# Patient Record
Sex: FEMALE | Race: BLACK OR AFRICAN AMERICAN | Employment: OTHER | URBAN - METROPOLITAN AREA
[De-identification: names, ages, dates, MRNs, and addresses within clinical notes are randomized per-mention and may not be internally consistent; named-entity substitution may affect disease eponyms.]

---

## 2017-01-31 ENCOUNTER — OFFICE VISIT (OUTPATIENT)
Dept: ORTHOPEDIC SURGERY | Age: 62
End: 2017-01-31

## 2017-01-31 ENCOUNTER — ANESTHESIA EVENT (OUTPATIENT)
Dept: ENDOSCOPY | Age: 62
End: 2017-01-31
Payer: COMMERCIAL

## 2017-01-31 VITALS
DIASTOLIC BLOOD PRESSURE: 60 MMHG | SYSTOLIC BLOOD PRESSURE: 143 MMHG | TEMPERATURE: 98 F | WEIGHT: 203 LBS | RESPIRATION RATE: 18 BRPM | OXYGEN SATURATION: 99 % | HEART RATE: 80 BPM | BODY MASS INDEX: 34.66 KG/M2 | HEIGHT: 64 IN

## 2017-01-31 DIAGNOSIS — M54.16 LUMBAR RADICULOPATHY: ICD-10-CM

## 2017-01-31 DIAGNOSIS — M79.18 MYOFASCIAL PAIN: ICD-10-CM

## 2017-01-31 DIAGNOSIS — M47.899 FACET SYNDROME: Primary | ICD-10-CM

## 2017-01-31 RX ORDER — IBUPROFEN 800 MG/1
TABLET ORAL
COMMUNITY
End: 2017-12-20

## 2017-01-31 NOTE — PROGRESS NOTES
[address]  [address], [address]  [place], [facility]  Phone: [phone]  Fax: [phone]        Mary Chavarria  : 1955  PCP: Henrique Da Silva MD  2017    PROGRESS NOTE      ASSESSMENT AND PLAN    Debo Davis comes in to the office today for a bilateral L4-5 and L5-S1 facet joint injection f/u. She has not had the facet injections completed. Her pain still appears to be due to a combination of facet syndrome, myofascial pain, and a right L5 radiculopathy. She will continue with the facet injections to see if it will provide relief. Pt was advised to continue her HEP. We discussed treating the other components of her pain if she finds relief for her facet syndrome with the injections. Pt will f/u in 3 weeks or prn. Nick Plascencia was seen today for back pain. Diagnoses and all orders for this visit:    Facet syndrome    Myofascial pain    Lumbar radiculopathy       Follow-up Disposition:  Return in about 3 weeks (around 2017), or if symptoms worsen or fail to improve. HISTORY OF PRESENT ILLNESS  Debo Davis is a 59 y.o. female. A&P / HPI from 2016:  Hector Puga was in the office today c/o lumbar pain. Her pain is likely due to myofascial pain syndrome. She has been in PT for about 4 weeks with mild relief. Pt will continue with PT at this time. She will continue taking Naproxen and Flexeril.      She was advised to increase the amount that she does her home exercises each day, stay active, and to use heat and massage as needed.        Pt will f/u in 6 weeks, or prn.       Updates from 16:  Pt presents for a lumbar PT f/u. She states that her pain has remained about the same, but she does mention that it has been a few weeks since she has gone and she does not do her exercises at home very often.  She has six PT visits left for the year.      Her pain today is predominantly in the right lumbar region.     Updates from 10/21/16:  Pt presents for a lumbar MRI f/u. The MRI shows some facet hypertrophy at L4-5 and L5-S1, a posterior L5-S1 disc bulge, and mild contact/compression of the bilateral L5 nerve roots.     She has pain with right back extension. She has a negative straight leg raise bilaterally. Pt mentions pain with standing for too long. She also has radiating right leg pain. Updates from 01/31/17:  Pt presents for a bilateral L4-5 and L5-S1 facet joint injection f/u. She has not had the facet injections completed. Her pain still appears to be due to facet syndrome. PAST MEDICAL HISTORY   Past Medical History   Diagnosis Date    Gastritis     GERD (gastroesophageal reflux disease)      erosive esophagitis 11/13 EGD    H/O colonoscopy 11/2013     normal    Hypertension     Left knee pain     Precordial pain     Prediabetes        Past Surgical History   Procedure Laterality Date    Hx gyn       vaginal delivery x 2    Hx orthopaedic       foot surgery    Hx cataract removal Right 10/04/2016    Hx colonoscopy  11/06/2016   . MEDICATIONS      Current Outpatient Prescriptions   Medication Sig Dispense Refill    naproxen (NAPROSYN) 500 mg tablet Take 500 mg by mouth two (2) times daily (with meals).  omeprazole (PRILOSEC) 20 mg capsule Take 20 mg by mouth daily.  cyclobenzaprine (FLEXERIL) 5 mg tablet Take 1 Tab by mouth nightly as needed for Muscle Spasm(s). 20 Tab 0    LORATADINE/PSEUDOEPHEDRINE (CLARITIN-D 24 HOUR PO) Take  by mouth.  fluticasone (FLONASE) 50 mcg/actuation nasal spray 2 sprays each nostril once daily (Patient taking differently: daily as needed. 2 sprays each nostril once daily) 1 Bottle 3    amLODIPine-valsartan (EXFORGE) 5-160 mg per tablet TAKE 1 TABLET DAILY 90 Tab 1    omega-3 fatty acids-vitamin e (FISH OIL) 1,000 mg cap Take 1 Cap by mouth.  multivitamin (ONE A DAY) tablet Take 1 Tab by mouth daily.       diclofenac EC (VOLTAREN) 75 mg EC tablet Take 1 Tab by mouth two (2) times daily as needed. 60 Tab 5    ibuprofen (MOTRIN) 800 mg tablet Take  by mouth. ALLERGIES    Allergies   Allergen Reactions    Pcn [Penicillins] Hives          SOCIAL HISTORY    Social History     Social History    Marital status:      Spouse name: N/A    Number of children: N/A    Years of education: N/A     Social History Main Topics    Smoking status: Never Smoker    Smokeless tobacco: Never Used    Alcohol use No    Drug use: No    Sexual activity: Not Asked     Other Topics Concern    None     Social History Narrative       FAMILY HISTORY    Family History   Problem Relation Age of Onset    Heart Failure Mother     Heart Disease Mother      mi 52's    Asthma Other          REVIEW OF SYSTEMS  Review of Systems   Constitutional: Negative for chills, diaphoresis, fever, malaise/fatigue and weight loss. Respiratory: Negative for shortness of breath. Cardiovascular: Negative for chest pain and leg swelling. Gastrointestinal: Negative for constipation, nausea and vomiting. Neurological: Negative for dizziness, tingling, seizures, loss of consciousness and headaches. Psychiatric/Behavioral: The patient does not have insomnia. PHYSICAL EXAMINATION  Visit Vitals    /60    Pulse 80    Temp 98 °F (36.7 °C) (Oral)    Resp 18    Ht 5' 3.5\" (1.613 m)    Wt 203 lb (92.1 kg)    SpO2 99%    BMI 35.4 kg/m2       Pain Assessment  10/21/2016   Location of Pain Back   Location Modifiers -   Severity of Pain 4   Quality of Pain Aching   Duration of Pain Persistent   Frequency of Pain Constant   Aggravating Factors -   Aggravating Factors Comment -   Limiting Behavior -   Relieving Factors -   Relieving Factors Comment -   Result of Injury No           Constitutional:  Well developed, well nourished, in no acute distress. Psychiatric: Affect and mood are appropriate. Integumentary: No rashes or abrasions noted on exposed areas. SPINE/MUSCULOSKELETAL EXAM    Cervical spine:  Neck is midline.    Normal muscle tone.    No focal atrophy is noted.    ROM pain free.    Shoulder ROM intact.    No tenderness to palpation. Negative Spurling's sign.    Negative Tinel's sign.    Negative Guevara's sign.       Sensation in the bilateral arms grossly intact to light touch.        Lumbar spine:  No rash, ecchymosis, or gross obliquity.    No fasciculations.    No focal atrophy is noted.    No pain with hip ROM.    Range of motion is normal.    Tenderness to palpation on right QL. No tenderness to palpation at the sciatic notch.    SI joints non-tender.    Trochanters non tender.      Sensation in the bilateral legs grossly intact to light touch. Updates from 01/31/17:  Pain with back extension, L=M=R. No pain with back flexion. MOTOR:      Biceps  Triceps Deltoids Wrist Ext Wrist Flex Hand Intrin   Right 5/5 5/5 5/5 5/5 5/5 5/5   Left 5/5 5/5 5/5 5/5 5/5 5/5             Hip Flex  Quads Hamstrings Ankle DF EHL Ankle PF   Right 5/5 5/5 5/5 5/5 5/5 5/5   Left 5/5 5/5 5/5 5/5 5/5 5/5     DTRs are 2+ biceps, triceps, brachioradialis, patella, and Achilles.      Negative Straight Leg raise.    Squat not tested.    No difficulty with tandem gait.    Normal heel walk.    Normal toe rise.       Ambulation without assistive device. FWB.       RADIOGRAPHS  Lumbar XR images from 7/11/2016 personally reviewed with patient:  1) Good overall alignment  2) No obvious fractures or instabilities    3) Degenerative changes     Lumbar MRI images taken on 10/18/2016 personally reviewed with patient:  Mild straightening of lumbar lordosis with no significant spondylolisthesis. No  compression fracture or pathologic marrow signal. Conus medullaris ends at L1-L2  with normal morphology and signal intensity.      High T2 signal lesions in the right kidney and in the left kidney, partially  imaged.  Likely cysts and can follow-up with CT or ultrasound.      L1-L2: Minimal posterior disc bulge with tiny right paracentral disc protrusion. No central stenosis or significant mass effect. No foraminal stenosis.      L2-L3, L3-L4: No disc herniation or central stenosis. Mild facet and ligamentous  hypertrophy. Patent neural foramina.      L4-L5: Minimal posterior disc bulge. Facet ligamentous hypertrophy with small  bilateral facet joint effusion. Mild left posterior lateral disc protrusion. Mild effacement of left lateral recess. Mild compression of the left descending  L5 nerve root possible. Patent neural foramina with no exiting L4 root  compression.      L5-S1: Posterior disc bulge. No significant central stenosis. Facet and  ligamentous hypertrophy with no significant foraminal stenosis. Disc material  slightly contacts the right exiting L5 nerve root within the right foramen.      IMPRESSION  IMPRESSION:  1. Mild disease at L4-L5 and L5-S1 as described. 2. Presumed cysts in the kidneys can be further followed up with CT or  ultrasound.     Daphney Luis   reviewed      Ms. Marya Shone has a reminder for a \"due or due soon\" health maintenance. I have asked that she contact her primary care provider for follow-up on this health maintenance.       This plan was reviewed with the patient and patient agrees. All questions were answered. More than half of this visit today was spent on counseling.     Written by Yana Rivero, as dictated by Dr. Horace Cedeno, Dr. Brien Mary, confirm that all documentation is accurate.

## 2017-01-31 NOTE — PATIENT INSTRUCTIONS
Riskalyze Activation    Thank you for requesting access to [employer]ze. Please follow the instructions below to securely access and download your online medical record. Riskalyze allows you to send messages to your doctor, view your test results, renew your prescriptions, schedule appointments, and more. How Do I Sign Up? 1. In your internet browser, go to [email or web address]  2. Click on the First Time User? Click Here link in the Sign In box. You will be redirect to the New Member Sign Up page. 3. Enter your Riskalyze Access Code exactly as it appears below. You will not need to use this code after youve completed the sign-up process. If you do not sign up before the expiration date, you must request a new code. Riskalyze Access Code: [email or web address]  Expires: 2017  9:36 AM (This is the date your Riskalyze access code will )    4. Enter the last four digits of your Social Security Number (xxxx) and Date of Birth (mm/dd/yyyy) as indicated and click Submit. You will be taken to the next sign-up page. 5. Create a Riskalyze ID. This will be your Riskalyze login ID and cannot be changed, so think of one that is secure and easy to remember. 6. Create a Riskalyze password. You can change your password at any time. 7. Enter your Password Reset Question and Answer. This can be used at a later time if you forget your password. 8. Enter your e-mail address. You will receive e-mail notification when new information is available in [address] 9. Click Sign Up. You can now view and download portions of your medical record. 10. Click the Download Summary menu link to download a portable copy of your medical information. Additional Information    If you have questions, please visit the Frequently Asked Questions section of the Riskalyze website at https://for; to (do) Centers. [employer]. [employer] Daily Caller/[employer] Home [employer]/. Remember, Riskalyze is NOT to be used for urgent needs. For medical emergencies, dial 911.          Low Back Pain: Exercises  Your Care Instructions  Here are some examples of typical rehabilitation exercises for your condition. Start each exercise slowly. Ease off the exercise if you start to have pain. Your doctor or physical therapist will tell you when you can start these exercises and which ones will work best for you. How to do the exercises  Press-up    1. Lie on your stomach, supporting your body with your forearms. 2. Press your elbows down into the floor to raise your upper back. As you do this, relax your stomach muscles and allow your back to arch without using your back muscles. As your press up, do not let your hips or pelvis come off the floor. 3. Hold for 15 to 30 seconds, then relax. 4. Repeat 2 to 4 times. Alternate arm and leg (bird dog) exercise    Note: Do this exercise slowly. Try to keep your body straight at all times, and do not let one hip drop lower than the other. 1. Start on the floor, on your hands and knees. 2. Tighten your belly muscles. 3. Raise one leg off the floor, and hold it straight out behind you. Be careful not to let your hip drop down, because that will twist your trunk. 4. Hold for about 6 seconds, then lower your leg and switch to the other leg. 5. Repeat 8 to 12 times on each leg. 6. Over time, work up to holding for 10 to 30 seconds each time. 7. If you feel stable and secure with your leg raised, try raising the opposite arm straight out in front of you at the same time. Knee-to-chest exercise    1. Lie on your back with your knees bent and your feet flat on the floor. 2. Bring one knee to your chest, keeping the other foot flat on the floor (or keeping the other leg straight, whichever feels better on your lower back). 3. Keep your lower back pressed to the floor. Hold for at least 15 to 30 seconds. 4. Relax, and lower the knee to the starting position. 5. Repeat with the other leg. Repeat 2 to 4 times with each leg.   6. To get more stretch, put your other leg flat on the floor while pulling your knee to your chest.  Curl-ups    1. Lie on the floor on your back with your knees bent at a 90-degree angle. Your feet should be flat on the floor, about 12 inches from your buttocks. 2. Cross your arms over your chest. If this bothers your neck, try putting your hands behind your neck (not your head), with your elbows spread apart. 3. Slowly tighten your belly muscles and raise your shoulder blades off the floor. 4. Keep your head in line with your body, and do not press your chin to your chest.  5. Hold this position for 1 or 2 seconds, then slowly lower yourself back down to the floor. 6. Repeat 8 to 12 times. Pelvic tilt exercise    1. Lie on your back with your knees bent. 2. \"Brace\" your stomach. This means to tighten your muscles by pulling in and imagining your belly button moving toward your spine. You should feel like your back is pressing to the floor and your hips and pelvis are rocking back. 3. Hold for about 6 seconds while you breathe smoothly. 4. Repeat 8 to 12 times. Heel dig bridging    1. Lie on your back with both knees bent and your ankles bent so that only your heels are digging into the floor. Your knees should be bent about 90 degrees. 2. Then push your heels into the floor, squeeze your buttocks, and lift your hips off the floor until your shoulders, hips, and knees are all in a straight line. 3. Hold for about 6 seconds as you continue to breathe normally, and then slowly lower your hips back down to the floor and rest for up to 10 seconds. 4. Do 8 to 12 repetitions. Hamstring stretch in doorway    1. Lie on your back in a doorway, with one leg through the open door. 2. Slide your leg up the wall to straighten your knee. You should feel a gentle stretch down the back of your leg. 3. Hold the stretch for at least 15 to 30 seconds. Do not arch your back, point your toes, or bend either knee.  Keep one heel touching the floor and the other heel touching the wall.  4. Repeat with your other leg. 5. Do 2 to 4 times for each leg. Hip flexor stretch    1. Kneel on the floor with one knee bent and one leg behind you. Place your forward knee over your foot. Keep your other knee touching the floor. 2. Slowly push your hips forward until you feel a stretch in the upper thigh of your rear leg. 3. Hold the stretch for at least 15 to 30 seconds. Repeat with your other leg. 4. Do 2 to 4 times on each side. Wall sit    1. Stand with your back 10 to 12 inches away from a wall. 2. Lean into the wall until your back is flat against it. 3. Slowly slide down until your knees are slightly bent, pressing your lower back into the wall. 4. Hold for about 6 seconds, then slide back up the wall. 5. Repeat 8 to 12 times. Follow-up care is a key part of your treatment and safety. Be sure to make and go to all appointments, and call your doctor if you are having problems. It's also a good idea to know your test results and keep a list of the medicines you take. Where can you learn more? Go to http://carlos eduardo-turner.info/. Enter R021 in the search box to learn more about \"Low Back Pain: Exercises. \"  Current as of: May 23, 2016  Content Version: 11.1  © 4176-9952 [employer]. Care instructions adapted under license by [employer] (which disclaims liability or warranty for this information). If you have questions about a medical condition or this instruction, always ask your healthcare professional. [place] [address] any warranty or liability for your use of this information.

## 2017-02-01 ENCOUNTER — ANESTHESIA (OUTPATIENT)
Dept: ENDOSCOPY | Age: 62
End: 2017-02-01
Payer: COMMERCIAL

## 2017-02-01 ENCOUNTER — SURGERY (OUTPATIENT)
Age: 62
End: 2017-02-01

## 2017-02-01 PROCEDURE — 74011000250 HC RX REV CODE- 250

## 2017-02-01 PROCEDURE — 74011250636 HC RX REV CODE- 250/636

## 2017-02-01 RX ORDER — LIDOCAINE HYDROCHLORIDE 20 MG/ML
INJECTION, SOLUTION EPIDURAL; INFILTRATION; INTRACAUDAL; PERINEURAL AS NEEDED
Status: DISCONTINUED | OUTPATIENT
Start: 2017-02-01 | End: 2017-02-01 | Stop reason: HOSPADM

## 2017-02-01 RX ORDER — PROPOFOL 10 MG/ML
INJECTION, EMULSION INTRAVENOUS AS NEEDED
Status: DISCONTINUED | OUTPATIENT
Start: 2017-02-01 | End: 2017-02-01 | Stop reason: HOSPADM

## 2017-02-01 RX ADMIN — LIDOCAINE HYDROCHLORIDE 40 MG: 20 INJECTION, SOLUTION EPIDURAL; INFILTRATION; INTRACAUDAL; PERINEURAL at 09:35

## 2017-02-01 RX ADMIN — PROPOFOL 50 MG: 10 INJECTION, EMULSION INTRAVENOUS at 09:35

## 2017-02-01 NOTE — ANESTHESIA PREPROCEDURE EVALUATION
Anesthetic History   No history of anesthetic complications            Review of Systems / Medical History  Patient summary reviewed and pertinent labs reviewed    Pulmonary  Within defined limits                 Neuro/Psych              Cardiovascular    Hypertension: well controlled              Exercise tolerance: >4 METS     GI/Hepatic/Renal     GERD           Endo/Other    Diabetes (prediabetic)    Morbid obesity     Other Findings   Comments: Current Smoker? NO       Elective Surgery? Yes       Abstained from smoking 24 hours prior to anesthesia? N/A    Risk Factors for Postoperative nausea/vomiting:       History of postoperative nausea/vomiting? NO       Female? YES       Motion sickness? NO       Intended opioid administration for postoperative analgesia?   NO           Physical Exam    Airway  Mallampati: II  TM Distance: 4 - 6 cm  Neck ROM: normal range of motion   Mouth opening: Normal     Cardiovascular  Regular rate and rhythm,  S1 and S2 normal,  no murmur, click, rub, or gallop             Dental  No notable dental hx       Pulmonary  Breath sounds clear to auscultation               Abdominal  GI exam deferred       Other Findings            Anesthetic Plan    ASA: 2  Anesthesia type: MAC          Induction: Intravenous  Anesthetic plan and risks discussed with: Patient

## 2017-02-01 NOTE — ANESTHESIA POSTPROCEDURE EVALUATION
Post-Anesthesia Evaluation and Assessment    Patient: Heidy Kaur MRN: [id number]  SSN: [id number]    YOB: 1955  Age: 59 y.o. Sex: female       Cardiovascular Function/Vital Signs  Visit Vitals    /78    Pulse (!) 50    Temp 36.4 °C (97.6 °F)    Resp 15    Ht 5' 3.5\" (1.613 m)    Wt 93 kg (205 lb)    SpO2 99%    Breastfeeding No    BMI 35.74 kg/m2       Patient is status post MAC anesthesia for Procedure(s):  ENDOSCOPY with biopsies. Nausea/Vomiting: None    Postoperative hydration reviewed and adequate. Pain:  Pain Scale 1: Numeric (0 - 10) (02/01/17 1018)  Pain Intensity 1: 0 (02/01/17 1018)   Managed    Neurological Status: At baseline    Mental Status and Level of Consciousness: Arousable    Pulmonary Status:   O2 Device: Room air (02/01/17 1018)   Adequate oxygenation and airway patent    Complications related to anesthesia: None    Post-anesthesia assessment completed.  No concerns      Signed By: Kourtney Kuo MD     February 1, 2017

## 2017-02-27 DIAGNOSIS — G89.29 CHRONIC RIGHT-SIDED LOW BACK PAIN, WITH SCIATICA PRESENCE UNSPECIFIED: ICD-10-CM

## 2017-02-27 DIAGNOSIS — M54.5 CHRONIC RIGHT-SIDED LOW BACK PAIN, WITH SCIATICA PRESENCE UNSPECIFIED: ICD-10-CM

## 2017-02-27 NOTE — TELEPHONE ENCOUNTER
This patient contacted office for the following prescriptions to be filled:    Medication requested :   Requested Prescriptions     Pending Prescriptions Disp Refills    cyclobenzaprine (FLEXERIL) 5 mg tablet 20 Tab 0     Sig: Take 1 Tab by mouth nightly as needed for Muscle Spasm(s).        PCP: [address] [place] [address] Street or Print: pharmacy  Mail order or Local pharmacy: [facility]     Scheduled appointment if not seen by current providers in office: last seen on 12/12/16 has fu on 4/24/17

## 2017-02-28 ENCOUNTER — TELEPHONE (OUTPATIENT)
Dept: ORTHOPEDIC SURGERY | Age: 62
End: 2017-02-28

## 2017-02-28 DIAGNOSIS — M54.5 CHRONIC RIGHT-SIDED LOW BACK PAIN, WITH SCIATICA PRESENCE UNSPECIFIED: ICD-10-CM

## 2017-02-28 DIAGNOSIS — G89.29 CHRONIC RIGHT-SIDED LOW BACK PAIN, WITH SCIATICA PRESENCE UNSPECIFIED: ICD-10-CM

## 2017-02-28 RX ORDER — CYCLOBENZAPRINE HCL 5 MG
5 TABLET ORAL
Qty: 20 TAB | Refills: 0 | OUTPATIENT
Start: 2017-02-28

## 2017-02-28 NOTE — TELEPHONE ENCOUNTER
PATIENT CALLED FOR . PATIENT SAID SHE ASKED HER PCP FOR A REFILL ON A MUSCLE SPASM MEDICATION; BUT HER PCP TOLD HER THAT SHE NEEDED TO CALL HER SPINE DOCTOR TO REQUEST THE MEDICATION. PATIENT IS ASKING FOR CYCLOBENZAPRINE MEDICATION FROM DR. ROMERO. WOULD LIKE CALLED IN  Juani Novak   TEL. [phone]. PATIENT TEL. [phone].

## 2017-03-01 RX ORDER — CYCLOBENZAPRINE HCL 5 MG
5 TABLET ORAL
Qty: 20 TAB | Refills: 0 | Status: SHIPPED | OUTPATIENT
Start: 2017-03-01 | End: 2017-07-20 | Stop reason: SDUPTHER

## 2017-03-01 NOTE — TELEPHONE ENCOUNTER
Per Dr. Radu Jorgensen, med refilled as before, sent to [address] on file. Attempted to call patient and number below to inform, phone rang numerous times, no answer, no voicemail or answering machine picked up. Will try again later.

## 2017-03-01 NOTE — TELEPHONE ENCOUNTER
Reached patient, informed of message below. Patient very grateful, verbalized agreement/understanding. No further action required at this time.

## 2017-04-10 ENCOUNTER — HOSPITAL ENCOUNTER (OUTPATIENT)
Dept: LAB | Age: 62
Discharge: HOME | End: 2017-04-10
Payer: COMMERCIAL

## 2017-04-10 LAB
ALBUMIN SERPL BCP-MCNC: 3.8 G/DL (ref 3.4–5)
ALBUMIN/GLOB SERPL: 1.4 {RATIO} (ref 0.8–1.7)
ALP SERPL-CCNC: 72 U/L (ref 45–117)
ALT SERPL-CCNC: 38 U/L (ref 13–56)
ANION GAP BLD CALC-SCNC: 10 MMOL/L (ref 3–18)
AST SERPL W P-5'-P-CCNC: 28 U/L (ref 15–37)
BILIRUB SERPL-MCNC: 0.6 MG/DL (ref 0.2–1)
BUN SERPL-MCNC: 17 MG/DL (ref 7–18)
BUN/CREAT SERPL: 24 (ref 12–20)
CALCIUM SERPL-MCNC: 8.7 MG/DL (ref 8.5–10.1)
CHLORIDE SERPL-SCNC: 108 MMOL/L (ref 100–108)
CO2 SERPL-SCNC: 26 MMOL/L (ref 21–32)
CREAT SERPL-MCNC: 0.71 MG/DL (ref 0.6–1.3)
GLOBULIN SER CALC-MCNC: 2.8 G/DL (ref 2–4)
GLUCOSE SERPL-MCNC: 115 MG/DL (ref 74–99)
HBA1C MFR BLD: 6.3 % (ref 4.2–5.6)
POTASSIUM SERPL-SCNC: 3.8 MMOL/L (ref 3.5–5.5)
PROT SERPL-MCNC: 6.6 G/DL (ref 6.4–8.2)
SODIUM SERPL-SCNC: 144 MMOL/L (ref 136–145)

## 2017-04-10 PROCEDURE — 83036 HEMOGLOBIN GLYCOSYLATED A1C: CPT | Performed by: FAMILY MEDICINE

## 2017-04-10 PROCEDURE — 80053 COMPREHEN METABOLIC PANEL: CPT | Performed by: FAMILY MEDICINE

## 2017-04-10 PROCEDURE — 36415 COLL VENOUS BLD VENIPUNCTURE: CPT | Performed by: FAMILY MEDICINE

## 2017-04-10 RX ORDER — AMLODIPINE AND VALSARTAN 5; 160 MG/1; MG/1
TABLET ORAL
Qty: 90 TAB | Refills: 0 | Status: SHIPPED | OUTPATIENT
Start: 2017-04-10 | End: 2017-07-09 | Stop reason: SDUPTHER

## 2017-04-11 ENCOUNTER — HOSPITAL ENCOUNTER (OUTPATIENT)
Dept: MAMMOGRAPHY | Age: 62
Discharge: HOME | End: 2017-04-11
Attending: FAMILY MEDICINE
Payer: COMMERCIAL

## 2017-04-11 DIAGNOSIS — Z12.31 VISIT FOR SCREENING MAMMOGRAM: ICD-10-CM

## 2017-04-11 DIAGNOSIS — R92.2 INCONCLUSIVE MAMMOGRAM: Primary | ICD-10-CM

## 2017-04-11 PROCEDURE — 77063 BREAST TOMOSYNTHESIS BI: CPT

## 2017-04-21 ENCOUNTER — HOSPITAL ENCOUNTER (OUTPATIENT)
Dept: ULTRASOUND IMAGING | Age: 62
Discharge: HOME | End: 2017-04-21
Attending: FAMILY MEDICINE
Payer: COMMERCIAL

## 2017-04-21 ENCOUNTER — HOSPITAL ENCOUNTER (OUTPATIENT)
Dept: MAMMOGRAPHY | Age: 62
Discharge: HOME | End: 2017-04-21
Attending: FAMILY MEDICINE
Payer: COMMERCIAL

## 2017-04-21 DIAGNOSIS — R92.2 INCONCLUSIVE MAMMOGRAM: ICD-10-CM

## 2017-04-21 PROCEDURE — 77065 DX MAMMO INCL CAD UNI: CPT

## 2017-04-24 ENCOUNTER — OFFICE VISIT (OUTPATIENT)
Dept: FAMILY MEDICINE CLINIC | Age: 62
End: 2017-04-24

## 2017-04-24 VITALS
TEMPERATURE: 97.9 F | SYSTOLIC BLOOD PRESSURE: 144 MMHG | RESPIRATION RATE: 16 BRPM | HEART RATE: 68 BPM | HEIGHT: 64 IN | DIASTOLIC BLOOD PRESSURE: 96 MMHG | BODY MASS INDEX: 34.83 KG/M2 | OXYGEN SATURATION: 96 % | WEIGHT: 204 LBS

## 2017-04-24 DIAGNOSIS — R73.03 PREDIABETES: ICD-10-CM

## 2017-04-24 DIAGNOSIS — K21.9 GASTROESOPHAGEAL REFLUX DISEASE, ESOPHAGITIS PRESENCE NOT SPECIFIED: ICD-10-CM

## 2017-04-24 DIAGNOSIS — I10 ESSENTIAL HYPERTENSION: Primary | ICD-10-CM

## 2017-04-24 NOTE — PROGRESS NOTES
Chief Complaint   Patient presents with    Hypertension    Other     Pre-DM    GERD    Results     1. Have you been to the ER, urgent care clinic since your last visit? Hospitalized since your last visit? No    2. Have you seen or consulted any other health care providers outside of the [facility] since your last visit? Include any pap smears or colon screening.  No

## 2017-04-24 NOTE — MR AVS SNAPSHOT
Visit Information Date & Time Provider Department Dept. Phone Encounter #  
 4/24/2017  8:00 AM Ophelia Skiff, [address] [id number] Follow-up Instructions Return in about 3 months (around 7/24/2017), or if symptoms worsen or fail to improve. Upcoming Health Maintenance Date Due  
 BREAST CANCER SCRN MAMMOGRAM 4/21/2019 PAP AKA CERVICAL CYTOLOGY 10/20/2019 DTaP/Tdap/Td series (2 - Td) 2/25/2023 COLONOSCOPY 12/24/2023 Allergies as of 4/24/2017  Review Complete On: 4/24/2017 By: Ophelia Skiff, MD  
  
 Severity Noted Reaction Type Reactions Pcn [Penicillins]  07/26/2010    Hives Current Immunizations  Reviewed on 10/20/2016 Name Date Influenza Vaccine 10/9/2014 Influenza Vaccine César Camps) 10/20/2016 Influenza Vaccine (Quad) PF 10/19/2015 Influenza Vaccine Split 9/21/2011 Tdap 2/25/2013 Not reviewed this visit You Were Diagnosed With   
  
 Codes Comments Essential hypertension    -  Primary ICD-10-CM: I10 
ICD-9-CM: 401.9 Prediabetes     ICD-10-CM: R73.03 
ICD-9-CM: 790.29 Gastroesophageal reflux disease, esophagitis presence not specified     ICD-10-CM: K21.9 ICD-9-CM: 530.81 Vitals BP Pulse Temp Resp Height(growth percentile) Weight(growth percentile) (!) 144/96 (BP 1 Location: Left arm, BP Patient Position: Sitting) 68 97.9 °F (36.6 °C) (Oral) 16 5' 3.5\" (1.613 m) 204 lb (92.5 kg) SpO2 BMI OB Status Smoking Status 96% 35.57 kg/m2 Postmenopausal Never Smoker BMI and BSA Data Body Mass Index Body Surface Area 35.57 kg/m 2 2.04 m 2 Preferred Pharmacy Pharmacy Name Phone [facility] [phone] Your Updated Medication List  
  
   
This list is accurate as of: 4/24/17  8:48 AM.  Always use your most recent med list. amLODIPine-valsartan 5-160 mg per tablet Commonly known as:  EXFORGE  
TAKE 1 TABLET DAILY CLARITIN-D 24 HOUR PO Take  by mouth. cyclobenzaprine 5 mg tablet Commonly known as:  FLEXERIL Take 1 Tab by mouth nightly as needed for Muscle Spasm(s). diclofenac EC 75 mg EC tablet Commonly known as:  VOLTAREN Take 1 Tab by mouth two (2) times daily as needed. FISH OIL 1,000 mg Cap Generic drug:  omega-3 fatty acids-vitamin e Take 1 Cap by mouth. fluticasone 50 mcg/actuation nasal spray Commonly known as:  FLONASE  
2 sprays each nostril once daily  
  
 ibuprofen 800 mg tablet Commonly known as:  MOTRIN Take  by mouth.  
  
 multivitamin tablet Commonly known as:  ONE A DAY Take 1 Tab by mouth daily. NAPROSYN 500 mg tablet Generic drug:  naproxen Take 500 mg by mouth two (2) times daily (with meals). omeprazole 20 mg capsule Commonly known as:  PRILOSEC Take 20 mg by mouth daily. Follow-up Instructions Return in about 3 months (around 7/24/2017), or if symptoms worsen or fail to improve. To-Do List   
 04/24/2017 Lab:  HEMOGLOBIN A1C W/O EAG   
  
 04/24/2017 Lab:  METABOLIC PANEL, BASIC Patient Instructions DASH Diet: Care Instructions Your Care Instructions The DASH diet is an eating plan that can help lower your blood pressure. DASH stands for Dietary Approaches to Stop Hypertension. Hypertension is high blood pressure. The DASH diet focuses on eating foods that are high in calcium, potassium, and magnesium. These nutrients can lower blood pressure. The foods that are highest in these nutrients are fruits, vegetables, low-fat dairy products, nuts, seeds, and legumes. But taking calcium, potassium, and magnesium supplements instead of eating foods that are high in those nutrients does not have the same effect. The DASH diet also includes whole grains, fish, and poultry.  
The DASH diet is one of several lifestyle changes your doctor may recommend to lower your high blood pressure. Your doctor may also want you to decrease the amount of sodium in your diet. Lowering sodium while following the DASH diet can lower blood pressure even further than just the DASH diet alone. Follow-up care is a key part of your treatment and safety. Be sure to make and go to all appointments, and call your doctor if you are having problems. It's also a good idea to know your test results and keep a list of the medicines you take. How can you care for yourself at home? Following the DASH diet · Eat 4 to 5 servings of fruit each day. A serving is 1 medium-sized piece of fruit, ½ cup chopped or canned fruit, 1/4 cup dried fruit, or 4 ounces (½ cup) of fruit juice. Choose fruit more often than fruit juice. · Eat 4 to 5 servings of vegetables each day. A serving is 1 cup of lettuce or raw leafy vegetables, ½ cup of chopped or cooked vegetables, or 4 ounces (½ cup) of vegetable juice. Choose vegetables more often than vegetable juice. · Get 2 to 3 servings of low-fat and fat-free dairy each day. A serving is 8 ounces of milk, 1 cup of yogurt, or 1 ½ ounces of cheese. · Eat 6 to 8 servings of grains each day. A serving is 1 slice of bread, 1 ounce of dry cereal, or ½ cup of cooked rice, pasta, or cooked cereal. Try to choose whole-grain products as much as possible. · Limit lean meat, poultry, and fish to 2 servings each day. A serving is 3 ounces, about the size of a deck of cards. · Eat 4 to 5 servings of nuts, seeds, and legumes (cooked dried beans, lentils, and split peas) each week. A serving is 1/3 cup of nuts, 2 tablespoons of seeds, or ½ cup of cooked beans or peas. · Limit fats and oils to 2 to 3 servings each day. A serving is 1 teaspoon of vegetable oil or 2 tablespoons of salad dressing. · Limit sweets and added sugars to 5 servings or less a week. A serving is 1 tablespoon jelly or jam, ½ cup sorbet, or 1 cup of lemonade. · Eat less than 2,300 milligrams (mg) of sodium a day. If you limit your sodium to 1,500 mg a day, you can lower your blood pressure even more. Tips for success · Start small. Do not try to make dramatic changes to your diet all at once. You might feel that you are missing out on your favorite foods and then be more likely to not follow the plan. Make small changes, and stick with them. Once those changes become habit, add a few more changes. · Try some of the following: ¨ Make it a goal to eat a fruit or vegetable at every meal and at snacks. This will make it easy to get the recommended amount of fruits and vegetables each day. ¨ Try yogurt topped with fruit and nuts for a snack or healthy dessert. ¨ Add lettuce, tomato, cucumber, and onion to sandwiches. ¨ Combine a ready-made pizza crust with low-fat mozzarella cheese and lots of vegetable toppings. Try using tomatoes, squash, spinach, broccoli, carrots, cauliflower, and onions. ¨ Have a variety of cut-up vegetables with a low-fat dip as an appetizer instead of chips and dip. ¨ Sprinkle sunflower seeds or chopped almonds over salads. Or try adding chopped walnuts or almonds to cooked vegetables. ¨ Try some vegetarian meals using beans and peas. Add garbanzo or kidney beans to salads. Make burritos and tacos with mashed valdez beans or black beans. Where can you learn more? Go to http://carlos eduardo-turner.info/. Enter W008 in the search box to learn more about \"DASH Diet: Care Instructions. \" Current as of: March 23, 2016 Content Version: 11.2 © 8409-4661 MedioTrabajo. Care instructions adapted under license by SeeSaw.[employer] (which disclaims liability or warranty for this information). If you have questions about a medical condition or this instruction, always ask your healthcare professional. [address] any warranty or liability for your use of this information. Introducing [facility]! Armando Warner introduces CaseStack patient portal. Now you can access parts of your medical record, email your doctor's office, and request medication refills online. 1. In your internet browser, go to [email or web address] 2. Click on the First Time User? Click Here link in the Sign In box. You will see the New Member Sign Up page. 3. Enter your CaseStack Access Code exactly as it appears below. You will not need to use this code after youve completed the sign-up process. If you do not sign up before the expiration date, you must request a new code. · CaseStack Access Code: [email or web address] Expires: 4/30/2017 10:36 AM 
 
4. Enter the last four digits of your Social Security Number (xxxx) and Date of Birth (mm/dd/yyyy) as indicated and click Submit. You will be taken to the next sign-up page. 5. Create a CaseStack ID. This will be your CaseStack login ID and cannot be changed, so think of one that is secure and easy to remember. 6. Create a CaseStack password. You can change your password at any time. 7. Enter your Password Reset Question and Answer. This can be used at a later time if you forget your password. 8. Enter your e-mail address. You will receive e-mail notification when new information is available in [address] 9. Click Sign Up. You can now view and download portions of your medical record. 10. Click the Download Summary menu link to download a portable copy of your medical information. If you have questions, please visit the Frequently Asked Questions section of the CaseStack website. Remember, CaseStack is NOT to be used for urgent needs. For medical emergencies, dial 911. Now available from your iPhone and Android! Please provide this summary of care documentation to your next provider. Your primary care clinician is listed as Marquis Sharp. If you have any questions after today's visit, please call [phone].

## 2017-04-24 NOTE — PROGRESS NOTES
Sary Friends, 59 y.o.,  female    SUBJECTIVE  Routine ff-up    HTN- taking meds later in the day, no symptoms. Did not take BP meds this am yet, and drank coffee    GERD- doing well on PPI, per pt recent EGD GLS normal, will request records    Chronic low back pain/ Myofascial pain- followed by dr. Angel Diallo, considering facet injections. Prediabetes- sedentary, diet is poor, soda regularly . ROS:  See HPI, all others negative        Patient Active Problem List   Diagnosis Code    HTN (hypertension) I10    Prediabetes R73.03    Left knee pain M25.562    Gastroesophageal reflux disease K21.9    Inconclusive mammogram R92.2       Current Outpatient Prescriptions   Medication Sig Dispense Refill    amLODIPine-valsartan (EXFORGE) 5-160 mg per tablet TAKE 1 TABLET DAILY 90 Tab 0    cyclobenzaprine (FLEXERIL) 5 mg tablet Take 1 Tab by mouth nightly as needed for Muscle Spasm(s). 20 Tab 0    ibuprofen (MOTRIN) 800 mg tablet Take  by mouth.  naproxen (NAPROSYN) 500 mg tablet Take 500 mg by mouth two (2) times daily (with meals).  omeprazole (PRILOSEC) 20 mg capsule Take 20 mg by mouth daily.  LORATADINE/PSEUDOEPHEDRINE (CLARITIN-D 24 HOUR PO) Take  by mouth.  fluticasone (FLONASE) 50 mcg/actuation nasal spray 2 sprays each nostril once daily (Patient taking differently: daily as needed. 2 sprays each nostril once daily) 1 Bottle 3    omega-3 fatty acids-vitamin e (FISH OIL) 1,000 mg cap Take 1 Cap by mouth.  multivitamin (ONE A DAY) tablet Take 1 Tab by mouth daily.  diclofenac EC (VOLTAREN) 75 mg EC tablet Take 1 Tab by mouth two (2) times daily as needed.  60 Tab 5       Allergies   Allergen Reactions    Pcn [Penicillins] Hives       Past Medical History:   Diagnosis Date    Gastritis     GERD (gastroesophageal reflux disease)     erosive esophagitis 11/13 EGD    H/O colonoscopy 11/2013    normal    Hypertension     Left knee pain     Morbid obesity ([address]     Precordial pain     Prediabetes        Social History     Social History    Marital status:      Spouse name: N/A    Number of children: N/A    Years of education: N/A     Occupational History    Not on file. Social History Main Topics    Smoking status: Never Smoker    Smokeless tobacco: Never Used    Alcohol use No    Drug use: No    Sexual activity: Not on file     Other Topics Concern    Not on file     Social History Narrative       Family History   Problem Relation Age of Onset    Heart Failure Mother     Heart Disease Mother      mi 52's    Asthma Other          OBJECTIVE    Physical Exam:     Visit Vitals    BP (!) 144/96 (BP 1 Location: Left arm, BP Patient Position: Sitting)  Comment: did not take meds this am and also had coffee    Pulse 68    Temp 97.9 °F (36.6 °C) (Oral)    Resp 16    Ht 5' 3.5\" (1.613 m)    Wt 204 lb (92.5 kg)    SpO2 96%    BMI 35.57 kg/m2       General: alert, well-appearing, in no apparent distress or pain  CVS: normal rate, regular rhythm, distinct S1 and S2  Lungs:clear to ausculation bilaterally, no crackles, wheezing or rhonchi noted  Abdomen: normoactive bowel sounds, soft, non-tender  Extremities: no edema, no cyanosis,  Skin: warm, no lesions, rashes noted  Psych:  mood and affect normal  Results for orders placed or performed during the hospital encounter of 63/45/61   METABOLIC PANEL, COMPREHENSIVE   Result Value Ref Range    Sodium 144 136 - 145 mmol/L    Potassium 3.8 3.5 - 5.5 mmol/L    Chloride 108 100 - 108 mmol/L    CO2 26 21 - 32 mmol/L    Anion gap 10 3.0 - 18 mmol/L    Glucose 115 (H) 74 - 99 mg/dL    BUN 17 7.0 - 18 MG/DL    Creatinine 0.71 0.6 - 1.3 MG/DL    BUN/Creatinine ratio 24 (H) 12 - 20      GFR est AA >60 >60 ml/min/1.73m2    GFR est non-AA >60 >60 ml/min/1.73m2    Calcium 8.7 8.5 - 10.1 MG/DL    Bilirubin, total 0.6 0.2 - 1.0 MG/DL    ALT (SGPT) 38 13 - 56 U/L    AST (SGOT) 28 15 - 37 U/L    Alk.  phosphatase 72 45 - 117 U/L    Protein, total 6.6 6.4 - 8.2 g/dL    Albumin 3.8 3.4 - 5.0 g/dL    Globulin 2.8 2.0 - 4.0 g/dL    A-G Ratio 1.4 0.8 - 1.7     HEMOGLOBIN A1C W/O EAG   Result Value Ref Range    Hemoglobin A1c 6.3 (H) 4.2 - 5.6 %           ASSESSMENT/PLAN  Tae Ramachandran was seen today for hypertension, other and gerd. Diagnoses and all orders for this visit:    Essential hypertension with goal blood pressure less than 140/90  Elevated today, Prev controlled, did not take am bp med yet  Cont current med exforge  Will monitor, DASH diet, meds prior to doctor visit    Prediabetes  Worsening, lengthy discussion on wt loss strategies, avoid soda, calorie counting, read labels    Gastroesophageal reflux disease, esophagitis presence not specified  Stable, cont PPI. Judicious use of PPI. Request EGD report GLS    Myofascial pain- improving, cont care with Dr. Bloom/PT/considering facet injection    Chronic low back pain without sciatica  Cont care with dr. Lee Just    Follow-up Disposition:  Return in about 3 months (around 7/24/2017), or if symptoms worsen or fail to improve. Patient understands plan of care. Patient has provided input and agrees with goals.

## 2017-04-24 NOTE — PATIENT INSTRUCTIONS

## 2017-07-17 ENCOUNTER — HOSPITAL ENCOUNTER (OUTPATIENT)
Dept: LAB | Age: 62
Discharge: HOME | End: 2017-07-17
Payer: COMMERCIAL

## 2017-07-17 LAB
ANION GAP BLD CALC-SCNC: 7 MMOL/L (ref 3–18)
BUN SERPL-MCNC: 19 MG/DL (ref 7–18)
BUN/CREAT SERPL: 21 (ref 12–20)
CALCIUM SERPL-MCNC: 9.2 MG/DL (ref 8.5–10.1)
CHLORIDE SERPL-SCNC: 106 MMOL/L (ref 100–108)
CO2 SERPL-SCNC: 28 MMOL/L (ref 21–32)
CREAT SERPL-MCNC: 0.92 MG/DL (ref 0.6–1.3)
GLUCOSE SERPL-MCNC: 109 MG/DL (ref 74–99)
HBA1C MFR BLD: 5.7 % (ref 4.2–5.6)
POTASSIUM SERPL-SCNC: 4.1 MMOL/L (ref 3.5–5.5)
SODIUM SERPL-SCNC: 141 MMOL/L (ref 136–145)

## 2017-07-17 PROCEDURE — 83036 HEMOGLOBIN GLYCOSYLATED A1C: CPT | Performed by: FAMILY MEDICINE

## 2017-07-17 PROCEDURE — 80048 BASIC METABOLIC PNL TOTAL CA: CPT | Performed by: FAMILY MEDICINE

## 2017-07-17 PROCEDURE — 36415 COLL VENOUS BLD VENIPUNCTURE: CPT | Performed by: FAMILY MEDICINE

## 2017-07-20 DIAGNOSIS — G89.29 CHRONIC RIGHT-SIDED LOW BACK PAIN, WITH SCIATICA PRESENCE UNSPECIFIED: ICD-10-CM

## 2017-07-20 DIAGNOSIS — M54.5 CHRONIC RIGHT-SIDED LOW BACK PAIN, WITH SCIATICA PRESENCE UNSPECIFIED: ICD-10-CM

## 2017-07-20 RX ORDER — CYCLOBENZAPRINE HCL 5 MG
TABLET ORAL
Qty: 20 TAB | Refills: 0 | Status: SHIPPED | OUTPATIENT
Start: 2017-07-20 | End: 2017-12-20 | Stop reason: SDUPTHER

## 2017-07-26 ENCOUNTER — HOSPITAL ENCOUNTER (OUTPATIENT)
Dept: LAB | Age: 62
Discharge: HOME | End: 2017-07-26
Payer: COMMERCIAL

## 2017-07-26 DIAGNOSIS — Z01.818 PREOP EXAMINATION: ICD-10-CM

## 2017-07-26 LAB
ALBUMIN SERPL BCP-MCNC: 3.9 G/DL (ref 3.4–5)
ALBUMIN/GLOB SERPL: 1.2 {RATIO} (ref 0.8–1.7)
ALP SERPL-CCNC: 78 U/L (ref 45–117)
ALT SERPL-CCNC: 29 U/L (ref 13–56)
ANION GAP BLD CALC-SCNC: 7 MMOL/L (ref 3–18)
AST SERPL W P-5'-P-CCNC: 17 U/L (ref 15–37)
ATRIAL RATE: 74 BPM
BILIRUB SERPL-MCNC: 0.6 MG/DL (ref 0.2–1)
BUN SERPL-MCNC: 15 MG/DL (ref 7–18)
BUN/CREAT SERPL: 19 (ref 12–20)
CALCIUM SERPL-MCNC: 9.3 MG/DL (ref 8.5–10.1)
CALCULATED P AXIS, ECG09: 46 DEGREES
CALCULATED R AXIS, ECG10: -4 DEGREES
CALCULATED T AXIS, ECG11: 22 DEGREES
CHLORIDE SERPL-SCNC: 106 MMOL/L (ref 100–108)
CO2 SERPL-SCNC: 27 MMOL/L (ref 21–32)
CREAT SERPL-MCNC: 0.79 MG/DL (ref 0.6–1.3)
DIAGNOSIS, 93000: NORMAL
ERYTHROCYTE [DISTWIDTH] IN BLOOD BY AUTOMATED COUNT: 12.9 % (ref 11.6–14.5)
GLOBULIN SER CALC-MCNC: 3.3 G/DL (ref 2–4)
GLUCOSE SERPL-MCNC: 137 MG/DL (ref 74–99)
HCT VFR BLD AUTO: 36 % (ref 35–45)
HGB BLD-MCNC: 11.7 G/DL (ref 12–16)
MCH RBC QN AUTO: 29.5 PG (ref 24–34)
MCHC RBC AUTO-ENTMCNC: 32.5 G/DL (ref 31–37)
MCV RBC AUTO: 90.9 FL (ref 74–97)
P-R INTERVAL, ECG05: 136 MS
PLATELET # BLD AUTO: 198 K/UL (ref 135–420)
PMV BLD AUTO: 11 FL (ref 9.2–11.8)
POTASSIUM SERPL-SCNC: 4 MMOL/L (ref 3.5–5.5)
PROT SERPL-MCNC: 7.2 G/DL (ref 6.4–8.2)
Q-T INTERVAL, ECG07: 404 MS
QRS DURATION, ECG06: 70 MS
QTC CALCULATION (BEZET), ECG08: 448 MS
RBC # BLD AUTO: 3.96 M/UL (ref 4.2–5.3)
SODIUM SERPL-SCNC: 140 MMOL/L (ref 136–145)
VENTRICULAR RATE, ECG03: 74 BPM
WBC # BLD AUTO: 3.6 K/UL (ref 4.6–13.2)

## 2017-07-26 PROCEDURE — 80053 COMPREHEN METABOLIC PANEL: CPT | Performed by: PODIATRIST

## 2017-07-26 PROCEDURE — 36415 COLL VENOUS BLD VENIPUNCTURE: CPT | Performed by: PODIATRIST

## 2017-07-26 PROCEDURE — 85027 COMPLETE CBC AUTOMATED: CPT | Performed by: PODIATRIST

## 2017-07-26 PROCEDURE — 93005 ELECTROCARDIOGRAM TRACING: CPT

## 2017-08-01 ENCOUNTER — OFFICE VISIT (OUTPATIENT)
Dept: FAMILY MEDICINE CLINIC | Age: 62
End: 2017-08-01

## 2017-08-01 VITALS
OXYGEN SATURATION: 97 % | HEIGHT: 64 IN | WEIGHT: 202 LBS | TEMPERATURE: 98.5 F | SYSTOLIC BLOOD PRESSURE: 130 MMHG | HEART RATE: 63 BPM | BODY MASS INDEX: 34.49 KG/M2 | DIASTOLIC BLOOD PRESSURE: 84 MMHG

## 2017-08-01 DIAGNOSIS — R73.03 PREDIABETES: ICD-10-CM

## 2017-08-01 DIAGNOSIS — I10 ESSENTIAL HYPERTENSION: Primary | ICD-10-CM

## 2017-08-01 DIAGNOSIS — K21.9 GASTROESOPHAGEAL REFLUX DISEASE, ESOPHAGITIS PRESENCE NOT SPECIFIED: ICD-10-CM

## 2017-08-01 DIAGNOSIS — Z01.818 PRE-OP EVALUATION: ICD-10-CM

## 2017-08-01 PROBLEM — R92.2 INCONCLUSIVE MAMMOGRAM: Status: RESOLVED | Noted: 2017-04-11 | Resolved: 2017-08-01

## 2017-08-01 NOTE — MR AVS SNAPSHOT
Visit Information Date & Time Provider Department Dept. Phone Encounter #  
 8/1/2017  [address] [place] [facility], [address] [id number] Follow-up Instructions Return in about 6 months (around 2/1/2018), or if symptoms worsen or fail to improve. Upcoming Health Maintenance Date Due INFLUENZA AGE 9 TO ADULT 8/1/2017 BREAST CANCER SCRN MAMMOGRAM 4/21/2019 PAP AKA CERVICAL CYTOLOGY 10/20/2019 DTaP/Tdap/Td series (2 - Td) 2/25/2023 COLONOSCOPY 12/24/2023 Allergies as of 8/1/2017  Review Complete On: 8/1/2017 By: Davin Castro MD  
  
 Severity Noted Reaction Type Reactions Pcn [Penicillins]  07/26/2010    Hives Current Immunizations  Reviewed on 10/20/2016 Name Date Influenza Vaccine 10/9/2014 Influenza Vaccine Joanette Uriel) 10/20/2016 Influenza Vaccine (Quad) PF 10/19/2015 Influenza Vaccine Split 9/21/2011 Tdap 2/25/2013 Not reviewed this visit You Were Diagnosed With   
  
 Codes Comments Essential hypertension    -  Primary ICD-10-CM: I10 
ICD-9-CM: 401.9 Gastroesophageal reflux disease, esophagitis presence not specified     ICD-10-CM: K21.9 ICD-9-CM: 530.81 Prediabetes     ICD-10-CM: R73.03 
ICD-9-CM: 790.29 Pre-op evaluation     ICD-10-CM: D13.320 ICD-9-CM: V72.84 Vitals BP Pulse Temp Height(growth percentile) Weight(growth percentile) SpO2  
 130/84 (BP 1 Location: Left arm, BP Patient Position: Sitting) 63 98.5 °F (36.9 °C) (Oral) 5' 3.5\" (1.613 m) 202 lb (91.6 kg) 97% BMI OB Status Smoking Status 35.22 kg/m2 Postmenopausal Never Smoker Vitals History BMI and BSA Data Body Mass Index Body Surface Area  
 35.22 kg/m 2 2.03 m 2 Preferred Pharmacy Pharmacy Name Phone [facility] [address] [place] [address] [phone] Your Updated Medication List  
  
   
 This list is accurate as of: 8/1/17  9:33 AM.  Always use your most recent med list. amLODIPine-valsartan 5-160 mg per tablet Commonly known as:  EXFORGE  
TAKE 1 TABLET DAILY CLARITIN-D 24 HOUR PO Take  by mouth. cyclobenzaprine 5 mg tablet Commonly known as:  FLEXERIL  
TAKE ONE TABLET BY MOUTH NIGHTLY AS NEEDED FOR MUSCLE SPASM(S)  
  
 diclofenac EC 75 mg EC tablet Commonly known as:  VOLTAREN Take 1 Tab by mouth two (2) times daily as needed. FISH OIL 1,000 mg Cap Generic drug:  omega-3 fatty acids-vitamin e Take 1 Cap by mouth. fluticasone 50 mcg/actuation nasal spray Commonly known as:  FLONASE  
2 sprays each nostril once daily  
  
 ibuprofen 800 mg tablet Commonly known as:  MOTRIN Take  by mouth.  
  
 multivitamin tablet Commonly known as:  ONE A DAY Take 1 Tab by mouth daily. NAPROSYN 500 mg tablet Generic drug:  naproxen Take 500 mg by mouth two (2) times daily (with meals). omeprazole 20 mg capsule Commonly known as:  PRILOSEC Take 20 mg by mouth daily. Follow-up Instructions Return in about 6 months (around 2/1/2018), or if symptoms worsen or fail to improve. Patient Instructions DASH Diet: Care Instructions Your Care Instructions The DASH diet is an eating plan that can help lower your blood pressure. DASH stands for Dietary Approaches to Stop Hypertension. Hypertension is high blood pressure. The DASH diet focuses on eating foods that are high in calcium, potassium, and magnesium. These nutrients can lower blood pressure. The foods that are highest in these nutrients are fruits, vegetables, low-fat dairy products, nuts, seeds, and legumes. But taking calcium, potassium, and magnesium supplements instead of eating foods that are high in those nutrients does not have the same effect. The DASH diet also includes whole grains, fish, and poultry. The DASH diet is one of several lifestyle changes your doctor may recommend to lower your high blood pressure. Your doctor may also want you to decrease the amount of sodium in your diet. Lowering sodium while following the DASH diet can lower blood pressure even further than just the DASH diet alone. Follow-up care is a key part of your treatment and safety. Be sure to make and go to all appointments, and call your doctor if you are having problems. It's also a good idea to know your test results and keep a list of the medicines you take. How can you care for yourself at home? Following the DASH diet · Eat 4 to 5 servings of fruit each day. A serving is 1 medium-sized piece of fruit, ½ cup chopped or canned fruit, 1/4 cup dried fruit, or 4 ounces (½ cup) of fruit juice. Choose fruit more often than fruit juice. · Eat 4 to 5 servings of vegetables each day. A serving is 1 cup of lettuce or raw leafy vegetables, ½ cup of chopped or cooked vegetables, or 4 ounces (½ cup) of vegetable juice. Choose vegetables more often than vegetable juice. · Get 2 to 3 servings of low-fat and fat-free dairy each day. A serving is 8 ounces of milk, 1 cup of yogurt, or 1 ½ ounces of cheese. · Eat 6 to 8 servings of grains each day. A serving is 1 slice of bread, 1 ounce of dry cereal, or ½ cup of cooked rice, pasta, or cooked cereal. Try to choose whole-grain products as much as possible. · Limit lean meat, poultry, and fish to 2 servings each day. A serving is 3 ounces, about the size of a deck of cards. · Eat 4 to 5 servings of nuts, seeds, and legumes (cooked dried beans, lentils, and split peas) each week. A serving is 1/3 cup of nuts, 2 tablespoons of seeds, or ½ cup of cooked beans or peas. · Limit fats and oils to 2 to 3 servings each day. A serving is 1 teaspoon of vegetable oil or 2 tablespoons of salad dressing. · Limit sweets and added sugars to 5 servings or less a week.  A serving is 1 tablespoon jelly or jam, ½ cup sorbet, or 1 cup of lemonade. · Eat less than 2,300 milligrams (mg) of sodium a day. If you limit your sodium to 1,500 mg a day, you can lower your blood pressure even more. Tips for success · Start small. Do not try to make dramatic changes to your diet all at once. You might feel that you are missing out on your favorite foods and then be more likely to not follow the plan. Make small changes, and stick with them. Once those changes become habit, add a few more changes. · Try some of the following: ¨ Make it a goal to eat a fruit or vegetable at every meal and at snacks. This will make it easy to get the recommended amount of fruits and vegetables each day. ¨ Try yogurt topped with fruit and nuts for a snack or healthy dessert. ¨ Add lettuce, tomato, cucumber, and onion to sandwiches. ¨ Combine a ready-made pizza crust with low-fat mozzarella cheese and lots of vegetable toppings. Try using tomatoes, squash, spinach, broccoli, carrots, cauliflower, and onions. ¨ Have a variety of cut-up vegetables with a low-fat dip as an appetizer instead of chips and dip. ¨ Sprinkle sunflower seeds or chopped almonds over salads. Or try adding chopped walnuts or almonds to cooked vegetables. ¨ Try some vegetarian meals using beans and peas. Add garbanzo or kidney beans to salads. Make burritos and tacos with mashed valdez beans or black beans. Where can you learn more? Go to http://carlos eduardo-turner.info/. Enter F581 in the search box to learn more about \"DASH Diet: Care Instructions. \" Current as of: April 3, 2017 Content Version: 11.3 © 9146-5360 Evotec. Care instructions adapted under license by [employer] (which disclaims liability or warranty for this information).  If you have questions about a medical condition or this instruction, always ask your healthcare professional. Betsy Schofield[employer] disclaims any warranty or liability for your use of this information. Introducing [facility]! Peg Slade introduces Bloomz patient portal. Now you can access parts of your medical record, email your doctor's office, and request medication refills online. 1. In your internet browser, go to [email or web address] 2. Click on the First Time User? Click Here link in the Sign In box. You will see the New Member Sign Up page. 3. Enter your Bloomz Access Code exactly as it appears below. You will not need to use this code after youve completed the sign-up process. If you do not sign up before the expiration date, you must request a new code. · Bloomz Access Code: [email or web address] Expires: 10/15/2017  4:27 PM 
 
4. Enter the last four digits of your Social Security Number (xxxx) and Date of Birth (mm/dd/yyyy) as indicated and click Submit. You will be taken to the next sign-up page. 5. Create a Bloomz ID. This will be your Bloomz login ID and cannot be changed, so think of one that is secure and easy to remember. 6. Create a Bloomz password. You can change your password at any time. 7. Enter your Password Reset Question and Answer. This can be used at a later time if you forget your password. 8. Enter your e-mail address. You will receive e-mail notification when new information is available in [address] 9. Click Sign Up. You can now view and download portions of your medical record. 10. Click the Download Summary menu link to download a portable copy of your medical information. If you have questions, please visit the Frequently Asked Questions section of the Bloomz website. Remember, Bloomz is NOT to be used for urgent needs. For medical emergencies, dial 911. Now available from your iPhone and Android! Please provide this summary of care documentation to your next provider. Your primary care clinician is listed as Davin Castro. If you have any questions after today's visit, please call [phone].

## 2017-08-01 NOTE — PROGRESS NOTES
Jenni Hsu, 57 y.o.,  female    SUBJECTIVE  Routine ff-up    HTN- taking exforge without problems. GERD- doing well on PPI    Chronic low back pain/ Myofascial pain- followed by dr. Gil Carias, considering facet injections. Prediabetes- sedentary    Requesting pre op form filled, to have L ankle and foot surgery. ROS:  See HPI, all others negative        Patient Active Problem List   Diagnosis Code    HTN (hypertension) I10    Prediabetes R73.03    Left knee pain M25.562    Gastroesophageal reflux disease K21.9    Pre-op evaluation Z01.818       Current Outpatient Prescriptions   Medication Sig Dispense Refill    cyclobenzaprine (FLEXERIL) 5 mg tablet TAKE ONE TABLET BY MOUTH NIGHTLY AS NEEDED FOR MUSCLE SPASM(S) 20 Tab 0    amLODIPine-valsartan (EXFORGE) 5-160 mg per tablet TAKE 1 TABLET DAILY 90 Tab 1    ibuprofen (MOTRIN) 800 mg tablet Take  by mouth.  naproxen (NAPROSYN) 500 mg tablet Take 500 mg by mouth two (2) times daily (with meals).  omeprazole (PRILOSEC) 20 mg capsule Take 20 mg by mouth daily.  LORATADINE/PSEUDOEPHEDRINE (CLARITIN-D 24 HOUR PO) Take  by mouth.  fluticasone (FLONASE) 50 mcg/actuation nasal spray 2 sprays each nostril once daily (Patient taking differently: daily as needed. 2 sprays each nostril once daily) 1 Bottle 3    omega-3 fatty acids-vitamin e (FISH OIL) 1,000 mg cap Take 1 Cap by mouth.  multivitamin (ONE A DAY) tablet Take 1 Tab by mouth daily.  diclofenac EC (VOLTAREN) 75 mg EC tablet Take 1 Tab by mouth two (2) times daily as needed.  61 Tab 5       Allergies   Allergen Reactions    Pcn [Penicillins] Hives       Past Medical History:   Diagnosis Date    Gastric ulcer 04/2017    dr Sherry Murphy, avoid nsaids    Gastritis     GERD (gastroesophageal reflux disease)     erosive esophagitis 11/13 EGD    H/O colonoscopy 11/2013    normal    Hypertension     Left knee pain     Morbid obesity (HCC)     Precordial pain     Prediabetes        Social History     Social History    Marital status:      Spouse name: N/A    Number of children: N/A    Years of education: N/A     Occupational History    Not on file.      Social History Main Topics    Smoking status: Never Smoker    Smokeless tobacco: Never Used    Alcohol use No    Drug use: No    Sexual activity: Not on file     Other Topics Concern    Not on file     Social History Narrative       Family History   Problem Relation Age of Onset    Heart Failure Mother     Heart Disease Mother      mi 52's    Asthma Other          OBJECTIVE    Physical Exam:     Visit Vitals    /84 (BP 1 Location: Left arm, BP Patient Position: Sitting)  Comment: took meds right before leaving for appt    Pulse 63    Temp 98.5 °F (36.9 °C) (Oral)    Ht 5' 3.5\" (1.613 m)    Wt 202 lb (91.6 kg)    SpO2 97%    BMI 35.22 kg/m2       General: alert, well-appearing, in no apparent distress or pain  CVS: normal rate, regular rhythm, distinct S1 and S2  Lungs:clear to ausculation bilaterally, no crackles, wheezing or rhonchi noted  Abdomen: normoactive bowel sounds, soft, non-tender  Extremities: no edema, no cyanosis,  Skin: warm, no lesions, rashes noted  Psych:  mood and affect normal  Results for orders placed or performed during the hospital encounter of 07/26/17   CBC W/O DIFF   Result Value Ref Range    WBC 3.6 (L) 4.6 - 13.2 K/uL    RBC 3.96 (L) 4.20 - 5.30 M/uL    HGB 11.7 (L) 12.0 - 16.0 g/dL    HCT 36.0 35.0 - 45.0 %    MCV 90.9 74.0 - 97.0 FL    MCH 29.5 24.0 - 34.0 PG    MCHC 32.5 31.0 - 37.0 g/dL    RDW 12.9 11.6 - 14.5 %    PLATELET 369 007 - 297 K/uL    MPV 11.0 9.2 - 82.6 FL   METABOLIC PANEL, COMPREHENSIVE   Result Value Ref Range    Sodium 140 136 - 145 mmol/L    Potassium 4.0 3.5 - 5.5 mmol/L    Chloride 106 100 - 108 mmol/L    CO2 27 21 - 32 mmol/L    Anion gap 7 3.0 - 18 mmol/L    Glucose 137 (H) 74 - 99 mg/dL    BUN 15 7.0 - 18 MG/DL    Creatinine 0.79 0.6 - 1.3 MG/DL    BUN/Creatinine ratio 19 12 - 20      GFR est AA >60 >60 ml/min/1.73m2    GFR est non-AA >60 >60 ml/min/1.73m2    Calcium 9.3 8.5 - 10.1 MG/DL    Bilirubin, total 0.6 0.2 - 1.0 MG/DL    ALT (SGPT) 29 13 - 56 U/L    AST (SGOT) 17 15 - 37 U/L    Alk. phosphatase 78 45 - 117 U/L    Protein, total 7.2 6.4 - 8.2 g/dL    Albumin 3.9 3.4 - 5.0 g/dL    Globulin 3.3 2.0 - 4.0 g/dL    A-G Ratio 1.2 0.8 - 1.7             ASSESSMENT/PLAN  Preeti Vasquez was seen today for hypertension, other and gerd. Diagnoses and all orders for this visit:    Essential hypertension with goal blood pressure less than 140/90  controlled  Cont current med exforge    Prediabetes  Worsening, lengthy discussion on wt loss strategies, avoid soda, calorie counting, read labels    Gastroesophageal reflux disease, esophagitis presence not specified  Stable, cont PPI. Judicious use of PPI. Myofascial pain- improving, cont care with Dr. Bloom/PT/considering facet injection    Chronic low back pain without sciatica  Cont care with dr. Fregoso Client    Pre-op clearance  Reviewed labs/EKG  Low risk for upcoming procedure  Cleared     Follow-up Disposition:  Return in about 6 months (around 2/1/2018), or if symptoms worsen or fail to improve. Patient understands plan of care. Patient has provided input and agrees with goals.

## 2017-08-01 NOTE — PROGRESS NOTES
Chief Complaint   Patient presents with    Hypertension    Other     Pre-DM    GERD    Results     1. Have you been to the ER, urgent care clinic since your last visit? Hospitalized since your last visit? No    2. Have you seen or consulted any other health care providers outside of the [address] [place] [address] since your last visit? Include any pap smears or colon screening.  No

## 2017-08-01 NOTE — PATIENT INSTRUCTIONS

## 2017-12-20 ENCOUNTER — OFFICE VISIT (OUTPATIENT)
Dept: ORTHOPEDIC SURGERY | Age: 62
End: 2017-12-20

## 2017-12-20 VITALS
BODY MASS INDEX: 35.03 KG/M2 | TEMPERATURE: 98.4 F | RESPIRATION RATE: 18 BRPM | WEIGHT: 205.2 LBS | HEIGHT: 64 IN | OXYGEN SATURATION: 97 % | DIASTOLIC BLOOD PRESSURE: 66 MMHG | SYSTOLIC BLOOD PRESSURE: 144 MMHG | HEART RATE: 85 BPM

## 2017-12-20 DIAGNOSIS — M79.18 MYOFASCIAL PAIN: ICD-10-CM

## 2017-12-20 DIAGNOSIS — G89.29 CHRONIC RIGHT-SIDED LOW BACK PAIN, WITH SCIATICA PRESENCE UNSPECIFIED: ICD-10-CM

## 2017-12-20 DIAGNOSIS — M54.5 CHRONIC RIGHT-SIDED LOW BACK PAIN, WITH SCIATICA PRESENCE UNSPECIFIED: ICD-10-CM

## 2017-12-20 DIAGNOSIS — M62.830 MUSCLE SPASM OF BACK: Primary | ICD-10-CM

## 2017-12-20 RX ORDER — DICLOFENAC SODIUM 75 MG/1
75 TABLET, DELAYED RELEASE ORAL
Qty: 60 TAB | Refills: 5 | Status: SHIPPED | OUTPATIENT
Start: 2017-12-20 | End: 2018-06-06 | Stop reason: SDUPTHER

## 2017-12-20 RX ORDER — CYCLOBENZAPRINE HCL 5 MG
TABLET ORAL
Qty: 30 TAB | Refills: 2 | Status: SHIPPED | OUTPATIENT
Start: 2017-12-20 | End: 2018-06-06 | Stop reason: SDUPTHER

## 2017-12-20 NOTE — PATIENT INSTRUCTIONS
Back Stretches: Exercises  Your Care Instructions  Here are some examples of exercises for stretching your back. Start each exercise slowly. Ease off the exercise if you start to have pain. Your doctor or physical therapist will tell you when you can start these exercises and which ones will work best for you. How to do the exercises  Overhead stretch    1. Stand comfortably with your feet shoulder-width apart. 2. Looking straight ahead, raise both arms over your head and reach toward the ceiling. Do not allow your head to tilt back. 3. Hold for 15 to 30 seconds, then lower your arms to your sides. 4. Repeat 2 to 4 times. Side stretch    1. Stand comfortably with your feet shoulder-width apart. 2. Raise one arm over your head, and then lean to the other side. 3. Slide your hand down your leg as you let the weight of your arm gently stretch your side muscles. Hold for 15 to 30 seconds. 4. Repeat 2 to 4 times on each side. Press-up    1. Lie on your stomach, supporting your body with your forearms. 2. Press your elbows down into the floor to raise your upper back. As you do this, relax your stomach muscles and allow your back to arch without using your back muscles. As your press up, do not let your hips or pelvis come off the floor. 3. Hold for 15 to 30 seconds, then relax. 4. Repeat 2 to 4 times. Relax and rest    1. Lie on your back with a rolled towel under your neck and a pillow under your knees. Extend your arms comfortably to your sides. 2. Relax and breathe normally. 3. Remain in this position for about 10 minutes. 4. If you can, do this 2 or 3 times each day. Follow-up care is a key part of your treatment and safety. Be sure to make and go to all appointments, and call your doctor if you are having problems. It's also a good idea to know your test results and keep a list of the medicines you take. Where can you learn more? Go to http://carlos eduardo-turner.info/.   Enter D520 in the search box to learn more about \"Back Stretches: Exercises. \"  Current as of: March 21, 2017  Content Version: 11.4  © 7342-4931 Healthwise, Incorporated. Care instructions adapted under license by [facility] (which disclaims liability or warranty for this information). If you have questions about a medical condition or this instruction, always ask your healthcare professional. [place] [address] any warranty or liability for your use of this information.

## 2017-12-20 NOTE — PROGRESS NOTES
[address]  [address], [address]  [place], [address] [place] [address] Street  Phone: [phone]  Fax: [phone]    Marlin Trujillo  : 1955  PCP: Shekhar Marquez MD    PROGRESS NOTE    HISTORY OF PRESENT ILLNESS:  Chief Complaint   Patient presents with    Back Pain     follow up     Meg Alejandra is a 57 y.o.  female with history of lumbar pain. She has myofascial pain syndrome. She has completed PT with minimal relief. She was scheduled for bilateral L4-5 L5-S1 facet joint injections but she never completed this. She was reading the information and was scared of this. She has been taking diclofenac and flexeril with good relief. She takes this very PRN. Today, she here for a med follow up. Her back is doing well. She is managing her symptoms with the two medications. Denies bladder/bowel dysfunction, saddle paresthesia, weakness, gait disturbance, or other neurological deficit. Pt at this time desires to continue with current care/proceed with medication evaluation. ASSESSMENT  57 y.o. female with lumbar pain2. Diagnoses and all orders for this visit:    1. Muscle spasm of back  -     cyclobenzaprine (FLEXERIL) 5 mg tablet; TAKE ONE TABLET BY MOUTH NIGHTLY AS NEEDED FOR MUSCLE SPASM(S)    2. Myofascial pain  -     diclofenac EC (VOLTAREN) 75 mg EC tablet; Take 1 Tab by mouth two (2) times daily as needed. 3. Chronic right-sided low back pain, with sciatica presence unspecified  -     diclofenac EC (VOLTAREN) 75 mg EC tablet; Take 1 Tab by mouth two (2) times daily as needed. IMPRESSION/PLAN    1) Pt was given information on lumbar exercises. 2) Continue Diclofenac and Flexeril PRN  3) increase frequency of HEP  4) Ms. Johann Wilhelm has a reminder for a \"due or due soon\" health maintenance. I have asked that she contact her primary care provider, Shekhar Marquez MD, for follow-up on this health maintenance.   5) We have informed patient to notify us for immediate appointment if he has any worsening neurogical symptoms or if an emergency situation presents, then call 911  6) Pt will follow-up in 6 months w/ NP for med fu. No pain behaviors. Denies thoughts of harming self or others. Pt has a good risk to benefit ratio which allows the pt to function in a home environment without side effects. PAST MEDICAL HISTORY  Past Medical History:   Diagnosis Date    Gastric ulcer 04/2017    dr Reagan William, avoid nsaids    Gastritis     GERD (gastroesophageal reflux disease)     erosive esophagitis 11/13 EGD    H/O colonoscopy 11/2013    normal    Hypertension     Left knee pain     Morbid obesity (HCC)     Precordial pain     Prediabetes         MEDICATIONS  Current Outpatient Prescriptions   Medication Sig Dispense Refill    diclofenac EC (VOLTAREN) 75 mg EC tablet Take 1 Tab by mouth two (2) times daily as needed. 60 Tab 5    cyclobenzaprine (FLEXERIL) 5 mg tablet TAKE ONE TABLET BY MOUTH NIGHTLY AS NEEDED FOR MUSCLE SPASM(S) 30 Tab 2    amLODIPine-valsartan (EXFORGE) 5-160 mg per tablet TAKE 1 TABLET DAILY 90 Tab 1    omeprazole (PRILOSEC) 20 mg capsule Take 20 mg by mouth daily.  LORATADINE/PSEUDOEPHEDRINE (CLARITIN-D 24 HOUR PO) Take  by mouth.  fluticasone (FLONASE) 50 mcg/actuation nasal spray 2 sprays each nostril once daily (Patient taking differently: daily as needed. 2 sprays each nostril once daily) 1 Bottle 3    omega-3 fatty acids-vitamin e (FISH OIL) 1,000 mg cap Take 1 Cap by mouth.  multivitamin (ONE A DAY) tablet Take 1 Tab by mouth daily. ALLERGIES  Allergies   Allergen Reactions    Pcn [Penicillins] Hives       SOCIAL HISTORY    Social History     Social History    Marital status:      Spouse name: N/A    Number of children: N/A    Years of education: N/A     Occupational History    Not on file.      Social History Main Topics    Smoking status: Never Smoker    Smokeless tobacco: Never Used    Alcohol use No  Drug use: No    Sexual activity: Not on file     Other Topics Concern    Not on file     Social History Narrative       SUBJECTIVE        Pain Scale: 5/10    Pain Assessment  12/20/2017   Location of Pain Back   Location Modifiers -   Severity of Pain 5   Quality of Pain Dull;Aching   Duration of Pain -   Frequency of Pain -   Aggravating Factors -   Aggravating Factors Comment -   Limiting Behavior -   Relieving Factors -   Relieving Factors Comment -   Result of Injury -       Accompanied by self. REVIEW OF SYSTEMS  ROS    Constitutional: Negative for fever, chills, or weight change. Respiratory: Negative for cough or shortness of breath. Cardiovascular: Negative for chest pain or palpitations. Gastrointestinal: Negative for acid reflux, change in bowel habits, or constipation. Genitourinary: Negative for incontinence, dysuria and flank pain. Musculoskeletal: Positive for low back pain. Skin: Negative for rash. Neurological: Negative for headaches, dizziness, or numbness. Endo/Heme/Allergies: Negative . Psychiatric/Behavioral: Negative. PHYSICAL EXAMINATION  Visit Vitals    /66    Pulse 85    Temp 98.4 °F (36.9 °C) (Oral)    Resp 18    Ht 5' 3.5\" (1.613 m)    Wt 205 lb 3.2 oz (93.1 kg)    SpO2 97%    BMI 35.78 kg/m2       Constitutional: Well developed,  well nourished,  awake, alert, and in no acute distress. Neurological:  Sensation to light touch is intact. Psychiatric: Affect and mood are appropriate. Integumentary: No rashes or abrasions noted on exposed areas,  warm, dry and intact. Cardiovascular/Peripheral Vascular:  No peripheral edema is noted. Lymphatic:  No evidence of lymphedema. No cervical lymphadenopathy. SPINE/MUSCULOSKELETAL EXAM    Lumbar spine:  No rash, ecchymosis, or gross obliquity. No fasciculations. No focal atrophy is noted. Range of motion is intact. No Tenderness to palpation. SI joints non-tender. Trochanters non tender. Musculoskeletal:  No pain with extension, axial loading, or forward flexion. No pain with internal or external rotation of her hips. MOTOR     Hip Flex  Quads Hamstrings Ankle DF EHL Ankle PF   Right +4/5 +4/5 +4/5 +4/5 +4/5 +4/5   Left +4/5 +4/5 +4/5 +4/5 +4/5 +4/5       Straight Leg raise negative bilaterally. normal gait and station    Ambulation without assistive device. full weight bearing, non-antalgic gait.     Rosales Younger NP

## 2017-12-20 NOTE — MR AVS SNAPSHOT
Visit Information Date & Time Provider Department Dept. Phone Encounter #  
 12/20/2017  9:00 AM Jamarcus Hopkins NP [facility] [phone] [id number] Follow-up Instructions Return in about 6 months (around 6/20/2018) for w/ NP for med fu. Follow-up and Disposition History Your Appointments 2/1/2018  9:30 AM  
ROUTINE CARE with Alok Mcclendon MD  
[address] [facility] ([address]) Appt Note: 6 month F/U  
 [address] [facility] [address]  
[address] [facility] [address] [facility] [address] Upcoming Health Maintenance Date Due Influenza Age 5 to Adult 8/1/2017 PAP AKA CERVICAL CYTOLOGY 10/20/2019 DTaP/Tdap/Td series (2 - Td) 2/25/2023 COLONOSCOPY 12/24/2023 Allergies as of 12/20/2017  Review Complete On: 12/20/2017 By: Nabila Dorsey LPN Severity Noted Reaction Type Reactions Pcn [Penicillins]  07/26/2010    Hives Current Immunizations  Reviewed on 10/20/2016 Name Date Influenza Vaccine 10/9/2014 Influenza Vaccine Roselynn Stevan) 10/20/2016 Influenza Vaccine (Quad) PF 10/19/2015 Influenza Vaccine Split 9/21/2011 Tdap 2/25/2013 Not reviewed this visit You Were Diagnosed With   
  
 Codes Comments Muscle spasm of back    -  Primary ICD-10-CM: W49.733 ICD-9-CM: 724.8 Myofascial pain     ICD-10-CM: M79.1 ICD-9-CM: 729.1 Chronic right-sided low back pain, with sciatica presence unspecified     ICD-10-CM: M54.5, G89.29 ICD-9-CM: 724.2, 338.29 Vitals BP Pulse Temp Resp Height(growth percentile) Weight(growth percentile) 144/66 85 98.4 °F (36.9 °C) (Oral) 18 5' 3.5\" (1.613 m) 205 lb 3.2 oz (93.1 kg) SpO2 BMI OB Status Smoking Status 97% 35.78 kg/m2 Postmenopausal Never Smoker BMI and BSA Data Body Mass Index Body Surface Area  35.78 kg/m 2 2.04 m 2  
  
  
 Preferred Pharmacy Pharmacy Name Phone [facility] [address], [address] [place] [address] [phone] Your Updated Medication List  
  
   
This list is accurate as of: 12/20/17  9:18 AM.  Always use your most recent med list. amLODIPine-valsartan 5-160 mg per tablet Commonly known as:  EXFORGE  
TAKE 1 TABLET DAILY CLARITIN-D 24 HOUR PO Take  by mouth. cyclobenzaprine 5 mg tablet Commonly known as:  FLEXERIL  
TAKE ONE TABLET BY MOUTH NIGHTLY AS NEEDED FOR MUSCLE SPASM(S)  
  
 diclofenac EC 75 mg EC tablet Commonly known as:  VOLTAREN Take 1 Tab by mouth two (2) times daily as needed. FISH OIL 1,000 mg Cap Generic drug:  omega-3 fatty acids-vitamin e Take 1 Cap by mouth. fluticasone 50 mcg/actuation nasal spray Commonly known as:  FLONASE  
2 sprays each nostril once daily  
  
 multivitamin tablet Commonly known as:  ONE A DAY Take 1 Tab by mouth daily. omeprazole 20 mg capsule Commonly known as:  PRILOSEC Take 20 mg by mouth daily. Prescriptions Sent to Pharmacy Refills  
 diclofenac EC (VOLTAREN) 75 mg EC tablet 5 Sig: Take 1 Tab by mouth two (2) times daily as needed. Class: Normal  
 Pharmacy: [id number] [address] Ph #: [phone] Route: Oral  
 cyclobenzaprine (FLEXERIL) 5 mg tablet 2 Sig: TAKE ONE TABLET BY MOUTH NIGHTLY AS NEEDED FOR MUSCLE SPASM(S) Class: Normal  
 Pharmacy: [id number] [address] Ph #: [phone] Follow-up Instructions Return in about 6 months (around 6/20/2018) for w/ NP for med fu. Patient Instructions Back Stretches: Exercises Your Care Instructions Here are some examples of exercises for stretching your back. Start each exercise slowly. Ease off the exercise if you start to have pain. Your doctor or physical therapist will tell you when you can start these exercises and which ones will work best for you. How to do the exercises Overhead stretch 1. Stand comfortably with your feet shoulder-width apart. 2. Looking straight ahead, raise both arms over your head and reach toward the ceiling. Do not allow your head to tilt back. 3. Hold for 15 to 30 seconds, then lower your arms to your sides. 4. Repeat 2 to 4 times. Side stretch 1. Stand comfortably with your feet shoulder-width apart. 2. Raise one arm over your head, and then lean to the other side. 3. Slide your hand down your leg as you let the weight of your arm gently stretch your side muscles. Hold for 15 to 30 seconds. 4. Repeat 2 to 4 times on each side. Press-up 1. Lie on your stomach, supporting your body with your forearms. 2. Press your elbows down into the floor to raise your upper back. As you do this, relax your stomach muscles and allow your back to arch without using your back muscles. As your press up, do not let your hips or pelvis come off the floor. 3. Hold for 15 to 30 seconds, then relax. 4. Repeat 2 to 4 times. Relax and rest 
 
1. Lie on your back with a rolled towel under your neck and a pillow under your knees. Extend your arms comfortably to your sides. 2. Relax and breathe normally. 3. Remain in this position for about 10 minutes. 4. If you can, do this 2 or 3 times each day. Follow-up care is a key part of your treatment and safety. Be sure to make and go to all appointments, and call your doctor if you are having problems. It's also a good idea to know your test results and keep a list of the medicines you take. Where can you learn more? Go to http://carlos eduardo-turner.info/. Enter Y128 in the search box to learn more about \"Back Stretches: Exercises. \" Current as of: March 21, 2017 Content Version: 11.4 © 7204-9695 Healthwise, Incorporated.  Care instructions adapted under license by [address] (which disclaims liability or warranty for this information). If you have questions about a medical condition or this instruction, always ask your healthcare professional. Hiren Robertson any warranty or liability for your use of this information. Introducing \[facility]\"" & HEALTH SERVICES! [address] [facility] introduces [employer] patient portal. Now you can access parts of your medical record, email your doctor's office, and request medication refills online. 1. In your internet browser, go to [email or web address] 2. Click on the First Time User? Click Here link in the Sign In box. You will see the New Member Sign Up page. 3. Enter your [employer] Access Code exactly as it appears below. You will not need to use this code after youve completed the sign-up process. If you do not sign up before the expiration date, you must request a new code. · [employer] Access Code: [email or web address] Expires: 3/20/2018  8:42 AM 
 
4. Enter the last four digits of your Social Security Number (xxxx) and Date of Birth (mm/dd/yyyy) as indicated and click Submit. You will be taken to the next sign-up page. 5. Create a [employer] ID. This will be your [employer] login ID and cannot be changed, so think of one that is secure and easy to remember. 6. Create a [employer] password. You can change your password at any time. 7. Enter your Password Reset Question and Answer. This can be used at a later time if you forget your password. 8. Enter your e-mail address. You will receive e-mail notification when new information is available in [address] 9. Click Sign Up. You can now view and download portions of your medical record. 10. Click the Download Summary menu link to download a portable copy of your medical information. If you have questions, please visit the Frequently Asked Questions section of the [employer] website.  Remember, [employer] is NOT to be used for urgent needs. For medical emergencies, dial 911. Now available from your iPhone and Android! Please provide this summary of care documentation to your next provider. Your primary care clinician is listed as Jole Ku. If you have any questions after today's visit, please call [phone].

## 2018-01-11 RX ORDER — AMLODIPINE AND VALSARTAN 5; 160 MG/1; MG/1
TABLET ORAL
Qty: 90 TAB | Refills: 1 | Status: SHIPPED | OUTPATIENT
Start: 2018-01-11 | End: 2018-07-05 | Stop reason: SDUPTHER

## 2018-01-24 ENCOUNTER — HOSPITAL ENCOUNTER (OUTPATIENT)
Dept: LAB | Age: 63
Discharge: HOME | End: 2018-01-24
Payer: COMMERCIAL

## 2018-01-24 DIAGNOSIS — R73.03 PREDIABETES: ICD-10-CM

## 2018-01-24 LAB
ANION GAP SERPL CALC-SCNC: 7 MMOL/L (ref 3–18)
BUN SERPL-MCNC: 14 MG/DL (ref 7–18)
BUN/CREAT SERPL: 18 (ref 12–20)
CALCIUM SERPL-MCNC: 9.2 MG/DL (ref 8.5–10.1)
CHLORIDE SERPL-SCNC: 106 MMOL/L (ref 100–108)
CO2 SERPL-SCNC: 29 MMOL/L (ref 21–32)
CREAT SERPL-MCNC: 0.8 MG/DL (ref 0.6–1.3)
GLUCOSE SERPL-MCNC: 115 MG/DL (ref 74–99)
HBA1C MFR BLD: 6.1 % (ref 4.2–5.6)
POTASSIUM SERPL-SCNC: 4.1 MMOL/L (ref 3.5–5.5)
SODIUM SERPL-SCNC: 142 MMOL/L (ref 136–145)

## 2018-01-24 PROCEDURE — 36415 COLL VENOUS BLD VENIPUNCTURE: CPT | Performed by: FAMILY MEDICINE

## 2018-01-24 PROCEDURE — 83036 HEMOGLOBIN GLYCOSYLATED A1C: CPT | Performed by: FAMILY MEDICINE

## 2018-01-24 PROCEDURE — 80048 BASIC METABOLIC PNL TOTAL CA: CPT | Performed by: FAMILY MEDICINE

## 2018-02-01 ENCOUNTER — OFFICE VISIT (OUTPATIENT)
Dept: FAMILY MEDICINE CLINIC | Age: 63
End: 2018-02-01

## 2018-02-01 VITALS
HEIGHT: 64 IN | TEMPERATURE: 97.8 F | SYSTOLIC BLOOD PRESSURE: 122 MMHG | WEIGHT: 201 LBS | RESPIRATION RATE: 16 BRPM | DIASTOLIC BLOOD PRESSURE: 76 MMHG | BODY MASS INDEX: 34.31 KG/M2 | OXYGEN SATURATION: 98 % | HEART RATE: 92 BPM

## 2018-02-01 DIAGNOSIS — K21.9 GASTROESOPHAGEAL REFLUX DISEASE, ESOPHAGITIS PRESENCE NOT SPECIFIED: ICD-10-CM

## 2018-02-01 DIAGNOSIS — I10 ESSENTIAL HYPERTENSION: Primary | ICD-10-CM

## 2018-02-01 DIAGNOSIS — L29.0 ANAL PRURITUS: ICD-10-CM

## 2018-02-01 DIAGNOSIS — R73.03 PREDIABETES: ICD-10-CM

## 2018-02-01 DIAGNOSIS — E66.01 SEVERE OBESITY (BMI 35.0-39.9): ICD-10-CM

## 2018-02-01 RX ORDER — TRIAMCINOLONE ACETONIDE 0.25 MG/G
OINTMENT TOPICAL 2 TIMES DAILY
Qty: 30 G | Refills: 0 | Status: SHIPPED | OUTPATIENT
Start: 2018-02-01 | End: 2018-02-01 | Stop reason: SDUPTHER

## 2018-02-01 RX ORDER — TRIAMCINOLONE ACETONIDE 0.25 MG/G
OINTMENT TOPICAL 2 TIMES DAILY
Qty: 30 G | Refills: 0 | Status: SHIPPED | OUTPATIENT
Start: 2018-02-01 | End: 2020-01-16 | Stop reason: SDUPTHER

## 2018-02-01 NOTE — PROGRESS NOTES
Loni Arevalo, 57 y.o.,  female    SUBJECTIVE  Routine ff-up    HTN- taking exforge without problems. Says improving diet. Reviewed labs    GERD- doing well on PPI    Chronic low back pain/ Myofascial pain- followed by dr. Khari Douglas    Prediabetes- sedentary    ROS:  See HPI, all others negative        Patient Active Problem List   Diagnosis Code    HTN (hypertension) I10    Prediabetes R73.03    Left knee pain M25.562    Gastroesophageal reflux disease K21.9    Pre-op evaluation Z01.818       Current Outpatient Prescriptions   Medication Sig Dispense Refill    triamcinolone acetonide (KENALOG) 0.025 % ointment Apply  to affected area two (2) times a day. use thin layer 30 g 0    amLODIPine-valsartan (EXFORGE) 5-160 mg per tablet TAKE 1 TABLET DAILY 90 Tab 1    diclofenac EC (VOLTAREN) 75 mg EC tablet Take 1 Tab by mouth two (2) times daily as needed. 60 Tab 5    cyclobenzaprine (FLEXERIL) 5 mg tablet TAKE ONE TABLET BY MOUTH NIGHTLY AS NEEDED FOR MUSCLE SPASM(S) 30 Tab 2    omeprazole (PRILOSEC) 20 mg capsule Take 20 mg by mouth daily.  fluticasone (FLONASE) 50 mcg/actuation nasal spray 2 sprays each nostril once daily (Patient taking differently: daily as needed. 2 sprays each nostril once daily) 1 Bottle 3    omega-3 fatty acids-vitamin e (FISH OIL) 1,000 mg cap Take 1 Cap by mouth.  multivitamin (ONE A DAY) tablet Take 1 Tab by mouth daily.  LORATADINE/PSEUDOEPHEDRINE (CLARITIN-D 24 HOUR PO) Take  by mouth.          Allergies   Allergen Reactions    Pcn [Penicillins] Hives       Past Medical History:   Diagnosis Date    Eosinophilic esophagitis     started on flvent, dr. Joel Patel Gastric ulcer 04/2017    dr Ruth Kee, avoid nsaids    Gastritis 12/13/2017    gastritis, cont PPI dr. Joel Patel GERD (gastroesophageal reflux disease)     erosive esophagitis 11/13 EGD    H/O colonoscopy 11/2013    normal    Hypertension     Left knee pain     Morbid obesity ([address]     Precordial pain     Prediabetes        Social History     Social History    Marital status:      Spouse name: N/A    Number of children: N/A    Years of education: N/A     Occupational History    Not on file. Social History Main Topics    Smoking status: Never Smoker    Smokeless tobacco: Never Used    Alcohol use No    Drug use: No    Sexual activity: Not on file     Other Topics Concern    Not on file     Social History Narrative       Family History   Problem Relation Age of Onset    Heart Failure Mother     Heart Disease Mother      mi 52's    Asthma Other          OBJECTIVE    Physical Exam:     Visit Vitals    /76 (BP 1 Location: Left arm, BP Patient Position: Sitting)    Pulse 92    Temp 97.8 °F (36.6 °C) (Oral)    Resp 16    Ht 5' 3.5\" (1.613 m)    Wt 201 lb (91.2 kg)    SpO2 98%    BMI 35.05 kg/m2       General: alert, well-appearing, in no apparent distress or pain  CVS: normal rate, regular rhythm, distinct S1 and S2  Lungs:clear to ausculation bilaterally, no crackles, wheezing or rhonchi noted  Abdomen: normoactive bowel sounds, soft, non-tender  Extremities: no edema, no cyanosis,  Skin: warm, no lesions, rashes noted  Psych:  mood and affect normal  Results for orders placed or performed during the hospital encounter of 01/24/18   HEMOGLOBIN A1C W/O EAG   Result Value Ref Range    Hemoglobin A1c 6.1 (H) 4.2 - 5.6 %   METABOLIC PANEL, BASIC   Result Value Ref Range    Sodium 142 136 - 145 mmol/L    Potassium 4.1 3.5 - 5.5 mmol/L    Chloride 106 100 - 108 mmol/L    CO2 29 21 - 32 mmol/L    Anion gap 7 3.0 - 18 mmol/L    Glucose 115 (H) 74 - 99 mg/dL    BUN 14 7.0 - 18 MG/DL    Creatinine 0.80 0.6 - 1.3 MG/DL    BUN/Creatinine ratio 18 12 - 20      GFR est AA >60 >60 ml/min/1.73m2    GFR est non-AA >60 >60 ml/min/1.73m2    Calcium 9.2 8.5 - 10.1 MG/DL     ASSESSMENT/PLAN  Emmy Dues was seen today for hypertension, other and gerd.     Diagnoses and all orders for this visit:    Essential hypertension with goal blood pressure less than 140/90  controlled  Cont current med exforge    Prediabetes  Persistent, commended on dietary changes  Referral to dietician    Gastroesophageal reflux disease, esophagitis presence not specified  Stable, cont PPI. Judicious use of PPI. BMI 35  Commended on healthier choices, will refer to dietician    Follow-up Disposition:  Return in about 3 months (around 5/1/2018), or if symptoms worsen or fail to improve, plan for CPE then. .    Patient understands plan of care. Patient has provided input and agrees with goals.

## 2018-02-01 NOTE — PROGRESS NOTES
Chief Complaint   Patient presents with    Hypertension    Other     Pre-DM    GERD    Results     1. Have you been to the ER, urgent care clinic since your last visit? Hospitalized since your last visit? No    2. Have you seen or consulted any other health care providers outside of the [address] [place] [address] since your last visit? Include any pap smears or colon screening.  Yes When: 1/12/18 Where: Dr. Annetta Shah Reason for visit: GERD

## 2018-02-01 NOTE — PATIENT INSTRUCTIONS

## 2018-02-01 NOTE — MR AVS SNAPSHOT
[address] [facility] [address] [phone] Patient: Tyrel Burton MRN: [id number] Visit Information Date & Time Provider Department Dept. Phone Encounter #  
 2/1/2018  9:30 AM Thong Liang, [address] [id number] Follow-up Instructions Return in about 3 months (around 5/1/2018), or if symptoms worsen or fail to improve, plan for CPE then. Madeleine Smith Your Appointments 6/6/2018  9:00 AM  
Follow Up with Clive Ribeiro NP  
[facility] ([address] [place] [address]) Appt Note: 6 MO MED F/U NP  
 [address] [facility] [address]  
[phone]  
  
   
 [address] [facility] [address] Upcoming Health Maintenance Date Due  
 BREAST CANCER SCRN MAMMOGRAM 4/21/2019 PAP AKA CERVICAL CYTOLOGY 10/20/2019 DTaP/Tdap/Td series (2 - Td) 2/25/2023 COLONOSCOPY 12/24/2023 Allergies as of 2/1/2018  Review Complete On: 2/1/2018 By: Thong Liang MD  
  
 Severity Noted Reaction Type Reactions Pcn [Penicillins]  07/26/2010    Hives Current Immunizations  Reviewed on 10/20/2016 Name Date Influenza Vaccine 10/1/2017, 10/9/2014 Influenza Vaccine Judene Bark) 10/20/2016 Influenza Vaccine (Quad) PF 10/19/2015 Influenza Vaccine Split 9/21/2011 Tdap 2/25/2013 Not reviewed this visit You Were Diagnosed With   
  
 Codes Comments Essential hypertension    -  Primary ICD-10-CM: I10 
ICD-9-CM: 401.9 Prediabetes     ICD-10-CM: R73.03 
ICD-9-CM: 790.29 Severe obesity (BMI 35.0-39.9) (HCC)     ICD-10-CM: E66.01 
ICD-9-CM: 278.01 Gastroesophageal reflux disease, esophagitis presence not specified     ICD-10-CM: K21.9 ICD-9-CM: 530.81 Anal pruritus     ICD-10-CM: L29.0 ICD-9-CM: 698.0 Vitals BP Pulse Temp Resp Height(growth percentile) Weight(growth percentile) 122/76 (BP 1 Location: Left arm, BP Patient Position: Sitting) 92 97.8 °F (36.6 °C) (Oral) 16 5' 3.5\" (1.613 m) 201 lb (91.2 kg) SpO2 BMI OB Status Smoking Status 98% 35.05 kg/m2 Postmenopausal Never Smoker BMI and BSA Data Body Mass Index Body Surface Area 35.05 kg/m 2 2.02 m 2 Preferred Pharmacy Pharmacy Name Phone [address] [facility] [phone] Your Updated Medication List  
  
   
This list is accurate as of: 2/1/18 10:12 AM.  Always use your most recent med list. amLODIPine-valsartan 5-160 mg per tablet Commonly known as:  EXFORGE  
TAKE 1 TABLET DAILY CLARITIN-D 24 HOUR PO Take  by mouth. cyclobenzaprine 5 mg tablet Commonly known as:  FLEXERIL  
TAKE ONE TABLET BY MOUTH NIGHTLY AS NEEDED FOR MUSCLE SPASM(S)  
  
 diclofenac EC 75 mg EC tablet Commonly known as:  VOLTAREN Take 1 Tab by mouth two (2) times daily as needed. FISH OIL 1,000 mg Cap Generic drug:  omega-3 fatty acids-vitamin e Take 1 Cap by mouth. fluticasone 50 mcg/actuation nasal spray Commonly known as:  FLONASE  
2 sprays each nostril once daily  
  
 multivitamin tablet Commonly known as:  ONE A DAY Take 1 Tab by mouth daily. omeprazole 20 mg capsule Commonly known as:  PRILOSEC Take 20 mg by mouth daily. triamcinolone acetonide 0.025 % ointment Commonly known as:  KENALOG Apply  to affected area two (2) times a day. use thin layer Prescriptions Sent to Pharmacy Refills  
 triamcinolone acetonide (KENALOG) 0.025 % ointment 0 Sig: Apply  to affected area two (2) times a day. use thin layer Class: Normal  
 Pharmacy: [address], [address] [place] [address] Ph #: [phone] Route: Topical  
  
We Performed the Following REFERRAL TO COLON AND RECTAL SURGERY [REF17 Custom] REFERRAL TO DIETITIAN [SQM51 Custom] Follow-up Instructions Return in about 3 months (around 5/1/2018), or if symptoms worsen or fail to improve, plan for CPE then. Peter Burt To-Do List   
 02/01/2018 Lab:  HEMOGLOBIN A1C W/O EAG   
  
 02/01/2018 Lab:  LIPID PANEL   
  
 02/01/2018 Lab:  METABOLIC PANEL, COMPREHENSIVE Referral Information Referral ID Referred By Referred To  
  
 [id number] Lynsey Trevino P Not Available Visits Status Start Date End Date 1 New Request 2/1/18 2/1/19 If your referral has a status of pending review or denied, additional information will be sent to support the outcome of this decision. Referral ID Referred By Referred To  
 [id number] Reina Hemphill MD  
   [address] [place] [address] Phone: [phone] Fax: [phone] Visits Status Start Date End Date 1 New Request 2/1/18 2/1/19 If your referral has a status of pending review or denied, additional information will be sent to support the outcome of this decision. Patient Instructions Starting a Weight Loss Plan: Care Instructions Your Care Instructions If you are thinking about losing weight, it can be hard to know where to start. Your doctor can help you set up a weight loss plan that best meets your needs. You may want to take a class on nutrition or exercise, or join a weight loss support group. If you have questions about how to make changes to your eating or exercise habits, ask your doctor about seeing a registered dietitian or an exercise specialist. 
It can be a big challenge to lose weight. But you do not have to make huge changes at once. Make small changes, and stick with them. When those changes become habit, add a few more changes.  
If you do not think you are ready to make changes right now, try to pick a date in the future. Make an appointment to see your doctor to discuss whether the time is right for you to start a plan. Follow-up care is a key part of your treatment and safety. Be sure to make and go to all appointments, and call your doctor if you are having problems. It's also a good idea to know your test results and keep a list of the medicines you take. How can you care for yourself at home? · Set realistic goals. Many people expect to lose much more weight than is likely. A weight loss of 5% to 10% of your body weight may be enough to improve your health. · Get family and friends involved to provide support. Talk to them about why you are trying to lose weight, and ask them to help. They can help by participating in exercise and having meals with you, even if they may be eating something different. · Find what works best for you. If you do not have time or do not like to cook, a program that offers meal replacement bars or shakes may be better for you. Or if you like to prepare meals, finding a plan that includes daily menus and recipes may be best. 
· Ask your doctor about other health professionals who can help you achieve your weight loss goals. ¨ A dietitian can help you make healthy changes in your diet. ¨ An exercise specialist or  can help you develop a safe and effective exercise program. 
¨ A counselor or psychiatrist can help you cope with issues such as depression, anxiety, or family problems that can make it hard to focus on weight loss. · Consider joining a support group for people who are trying to lose weight. Your doctor can suggest groups in your area. Where can you learn more? Go to http://carlos eduardo-turner.info/. Enter E174 in the search box to learn more about \"Starting a Weight Loss Plan: Care Instructions. \" Current as of: October 13, 2016 Content Version: 11.4 © 6444-3139 Healthwise, Incorporated.  Care instructions adapted under license by [address] (which disclaims liability or warranty for this information). If you have questions about a medical condition or this instruction, always ask your healthcare professional. [address] any warranty or liability for your use of this information. Introducing [facility]! Tee Goldsmith introduces [employer] patient portal. Now you can access parts of your medical record, email your doctor's office, and request medication refills online. 1. In your internet browser, go to [email or web address] 2. Click on the First Time User? Click Here link in the Sign In box. You will see the New Member Sign Up page. 3. Enter your [employer] Access Code exactly as it appears below. You will not need to use this code after youve completed the sign-up process. If you do not sign up before the expiration date, you must request a new code. · [employer] Access Code: [email or web address] Expires: 3/20/2018  8:42 AM 
 
4. Enter the last four digits of your Social Security Number (xxxx) and Date of Birth (mm/dd/yyyy) as indicated and click Submit. You will be taken to the next sign-up page. 5. Create a [employer] ID. This will be your [employer] login ID and cannot be changed, so think of one that is secure and easy to remember. 6. Create a [employer] password. You can change your password at any time. 7. Enter your Password Reset Question and Answer. This can be used at a later time if you forget your password. 8. Enter your e-mail address. You will receive e-mail notification when new information is available in [address] 9. Click Sign Up. You can now view and download portions of your medical record. 10. Click the Download Summary menu link to download a portable copy of your medical information. If you have questions, please visit the Frequently Asked Questions section of the [employer] website.  Remember, [employer] is NOT to be used for urgent needs. For medical emergencies, dial 911. Now available from your iPhone and Android! Please provide this summary of care documentation to your next provider. Your primary care clinician is listed as Callie Cables. If you have any questions after today's visit, please call [phone].

## 2018-02-07 ENCOUNTER — TELEPHONE (OUTPATIENT)
Dept: FAMILY MEDICINE CLINIC | Age: 63
End: 2018-02-07

## 2018-02-07 NOTE — TELEPHONE ENCOUNTER
Patient called this afternoon. She states she has been having trouble sleeping at night. She states she feels like she only sleeps for 2 hours at night. Patient requesting a medication to help her sleep as she states she tried over the counter meds but were not helpful.

## 2018-02-07 NOTE — TELEPHONE ENCOUNTER
Left detailed message for patient to schedule appointment with Dr. Lazarus Haynes to discuss sleep issues.

## 2018-02-08 NOTE — TELEPHONE ENCOUNTER
Patient identified with 2 identifiers (name and ). Patient returned my call and states that she had left this message on tuesday not wednesday. Patient is upset that she need to schedule appt to come in when she has a history of sleep issues. Tried to explain to patient that Dr. Paco Conway needs to discuss the sleeping issues with patient for best recommendation and best medicine practice. While trying to explain this to patient she apparently had hung up phone.

## 2018-02-19 ENCOUNTER — TELEPHONE (OUTPATIENT)
Dept: SURGERY | Age: 63
End: 2018-02-19

## 2018-02-19 NOTE — TELEPHONE ENCOUNTER
Attempted to call patient to move her appt to tomorrow at 10:30 am with an arrival time of 10 am to complete new patient paperwork.

## 2018-02-20 ENCOUNTER — OFFICE VISIT (OUTPATIENT)
Dept: SURGERY | Age: 63
End: 2018-02-20

## 2018-02-20 VITALS
WEIGHT: 201 LBS | SYSTOLIC BLOOD PRESSURE: 136 MMHG | HEART RATE: 80 BPM | BODY MASS INDEX: 34.31 KG/M2 | TEMPERATURE: 97.5 F | HEIGHT: 64 IN | DIASTOLIC BLOOD PRESSURE: 72 MMHG | RESPIRATION RATE: 18 BRPM

## 2018-02-20 DIAGNOSIS — L29.0 PRURITUS ANI: Primary | ICD-10-CM

## 2018-02-20 NOTE — PROGRESS NOTES
Patient presents as referred by PCP for anal pruritis. She reports 20 years of itching, which she states she often scratches sometimes opening the skin. She denies constipation or rectal bleeding. Per patient she had a colonoscopy approx 4 years ago.

## 2018-02-20 NOTE — MR AVS SNAPSHOT
[address], [facility] [address] [facility] [address] 
[phone] Patient: Jazmin Mcgowan MRN: [id number] Visit Information Date & Time Provider Department Dept. Phone Encounter #  
 2018  9:30 AM Sergey Montague MD [facility] [phone] [id number] Your Appointments 5/3/2018  8:15 AM  
COMPLETE PHYSICAL with Kiki Solis MD  
[facility] ([facility]) Appt Note: 3mth f/u  
 [address] [facility] [address]  
[address] [facility] [address] [address] [place] [address]  
  
    
 2018  9:00 AM  
Follow Up with Mary Anne Contreras NP  
[facility] ([facility]) Appt Note: 6 MO MED F/U NP  
 [address] [facility] [address]  
[phone]  
  
   
 [address] [facility] [address] [facility] [address] Upcoming Health Maintenance Date Due  
 BREAST CANCER SCRN MAMMOGRAM 2019 PAP AKA CERVICAL CYTOLOGY 10/20/2019 DTaP/Tdap/Td series (2 - Td) 2023 COLONOSCOPY 2023 Allergies as of 2018  Review Complete On: 2018 By: Maurine Apgar, LPN Severity Noted Reaction Type Reactions Pcn [Penicillins]  2010    Hives Current Immunizations  Reviewed on 10/20/2016 Name Date Influenza Vaccine 10/1/2017, 10/9/2014 Influenza Vaccine Kodyfranky Mehta) 10/20/2016 Influenza Vaccine (Quad) PF 10/19/2015 Influenza Vaccine Split 2011 Tdap 2013 Not reviewed this visit Vitals BP Pulse Temp Resp Height(growth percentile) Weight(growth percentile) 136/72 80 97.5 °F (36.4 °C) (Oral) 18 5' 3.5\" (1.613 m) 201 lb (91.2 kg) BMI OB Status Smoking Status 35.05 kg/m2 Postmenopausal Never Smoker BMI and BSA Data Body Mass Index Body Surface Area  35.05 kg/m 2 2.02 m 2  
  
 Preferred Pharmacy Pharmacy Name Phone [facility], [address] [place] [address] [phone] Your Updated Medication List  
  
   
This list is accurate as of: 2/20/18 11:15 AM.  Always use your most recent med list. amLODIPine-valsartan 5-160 mg per tablet Commonly known as:  EXFORGE  
TAKE 1 TABLET DAILY CLARITIN-D 24 HOUR PO Take  by mouth. cyclobenzaprine 5 mg tablet Commonly known as:  FLEXERIL  
TAKE ONE TABLET BY MOUTH NIGHTLY AS NEEDED FOR MUSCLE SPASM(S)  
  
 diclofenac EC 75 mg EC tablet Commonly known as:  VOLTAREN Take 1 Tab by mouth two (2) times daily as needed. FISH OIL 1,000 mg Cap Generic drug:  omega-3 fatty acids-vitamin e Take 1 Cap by mouth. fluticasone 50 mcg/actuation nasal spray Commonly known as:  FLONASE  
2 sprays each nostril once daily  
  
 multivitamin tablet Commonly known as:  ONE A DAY Take 1 Tab by mouth daily. omeprazole 20 mg capsule Commonly known as:  PRILOSEC Take 20 mg by mouth daily. triamcinolone acetonide 0.025 % ointment Commonly known as:  KENALOG Apply  to affected area two (2) times a day. use thin layer Patient Instructions If you have any questions or concerns about today's appointment, the verbal and/or written instructions you were given for follow up care, please call our office at [phone]. [facility], [place] [phone] office [phone] Introducing [facility]! Chano Jarrell introduces [employer] patient portal. Now you can access parts of your medical record, email your doctor's office, and request medication refills online. 1. In your internet browser, go to [email or web address] 2. Click on the First Time User? Click Here link in the Sign In box. You will see the New Member Sign Up page. 3. Enter your [employer] Access Code exactly as it appears below. You will not need to use this code after youve completed the sign-up process. If you do not sign up before the expiration date, you must request a new code. · [employer] Access Code: [email or web address] Expires: 3/20/2018  8:42 AM 
 
4. Enter the last four digits of your Social Security Number (xxxx) and Date of Birth (mm/dd/yyyy) as indicated and click Submit. You will be taken to the next sign-up page. 5. Create a [employer] ID. This will be your [employer] login ID and cannot be changed, so think of one that is secure and easy to remember. 6. Create a [employer] password. You can change your password at any time. 7. Enter your Password Reset Question and Answer. This can be used at a later time if you forget your password. 8. Enter your e-mail address. You will receive e-mail notification when new information is available in [address] 9. Click Sign Up. You can now view and download portions of your medical record. 10. Click the Download Summary menu link to download a portable copy of your medical information. If you have questions, please visit the Frequently Asked Questions section of the [employer] website. Remember, [employer] is NOT to be used for urgent needs. For medical emergencies, dial 911. Now available from your iPhone and Android! Please provide this summary of care documentation to your next provider. Your primary care clinician is listed as Ina Greenberg. If you have any questions after today's visit, please call [phone].

## 2018-02-20 NOTE — PATIENT INSTRUCTIONS
If you have any questions or concerns about today's appointment, the verbal and/or written instructions you were given for follow up care, please call our office at [phone].     [facility] - [address]    [phone] office  [phone]

## 2018-02-20 NOTE — PROGRESS NOTES
[facility]  Colon and Rectal Surgery  [address], [address], [facility]              Colon and Rectal Surgery Consult          Patient: Cathy Shahid  MRN: [id number]  Date: 2/20/2018     Age:  57 y.o.,      Sex: female    YOB: 1955      Subjective    Ms. Jesenia Orellana is an 57 y.o. female referred by Dr. Kyara Shepard.  Her current symptoms persistent perianal itching and burning sensation.  reports symptoms have presented for 20 years. She has not had previous rectal surgery. Previous treatments have included pretty much all sorts of prescribed and overt the counter topical interventions without success.   denies associated fever. A history of inflammatory bowel disease has not been reported. Of note, the patient states that the perianal area was biopsied about 6 years ago with negative findings as per the patient. The patient denies any rectal bleeding, change in bowel habits, weight changes, nor any abdominal pain. Patient denies constipation, vomiting, diarrhea, bloody stools, mucousy stools, reflux and nausea. Bowel habits are reported as normal without unsual diarrhea, constipation, blood or pain. The family history is negative for colon cancer/polyps, other GI malignancies, nor inflammatory bowel diseases. Colonoscopy was performed last about 4-5 years ago with unremarkable findings as per the patient.          Past Medical History:   Diagnosis Date    Eosinophilic esophagitis     started on flvent, dr. Ander Kebede Gastric ulcer 04/2017    dr Sunday Tracy, avoid nsaids    Gastritis 12/13/2017    gastritis, cont PPI dr. Ander Kebede GERD (gastroesophageal reflux disease)     erosive esophagitis 11/13 EGD    H/O colonoscopy 11/2013    normal    Hypertension     Left knee pain     Morbid obesity ([address]     Precordial pain     Prediabetes        Past Surgical History:   Procedure Laterality Date    HX CATARACT REMOVAL Right 10/04/2016  HX COLONOSCOPY  11/06/2016    HX GYN      vaginal delivery x 2    HX ORTHOPAEDIC      foot surgery       Allergies   Allergen Reactions    Pcn [Penicillins] Hives       Prior to Admission medications    Medication Sig Start Date End Date Taking? Authorizing Provider   triamcinolone acetonide (KENALOG) 0.025 % ointment Apply  to affected area two (2) times a day. use thin layer 2/1/18  Yes Kelsey García MD   amLODIPine-valsartan (EXFORGE) 5-160 mg per tablet TAKE 1 TABLET DAILY 1/11/18  Yes eKlsey García MD   diclofenac EC (VOLTAREN) 75 mg EC tablet Take 1 Tab by mouth two (2) times daily as needed. 12/20/17  Yes Stefany PETERSON [facility], NP   cyclobenzaprine (FLEXERIL) 5 mg tablet TAKE ONE TABLET BY MOUTH NIGHTLY AS NEEDED FOR MUSCLE SPASM(S) 12/20/17  Yes Norma Steinberg NP   omeprazole (PRILOSEC) 20 mg capsule Take 20 mg by mouth daily. Yes Historical Provider   LORATADINE/PSEUDOEPHEDRINE (CLARITIN-D 24 HOUR PO) Take  by mouth. Yes Historical Provider   fluticasone (FLONASE) 50 mcg/actuation nasal spray 2 sprays each nostril once daily  Patient taking differently: daily as needed. 2 sprays each nostril once daily 11/16/16  Yes Rober Simon MD   omega-3 fatty acids-vitamin e (FISH OIL) 1,000 mg cap Take 1 Cap by mouth. Yes Historical Provider   multivitamin (ONE A DAY) tablet Take 1 Tab by mouth daily. Yes Historical Provider       Current Outpatient Prescriptions   Medication Sig Dispense Refill    triamcinolone acetonide (KENALOG) 0.025 % ointment Apply  to affected area two (2) times a day. use thin layer 30 g 0    amLODIPine-valsartan (EXFORGE) 5-160 mg per tablet TAKE 1 TABLET DAILY 90 Tab 1    diclofenac EC (VOLTAREN) 75 mg EC tablet Take 1 Tab by mouth two (2) times daily as needed. 60 Tab 5    cyclobenzaprine (FLEXERIL) 5 mg tablet TAKE ONE TABLET BY MOUTH NIGHTLY AS NEEDED FOR MUSCLE SPASM(S) 30 Tab 2    omeprazole (PRILOSEC) 20 mg capsule Take 20 mg by mouth daily.       LORATADINE/PSEUDOEPHEDRINE (CLARITIN-D 24 HOUR PO) Take  by mouth.  fluticasone (FLONASE) 50 mcg/actuation nasal spray 2 sprays each nostril once daily (Patient taking differently: daily as needed. 2 sprays each nostril once daily) 1 Bottle 3    omega-3 fatty acids-vitamin e (FISH OIL) 1,000 mg cap Take 1 Cap by mouth.  multivitamin (ONE A DAY) tablet Take 1 Tab by mouth daily. Social History     Social History    Marital status:      Spouse name: N/A    Number of children: N/A    Years of education: N/A     Occupational History    Not on file. Social History Main Topics    Smoking status: Never Smoker    Smokeless tobacco: Never Used    Alcohol use No    Drug use: No    Sexual activity: Not on file     Other Topics Concern    Not on file     Social History Narrative       Family History   Problem Relation Age of Onset    Heart Failure Mother     Heart Disease Mother      mi 52's    Asthma Other            Review of Systems:    A comprehensive review of systems was negative except for: Musculoskeletal: positive for myalgias, arthralgias and stiff joints    Objective:        Visit Vitals    /72    Pulse 80    Temp 97.5 °F (36.4 °C) (Oral)    Resp 18    Ht 5' 3.5\" (1.613 m)    Wt 91.2 kg (201 lb)    BMI 35.05 kg/m2       Physical Exam:   GENERAL: alert, cooperative, no distress, appears stated age  LUNG: clear to auscultation bilaterally  HEART: regular rate and rhythm  EXTREMITIES:  extremities normal, atraumatic, no cyanosis or edema     Anorectal:  With the patient in the prone position the anus appeared abnormal with findings of mild to moderate circumferential perianal pruritus ani. No skin breakdown was present. Digital rectal examination revealed Normal sphincter tone and squeeze pressure. Palpation revealed No Masses. Anoscopy revealed normal findings. Assessment / Plan    Ms. Alanis Carlson is an 57 y.o. female with mild to moderate circumferential perianal pruritus ani.      I reassured the patient and recommended starting the management regimen consisting of:    1. Frequent sitz baths at home with Epsom salt  2. Avoidance of any soap to the anal area. 3. Application of A&D ointment as instructed. The patient will follow up in my clinic in about 2 months. Thank you for allowing me to participate in the patient's care.           Jason Rojas MD, FACS, FASCRS  Colon and Rectal Surgery  [facility]  Office [phone]  Fax     [phone]  2/20/2018  3:16 PM

## 2018-03-05 ENCOUNTER — HOSPITAL ENCOUNTER (OUTPATIENT)
Dept: NUTRITION | Age: 63
Discharge: HOME | End: 2018-03-05
Payer: COMMERCIAL

## 2018-03-05 PROCEDURE — 97802 MEDICAL NUTRITION INDIV IN: CPT

## 2018-03-05 NOTE — PROGRESS NOTES
[address] [place] [address], [address] [place] [address], [address] [facility] [address]  Phone: [phone]  Fax: [phone]   Nutrition Assessment  Medical Nutrition Therapy   Outpatient Initial Evaluation         Patient Name: Philly Briceño : 1955   Treatment Diagnosis: Prediabetes, Obesity    Referral Source: Vanesa Renee MD [facility]): 3/5/2018     Gender: female Age: 57 y.o. Ht:  in Wt:   lb  kg   BMI:  BMR   Male  BMR Female    Anthropometrics Assessment: Moderate abdominal adiposity is evident based on visual observation     Past Medical History includes: HTN     Pertinent Medications:        Biochemical Data:   Lab Results   Component Value Date/Time    Hemoglobin A1c 6.1 (H) 2018 09:25 AM     Lab Results   Component Value Date/Time    Cholesterol, total 175 10/10/2016 06:30 AM    HDL Cholesterol 90 (H) 10/10/2016 06:30 AM    LDL, calculated 72.8 10/10/2016 06:30 AM    VLDL, calculated 12.2 10/10/2016 06:30 AM    Triglyceride 61 10/10/2016 06:30 AM    CHOL/HDL Ratio 1.9 10/10/2016 06:30 AM     Lab Results   Component Value Date/Time    ALT (SGPT) 29 2017 10:50 AM    AST (SGOT) 17 2017 10:50 AM    Alk. phosphatase 78 2017 10:50 AM    Bilirubin, direct 0.23 2012 07:06 AM    Bilirubin, total 0.6 2017 10:50 AM     Lab Results   Component Value Date/Time    Creatinine 0.80 2018 09:25 AM     Lab Results   Component Value Date/Time    BUN 14 2018 09:25 AM     No results found for: MCACR, MCA1, MCA2, MCA3, MCAU, MCAU2, MCALPOCT     Subjective/Assessment:   Pt being seen for weight loss and BG management. Since , Pt has daily been writing down her calories, food/fluid intake, and weight. She uses [employer] and the booklet Carb Counting & Meal Planning as references. She is frustrated that she hasn't lost any weight except for 5 lbs since .  Pt walks 4.1 miles daily, either outside or at the gym for 1 hour. She sleeps poorly, having a restless mind and often waking up 1AM and not being able to get back to sleep. Pt is well-read on health/nutrition information but has a misunderstanding on principles for weight loss/management and only focuses on \"diet\" and calories. She was willing and open to discussing lifestyle habits she can incorporate to help aid weight loss and manage BG. Discussed that Pt is probably not consuming sufficient daily calories to meet her caloric needs and is therefore also slowing her metabolism. Pt verbalized understanding of information discussed, expect compliance, and will follow up in a few weeks to review progress. Current Eating Patterns: Pt eats three meals and two snacks daily, spacing them out appropriately. Breakfast is usually solely CHO and is sometimes lacking protein at other meals as well. Overall food choices are healthful. Pt has been limiting her daily calorie intake to 1200 Kcal or less. Beverages include green tea, water, and diet soda. Estimate Needs   Calories:  1600 Protein: 144 Carbs: 136 Fat: 53   Kcal/day  g/day  g/day  g/day        percent: 36  34  30               Education & Recommendations provided: Educated pt on principles of general healthy eating, including the importance of sufficient calories to meet nutrient needs. Discussed meal timing and appropriate serving sizes.    Handouts Provided: []  Carbohydrates  []  Protein  []  Fiber  []  Serving Sizes  [x]  Meal and Snack Ideas  []  Food Journals []  Diabetes  []  Cholesterol  []  Sodium  []  Gen Nutr Guidelines  []  SBGM Guidelines  [x]  Others: Meal Builder    Information Reviewed with: Pt   Readiness to Change Stage: []  Pre-contemplative    []  Contemplative  [x]  Preparation               [x]  Action                  []  Maintenance   Potential Barriers to Learning: []  Decline in memory    []  Language barrier   []  Other:  []  Emotional                  []  Limited mobility  []  Lack of motivation     [] Vision, hearing or cognitive impairment   Expected Compliance: Good      Nutritional Goal - To promote lifestyle changes to result in:    [x]  Weight loss  []  Improved diabetic control  []  Decreased cholesterol levels  []  Decreased blood pressure  []  Weight maintenance []  Preventing any interactions associated with food allergies  []  Adequate weight gain toward goal weight  [x]  Other: Improved BG control      Patient Goals:  SMART goals 1. Include protein at every meal, eating every 3-5 hours and 2 hours upon waking. 2. Use Meal Builder to practice portion control and provide structure/balance to meals.       Dietitian Signature: Ab Lomax RDN Date: 3/5/2018   Follow-up: March 26 at 0930 Time: 2:52 PM

## 2018-03-26 ENCOUNTER — HOSPITAL ENCOUNTER (OUTPATIENT)
Dept: NUTRITION | Age: 63
Discharge: HOME | End: 2018-03-26
Payer: COMMERCIAL

## 2018-03-26 ENCOUNTER — TELEPHONE (OUTPATIENT)
Dept: FAMILY MEDICINE CLINIC | Age: 63
End: 2018-03-26

## 2018-03-26 DIAGNOSIS — R63.5 WEIGHT GAIN: ICD-10-CM

## 2018-03-26 PROCEDURE — 97803 MED NUTRITION INDIV SUBSEQ: CPT

## 2018-03-26 NOTE — PROGRESS NOTES
NUTRITION  FOLLOW-UP TREATMENT NOTE  Patient Name: Ishaan Mccormick         Date: 3/26/2018  : 1955    YES Patient  Verified  Diagnosis: Prediabetes, Obesity   In time:   930             Out time:   1000   Total Treatment Time (min):   30     SUBJECTIVE/ASSESSMENT  Changes in medication or medical history? Any new allergies, surgeries or procedures? NO    If yes, update Summary List   Pt continues to be frustrated that she is not losing weight but fluctuating between +/-2 lb. She is eating 4-5 times/day, which includes three meals and 1-2 snacks. She has found Meal Builder helpful but doesn't understand why her weight remains unchanged despite her efforts to be active and eat less calories. Reviewed nutrition guidelines and addressed her specific questions/concerns. Explained that the number on the scale will take longer to \"catch up\" and show results but instead she should pay attention to how she is feeling/clothes fitting. Her food choices are healthful and she is staying active 3-4 days/week. Will continue to provide support. Pt has next doctor's visit in May, will plan to follow up after this appointment to review. Current Wt: 196 Previous Wt: 195 Wt Change: +1     Achievement of Goals: 1. Include protein at every meal, eating every 3-5 hours and 2 hours upon waking.=met, continue  2. Use Meal Builder to practice portion control and provide structure/balance to meals.=met, continue  New Patient Goals:  1. Weigh yourself only once per week, first thing in the morning without clothes. Patient Education:  [x]  Review current plan with patient   []  Other:    Handouts/  Information Provided: []  Carbohydrates  []  Protein  []  Fiber  []  Serving Sizes  []  Fluids  []  General guidelines []  Diabetes  []  Cholesterol  []  Sodium  []  SBGM  []  Food Journals  []  Others:      PLAN  []  Continue on current plan []  Follow-up PRN   []  Discharge due to :    [x]  Next appt:  May 28 at 0930 Dietitian: Felicia Wilde RDN    Date: 3/26/2018 Time: 1:32 PM

## 2018-03-26 NOTE — TELEPHONE ENCOUNTER
Pt states that the nutritionist thinks that her thyroid needs to be checked for her metabolism. Please advise.

## 2018-03-29 ENCOUNTER — HOSPITAL ENCOUNTER (OUTPATIENT)
Dept: LAB | Age: 63
Discharge: HOME | End: 2018-03-29
Payer: COMMERCIAL

## 2018-03-29 LAB — TSH SERPL DL<=0.05 MIU/L-ACNC: 1.06 UIU/ML (ref 0.36–3.74)

## 2018-03-29 PROCEDURE — 36415 COLL VENOUS BLD VENIPUNCTURE: CPT | Performed by: FAMILY MEDICINE

## 2018-03-29 PROCEDURE — 84443 ASSAY THYROID STIM HORMONE: CPT | Performed by: FAMILY MEDICINE

## 2018-04-30 ENCOUNTER — HOSPITAL ENCOUNTER (OUTPATIENT)
Dept: LAB | Age: 63
Discharge: HOME | End: 2018-04-30
Payer: COMMERCIAL

## 2018-04-30 DIAGNOSIS — R73.03 PREDIABETES: ICD-10-CM

## 2018-04-30 DIAGNOSIS — I10 ESSENTIAL HYPERTENSION: ICD-10-CM

## 2018-04-30 LAB
ALBUMIN SERPL-MCNC: 3.9 G/DL (ref 3.4–5)
ALBUMIN/GLOB SERPL: 1.3 {RATIO} (ref 0.8–1.7)
ALP SERPL-CCNC: 76 U/L (ref 45–117)
ALT SERPL-CCNC: 40 U/L (ref 13–56)
ANION GAP SERPL CALC-SCNC: 8 MMOL/L (ref 3–18)
AST SERPL-CCNC: 26 U/L (ref 15–37)
BILIRUB SERPL-MCNC: 0.5 MG/DL (ref 0.2–1)
BUN SERPL-MCNC: 22 MG/DL (ref 7–18)
BUN/CREAT SERPL: 28 (ref 12–20)
CALCIUM SERPL-MCNC: 8.6 MG/DL (ref 8.5–10.1)
CHLORIDE SERPL-SCNC: 109 MMOL/L (ref 100–108)
CHOLEST SERPL-MCNC: 187 MG/DL
CO2 SERPL-SCNC: 25 MMOL/L (ref 21–32)
CREAT SERPL-MCNC: 0.8 MG/DL (ref 0.6–1.3)
GLOBULIN SER CALC-MCNC: 3 G/DL (ref 2–4)
GLUCOSE SERPL-MCNC: 110 MG/DL (ref 74–99)
HBA1C MFR BLD: 6.6 % (ref 4.2–5.6)
HDLC SERPL-MCNC: 79 MG/DL (ref 40–60)
HDLC SERPL: 2.4 {RATIO} (ref 0–5)
LDLC SERPL CALC-MCNC: 92.2 MG/DL (ref 0–100)
LIPID PROFILE,FLP: ABNORMAL
POTASSIUM SERPL-SCNC: 4 MMOL/L (ref 3.5–5.5)
PROT SERPL-MCNC: 6.9 G/DL (ref 6.4–8.2)
SODIUM SERPL-SCNC: 142 MMOL/L (ref 136–145)
TRIGL SERPL-MCNC: 79 MG/DL (ref ?–150)
VLDLC SERPL CALC-MCNC: 15.8 MG/DL

## 2018-04-30 PROCEDURE — 36415 COLL VENOUS BLD VENIPUNCTURE: CPT | Performed by: FAMILY MEDICINE

## 2018-04-30 PROCEDURE — 80053 COMPREHEN METABOLIC PANEL: CPT | Performed by: FAMILY MEDICINE

## 2018-04-30 PROCEDURE — 80061 LIPID PANEL: CPT | Performed by: FAMILY MEDICINE

## 2018-04-30 PROCEDURE — 83036 HEMOGLOBIN GLYCOSYLATED A1C: CPT | Performed by: FAMILY MEDICINE

## 2018-05-01 ENCOUNTER — HOSPITAL ENCOUNTER (OUTPATIENT)
Dept: MAMMOGRAPHY | Age: 63
Discharge: HOME | End: 2018-05-01
Attending: FAMILY MEDICINE
Payer: COMMERCIAL

## 2018-05-01 DIAGNOSIS — Z12.31 VISIT FOR SCREENING MAMMOGRAM: ICD-10-CM

## 2018-05-01 PROCEDURE — 77063 BREAST TOMOSYNTHESIS BI: CPT

## 2018-05-03 ENCOUNTER — OFFICE VISIT (OUTPATIENT)
Dept: FAMILY MEDICINE CLINIC | Age: 63
End: 2018-05-03

## 2018-05-03 VITALS
HEIGHT: 63 IN | HEART RATE: 85 BPM | OXYGEN SATURATION: 97 % | RESPIRATION RATE: 16 BRPM | TEMPERATURE: 97.9 F | SYSTOLIC BLOOD PRESSURE: 130 MMHG | BODY MASS INDEX: 34.59 KG/M2 | DIASTOLIC BLOOD PRESSURE: 72 MMHG | WEIGHT: 195.2 LBS

## 2018-05-03 DIAGNOSIS — I10 ESSENTIAL HYPERTENSION: ICD-10-CM

## 2018-05-03 DIAGNOSIS — J30.1 SEASONAL ALLERGIC RHINITIS DUE TO POLLEN: ICD-10-CM

## 2018-05-03 DIAGNOSIS — Z12.11 COLON CANCER SCREENING: ICD-10-CM

## 2018-05-03 DIAGNOSIS — Z01.419 WELL WOMAN EXAM WITH ROUTINE GYNECOLOGICAL EXAM: Primary | ICD-10-CM

## 2018-05-03 DIAGNOSIS — R73.03 PREDIABETES: ICD-10-CM

## 2018-05-03 DIAGNOSIS — K21.9 GASTROESOPHAGEAL REFLUX DISEASE, ESOPHAGITIS PRESENCE NOT SPECIFIED: ICD-10-CM

## 2018-05-03 DIAGNOSIS — R09.81 SINUS CONGESTION: ICD-10-CM

## 2018-05-03 PROBLEM — Z01.818 PRE-OP EVALUATION: Status: RESOLVED | Noted: 2017-08-01 | Resolved: 2018-05-03

## 2018-05-03 RX ORDER — FLUTICASONE PROPIONATE 50 MCG
SPRAY, SUSPENSION (ML) NASAL
Qty: 1 BOTTLE | Refills: 3 | Status: SHIPPED | OUTPATIENT
Start: 2018-05-03 | End: 2020-02-12

## 2018-05-03 NOTE — PROGRESS NOTES
Chief Complaint   Patient presents with    Well Woman    Other     prediabetes    Hypertension    GERD       1. Have you been to the ER, urgent care clinic since your last visit? Hospitalized since your last visit? No    2. Have you seen or consulted any other health care providers outside of the [facility] since your last visit? Include any pap smears or colon screening.  No

## 2018-05-03 NOTE — PROGRESS NOTES
Subjective:   57 y.o. female for Well Woman Check. No LMP recorded. Patient is postmenopausal.    HTN- taking medication without problems  Prediabetes- working with nutritionist, lost 6 lobs since last visit. TSH normal    Allergies   Allergen Reactions    Pcn [Penicillins] Hives     Past Medical History:   Diagnosis Date    Eosinophilic esophagitis     started on flvent, dr. Ju Victor Gastric ulcer 04/2017    dr Alisha Mcdonald, avoid nsaids    Gastritis 12/13/2017    gastritis, cont PPI dr. Ju Victor GERD (gastroesophageal reflux disease)     erosive esophagitis 11/13 EGD    H/O colonoscopy 11/2013    normal    Hypertension     Left knee pain     Morbid obesity ([address]     Precordial pain     Prediabetes      Past Surgical History:   Procedure Laterality Date    HX CATARACT REMOVAL Right 10/04/2016    HX COLONOSCOPY  11/06/2016    HX GYN      vaginal delivery x 2    HX ORTHOPAEDIC      foot surgery     Family History   Problem Relation Age of Onset    Heart Failure Mother     Heart Disease Mother      mi 52's   Leanna Fare Asthma Other      Social History   Substance Use Topics    Smoking status: Never Smoker    Smokeless tobacco: Never Used    Alcohol use No        ROS:  Feeling well. No dyspnea or chest pain on exertion. No abdominal pain, change in bowel habits, black or bloody stools. No urinary tract symptoms. GYN ROS: no breast pain or new or enlarging lumps on self exam. No neurological complaints. Objective:     Visit Vitals    /72 (BP 1 Location: Left arm, BP Patient Position: Sitting)    Pulse 85    Temp 97.9 °F (36.6 °C) (Oral)    Resp 16    Ht 5' 3\" (1.6 m)    Wt 195 lb 3.2 oz (88.5 kg)    SpO2 97%    BMI 34.58 kg/m2     The patient appears well, alert, oriented x 3, in no distress. ENT normal.  Neck supple. No adenopathy or thyromegaly. EMMIE. Lungs are clear, good air entry, no wheezes, rhonchi or rales. S1 and S2 normal, no murmurs, regular rate and rhythm.  Abdomen soft without tenderness, guarding, mass or organomegaly. Extremities show no edema, normal peripheral pulses. Neurological is normal, no focal findings. BREAST EXAM: breasts appear normal, no suspicious masses, no skin or nipple changes or axillary nodes    PELVIC EXAM: examination not indicated    Assessment/Plan:   Diagnoses and all orders for this visit:    1. Well woman exam with routine gynecological exam  well woman  Mammogram- utd  pap smear- update 2019  return annually or prn  reviewed diet, exercise and weight control. tdap utd  shingrix rx provided  crcs update 2018, referral placed  Plan on dexa screen next year    2. Prediabetes  Persistent, commended on wt loss   Monitoring, cont with nutritionist  Recheck labs in 3 months  -     HEMOGLOBIN A1C W/O EAG; Future  -     METABOLIC PANEL, BASIC; Future    3. Essential hypertension  Controlled, cont exforge    4. Gastroesophageal reflux disease, esophagitis presence not specified  Stable, cont prilosec    5. Colon cancer screening  -     REFERRAL TO GASTROENTEROLOGY    6. Sinus congestion  -     fluticasone (FLONASE) 50 mcg/actuation nasal spray; 2 sprays each nostril once daily        - zyrtec  7. Seasonal allergic rhinitis due to pollen    Other orders  -     varicella-zoster recombinant, PF, (SHINGRIX) 50 mcg/0.5 mL susr injection; 0.5 mL by IntraMUSCular route once for 1 dose. Ff-up in 3 months or sooner prn    Patient/guardian understands plan of care. Patient has provided input and agrees with goals. Future labs to be discussed on next visit.

## 2018-05-03 NOTE — PATIENT INSTRUCTIONS
Prediabetes: Care Instructions  Your Care Instructions    Prediabetes is a warning sign that you are at risk for getting type 2 diabetes. It means that your blood sugar is higher than it should be. The food you eat turns into sugar, which your body uses for energy. Normally, an organ called the pancreas makes insulin, which allows the sugar in your blood to get into your body's cells. But when your body can't use insulin the right way, the sugar doesn't move into cells. It stays in your blood instead. This is called insulin resistance. The buildup of sugar in the blood causes prediabetes. The good news is that lifestyle changes may help you get your blood sugar back to normal and help you avoid or delay diabetes. Follow-up care is a key part of your treatment and safety. Be sure to make and go to all appointments, and call your doctor if you are having problems. It's also a good idea to know your test results and keep a list of the medicines you take. How can you care for yourself at home? · Watch your weight. A healthy weight helps your body use insulin properly. · Limit the amount of calories, sweets, and unhealthy fat you eat. Ask your doctor if you should see a dietitian. A registered dietitian can help you create meal plans that fit your lifestyle. · Get at least 30 minutes of exercise on most days of the week. Exercise helps control your blood sugar. It also helps you maintain a healthy weight. Walking is a good choice. You also may want to do other activities, such as running, swimming, cycling, or playing tennis or team sports. · Do not smoke. Smoking can make prediabetes worse. If you need help quitting, talk to your doctor about stop-smoking programs and medicines. These can increase your chances of quitting for good. · If your doctor prescribed medicines, take them exactly as prescribed. Call your doctor if you think you are having a problem with your medicine.  You will get more details on the specific medicines your doctor prescribes. When should you call for help? Watch closely for changes in your health, and be sure to contact your doctor if:  ? · You have any symptoms of diabetes. These may include:  ¨ Being thirsty more often. ¨ Urinating more. ¨ Being hungrier. ¨ Losing weight. ¨ Being very tired. ¨ Having blurry vision. ? · You have a wound that will not heal.   ? · You have an infection that will not go away. ? · You have problems with your blood pressure. ? · You want more information about diabetes and how you can keep from getting it. Where can you learn more? Go to http://carlos eduardo-turner.info/. Enter I222 in the search box to learn more about \"Prediabetes: Care Instructions. \"  Current as of: March 13, 2017  Content Version: 11.4  © 8576-1402 [employer]. Care instructions adapted under license by [employer] (which disclaims liability or warranty for this information). If you have questions about a medical condition or this instruction, always ask your healthcare professional. [address] any warranty or liability for your use of this information. DASH Diet: Care Instructions  Your Care Instructions    The DASH diet is an eating plan that can help lower your blood pressure. DASH stands for Dietary Approaches to Stop Hypertension. Hypertension is high blood pressure. The DASH diet focuses on eating foods that are high in calcium, potassium, and magnesium. These nutrients can lower blood pressure. The foods that are highest in these nutrients are fruits, vegetables, low-fat dairy products, nuts, seeds, and legumes. But taking calcium, potassium, and magnesium supplements instead of eating foods that are high in those nutrients does not have the same effect. The DASH diet also includes whole grains, fish, and poultry.   The DASH diet is one of several lifestyle changes your doctor may recommend to lower your high blood pressure. Your doctor may also want you to decrease the amount of sodium in your diet. Lowering sodium while following the DASH diet can lower blood pressure even further than just the DASH diet alone. Follow-up care is a key part of your treatment and safety. Be sure to make and go to all appointments, and call your doctor if you are having problems. It's also a good idea to know your test results and keep a list of the medicines you take. How can you care for yourself at home? Following the DASH diet  · Eat 4 to 5 servings of fruit each day. A serving is 1 medium-sized piece of fruit, ½ cup chopped or canned fruit, 1/4 cup dried fruit, or 4 ounces (½ cup) of fruit juice. Choose fruit more often than fruit juice. · Eat 4 to 5 servings of vegetables each day. A serving is 1 cup of lettuce or raw leafy vegetables, ½ cup of chopped or cooked vegetables, or 4 ounces (½ cup) of vegetable juice. Choose vegetables more often than vegetable juice. · Get 2 to 3 servings of low-fat and fat-free dairy each day. A serving is 8 ounces of milk, 1 cup of yogurt, or 1 ½ ounces of cheese. · Eat 6 to 8 servings of grains each day. A serving is 1 slice of bread, 1 ounce of dry cereal, or ½ cup of cooked rice, pasta, or cooked cereal. Try to choose whole-grain products as much as possible. · Limit lean meat, poultry, and fish to 2 servings each day. A serving is 3 ounces, about the size of a deck of cards. · Eat 4 to 5 servings of nuts, seeds, and legumes (cooked dried beans, lentils, and split peas) each week. A serving is 1/3 cup of nuts, 2 tablespoons of seeds, or ½ cup of cooked beans or peas. · Limit fats and oils to 2 to 3 servings each day. A serving is 1 teaspoon of vegetable oil or 2 tablespoons of salad dressing. · Limit sweets and added sugars to 5 servings or less a week. A serving is 1 tablespoon jelly or jam, ½ cup sorbet, or 1 cup of lemonade. · Eat less than 2,300 milligrams (mg) of sodium a day. If you limit your sodium to 1,500 mg a day, you can lower your blood pressure even more. Tips for success  · Start small. Do not try to make dramatic changes to your diet all at once. You might feel that you are missing out on your favorite foods and then be more likely to not follow the plan. Make small changes, and stick with them. Once those changes become habit, add a few more changes. · Try some of the following:  ¨ Make it a goal to eat a fruit or vegetable at every meal and at snacks. This will make it easy to get the recommended amount of fruits and vegetables each day. ¨ Try yogurt topped with fruit and nuts for a snack or healthy dessert. ¨ Add lettuce, tomato, cucumber, and onion to sandwiches. ¨ Combine a ready-made pizza crust with low-fat mozzarella cheese and lots of vegetable toppings. Try using tomatoes, squash, spinach, broccoli, carrots, cauliflower, and onions. ¨ Have a variety of cut-up vegetables with a low-fat dip as an appetizer instead of chips and dip. ¨ Sprinkle sunflower seeds or chopped almonds over salads. Or try adding chopped walnuts or almonds to cooked vegetables. ¨ Try some vegetarian meals using beans and peas. Add garbanzo or kidney beans to salads. Make burritos and tacos with mashed valdez beans or black beans. Where can you learn more? Go to http://carlso eduardo-turner.info/. Enter D303 in the search box to learn more about \"DASH Diet: Care Instructions. \"  Current as of: September 21, 2016  Content Version: 11.4  © 5140-7342 [employer]. Care instructions adapted under license by [employer] (which disclaims liability or warranty for this information). If you have questions about a medical condition or this instruction, always ask your healthcare professional. [address] any warranty or liability for your use of this information.

## 2018-05-03 NOTE — MR AVS SNAPSHOT
[address] [place] [address] [address] [facility] 
[phone] Patient: Eran Sheikh MRN: [id number] IEF:[birth year] Visit Information Date & Time Provider Department Dept. Phone Encounter #  
 5/3/2018  8:15 AM Scott Narayan, [address] [id number] Follow-up Instructions Return in about 3 months (around 8/3/2018), or if symptoms worsen or fail to improve. Your Appointments 6/5/2018  9:00 AM  
Follow Up with Praveen Hayes MD  
[facility] [address]) Appt Note: 2 month f/up 12[address] [facility] [place] [address] [place] [address], [place] [address] [phone]  
  
    
 6/6/2018  9:00 AM  
Follow Up with Cristiano Tena NP  
[facility] ([address] [place] [address]) Appt Note: 6 MO MED F/U NP  
 [address] [facility] [id number] [phone]  
  
   
 [address] [facility] [address] Upcoming Health Maintenance Date Due Bone Densitometry (Dexa) Screening 7/7/2018 Influenza Age 5 to Adult 8/1/2018 PAP AKA CERVICAL CYTOLOGY 10/20/2019 BREAST CANCER SCRN MAMMOGRAM 5/1/2020 DTaP/Tdap/Td series (2 - Td) 2/25/2023 COLONOSCOPY 12/24/2023 Allergies as of 5/3/2018  Review Complete On: 5/3/2018 By: Scott Narayan MD  
  
 Severity Noted Reaction Type Reactions Pcn [Penicillins]  07/26/2010    Hives Current Immunizations  Reviewed on 10/20/2016 Name Date Influenza Vaccine 10/1/2017, 10/9/2014 Influenza Vaccine Elenora Gemma) 10/20/2016 Influenza Vaccine (Quad) PF 10/19/2015 Influenza Vaccine Split 9/21/2011 Tdap 2/25/2013 Not reviewed this visit You Were Diagnosed With   
  
 Codes Comments Well woman exam with routine gynecological exam    -  Primary ICD-10-CM: I62.295 ICD-9-CM: V72.31   
 Prediabetes     ICD-10-CM: R73.03 
ICD-9-CM: 790.29 Essential hypertension     ICD-10-CM: I10 
ICD-9-CM: 401.9 Gastroesophageal reflux disease, esophagitis presence not specified     ICD-10-CM: K21.9 ICD-9-CM: 530.81 Vitals BP Pulse Temp Resp Height(growth percentile) Weight(growth percentile) 130/72 (BP 1 Location: Left arm, BP Patient Position: Sitting) 85 97.9 °F (36.6 °C) (Oral) 16 5' 3\" (1.6 m) 195 lb 3.2 oz (88.5 kg) SpO2 BMI OB Status Smoking Status 97% 34.58 kg/m2 Postmenopausal Never Smoker Vitals History BMI and BSA Data Body Mass Index Body Surface Area 34.58 kg/m 2 1.98 m 2 Preferred Pharmacy Pharmacy Name Phone [address] [phone] Your Updated Medication List  
  
   
This list is accurate as of 5/3/18  8:52 AM.  Always use your most recent med list. amLODIPine-valsartan 5-160 mg per tablet Commonly known as:  EXFORGE  
TAKE 1 TABLET DAILY CLARITIN-D 24 HOUR PO Take  by mouth. cyclobenzaprine 5 mg tablet Commonly known as:  FLEXERIL  
TAKE ONE TABLET BY MOUTH NIGHTLY AS NEEDED FOR MUSCLE SPASM(S)  
  
 diclofenac EC 75 mg EC tablet Commonly known as:  VOLTAREN Take 1 Tab by mouth two (2) times daily as needed. FISH OIL 1,000 mg Cap Generic drug:  omega-3 fatty acids-vitamin e Take 1 Cap by mouth. fluticasone 50 mcg/actuation nasal spray Commonly known as:  FLONASE  
2 sprays each nostril once daily  
  
 multivitamin tablet Commonly known as:  ONE A DAY Take 1 Tab by mouth daily. omeprazole 20 mg capsule Commonly known as:  PRILOSEC Take 20 mg by mouth daily. triamcinolone acetonide 0.025 % ointment Commonly known as:  KENALOG Apply  to affected area two (2) times a day. use thin layer  
  
 varicella-zoster recombinant (PF) 50 mcg/0.5 mL Susr injection Commonly known as:  [facility] 0.5 mL by IntraMUSCular route once for 1 dose. ZYRTEC PO Take  by mouth. Prescriptions Printed Refills  
 varicella-zoster recombinant, PF, (SHINGRIX) 50 mcg/0.5 mL susr injection 1 Si.5 mL by IntraMUSCular route once for 1 dose. Class: Print Route: IntraMUSCular Follow-up Instructions Return in about 3 months (around 8/3/2018), or if symptoms worsen or fail to improve. To-Do List   
 2018 Lab:  HEMOGLOBIN A1C W/O EAG   
  
 2018 Lab:  METABOLIC PANEL, BASIC   
  
 2018 9:30 AM  
  Appointment with Angella Espinoza at [address] [place] [address] Patient Instructions Prediabetes: Care Instructions Your Care Instructions Prediabetes is a warning sign that you are at risk for getting type 2 diabetes. It means that your blood sugar is higher than it should be. The food you eat turns into sugar, which your body uses for energy. Normally, an organ called the pancreas makes insulin, which allows the sugar in your blood to get into your body's cells. But when your body can't use insulin the right way, the sugar doesn't move into cells. It stays in your blood instead. This is called insulin resistance. The buildup of sugar in the blood causes prediabetes. The good news is that lifestyle changes may help you get your blood sugar back to normal and help you avoid or delay diabetes. Follow-up care is a key part of your treatment and safety. Be sure to make and go to all appointments, and call your doctor if you are having problems. It's also a good idea to know your test results and keep a list of the medicines you take. How can you care for yourself at home? · Watch your weight. A healthy weight helps your body use insulin properly. · Limit the amount of calories, sweets, and unhealthy fat you eat. Ask your doctor if you should see a dietitian. A registered dietitian can help you create meal plans that fit your lifestyle. · Get at least 30 minutes of exercise on most days of the week. Exercise helps control your blood sugar. It also helps you maintain a healthy weight. Walking is a good choice. You also may want to do other activities, such as running, swimming, cycling, or playing tennis or team sports. · Do not smoke. Smoking can make prediabetes worse. If you need help quitting, talk to your doctor about stop-smoking programs and medicines. These can increase your chances of quitting for good. · If your doctor prescribed medicines, take them exactly as prescribed. Call your doctor if you think you are having a problem with your medicine. You will get more details on the specific medicines your doctor prescribes. When should you call for help? Watch closely for changes in your health, and be sure to contact your doctor if: 
? · You have any symptoms of diabetes. These may include: ¨ Being thirsty more often. ¨ Urinating more. ¨ Being hungrier. ¨ Losing weight. ¨ Being very tired. ¨ Having blurry vision. ? · You have a wound that will not heal.  
? · You have an infection that will not go away. ? · You have problems with your blood pressure. ? · You want more information about diabetes and how you can keep from getting it. Where can you learn more? Go to http://carlos eduardo-turner.info/. Enter I222 in the search box to learn more about \"Prediabetes: Care Instructions. \" Current as of: March 13, 2017 Content Version: 11.4 © 2449-6456 ScanÃ¢â‚¬Â¢Jour. Care instructions adapted under license by [employer] (which disclaims liability or warranty for this information). If you have questions about a medical condition or this instruction, always ask your healthcare professional. [place] [address] any warranty or liability for your use of this information. DASH Diet: Care Instructions Your Care Instructions The DASH diet is an eating plan that can help lower your blood pressure. DASH stands for Dietary Approaches to Stop Hypertension. Hypertension is high blood pressure. The DASH diet focuses on eating foods that are high in calcium, potassium, and magnesium. These nutrients can lower blood pressure. The foods that are highest in these nutrients are fruits, vegetables, low-fat dairy products, nuts, seeds, and legumes. But taking calcium, potassium, and magnesium supplements instead of eating foods that are high in those nutrients does not have the same effect. The DASH diet also includes whole grains, fish, and poultry. The DASH diet is one of several lifestyle changes your doctor may recommend to lower your high blood pressure. Your doctor may also want you to decrease the amount of sodium in your diet. Lowering sodium while following the DASH diet can lower blood pressure even further than just the DASH diet alone. Follow-up care is a key part of your treatment and safety. Be sure to make and go to all appointments, and call your doctor if you are having problems. It's also a good idea to know your test results and keep a list of the medicines you take. How can you care for yourself at home? Following the DASH diet · Eat 4 to 5 servings of fruit each day. A serving is 1 medium-sized piece of fruit, ½ cup chopped or canned fruit, 1/4 cup dried fruit, or 4 ounces (½ cup) of fruit juice. Choose fruit more often than fruit juice. · Eat 4 to 5 servings of vegetables each day. A serving is 1 cup of lettuce or raw leafy vegetables, ½ cup of chopped or cooked vegetables, or 4 ounces (½ cup) of vegetable juice. Choose vegetables more often than vegetable juice. · Get 2 to 3 servings of low-fat and fat-free dairy each day. A serving is 8 ounces of milk, 1 cup of yogurt, or 1 ½ ounces of cheese. · Eat 6 to 8 servings of grains each day.  A serving is 1 slice of bread, 1 ounce of dry cereal, or ½ cup of cooked rice, pasta, or cooked cereal. Try to choose whole-grain products as much as possible. · Limit lean meat, poultry, and fish to 2 servings each day. A serving is 3 ounces, about the size of a deck of cards. · Eat 4 to 5 servings of nuts, seeds, and legumes (cooked dried beans, lentils, and split peas) each week. A serving is 1/3 cup of nuts, 2 tablespoons of seeds, or ½ cup of cooked beans or peas. · Limit fats and oils to 2 to 3 servings each day. A serving is 1 teaspoon of vegetable oil or 2 tablespoons of salad dressing. · Limit sweets and added sugars to 5 servings or less a week. A serving is 1 tablespoon jelly or jam, ½ cup sorbet, or 1 cup of lemonade. · Eat less than 2,300 milligrams (mg) of sodium a day. If you limit your sodium to 1,500 mg a day, you can lower your blood pressure even more. Tips for success · Start small. Do not try to make dramatic changes to your diet all at once. You might feel that you are missing out on your favorite foods and then be more likely to not follow the plan. Make small changes, and stick with them. Once those changes become habit, add a few more changes. · Try some of the following: ¨ Make it a goal to eat a fruit or vegetable at every meal and at snacks. This will make it easy to get the recommended amount of fruits and vegetables each day. ¨ Try yogurt topped with fruit and nuts for a snack or healthy dessert. ¨ Add lettuce, tomato, cucumber, and onion to sandwiches. ¨ Combine a ready-made pizza crust with low-fat mozzarella cheese and lots of vegetable toppings. Try using tomatoes, squash, spinach, broccoli, carrots, cauliflower, and onions. ¨ Have a variety of cut-up vegetables with a low-fat dip as an appetizer instead of chips and dip. ¨ Sprinkle sunflower seeds or chopped almonds over salads. Or try adding chopped walnuts or almonds to cooked vegetables. ¨ Try some vegetarian meals using beans and peas. Add garbanzo or kidney beans to salads. Make burritos and tacos with mashed valdez beans or black beans. Where can you learn more? Go to http://carlos eduardo-turner.info/. Enter C325 in the search box to learn more about \"DASH Diet: Care Instructions. \" Current as of: September 21, 2016 Content Version: 11.4 © 2262-7831 [employer]. Care instructions adapted under license by [employer] (which disclaims liability or warranty for this information). If you have questions about a medical condition or this instruction, always ask your healthcare professional. [place] [address] any warranty or liability for your use of this information. Introducing [address]! Celso Boone introduces [employer] patient portal. Now you can access parts of your medical record, email your doctor's office, and request medication refills online. 1. In your internet browser, go to https://Delta Data Software. [employer] 2. Click on the First Time User? Click Here link in the Sign In box. You will see the New Member Sign Up page. 3. Enter your T[employer] Access Code exactly as it appears below. You will not need to use this code after youve completed the sign-up process. If you do not sign up before the expiration date, you must request a new code. · [employer] Access Code: [email or web address] Expires: 6/20/2018  5:02 PM 
 
4. Enter the last four digits of your Social Security Number (xxxx) and Date of Birth (mm/dd/yyyy) as indicated and click Submit. You will be taken to the next sign-up page. 5. Create a T[employer] ID. This will be your [employer] login ID and cannot be changed, so think of one that is secure and easy to remember. 6. Create a [employer] password. You can change your password at any time. 7. Enter your Password Reset Question and Answer. This can be used at a later time if you forget your password. 8. Enter your e-mail address. You will receive e-mail notification when new information is available in [address] 9. Click Sign Up. You can now view and download portions of your medical record. 10. Click the Download Summary menu link to download a portable copy of your medical information. If you have questions, please visit the Frequently Asked Questions section of the [employer] website. Remember, [employer] is NOT to be used for urgent needs. For medical emergencies, dial 911. Now available from your iPhone and Android! Please provide this summary of care documentation to your next provider. Your primary care clinician is listed as Oneita Clas. If you have any questions after today's visit, please call [phone].

## 2018-05-29 ENCOUNTER — DOCUMENTATION ONLY (OUTPATIENT)
Dept: ORTHOPEDIC SURGERY | Age: 63
End: 2018-05-29

## 2018-06-06 ENCOUNTER — OFFICE VISIT (OUTPATIENT)
Dept: ORTHOPEDIC SURGERY | Age: 63
End: 2018-06-06

## 2018-06-06 VITALS
SYSTOLIC BLOOD PRESSURE: 131 MMHG | HEIGHT: 63 IN | RESPIRATION RATE: 15 BRPM | OXYGEN SATURATION: 98 % | DIASTOLIC BLOOD PRESSURE: 61 MMHG | HEART RATE: 99 BPM | WEIGHT: 197.2 LBS | TEMPERATURE: 98.6 F | BODY MASS INDEX: 34.94 KG/M2

## 2018-06-06 DIAGNOSIS — M54.5 CHRONIC RIGHT-SIDED LOW BACK PAIN, WITH SCIATICA PRESENCE UNSPECIFIED: ICD-10-CM

## 2018-06-06 DIAGNOSIS — G89.29 CHRONIC RIGHT-SIDED LOW BACK PAIN, WITH SCIATICA PRESENCE UNSPECIFIED: ICD-10-CM

## 2018-06-06 DIAGNOSIS — M62.830 MUSCLE SPASM OF BACK: ICD-10-CM

## 2018-06-06 DIAGNOSIS — M79.18 MYOFASCIAL PAIN: ICD-10-CM

## 2018-06-06 RX ORDER — CYCLOBENZAPRINE HCL 5 MG
TABLET ORAL
Qty: 30 TAB | Refills: 2 | Status: SHIPPED | OUTPATIENT
Start: 2018-06-06 | End: 2018-12-11 | Stop reason: SDUPTHER

## 2018-06-06 RX ORDER — DICLOFENAC SODIUM 75 MG/1
75 TABLET, DELAYED RELEASE ORAL
Qty: 60 TAB | Refills: 5 | Status: SHIPPED | OUTPATIENT
Start: 2018-06-06 | End: 2018-12-11 | Stop reason: SDUPTHER

## 2018-06-06 NOTE — PATIENT INSTRUCTIONS
Learning About How to Have a Healthy Back  What causes back pain? Back pain is often caused by overuse, strain, or injury. For example, people often hurt their backs playing sports or working in the yard, being jolted in a car accident, or lifting something too heavy. Aging plays a part too. Your bones and muscles tend to lose strength as you age, which makes injury more likely. The spongy discs between the bones of the spine (vertebrae) may suffer from wear and tear and no longer provide enough cushion between the bones. A disc that bulges or breaks open (herniated disc) can press on nerves, causing back pain. In some people, back pain is the result of arthritis, broken vertebrae caused by bone loss (osteoporosis), illness, or a spine problem. Although most people have back pain at one time or another, there are steps you can take to make it less likely. How can you have a healthy back? Reduce stress on your back through good posture  Slumping or slouching alone may not cause low back pain. But after the back has been strained or injured, bad posture can make pain worse. · Sleep in a position that maintains your back's normal curves and on a mattress that feels comfortable. Sleep on your side with a pillow between your knees, or sleep on your back with a pillow under your knees. These positions can reduce strain on your back. · Stand and sit up straight. \"Good posture\" generally means your ears, shoulders, and hips are in a straight line. · If you must stand for a long time, put one foot on a stool, ledge, or box. Switch feet every now and then. · Sit in a chair that is low enough to let you place both feet flat on the floor with both knees nearly level with your hips. If your chair or desk is too high, use a footrest to raise your knees. Place a small pillow, a rolled-up towel, or a lumbar roll in the curve of your back if you need extra support.   · Try a kneeling chair, which helps tilt your hips forward. This takes pressure off your lower back. · Try sitting on an exercise ball. It can rock from side to side, which helps keep your back loose. · When driving, keep your knees nearly level with your hips. Sit straight, and drive with both hands on the steering wheel. Your arms should be in a slightly bent position. Reduce stress on your back through careful lifting  · Squat down, bending at the hips and knees only. If you need to, put one knee to the floor and extend your other knee in front of you, bent at a right angle (half kneeling). · Press your chest straight forward. This helps keep your upper back straight while keeping a slight arch in your low back. · Hold the load as close to your body as possible, at the level of your belly button (navel). · Use your feet to change direction, taking small steps. · Lead with your hips as you change direction. Keep your shoulders in line with your hips as you move. · Set down your load carefully, squatting with your knees and hips only. Exercise and stretch your back  · Do some exercise on most days of the week, if your doctor says it is okay. You can walk, run, swim, or cycle. · Stretch your back muscles. Here are a few exercises to try:  Swati Sermons on your back, and gently pull one bent knee to your chest. Put that foot back on the floor, and then pull the other knee to your chest.  ¨ Do pelvic tilts. Lie on your back with your knees bent. Tighten your stomach muscles. Pull your belly button (navel) in and up toward your ribs. You should feel like your back is pressing to the floor and your hips and pelvis are slightly lifting off the floor. Hold for 6 seconds while breathing smoothly. ¨ Sit with your back flat against a wall. · Keep your core muscles strong. The muscles of your back, belly (abdomen), and buttocks support your spine. ¨ Pull in your belly and imagine pulling your navel toward your spine. Hold this for 6 seconds, then relax.  Remember to keep breathing normally as you tense your muscles. ¨ Do curl-ups. Always do them with your knees bent. Keep your low back on the floor, and curl your shoulders toward your knees using a smooth, slow motion. Keep your arms folded across your chest. If this bothers your neck, try putting your hands behind your neck (not your head), with your elbows spread apart. ¨ Lie on your back with your knees bent and your feet flat on the floor. Tighten your belly muscles, and then push with your feet and raise your buttocks up a few inches. Hold this position 6 seconds as you continue to breathe normally, then lower yourself slowly to the floor. Repeat 8 to 12 times. ¨ If you like group exercise, try Pilates or yoga. These classes have poses that strengthen the core muscles. Lead a healthy lifestyle  · Stay at a healthy weight to avoid strain on your back. · Do not smoke. Smoking increases the risk of osteoporosis, which weakens the spine. If you need help quitting, talk to your doctor about stop-smoking programs and medicines. These can increase your chances of quitting for good. Where can you learn more? Go to http://carlos eduardo-turner.info/. Enter L315 in the search box to learn more about \"Learning About How to Have a Healthy Back. \"  Current as of: March 21, 2017  Content Version: 11.4  © 0645-1649 Healthwise, Incorporated. Care instructions adapted under license by [employer] (which disclaims liability or warranty for this information). If you have questions about a medical condition or this instruction, always ask your healthcare professional. [place] [address] any warranty or liability for your use of this information.

## 2018-06-06 NOTE — PROGRESS NOTES
[address]  [address]  [place], [address] [place] [address] Street  Phone: [phone]  Fax: [phone]    Sarina [place]  : 1955  PCP: Armida Salazar MD    PROGRESS NOTE    HISTORY OF PRESENT ILLNESS:  Chief Complaint   Patient presents with    Back Pain     F/U     Luisito Trinidad is a 57 y.o.  female with history of lumbar pain. She has myofascial pain syndrome. She has completed PT with minimal relief. She was scheduled for bilateral L4-5 L5-S1 facet joint injections but she never completed this. She was reading the information and was scared of this. She has been taking diclofenac and flexeril with good relief. She takes this very PRN. She states she has good days and bad days. She has learned how to manage her symptoms. She continues to use Diclofenac and flexeril PRN. She states the muscle spasms cause the most discomfort. Denies bladder/bowel dysfunction, saddle paresthesia, weakness, gait disturbance, or other neurological deficit. Pt at this time desires to continue with current care/proceed with medication evaluation. ASSESSMENT  57 y.o. female with lumbar pain. Diagnoses and all orders for this visit:    1. Myofascial pain  -     diclofenac EC (VOLTAREN) 75 mg EC tablet; Take 1 Tab by mouth two (2) times daily as needed. -     cyclobenzaprine (FLEXERIL) 5 mg tablet; TAKE ONE TABLET BY MOUTH NIGHTLY AS NEEDED FOR MUSCLE SPASM(S)    2. Chronic right-sided low back pain, with sciatica presence unspecified  -     diclofenac EC (VOLTAREN) 75 mg EC tablet; Take 1 Tab by mouth two (2) times daily as needed. -     cyclobenzaprine (FLEXERIL) 5 mg tablet; TAKE ONE TABLET BY MOUTH NIGHTLY AS NEEDED FOR MUSCLE SPASM(S)    3. Muscle spasm of back  -     diclofenac EC (VOLTAREN) 75 mg EC tablet; Take 1 Tab by mouth two (2) times daily as needed.   -     cyclobenzaprine (FLEXERIL) 5 mg tablet; TAKE ONE TABLET BY MOUTH NIGHTLY AS NEEDED FOR MUSCLE SPASM(S) IMPRESSION/PLAN    1) Pt was given information on back care. 2) Continue Diclofenac and Flexeril PRN  3) Increase HEP to daily. 4) Ms. Sebastián Pandya has a reminder for a \"due or due soon\" health maintenance. I have asked that she contact her primary care provider, Mary Poon MD, for follow-up on this health maintenance. 5) We have informed patient to notify us for immediate appointment if he has any worsening neurogical symptoms or if an emergency situation presents, then call 911  6) Pt will follow-up in 6 months for med fu.  is appropriate. PAST MEDICAL HISTORY  Past Medical History:   Diagnosis Date    Eosinophilic esophagitis     started on flvent, dr. Cirilo Colmenares Gastric ulcer 04/2017    dr Matteo Ramon, avoid nsaids    Gastritis 12/13/2017    gastritis, cont PPI dr. Cirilo Colmenares GERD (gastroesophageal reflux disease)     erosive esophagitis 11/13 EGD    H/O colonoscopy 11/2013    normal    Hypertension     Left knee pain     Morbid obesity (HCC)     Precordial pain     Prediabetes         MEDICATIONS  Current Outpatient Prescriptions   Medication Sig Dispense Refill    diclofenac EC (VOLTAREN) 75 mg EC tablet Take 1 Tab by mouth two (2) times daily as needed. 60 Tab 5    cyclobenzaprine (FLEXERIL) 5 mg tablet TAKE ONE TABLET BY MOUTH NIGHTLY AS NEEDED FOR MUSCLE SPASM(S) 30 Tab 2    cetirizine HCl (ZYRTEC PO) Take  by mouth.  fluticasone (FLONASE) 50 mcg/actuation nasal spray 2 sprays each nostril once daily 1 Bottle 3    triamcinolone acetonide (KENALOG) 0.025 % ointment Apply  to affected area two (2) times a day. use thin layer 30 g 0    amLODIPine-valsartan (EXFORGE) 5-160 mg per tablet TAKE 1 TABLET DAILY 90 Tab 1    omeprazole (PRILOSEC) 20 mg capsule Take 20 mg by mouth daily.  LORATADINE/PSEUDOEPHEDRINE (CLARITIN-D 24 HOUR PO) Take  by mouth.  omega-3 fatty acids-vitamin e (FISH OIL) 1,000 mg cap Take 1 Cap by mouth.       multivitamin (ONE A DAY) tablet Take 1 Tab by mouth daily. ALLERGIES  Allergies   Allergen Reactions    Pcn [Penicillins] Hives       SOCIAL HISTORY    Social History     Social History    Marital status:      Spouse name: N/A    Number of children: N/A    Years of education: N/A     Occupational History    Not on file. Social History Main Topics    Smoking status: Never Smoker    Smokeless tobacco: Never Used    Alcohol use No    Drug use: No    Sexual activity: Not on file     Other Topics Concern    Not on file     Social History Narrative       SUBJECTIVE        Pain Scale: 5/10    Pain Assessment  6/6/2018   Location of Pain Back   Location Modifiers -   Severity of Pain 5   Quality of Pain Aching   Duration of Pain -   Frequency of Pain Constant   Aggravating Factors (No Data)   Aggravating Factors Comment mornings, sitting to long   Limiting Behavior -   Relieving Factors (No Data)   Relieving Factors Comment pain medication   Result of Injury -       Accompanied by self. REVIEW OF SYSTEMS  ROS    Constitutional: Negative for fever, chills, or weight change. Respiratory: Negative for cough or shortness of breath. Cardiovascular: Negative for chest pain or palpitations. Gastrointestinal: Negative for acid reflux, change in bowel habits, or constipation. Genitourinary: Negative for incontinence, dysuria and flank pain. Musculoskeletal: Positive for lumbar pain. Skin: Negative for rash. Neurological: Negative for headaches, dizziness, or numbness. Endo/Heme/Allergies: Negative . Psychiatric/Behavioral: Negative. PHYSICAL EXAMINATION  Visit Vitals    /61    Pulse 99    Temp 98.6 °F (37 °C)    Resp 15    Ht 5' 3\" (1.6 m)    Wt 197 lb 3.2 oz (89.4 kg)    SpO2 98%    BMI 34.93 kg/m2       Constitutional: Well developed,  well nourished,  awake, alert, and in no acute distress. Neurological:  Sensation to light touch is intact. Psychiatric: Affect and mood are appropriate. Integumentary: No rashes or abrasions noted on exposed areas,  warm, dry and intact. Cardiovascular/Peripheral Vascular:  No peripheral edema is noted. Lymphatic:  No evidence of lymphedema. No cervical lymphadenopathy. SPINE/MUSCULOSKELETAL EXAM    Lumbar spine:  No rash, ecchymosis, or gross obliquity. No fasciculations. No focal atrophy is noted. Range of motion is intacy. No Tenderness to palpation . SI joints non-tender. Trochanters non tender. Musculoskeletal:  No pain with extension, axial loading, or forward flexion. No pain with internal or external rotation of her hips. MOTOR     Hip Flex  Quads Hamstrings Ankle DF EHL Ankle PF   Right +4/5 +4/5 +4/5 +4/5 +4/5 +4/5   Left +4/5 +4/5 +4/5 +4/5 +4/5 +4/5     Straight Leg raise negative bilaterally. normal gait and station    Ambulation -non assistive devices. full weight bearing, non-antalgic gait.     Ethan Cedeño, NP

## 2018-06-06 NOTE — MR AVS SNAPSHOT
[address] [facility] [address] 
[phone] Patient: Willard Gibson MRN: [id number] MOX:[birth year] Visit Information Date & Time Provider Department Dept. Phone Encounter #  
 6/6/2018  9:00 AM Ese Mancini NP [facility] [phone] [id number] Follow-up Instructions Return in about 6 months (around 12/6/2018) for neno. Follow-up and Disposition History Your Appointments 8/10/2018  8:00 AM  
FOLLOW UP EXAM with Arabella Seay MD  
[facility] ([address]) Appt Note: routine f/u 3mo [address] [facility] [address]  
[address] [facility] [address] [facility] [address] Upcoming Health Maintenance Date Due Bone Densitometry (Dexa) Screening 7/7/2018 Influenza Age 5 to Adult 8/1/2018 PAP AKA CERVICAL CYTOLOGY 10/20/2019 BREAST CANCER SCRN MAMMOGRAM 5/1/2020 DTaP/Tdap/Td series (2 - Td) 2/25/2023 COLONOSCOPY 12/24/2023 Allergies as of 6/6/2018  Review Complete On: 6/6/2018 By: Mague Dennis LPN Severity Noted Reaction Type Reactions Pcn [Penicillins]  07/26/2010    Hives Current Immunizations  Reviewed on 10/20/2016 Name Date Influenza Vaccine 10/1/2017, 10/9/2014 Influenza Vaccine Deri Mulling) 10/20/2016 Influenza Vaccine (Quad) PF 10/19/2015 Influenza Vaccine Split 9/21/2011 Tdap 2/25/2013 Not reviewed this visit You Were Diagnosed With   
  
 Codes Comments Myofascial pain     ICD-10-CM: M79.1 ICD-9-CM: 729.1 Chronic right-sided low back pain, with sciatica presence unspecified     ICD-10-CM: M54.5, G89.29 ICD-9-CM: 724.2, 338.29 Muscle spasm of back     ICD-10-CM: M06.706 ICD-9-CM: 724.8 Vitals BP Pulse Temp Resp Height(growth percentile) Weight(growth percentile) 131/61 99 98.6 °F (37 °C) 15 5' 3\" (1.6 m) 197 lb 3.2 oz (89.4 kg) SpO2 BMI OB Status Smoking Status 98% 34.93 kg/m2 Postmenopausal Never Smoker BMI and BSA Data Body Mass Index Body Surface Area 34.93 kg/m 2 1.99 m 2 Preferred Pharmacy Pharmacy Name Phone [address] [phone] Your Updated Medication List  
  
   
This list is accurate as of 6/6/18  9:29 AM.  Always use your most recent med list. amLODIPine-valsartan 5-160 mg per tablet Commonly known as:  EXFORGE  
TAKE 1 TABLET DAILY CLARITIN-D 24 HOUR PO Take  by mouth. cyclobenzaprine 5 mg tablet Commonly known as:  FLEXERIL  
TAKE ONE TABLET BY MOUTH NIGHTLY AS NEEDED FOR MUSCLE SPASM(S)  
  
 diclofenac EC 75 mg EC tablet Commonly known as:  VOLTAREN Take 1 Tab by mouth two (2) times daily as needed. FISH OIL 1,000 mg Cap Generic drug:  omega-3 fatty acids-vitamin e Take 1 Cap by mouth. fluticasone 50 mcg/actuation nasal spray Commonly known as:  FLONASE  
2 sprays each nostril once daily  
  
 multivitamin tablet Commonly known as:  ONE A DAY Take 1 Tab by mouth daily. omeprazole 20 mg capsule Commonly known as:  PRILOSEC Take 20 mg by mouth daily. triamcinolone acetonide 0.025 % ointment Commonly known as:  KENALOG Apply  to affected area two (2) times a day. use thin layer ZYRTEC PO Take  by mouth. Prescriptions Sent to Pharmacy Refills  
 diclofenac EC (VOLTAREN) 75 mg EC tablet 5 Sig: Take 1 Tab by mouth two (2) times daily as needed. Class: Normal  
 Pharmacy: [facility] [address] YESSICA DORAN [address] [facility], [address] [place] [address] Ph #: [phone] Route: Oral  
 cyclobenzaprine (FLEXERIL) 5 mg tablet 2 Sig: TAKE ONE TABLET BY MOUTH NIGHTLY AS NEEDED FOR MUSCLE SPASM(S)  Class: Normal  
 Pharmacy: [facility] [address] YESSICA DORAN [address] [place], [address] [place] [address] #: [phone] Follow-up Instructions Return in about 6 months (around 12/6/2018) for neno. Patient Instructions Learning About How to Have a Healthy Back What causes back pain? Back pain is often caused by overuse, strain, or injury. For example, people often hurt their backs playing sports or working in the yard, being jolted in a car accident, or lifting something too heavy. Aging plays a part too. Your bones and muscles tend to lose strength as you age, which makes injury more likely. The spongy discs between the bones of the spine (vertebrae) may suffer from wear and tear and no longer provide enough cushion between the bones. A disc that bulges or breaks open (herniated disc) can press on nerves, causing back pain. In some people, back pain is the result of arthritis, broken vertebrae caused by bone loss (osteoporosis), illness, or a spine problem. Although most people have back pain at one time or another, there are steps you can take to make it less likely. How can you have a healthy back? Reduce stress on your back through good posture Slumping or slouching alone may not cause low back pain. But after the back has been strained or injured, bad posture can make pain worse. · Sleep in a position that maintains your back's normal curves and on a mattress that feels comfortable. Sleep on your side with a pillow between your knees, or sleep on your back with a pillow under your knees. These positions can reduce strain on your back. · Stand and sit up straight. \"Good posture\" generally means your ears, shoulders, and hips are in a straight line. · If you must stand for a long time, put one foot on a stool, ledge, or box. Switch feet every now and then. · Sit in a chair that is low enough to let you place both feet flat on the floor with both knees nearly level with your hips.  If your chair or desk is too high, use a footrest to raise your knees. Place a small pillow, a rolled-up towel, or a lumbar roll in the curve of your back if you need extra support. · Try a kneeling chair, which helps tilt your hips forward. This takes pressure off your lower back. · Try sitting on an exercise ball. It can rock from side to side, which helps keep your back loose. · When driving, keep your knees nearly level with your hips. Sit straight, and drive with both hands on the steering wheel. Your arms should be in a slightly bent position. Reduce stress on your back through careful lifting · Squat down, bending at the hips and knees only. If you need to, put one knee to the floor and extend your other knee in front of you, bent at a right angle (half kneeling). · Press your chest straight forward. This helps keep your upper back straight while keeping a slight arch in your low back. · Hold the load as close to your body as possible, at the level of your belly button (navel). · Use your feet to change direction, taking small steps. · Lead with your hips as you change direction. Keep your shoulders in line with your hips as you move. · Set down your load carefully, squatting with your knees and hips only. Exercise and stretch your back · Do some exercise on most days of the week, if your doctor says it is okay. You can walk, run, swim, or cycle. · Stretch your back muscles. Here are a few exercises to try: ¨ Lie on your back, and gently pull one bent knee to your chest. Put that foot back on the floor, and then pull the other knee to your chest. 
¨ Do pelvic tilts. Lie on your back with your knees bent. Tighten your stomach muscles. Pull your belly button (navel) in and up toward your ribs. You should feel like your back is pressing to the floor and your hips and pelvis are slightly lifting off the floor. Hold for 6 seconds while breathing smoothly. ¨ Sit with your back flat against a wall. · Keep your core muscles strong. The muscles of your back, belly (abdomen), and buttocks support your spine. ¨ Pull in your belly and imagine pulling your navel toward your spine. Hold this for 6 seconds, then relax. Remember to keep breathing normally as you tense your muscles. ¨ Do curl-ups. Always do them with your knees bent. Keep your low back on the floor, and curl your shoulders toward your knees using a smooth, slow motion. Keep your arms folded across your chest. If this bothers your neck, try putting your hands behind your neck (not your head), with your elbows spread apart. ¨ Lie on your back with your knees bent and your feet flat on the floor. Tighten your belly muscles, and then push with your feet and raise your buttocks up a few inches. Hold this position 6 seconds as you continue to breathe normally, then lower yourself slowly to the floor. Repeat 8 to 12 times. ¨ If you like group exercise, try Pilates or yoga. These classes have poses that strengthen the core muscles. Lead a healthy lifestyle · Stay at a healthy weight to avoid strain on your back. · Do not smoke. Smoking increases the risk of osteoporosis, which weakens the spine. If you need help quitting, talk to your doctor about stop-smoking programs and medicines. These can increase your chances of quitting for good. Where can you learn more? Go to http://carlos eduardo-turner.info/. Enter L315 in the search box to learn more about \"Learning About How to Have a Healthy Back. \" Current as of: March 21, 2017 Content Version: 11.4 © 0612-9933 Healthwise, Incorporated. Care instructions adapted under license by [employer] (which disclaims liability or warranty for this information). If you have questions about a medical condition or this instruction, always ask your healthcare professional. [address] any warranty or liability for your use of this information. Introducing [facility]! [facility] introduces Inter[employer] Me patient portal. Now you can access parts of your medical record, email your doctor's office, and request medication refills online. 1. In your internet browser, go to [email or web address] 2. Click on the First Time User? Click Here link in the Sign In box. You will see the New Member Sign Up page. 3. Enter your Interviu Me Access Code exactly as it appears below. You will not need to use this code after youve completed the sign-up process. If you do not sign up before the expiration date, you must request a new code. · Inter[employer] Me Access Code: [email or web address] Expires: 6/20/2018  5:02 PM 
 
4. Enter the last four digits of your Social Security Number (xxxx) and Date of Birth (mm/dd/yyyy) as indicated and click Submit. You will be taken to the next sign-up page. 5. Create a Interviu Me ID. This will be your Interviu Me login ID and cannot be changed, so think of one that is secure and easy to remember. 6. Create a Interviu Me password. You can change your password at any time. 7. Enter your Password Reset Question and Answer. This can be used at a later time if you forget your password. 8. Enter your e-mail address. You will receive e-mail notification when new information is available in [address] 9. Click Sign Up. You can now view and download portions of your medical record. 10. Click the Download Summary menu link to download a portable copy of your medical information. If you have questions, please visit the Frequently Asked Questions section of the Interviu Me website. Remember, Interviu Me is NOT to be used for urgent needs. For medical emergencies, dial 911. Now available from your iPhone and Android! Please provide this summary of care documentation to your next provider. Your primary care clinician is listed as Silvia Chavez. If you have any questions after today's visit, please call [phone].

## 2018-06-26 ENCOUNTER — HOSPITAL ENCOUNTER (OUTPATIENT)
Dept: LAB | Age: 63
Discharge: HOME | End: 2018-06-26
Payer: COMMERCIAL

## 2018-06-26 ENCOUNTER — OFFICE VISIT (OUTPATIENT)
Dept: FAMILY MEDICINE CLINIC | Age: 63
End: 2018-06-26

## 2018-06-26 ENCOUNTER — HOSPITAL ENCOUNTER (OUTPATIENT)
Dept: GENERAL RADIOLOGY | Age: 63
Discharge: HOME | End: 2018-06-26
Payer: COMMERCIAL

## 2018-06-26 VITALS
WEIGHT: 198.4 LBS | HEIGHT: 63 IN | HEART RATE: 58 BPM | DIASTOLIC BLOOD PRESSURE: 80 MMHG | TEMPERATURE: 97.7 F | BODY MASS INDEX: 35.15 KG/M2 | RESPIRATION RATE: 16 BRPM | SYSTOLIC BLOOD PRESSURE: 130 MMHG | OXYGEN SATURATION: 98 %

## 2018-06-26 DIAGNOSIS — G89.29 CHRONIC MIDLINE LOW BACK PAIN, WITH SCIATICA PRESENCE UNSPECIFIED: ICD-10-CM

## 2018-06-26 DIAGNOSIS — R19.5 LOOSE STOOLS: ICD-10-CM

## 2018-06-26 DIAGNOSIS — R10.9 SIDE PAIN: ICD-10-CM

## 2018-06-26 DIAGNOSIS — R10.9 SIDE PAIN: Primary | ICD-10-CM

## 2018-06-26 DIAGNOSIS — M54.5 CHRONIC MIDLINE LOW BACK PAIN, WITH SCIATICA PRESENCE UNSPECIFIED: ICD-10-CM

## 2018-06-26 PROBLEM — E66.01 SEVERE OBESITY (BMI 35.0-39.9): Status: ACTIVE | Noted: 2018-06-26

## 2018-06-26 LAB
ALBUMIN SERPL-MCNC: 4 G/DL (ref 3.4–5)
ALBUMIN/GLOB SERPL: 1.3 {RATIO} (ref 0.8–1.7)
ALP SERPL-CCNC: 72 U/L (ref 45–117)
ALT SERPL-CCNC: 29 U/L (ref 13–56)
ANION GAP SERPL CALC-SCNC: 8 MMOL/L (ref 3–18)
AST SERPL-CCNC: 21 U/L (ref 15–37)
BASOPHILS # BLD: 0 K/UL (ref 0–0.06)
BASOPHILS NFR BLD: 0 % (ref 0–2)
BILIRUB SERPL-MCNC: 0.7 MG/DL (ref 0.2–1)
BILIRUB UR QL STRIP: NEGATIVE
BUN SERPL-MCNC: 10 MG/DL (ref 7–18)
BUN/CREAT SERPL: 13 (ref 12–20)
CALCIUM SERPL-MCNC: 8.6 MG/DL (ref 8.5–10.1)
CHLORIDE SERPL-SCNC: 106 MMOL/L (ref 100–108)
CO2 SERPL-SCNC: 29 MMOL/L (ref 21–32)
CREAT SERPL-MCNC: 0.75 MG/DL (ref 0.6–1.3)
DIFFERENTIAL METHOD BLD: ABNORMAL
EOSINOPHIL # BLD: 0.1 K/UL (ref 0–0.4)
EOSINOPHIL NFR BLD: 3 % (ref 0–5)
ERYTHROCYTE [DISTWIDTH] IN BLOOD BY AUTOMATED COUNT: 12.8 % (ref 11.6–14.5)
GLOBULIN SER CALC-MCNC: 3.1 G/DL (ref 2–4)
GLUCOSE SERPL-MCNC: 93 MG/DL (ref 74–99)
GLUCOSE UR-MCNC: NEGATIVE MG/DL
HCT VFR BLD AUTO: 35.8 % (ref 35–45)
HGB BLD-MCNC: 11.8 G/DL (ref 12–16)
KETONES P FAST UR STRIP-MCNC: NEGATIVE MG/DL
LYMPHOCYTES # BLD: 2 K/UL (ref 0.9–3.6)
LYMPHOCYTES NFR BLD: 46 % (ref 21–52)
MCH RBC QN AUTO: 30.1 PG (ref 24–34)
MCHC RBC AUTO-ENTMCNC: 33 G/DL (ref 31–37)
MCV RBC AUTO: 91.3 FL (ref 74–97)
MONOCYTES # BLD: 0.3 K/UL (ref 0.05–1.2)
MONOCYTES NFR BLD: 7 % (ref 3–10)
NEUTS SEG # BLD: 1.9 K/UL (ref 1.8–8)
NEUTS SEG NFR BLD: 44 % (ref 40–73)
PH UR STRIP: 6.5 [PH] (ref 4.6–8)
PLATELET # BLD AUTO: 197 K/UL (ref 135–420)
PMV BLD AUTO: 10.8 FL (ref 9.2–11.8)
POTASSIUM SERPL-SCNC: 3.6 MMOL/L (ref 3.5–5.5)
PROT SERPL-MCNC: 7.1 G/DL (ref 6.4–8.2)
PROT UR QL STRIP: NEGATIVE
RBC # BLD AUTO: 3.92 M/UL (ref 4.2–5.3)
SODIUM SERPL-SCNC: 143 MMOL/L (ref 136–145)
SP GR UR STRIP: 1 (ref 1–1.03)
UA UROBILINOGEN AMB POC: NORMAL (ref 0.2–1)
URINALYSIS CLARITY POC: CLEAR
URINALYSIS COLOR POC: YELLOW
URINE BLOOD POC: NEGATIVE
URINE LEUKOCYTES POC: NORMAL
URINE NITRITES POC: NEGATIVE
WBC # BLD AUTO: 4.2 K/UL (ref 4.6–13.2)

## 2018-06-26 PROCEDURE — 87086 URINE CULTURE/COLONY COUNT: CPT | Performed by: FAMILY MEDICINE

## 2018-06-26 PROCEDURE — 85025 COMPLETE CBC W/AUTO DIFF WBC: CPT | Performed by: FAMILY MEDICINE

## 2018-06-26 PROCEDURE — 74018 RADEX ABDOMEN 1 VIEW: CPT

## 2018-06-26 PROCEDURE — 80053 COMPREHEN METABOLIC PANEL: CPT | Performed by: FAMILY MEDICINE

## 2018-06-26 PROCEDURE — 36415 COLL VENOUS BLD VENIPUNCTURE: CPT | Performed by: FAMILY MEDICINE

## 2018-06-26 RX ORDER — CIPROFLOXACIN 250 MG/1
250 TABLET, FILM COATED ORAL EVERY 12 HOURS
Qty: 6 TAB | Refills: 0 | Status: SHIPPED | OUTPATIENT
Start: 2018-06-26 | End: 2018-06-29

## 2018-06-26 NOTE — PROGRESS NOTES
1. Have you been to the ER, urgent care clinic since your last visit? Hospitalized since your last visit? No    2. Have you seen or consulted any other health care providers outside of the [address] [place] [address] since your last visit? Include any pap smears or colon screening.  No    Chief Complaint   Patient presents with    Other     LT side pain, times 2 days

## 2018-06-26 NOTE — MR AVS SNAPSHOT
[address] [facility] [address] [facility] 
[phone] Patient: Paris Giron MRN: [id number] KED:0/2/3344 Visit Information Date & Time Provider Department Dept. Phone Encounter #  
 6/26/2018  2:30 PM Tee Mccarthy, [address] [facility] [address] [id number] Follow-up Instructions Return in about 3 days (around 6/29/2018), or if symptoms worsen or fail to improve. Your Appointments 8/10/2018  8:00 AM  
FOLLOW UP EXAM with Tee Mccarthy MD  
[facility] ([address] [facility]) Appt Note: routine f/u 3mo [address] [facility] [address] [place] [address] [address] [place] [id number]  
  
    
 12/11/2018  8:30 AM  
Follow Up with Niharika Woodward NP  
[facility] ([address] [place] [address]) Appt Note: 6 mo fu back [address] [facility] [id number] [phone]  
  
   
 [address] [facility] [address] Upcoming Health Maintenance Date Due Bone Densitometry (Dexa) Screening 7/7/2018 Influenza Age 5 to Adult 8/1/2018 PAP AKA CERVICAL CYTOLOGY 10/20/2019 BREAST CANCER SCRN MAMMOGRAM 5/1/2020 DTaP/Tdap/Td series (2 - Td) 2/25/2023 COLONOSCOPY 12/24/2023 Allergies as of 6/26/2018  Review Complete On: 6/26/2018 By: Tee Mccarthy MD  
  
 Severity Noted Reaction Type Reactions Pcn [Penicillins]  07/26/2010    Hives Current Immunizations  Reviewed on 10/20/2016 Name Date Influenza Vaccine 10/1/2017, 10/9/2014 Influenza Vaccine Pawan Sharper) 10/20/2016 Influenza Vaccine (Quad) PF 10/19/2015 Influenza Vaccine Split 9/21/2011 Tdap 2/25/2013 Zoster Recombinant 5/30/2018 Not reviewed this visit You Were Diagnosed With   
  
 Codes Comments Side pain    -  Primary ICD-10-CM: R10.9 ICD-9-CM: 789.00 Chronic midline low back pain, with sciatica presence unspecified     ICD-10-CM: M54.5, G89.29 ICD-9-CM: 724.2, 338.29 Vitals BP Pulse Temp Resp Height(growth percentile) Weight(growth percentile) 130/80 (BP 1 Location: Left arm, BP Patient Position: Sitting) (!) 58 97.7 °F (36.5 °C) (Oral) 16 5' 3\" (1.6 m) 198 lb 6.4 oz (90 kg) SpO2 BMI OB Status Smoking Status 98% 35.14 kg/m2 Postmenopausal Never Smoker Vitals History BMI and BSA Data Body Mass Index Body Surface Area  
 35.14 kg/m 2 2 m 2 Preferred Pharmacy Pharmacy Name Phone [address] [phone] Your Updated Medication List  
  
   
This list is accurate as of 6/26/18  3:14 PM.  Always use your most recent med list. amLODIPine-valsartan 5-160 mg per tablet Commonly known as:  EXFORGE  
TAKE 1 TABLET DAILY  
  
 ciprofloxacin HCl 250 mg tablet Commonly known as:  CIPRO Take 1 Tab by mouth every twelve (12) hours for 3 days. CLARITIN-D 24 HOUR PO Take  by mouth. cyclobenzaprine 5 mg tablet Commonly known as:  FLEXERIL  
TAKE ONE TABLET BY MOUTH NIGHTLY AS NEEDED FOR MUSCLE SPASM(S)  
  
 diclofenac EC 75 mg EC tablet Commonly known as:  VOLTAREN Take 1 Tab by mouth two (2) times daily as needed. FISH OIL 1,000 mg Cap Generic drug:  omega-3 fatty acids-vitamin e Take 1 Cap by mouth. fluticasone 50 mcg/actuation nasal spray Commonly known as:  FLONASE  
2 sprays each nostril once daily  
  
 multivitamin tablet Commonly known as:  ONE A DAY Take 1 Tab by mouth daily. omeprazole 20 mg capsule Commonly known as:  PRILOSEC Take 20 mg by mouth daily. triamcinolone acetonide 0.025 % ointment Commonly known as:  KENALOG Apply  to affected area two (2) times a day. use thin layer ZYRTEC PO Take  by mouth. Prescriptions Sent to Pharmacy Refills  
 ciprofloxacin HCl (CIPRO) 250 mg tablet 0 Sig: Take 1 Tab by mouth every twelve (12) hours for 3 days. Class: Normal  
 Pharmacy: [facility] [address] YESSICA DORAN [address], [address] [place] [address] #: [phone] Route: Oral  
  
We Performed the Following AMB POC URINALYSIS DIP STICK AUTO W/O MICRO [72441 CPT(R)] Follow-up Instructions Return in about 3 days (around 6/29/2018), or if symptoms worsen or fail to improve. To-Do List   
 06/26/2018 Lab:  CBC WITH AUTOMATED DIFF   
  
 06/26/2018 Lab:  METABOLIC PANEL, COMPREHENSIVE   
  
 06/26/2018 Imaging:  XR ABD (KUB) Patient Instructions Take flexeril and ciprofloxacin Introducing [facility] & HEALTH SERVICES! [facility] introduces [employer] patient portal. Now you can access parts of your medical record, email your doctor's office, and request medication refills online. 1. In your internet browser, go to [email or web address] 2. Click on the First Time User? Click Here link in the Sign In box. You will see the New Member Sign Up page. 3. Enter your [employer] Access Code exactly as it appears below. You will not need to use this code after youve completed the sign-up process. If you do not sign up before the expiration date, you must request a new code. · [employer] Access Code: [email or web address] Expires: 9/24/2018  3:12 PM 
 
4. Enter the last four digits of your Social Security Number (xxxx) and Date of Birth (mm/dd/yyyy) as indicated and click Submit. You will be taken to the next sign-up page. 5. Create a [email or web address] ID. This will be your [employer] login ID and cannot be changed, so think of one that is secure and easy to remember. 6. Create a [employer] password. You can change your password at any time. 7. Enter your Password Reset Question and Answer. This can be used at a later time if you forget your password. 8. Enter your e-mail address. You will receive e-mail notification when new information is available in [address] 9. Click Sign Up. You can now view and download portions of your medical record. 10. Click the Download Summary menu link to download a portable copy of your medical information. If you have questions, please visit the Frequently Asked Questions section of the [employer] website. Remember, [employer] is NOT to be used for urgent needs. For medical emergencies, dial 911. Now available from your iPhone and Android! Please provide this summary of care documentation to your next provider. Your primary care clinician is listed as Ramon Hernandez. If you have any questions after today's visit, please call [phone].

## 2018-06-26 NOTE — PROGRESS NOTES
Tru Clarke, 57 y.o.,  female    SUBJECTIVE  Left sided abdominal pain x 2 days    Sudden onset,sharp, intense constant Left sided pain similar to labor pains, she says. No hematuria, fever, urinary urgency or frequency, no constipation, diarrhea or nausea, no trauma. Says no pattern with meals or BMs, she does note increase in pain with movement. She then started drinking bottles of cranberry juice and reports developing loose stools after. She has h/o chronic back/myofascial pain, usually presents with midline or R sided pain, she is on NSAIDs and flexeril. She has not tried flexeril in a while. ROS:  See HPI, all others negative        Patient Active Problem List   Diagnosis Code    HTN (hypertension) I10    Prediabetes R73.03    Left knee pain M25.562    Gastroesophageal reflux disease K21.9    Pruritus ani L29.0    Severe obesity (BMI 35.0-39.9) ([facility]) E66.01       Current Outpatient Prescriptions   Medication Sig Dispense Refill    ciprofloxacin HCl (CIPRO) 250 mg tablet Take 1 Tab by mouth every twelve (12) hours for 3 days. 6 Tab 0    diclofenac EC (VOLTAREN) 75 mg EC tablet Take 1 Tab by mouth two (2) times daily as needed. 60 Tab 5    cyclobenzaprine (FLEXERIL) 5 mg tablet TAKE ONE TABLET BY MOUTH NIGHTLY AS NEEDED FOR MUSCLE SPASM(S) 30 Tab 2    cetirizine HCl (ZYRTEC PO) Take  by mouth.  fluticasone (FLONASE) 50 mcg/actuation nasal spray 2 sprays each nostril once daily 1 Bottle 3    triamcinolone acetonide (KENALOG) 0.025 % ointment Apply  to affected area two (2) times a day. use thin layer 30 g 0    amLODIPine-valsartan (EXFORGE) 5-160 mg per tablet TAKE 1 TABLET DAILY 90 Tab 1    omega-3 fatty acids-vitamin e (FISH OIL) 1,000 mg cap Take 1 Cap by mouth.  multivitamin (ONE A DAY) tablet Take 1 Tab by mouth daily.  omeprazole (PRILOSEC) 20 mg capsule Take 20 mg by mouth daily.  LORATADINE/PSEUDOEPHEDRINE (CLARITIN-D 24 HOUR PO) Take  by mouth. Allergies   Allergen Reactions    Pcn [Penicillins] Hives       Past Medical History:   Diagnosis Date    Eosinophilic esophagitis     started on flvent, dr. Kia Fernandez Gastric ulcer 04/2017    dr Steve Aviles, avoid nsaids    Gastritis 12/13/2017    gastritis, cont PPI dr. Kia Fernandez GERD (gastroesophageal reflux disease)     erosive esophagitis 11/13 EGD    H/O colonoscopy 11/2013    normal    Hypertension     Left knee pain     Morbid obesity ([address]     Precordial pain     Prediabetes        Social History     Social History    Marital status:      Spouse name: N/A    Number of children: N/A    Years of education: N/A     Occupational History    Not on file. Social History Main Topics    Smoking status: Never Smoker    Smokeless tobacco: Never Used    Alcohol use No    Drug use: No    Sexual activity: Not on file     Other Topics Concern    Not on file     Social History Narrative       Family History   Problem Relation Age of Onset    Heart Failure Mother     Heart Disease Mother      mi 52's    Asthma Other          OBJECTIVE    Physical Exam:     Visit Vitals    /80 (BP 1 Location: Left arm, BP Patient Position: Sitting)    Pulse (!) 58    Temp 97.7 °F (36.5 °C) (Oral)    Resp 16    Ht 5' 3\" (1.6 m)    Wt 198 lb 6.4 oz (90 kg)    SpO2 98%    BMI 35.14 kg/m2       General: alert, in no apparent distress or pain  CVS: normal rate, regular rhythm, distinct S1 and S2  Lungs:clear to ausculation bilaterally, no crackles, wheezing or rhonchi noted  Abdomen: normoactive bowel sounds, soft, non-tender,.  No CVA tenderness  Extremities: no edema, no cyanosis,  Skin: warm, no lesions, rashes noted  Psych:  mood and affect normal    Results for orders placed or performed in visit on 06/26/18   AMB POC URINALYSIS DIP STICK AUTO W/O MICRO   Result Value Ref Range    Color (UA POC) Yellow     Clarity (UA POC) Clear     Glucose (UA POC) Negative Negative    Bilirubin (UA POC) Negative Negative    Ketones (UA POC) Negative Negative    Specific gravity (UA POC) 1.005 1.001 - 1.035    Blood (UA POC) Negative Negative    pH (UA POC) 6.5 4.6 - 8.0    Protein (UA POC) Negative Negative    Urobilinogen (UA POC) 0.2 mg/dL 0.2 - 1    Nitrites (UA POC) Negative Negative    Leukocyte esterase (UA POC) Trace Negative           ASSESSMENT/PLAN  Diagnoses and all orders for this visit:    1. Side pain  Will treat with cipro for trace LE  Benign exam  Consider MSK r/o GI cause  Take flexeril the next few days  -     AMB POC URINALYSIS DIP STICK AUTO W/O MICRO  -     CBC WITH AUTOMATED DIFF; Future  -     METABOLIC PANEL, COMPREHENSIVE; Future  -     XR ABD (KUB); Future        -     Urine culture    2. Chronic midline low back pain, with sciatica presence unspecified    3. Loose stools    Other orders  -     ciprofloxacin HCl (CIPRO) 250 mg tablet; Take 1 Tab by mouth every twelve (12) hours for 3 days. Follow-up Disposition:  Return in about 3 days (around 6/29/2018), or if symptoms worsen or fail to improve. Patient understands plan of care. Patient has provided input and agrees with goals.

## 2018-06-28 LAB
BACTERIA SPEC CULT: NORMAL
SERVICE CMNT-IMP: NORMAL

## 2018-06-29 ENCOUNTER — OFFICE VISIT (OUTPATIENT)
Dept: FAMILY MEDICINE CLINIC | Age: 63
End: 2018-06-29

## 2018-06-29 VITALS
HEART RATE: 79 BPM | DIASTOLIC BLOOD PRESSURE: 78 MMHG | WEIGHT: 197.8 LBS | OXYGEN SATURATION: 98 % | RESPIRATION RATE: 14 BRPM | TEMPERATURE: 97.8 F | BODY MASS INDEX: 35.05 KG/M2 | HEIGHT: 63 IN | SYSTOLIC BLOOD PRESSURE: 130 MMHG

## 2018-06-29 DIAGNOSIS — R10.9 SIDE PAIN: Primary | ICD-10-CM

## 2018-06-29 NOTE — MR AVS SNAPSHOT
[address] [facility] [address] [facility] 
[phone] Patient: Debby Jimenez MRN: [id number] KQA:[birth year] Visit Information Date & Time Provider Department Dept. Phone Encounter #  
 6/29/2018  9:45 AM Mary Poon, [address] [address] [id number] Follow-up Instructions Return keep appt in august or sooner as needed. Your Appointments 8/10/2018  8:00 AM  
FOLLOW UP EXAM with Mary Poon MD  
[facility] ([address]) Appt Note: routine f/u 3mo [address] [facility] [address] [place] [address] [address] [facility] [address]  
  
    
 12/11/2018  8:30 AM  
Follow Up with Sara Cerrato NP  
[facility] ([address] [facility]) Appt Note: 6 mo fu back [address] [facility] [address]  
[phone]  
  
   
 [address] [facility] [address] Upcoming Health Maintenance Date Due Bone Densitometry (Dexa) Screening 7/7/2018 Influenza Age 5 to Adult 8/1/2018 PAP AKA CERVICAL CYTOLOGY 10/20/2019 BREAST CANCER SCRN MAMMOGRAM 5/1/2020 DTaP/Tdap/Td series (2 - Td) 2/25/2023 COLONOSCOPY 12/24/2023 Allergies as of 6/29/2018  Review Complete On: 6/29/2018 By: Kera Silvestre LPN Severity Noted Reaction Type Reactions Pcn [Penicillins]  07/26/2010    Hives Current Immunizations  Reviewed on 10/20/2016 Name Date Influenza Vaccine 10/1/2017, 10/9/2014 Influenza Vaccine Gerline Kitten) 10/20/2016 Influenza Vaccine (Quad) PF 10/19/2015 Influenza Vaccine Split 9/21/2011 Tdap 2/25/2013 Zoster Recombinant 5/30/2018 Not reviewed this visit You Were Diagnosed With   
  
 Codes Comments Side pain    -  Primary ICD-10-CM: R10.9 ICD-9-CM: 789.00 Vitals BP Pulse Temp Resp Height(growth percentile) Weight(growth percentile) 130/78 (BP 1 Location: Left arm, BP Patient Position: Sitting) 79 97.8 °F (36.6 °C) (Oral) 14 5' 3\" (1.6 m) 197 lb 12.8 oz (89.7 kg) SpO2 BMI OB Status Smoking Status 98% 35.04 kg/m2 Postmenopausal Never Smoker Vitals History BMI and BSA Data Body Mass Index Body Surface Area 35.04 kg/m 2 2 m 2 Preferred Pharmacy Pharmacy Name Phone [address] [place] [address] Avenue [phone] Your Updated Medication List  
  
   
This list is accurate as of 6/29/18 10:11 AM.  Always use your most recent med list. amLODIPine-valsartan 5-160 mg per tablet Commonly known as:  EXFORGE  
TAKE 1 TABLET DAILY  
  
 ciprofloxacin HCl 250 mg tablet Commonly known as:  CIPRO Take 1 Tab by mouth every twelve (12) hours for 3 days. CLARITIN-D 24 HOUR PO Take  by mouth. cyclobenzaprine 5 mg tablet Commonly known as:  FLEXERIL  
TAKE ONE TABLET BY MOUTH NIGHTLY AS NEEDED FOR MUSCLE SPASM(S)  
  
 diclofenac EC 75 mg EC tablet Commonly known as:  VOLTAREN Take 1 Tab by mouth two (2) times daily as needed. FISH OIL 1,000 mg Cap Generic drug:  omega-3 fatty acids-vitamin e Take 1 Cap by mouth. fluticasone 50 mcg/actuation nasal spray Commonly known as:  FLONASE  
2 sprays each nostril once daily  
  
 multivitamin tablet Commonly known as:  ONE A DAY Take 1 Tab by mouth daily. omeprazole 20 mg capsule Commonly known as:  PRILOSEC Take 20 mg by mouth daily. triamcinolone acetonide 0.025 % ointment Commonly known as:  KENALOG Apply  to affected area two (2) times a day. use thin layer ZYRTEC PO Take  by mouth. Follow-up Instructions Return keep appt in august or sooner as needed. Introducing [facility] HEALTH SERVICES!    
 Giorgio Jauregui introduces [employer] patient portal. Now you can access parts of your medical record, email your doctor's office, and request medication refills online. 1. In your internet browser, go to [email or web address] 2. Click on the First Time User? Click Here link in the Sign In box. You will see the New Member Sign Up page. 3. Enter your [employer] Access Code exactly as it appears below. You will not need to use this code after youve completed the sign-up process. If you do not sign up before the expiration date, you must request a new code. · [employer] Access Code: [email or web address] Expires: 9/24/2018  3:12 PM 
 
4. Enter the last four digits of your Social Security Number (xxxx) and Date of Birth (mm/dd/yyyy) as indicated and click Submit. You will be taken to the next sign-up page. 5. Create a [employer] ID. This will be your [employer] login ID and cannot be changed, so think of one that is secure and easy to remember. 6. Create a [employer] password. You can change your password at any time. 7. Enter your Password Reset Question and Answer. This can be used at a later time if you forget your password. 8. Enter your e-mail address. You will receive e-mail notification when new information is available in [address] 9. Click Sign Up. You can now view and download portions of your medical record. 10. Click the Download Summary menu link to download a portable copy of your medical information. If you have questions, please visit the Frequently Asked Questions section of the [employer] website. Remember, [employer] is NOT to be used for urgent needs. For medical emergencies, dial 911. Now available from your iPhone and Android! Please provide this summary of care documentation to your next provider. Your primary care clinician is listed as Suha Smiley. If you have any questions after today's visit, please call [phone].

## 2018-06-29 NOTE — PROGRESS NOTES
1. Have you been to the ER, urgent care clinic since your last visit? Hospitalized since your last visit? No    2. Have you seen or consulted any other health care providers outside of the [address] [place] [address] since your last visit? Include any pap smears or colon screening.  No    Chief Complaint   Patient presents with    Side Pain     LT side pain, improved    Hypertension    GERD    Other     Pre-DM

## 2018-06-29 NOTE — PROGRESS NOTES
Bailey Betancourt, 57 y.o.,  female    SUBJECTIVE  Ff-up side pain    L side pain has improved, reports flexeril most helpful. Reviewed labs and KUB, found to have moderate stools, no kidney calcifications. She denies constipation. ROS:  See HPI, all others negative        Patient Active Problem List   Diagnosis Code    HTN (hypertension) I10    Prediabetes R73.03    Left knee pain M25.562    Gastroesophageal reflux disease K21.9    Pruritus ani L29.0    Severe obesity (BMI 35.0-39.9) ([facility]) E66.01       Current Outpatient Prescriptions   Medication Sig Dispense Refill    ciprofloxacin HCl (CIPRO) 250 mg tablet Take 1 Tab by mouth every twelve (12) hours for 3 days. 6 Tab 0    diclofenac EC (VOLTAREN) 75 mg EC tablet Take 1 Tab by mouth two (2) times daily as needed. 60 Tab 5    cyclobenzaprine (FLEXERIL) 5 mg tablet TAKE ONE TABLET BY MOUTH NIGHTLY AS NEEDED FOR MUSCLE SPASM(S) 30 Tab 2    cetirizine HCl (ZYRTEC PO) Take  by mouth.  fluticasone (FLONASE) 50 mcg/actuation nasal spray 2 sprays each nostril once daily 1 Bottle 3    triamcinolone acetonide (KENALOG) 0.025 % ointment Apply  to affected area two (2) times a day. use thin layer 30 g 0    amLODIPine-valsartan (EXFORGE) 5-160 mg per tablet TAKE 1 TABLET DAILY 90 Tab 1    omeprazole (PRILOSEC) 20 mg capsule Take 20 mg by mouth daily.  omega-3 fatty acids-vitamin e (FISH OIL) 1,000 mg cap Take 1 Cap by mouth.  LORATADINE/PSEUDOEPHEDRINE (CLARITIN-D 24 HOUR PO) Take  by mouth.  multivitamin (ONE A DAY) tablet Take 1 Tab by mouth daily.          Allergies   Allergen Reactions    Pcn [Penicillins] Hives       Past Medical History:   Diagnosis Date    Eosinophilic esophagitis     started on flvent, dr. Christina Bingham Gastric ulcer 04/2017    dr Emory Oshea, avoid nsaids    Gastritis 12/13/2017    gastritis, cont PPI dr. Christina Bingham GERD (gastroesophageal reflux disease)     erosive esophagitis 11/13 EGD    H/O colonoscopy 11/2013 normal    Hypertension     Left knee pain     Morbid obesity (HCC)     Precordial pain     Prediabetes        Social History     Social History    Marital status:      Spouse name: N/A    Number of children: N/A    Years of education: N/A     Occupational History    Not on file. Social History Main Topics    Smoking status: Never Smoker    Smokeless tobacco: Never Used    Alcohol use No    Drug use: No    Sexual activity: Not on file     Other Topics Concern    Not on file     Social History Narrative       Family History   Problem Relation Age of Onset    Heart Failure Mother     Heart Disease Mother      mi 52's    Asthma Other          OBJECTIVE    Physical Exam:     Visit Vitals    /78 (BP 1 Location: Left arm, BP Patient Position: Sitting)    Pulse 79    Temp 97.8 °F (36.6 °C) (Oral)    Resp 14    Ht 5' 3\" (1.6 m)    Wt 197 lb 12.8 oz (89.7 kg)    SpO2 98%    BMI 35.04 kg/m2       General: alert, well-appearing, AA, in no apparent distress or pain  CVS: normal rate, regular rhythm, distinct S1 and S2  Lungs:clear to ausculation bilaterally, no crackles, wheezing or rhonchi noted  Abdomen: normoactive bowel sounds, soft, non-tender  Extremities: no edema, no cyanosis,  Skin: warm, no lesions, rashes noted  Psych:  mood and affect normal    Results for orders placed or performed during the hospital encounter of 06/26/18   CULTURE, URINE   Result Value Ref Range    Special Requests: NO SPECIAL REQUESTS      Culture result: NO GROWTH 2 DAYS             ASSESSMENT/PLAN  Diagnoses and all orders for this visit:    1. Side pain  Improved, to cont prn flexeril  Reassuring labs, there is moderate stools on KUB, may take miralax prn for constipation      Follow-up Disposition:  Return keep appt in august or sooner as needed. Patient understands plan of care. Patient has provided input and agrees with goals.

## 2018-07-05 RX ORDER — AMLODIPINE AND VALSARTAN 5; 160 MG/1; MG/1
TABLET ORAL
Qty: 90 TAB | Refills: 1 | Status: SHIPPED | OUTPATIENT
Start: 2018-07-05 | End: 2018-12-24 | Stop reason: SDUPTHER

## 2018-08-06 ENCOUNTER — HOSPITAL ENCOUNTER (OUTPATIENT)
Dept: LAB | Age: 63
Discharge: HOME | End: 2018-08-06
Payer: MEDICARE

## 2018-08-06 DIAGNOSIS — R63.5 WEIGHT GAIN: ICD-10-CM

## 2018-08-06 DIAGNOSIS — R73.03 PREDIABETES: ICD-10-CM

## 2018-08-06 LAB
ANION GAP SERPL CALC-SCNC: 6 MMOL/L (ref 3–18)
BUN SERPL-MCNC: 16 MG/DL (ref 7–18)
BUN/CREAT SERPL: 22 (ref 12–20)
CALCIUM SERPL-MCNC: 8.7 MG/DL (ref 8.5–10.1)
CHLORIDE SERPL-SCNC: 107 MMOL/L (ref 100–108)
CO2 SERPL-SCNC: 29 MMOL/L (ref 21–32)
CREAT SERPL-MCNC: 0.73 MG/DL (ref 0.6–1.3)
GLUCOSE SERPL-MCNC: 113 MG/DL (ref 74–99)
HBA1C MFR BLD: 6.7 % (ref 4.2–5.6)
POTASSIUM SERPL-SCNC: 4 MMOL/L (ref 3.5–5.5)
SODIUM SERPL-SCNC: 142 MMOL/L (ref 136–145)

## 2018-08-06 PROCEDURE — 83036 HEMOGLOBIN GLYCOSYLATED A1C: CPT | Performed by: FAMILY MEDICINE

## 2018-08-06 PROCEDURE — 36415 COLL VENOUS BLD VENIPUNCTURE: CPT | Performed by: FAMILY MEDICINE

## 2018-08-06 PROCEDURE — 80048 BASIC METABOLIC PNL TOTAL CA: CPT | Performed by: FAMILY MEDICINE

## 2018-08-10 ENCOUNTER — OFFICE VISIT (OUTPATIENT)
Dept: FAMILY MEDICINE CLINIC | Age: 63
End: 2018-08-10

## 2018-08-10 VITALS
TEMPERATURE: 98.4 F | BODY MASS INDEX: 35.19 KG/M2 | WEIGHT: 198.6 LBS | HEART RATE: 97 BPM | RESPIRATION RATE: 17 BRPM | SYSTOLIC BLOOD PRESSURE: 128 MMHG | HEIGHT: 63 IN | DIASTOLIC BLOOD PRESSURE: 78 MMHG | OXYGEN SATURATION: 95 %

## 2018-08-10 DIAGNOSIS — E66.01 SEVERE OBESITY (BMI 35.0-39.9): ICD-10-CM

## 2018-08-10 DIAGNOSIS — Z23 ENCOUNTER FOR IMMUNIZATION: ICD-10-CM

## 2018-08-10 DIAGNOSIS — G47.9 SLEEPING DIFFICULTY: ICD-10-CM

## 2018-08-10 DIAGNOSIS — Z71.89 ADVANCE DIRECTIVE DISCUSSED WITH PATIENT: ICD-10-CM

## 2018-08-10 DIAGNOSIS — Z00.00 WELCOME TO MEDICARE PREVENTIVE VISIT: Primary | ICD-10-CM

## 2018-08-10 DIAGNOSIS — R73.03 PREDIABETES: ICD-10-CM

## 2018-08-10 DIAGNOSIS — Z78.0 POST-MENOPAUSAL: ICD-10-CM

## 2018-08-10 DIAGNOSIS — I10 ESSENTIAL HYPERTENSION: ICD-10-CM

## 2018-08-10 RX ORDER — TRAZODONE HYDROCHLORIDE 50 MG/1
50 TABLET ORAL
Qty: 30 TAB | Refills: 0 | Status: SHIPPED | OUTPATIENT
Start: 2018-08-10 | End: 2018-11-13 | Stop reason: DRUGHIGH

## 2018-08-10 NOTE — PROGRESS NOTES
Prevnar 13 given in LT deltoid. Lot # [id number], exp date 04/01/2020. Patient tolerated injection well.  No adverse reaction noted.

## 2018-08-10 NOTE — ACP (ADVANCE CARE PLANNING)
Advance Care Planning (ACP) Provider Note - Comprehensive     Date of ACP Conversation: 08/10/18  Persons included in Conversation:  patient  Length of ACP Conversation in minutes:  16 minutes    Authorized Decision Maker (if patient is incapable of making informed decisions):    This person is:  None assigned yet, thinking  and son        General ACP for ALL Patients with Decision Making Capacity:   Importance of advance care planning, including choosing a healthcare agent to communicate patient's healthcare decisions if patient lost the ability to make decisions, such as after a sudden illness or accident  Understanding of the healthcare agent role was assessed and information provided  Exploration of values, goals, and preferences if recovery is not expected, even with continued medical treatment in the event of: Imminent death  Severe, permanent brain injury          Interventions Provided:  Recommended completion of Advance Directive form after review of ACP materials and conversation with prospective healthcare agent   Recommended communicating the plan and making copies for the healthcare agent, personal physician, and others as appropriate (e.g., health system)  Recommended review of completed ACP document annually or upon change in health status  Refer to honoring choices program, pt is interested

## 2018-08-10 NOTE — MR AVS SNAPSHOT
[address] [facility] [address] [place] [address] 
[phone] Patient: Levester Sandhoff MRN: [id number] NCF:0/8/1837 Visit Information Date & Time Provider Department Dept. Phone Encounter #  
 8/10/2018  8:00 AM Elli Duffy, [address] [id number] Follow-up Instructions Return in about 4 weeks (around 9/7/2018), or if symptoms worsen or fail to improve. Your Appointments 12/11/2018  8:30 AM  
Follow Up with Francisco J Kelly NP  
[facility] Appt Note: 6 mo fu back [address] [facility] [address] [phone]  
  
   
 [address] [facility] [address] [facility] [address] Upcoming Health Maintenance Date Due Bone Densitometry (Dexa) Screening 7/7/2018 Pneumococcal 65+ Low/Medium Risk (1 of 2 - PCV13) 7/7/2018 Influenza Age 5 to Adult 8/1/2018 MEDICARE YEARLY EXAM 8/6/2018 COLONOSCOPY 12/24/2018 GLAUCOMA SCREENING Q2Y 11/15/2019 BREAST CANCER SCRN MAMMOGRAM 5/1/2020 DTaP/Tdap/Td series (2 - Td) 2/25/2023 Allergies as of 8/10/2018  Review Complete On: 8/10/2018 By: Aviva Monteiro LPN Severity Noted Reaction Type Reactions Pcn [Penicillins]  07/26/2010    Hives Current Immunizations  Reviewed on 10/20/2016 Name Date Influenza Vaccine 10/1/2017, 10/9/2014 Influenza Vaccine Loletha Ringer) 10/20/2016 Influenza Vaccine (Quad) PF 10/19/2015 Influenza Vaccine Split 9/21/2011 Pneumococcal Conjugate (PCV-13)  Incomplete Tdap 2/25/2013 Zoster Recombinant 5/30/2018 Not reviewed this visit You Were Diagnosed With   
  
 Codes Comments Welcome to Medicare preventive visit    -  Primary ICD-10-CM: Z00.00 ICD-9-CM: V70.0 Encounter for immunization     ICD-10-CM: B11 ICD-9-CM: V03.89 Post-menopausal     ICD-10-CM: Z78.0 ICD-9-CM: V49.81 Essential hypertension     ICD-10-CM: I10 
ICD-9-CM: 401.9 Prediabetes     ICD-10-CM: R73.03 
ICD-9-CM: 790.29 Severe obesity (BMI 35.0-39.9) (HCC)     ICD-10-CM: E66.01 
ICD-9-CM: 278.01 Sleeping difficulty     ICD-10-CM: G47.9 ICD-9-CM: 780.50 Vitals BP Pulse Temp Resp Height(growth percentile) Weight(growth percentile) 128/78 (BP 1 Location: Left arm, BP Patient Position: Sitting) 97 98.4 °F (36.9 °C) (Oral) 17 5' 3\" (1.6 m) 198 lb 9.6 oz (90.1 kg) SpO2 BMI OB Status Smoking Status 95% 35.18 kg/m2 Postmenopausal Never Smoker Vitals History BMI and BSA Data Body Mass Index Body Surface Area  
 35.18 kg/m 2 2 m 2 Preferred Pharmacy Pharmacy Name Phone [facility] [address] [phone] Your Updated Medication List  
  
   
This list is accurate as of 8/10/18  8:39 AM.  Always use your most recent med list. amLODIPine-valsartan 5-160 mg per tablet Commonly known as:  EXFORGE  
TAKE 1 TABLET DAILY CLARITIN-D 24 HOUR PO Take  by mouth. cyclobenzaprine 5 mg tablet Commonly known as:  FLEXERIL  
TAKE ONE TABLET BY MOUTH NIGHTLY AS NEEDED FOR MUSCLE SPASM(S)  
  
 diclofenac EC 75 mg EC tablet Commonly known as:  VOLTAREN Take 1 Tab by mouth two (2) times daily as needed. FISH OIL 1,000 mg Cap Generic drug:  omega-3 fatty acids-vitamin e Take 1 Cap by mouth. fluticasone 50 mcg/actuation nasal spray Commonly known as:  FLONASE  
2 sprays each nostril once daily  
  
 multivitamin tablet Commonly known as:  ONE A DAY Take 1 Tab by mouth daily. omeprazole 20 mg capsule Commonly known as:  PRILOSEC Take 20 mg by mouth daily. traZODone 50 mg tablet Commonly known as:  Madolyn Saas Take 1 Tab by mouth nightly. triamcinolone acetonide 0.025 % ointment Commonly known as:  KENALOG Apply  to affected area two (2) times a day. use thin layer ZYRTEC PO Take  by mouth. Prescriptions Sent to Pharmacy Refills  
 traZODone (DESYREL) 50 mg tablet 0 Sig: Take 1 Tab by mouth nightly. Class: Normal  
 Pharmacy: [facility] [address] YESSICA DORAN [address] [facility], [address] [place] [address] Ph #: [phone] Route: Oral  
  
We Performed the Following PNEUMOCOCCAL CONJ VACCINE 13 VALENT IM [id number] CPT(R)] Follow-up Instructions Return in about 4 weeks (around 9/7/2018), or if symptoms worsen or fail to improve. To-Do List   
 08/10/2018 Imaging:  DEXA BONE DENSITY STUDY AXIAL Patient Instructions Medicare Wellness Visit, Female The best way to live healthy is to have a lifestyle where you eat a well-balanced diet, exercise regularly, limit alcohol use, and quit all forms of tobacco/nicotine, if applicable. Regular preventive services are another way to keep healthy. Preventive services (vaccines, screening tests, monitoring & exams) can help personalize your care plan, which helps you manage your own care. Screening tests can find health problems at the earliest stages, when they are easiest to treat. [facility] follows the current, evidence-based guidelines published by the [place] [employer]ish[employer] (USPSTF) when recommending preventive services for our patients. Because we follow these guidelines, sometimes recommendations change over time as research supports it. (For example, mammograms used to be recommended annually. Even though Medicare will still pay for an annual mammogram, the newer guidelines recommend a mammogram every two years for women of average risk.) Of course, you and your provider may decide to screen more often for some diseases, based on your risk and co-morbidities (chronic disease you are already diagnosed with). Preventive services for you include: - Medicare offers their members a free annual wellness visit, which is time for you and your primary care provider to discuss and plan for your preventive service needs. Take advantage of this benefit every year! 
 
-All people over age 72 should receive the recommended pneumonia vaccines. Current USPSTF guidelines recommend a series of two vaccines for the best pneumonia protection.  
 
-All adults should have a yearly flu vaccine and a tetanus vaccine every 10 years. All adults age 61 years should receive a shingles vaccine once in their lifetime.   
 
-A bone mass density test is recommended when a woman turns 65 to screen for osteoporosis. This test is only recommended once as a screening. Some providers will use this same test as a disease monitoring tool if you already have osteoporosis. -All adults age 38-68 years who are overweight should have a diabetes screening test once every three years.  
 
-Other screening tests & preventive services for persons with diabetes include: an eye exam to screen for diabetic retinopathy, a kidney function test, a foot exam, and stricter control over your cholesterol.  
 
-Cardiovascular screening for adults with routine risk involves an electrocardiogram (ECG) at intervals determined by the provider.  
 
-Colorectal cancer screenings should be done for adults age 54-65 years with normal risk. There are a number of acceptable methods of screening for this type of cancer. Each test has its own benefits and drawbacks. Discuss with your provider what is most appropriate for you during your annual wellness visit. The different tests include: colonoscopy (considered the best screening method), a fecal occult blood test, a fecal DNA test, and sigmoidoscopy. -Breast cancer screenings are recommended every other year for women of normal risk age 54-69 years.  
 
-Cervical cancer screenings for women over age 72 are only recommended with certain risk factors. -All adults born between [facility] should be screened once for Hepatitis C. Here is a list of your current Health Maintenance items (your personalized list of preventive services) with a due date: 
Health Maintenance Due Topic Date Due  Bone Mineral Density   07/07/2018  Pneumococcal Vaccine (1 of 2 - PCV13) 07/07/2018  Flu Vaccine  08/01/2018 [address] [place] [address] Annual Well Visit  08/06/2018  Colonoscopy  12/24/2018 Introducing [facility]! [facility] introduces [employer] patient portal. Now you can access parts of your medical record, email your doctor's office, and request medication refills online. 1. In your internet browser, go to [email or web address] 2. Click on the First Time User? Click Here link in the Sign In box. You will see the New Member Sign Up page. 3. Enter your [employer] Access Code exactly as it appears below. You will not need to use this code after youve completed the sign-up process. If you do not sign up before the expiration date, you must request a new code. · [employer]ia[employer] Access Code: [email or web address] Expires: 9/24/2018  3:12 PM 
 
4. Enter the last four digits of your Social Security Number (xxxx) and Date of Birth (mm/dd/yyyy) as indicated and click Submit. You will be taken to the next sign-up page. 5. Create a Ciafo ID. This will be your Ciafo login ID and cannot be changed, so think of one that is secure and easy to remember. 6. Create a Ciafo password. You can change your password at any time. 7. Enter your Password Reset Question and Answer. This can be used at a later time if you forget your password. 8. Enter your e-mail address. You will receive e-mail notification when new information is available in [address] 9. Click Sign Up. You can now view and download portions of your medical record. 10. Click the Download Summary menu link to download a portable copy of your medical information. If you have questions, please visit the Frequently Asked Questions section of the [employer] website. Remember, [employer] is NOT to be used for urgent needs. For medical emergencies, dial 911. Now available from your iPhone and Android! Please provide this summary of care documentation to your next provider. Your primary care clinician is listed as Josy Pascual. If you have any questions after today's visit, please call [phone].

## 2018-08-10 NOTE — PROGRESS NOTES
1. Have you been to the ER, urgent care clinic since your last visit? Hospitalized since your last visit? No    2. Have you seen or consulted any other health care providers outside of the [address] [place] [address] since your last visit? Include any pap smears or colon screening. No    This is a \"Welcome to [employer]"  Initial Preventive Physical Examination (IPPE) providing Personalized Prevention Plan Services (Performed in the first 12 months of enrollment)    I have reviewed the patient's medical history in detail and updated the computerized patient record. History     Past Medical History:   Diagnosis Date    Eosinophilic esophagitis     started on flvent, dr. Zay Rosas Gastric ulcer 04/2017    dr Nataliia Case, avoid nsaids    Gastritis 12/13/2017    gastritis, cont PPI dr. Zay Rosas GERD (gastroesophageal reflux disease)     erosive esophagitis 11/13 EGD    H/O colonoscopy 11/2013    normal    Hypertension     Left knee pain     Morbid obesity ([address]     Precordial pain     Prediabetes       Past Surgical History:   Procedure Laterality Date    HX CATARACT REMOVAL Right 10/04/2016    HX COLONOSCOPY  11/06/2016    HX GYN      vaginal delivery x 2    HX ORTHOPAEDIC      foot surgery     Current Outpatient Prescriptions   Medication Sig Dispense Refill    amLODIPine-valsartan (EXFORGE) 5-160 mg per tablet TAKE 1 TABLET DAILY 90 Tab 1    diclofenac EC (VOLTAREN) 75 mg EC tablet Take 1 Tab by mouth two (2) times daily as needed. 60 Tab 5    cyclobenzaprine (FLEXERIL) 5 mg tablet TAKE ONE TABLET BY MOUTH NIGHTLY AS NEEDED FOR MUSCLE SPASM(S) 30 Tab 2    cetirizine HCl (ZYRTEC PO) Take  by mouth.  fluticasone (FLONASE) 50 mcg/actuation nasal spray 2 sprays each nostril once daily 1 Bottle 3    omeprazole (PRILOSEC) 20 mg capsule Take 20 mg by mouth daily.  omega-3 fatty acids-vitamin e (FISH OIL) 1,000 mg cap Take 1 Cap by mouth.       multivitamin (ONE A DAY) tablet Take 1 Tab by mouth daily.      triamcinolone acetonide (KENALOG) 0.025 % ointment Apply  to affected area two (2) times a day. use thin layer 30 g 0    LORATADINE/PSEUDOEPHEDRINE (CLARITIN-D 24 HOUR PO) Take  by mouth. Allergies   Allergen Reactions    Pcn [Penicillins] Hives     Family History   Problem Relation Age of Onset    Heart Failure Mother     Heart Disease Mother      mi 52's    Asthma Other      Social History   Substance Use Topics    Smoking status: Never Smoker    Smokeless tobacco: Never Used    Alcohol use No     Diet, Lifestyle: No special diet    Exercise level: moderately active    Depression Risk Screen     PHQ over the last two weeks 4/24/2017   Little interest or pleasure in doing things Not at all   Feeling down, depressed, irritable, or hopeless Not at all   Total Score PHQ 2 0     Alcohol Risk Screen   You do not drink alcohol or very rarely. Functional Ability and Level of Safety   Hearing Loss  Hearing is good. Vision Screening  Vision is fair. No exam data present      Activities of Daily Living  The home contains: no safety equipment. Patient does total self care    Fall Risk Screen  Fall Risk Assessment, last 12 mths 7/11/2016   Able to walk? Yes   Fall in past 12 months? No       Abuse Screen  Patient is not abused    Screening EKG   EKG order placed: Done 7/26/17    Patient Care Team   Patient Care Team:  Henrique Da Silva MD as PCP - General (Family Practice)  Vandana Easton MD (Gastroenterology)  Denver Alonzo MD (Ophthalmology)  Lauren Marina MD (Gastroenterology)  Mai Acevedo MD as Physician (Orthopedic Surgery)  Chacha Chris MD (Podiatry)     End of Life Planning   Advanced care planning directives were discussed with the patient and /or family/caregiver.      Assessment/Plan   Education and counseling provided:  Are appropriate based on today's review and evaluation  End-of-Life planning (with patient's consent)-discussed, provided form, set up for honoring choices program  Pneumococcal Vaccine- 13 today, plan for 23 next year  Influenza Vaccine- annually   Screening Mammography- 5/18  Colorectal cancer screening tests- update 11/18  Bone mass measurement (DEXA)- due, order placed    Diagnoses and all orders for this visit:    1. Welcome to Medicare preventive visit      Health Maintenance Due   Topic Date Due    Bone Densitometry (Dexa) Screening  07/07/2018    Pneumococcal 65+ Low/Medium Risk (1 of 2 - PCV13) 07/07/2018    Influenza Age 9 to Adult  08/01/2018    MEDICARE YEARLY EXAM  08/06/2018    COLONOSCOPY  12/24/2018       Susie Richmond, 70 y.o.,  female    SUBJECTIVE  Routine ff-up    HTN- taking exforge without problems. GERD- doing well on PPI    Chronic low back pain/ Myofascial pain- followed by dr. Lodema Mcardle labs, pt hesitant to start metformin and wants to work on dietary measures. She thinks her sleep affects her eating patterns. She tosses and turns, cant keep mind off from 11-2 am.  She reports to be following good bedtime routine, staying away from caffeine. She is sedentary. ROS:  See HPI, all others negative        Patient Active Problem List   Diagnosis Code    HTN (hypertension) I10    Prediabetes R73.03    Left knee pain M25.562    Gastroesophageal reflux disease K21.9    Pruritus ani L29.0    Severe obesity (BMI 35.0-39.9) ([facility]) E66.01       Current Outpatient Prescriptions   Medication Sig Dispense Refill    traZODone (DESYREL) 50 mg tablet Take 1 Tab by mouth nightly. 30 Tab 0    amLODIPine-valsartan (EXFORGE) 5-160 mg per tablet TAKE 1 TABLET DAILY 90 Tab 1    diclofenac EC (VOLTAREN) 75 mg EC tablet Take 1 Tab by mouth two (2) times daily as needed. 60 Tab 5    cyclobenzaprine (FLEXERIL) 5 mg tablet TAKE ONE TABLET BY MOUTH NIGHTLY AS NEEDED FOR MUSCLE SPASM(S) 30 Tab 2    cetirizine HCl (ZYRTEC PO) Take  by mouth.       fluticasone (FLONASE) 50 mcg/actuation nasal spray 2 sprays each nostril once daily 1 Bottle 3    omeprazole (PRILOSEC) 20 mg capsule Take 20 mg by mouth daily.  omega-3 fatty acids-vitamin e (FISH OIL) 1,000 mg cap Take 1 Cap by mouth.  multivitamin (ONE A DAY) tablet Take 1 Tab by mouth daily.  triamcinolone acetonide (KENALOG) 0.025 % ointment Apply  to affected area two (2) times a day. use thin layer 30 g 0    LORATADINE/PSEUDOEPHEDRINE (CLARITIN-D 24 HOUR PO) Take  by mouth. Allergies   Allergen Reactions    Pcn [Penicillins] Hives       Past Medical History:   Diagnosis Date    Eosinophilic esophagitis     started on flvent, dr. Marycarmen Lemus Gastric ulcer 04/2017    dr Yamileth Albright, avoid nsaids    Gastritis 12/13/2017    gastritis, cont PPI dr. Marycarmen Lemus GERD (gastroesophageal reflux disease)     erosive esophagitis 11/13 EGD    H/O colonoscopy 11/2013    normal    Hypertension     Left knee pain     Morbid obesity ([address]     Precordial pain     Prediabetes        Social History     Social History    Marital status:      Spouse name: N/A    Number of children: N/A    Years of education: N/A     Occupational History    Not on file.      Social History Main Topics    Smoking status: Never Smoker    Smokeless tobacco: Never Used    Alcohol use No    Drug use: No    Sexual activity: Not on file     Other Topics Concern    Not on file     Social History Narrative       Family History   Problem Relation Age of Onset    Heart Failure Mother     Heart Disease Mother      mi 52's    Asthma Other          OBJECTIVE    Physical Exam:     Visit Vitals    /78 (BP 1 Location: Left arm, BP Patient Position: Sitting)    Pulse 97    Temp 98.4 °F (36.9 °C) (Oral)    Resp 17    Ht 5' 3\" (1.6 m)    Wt 198 lb 9.6 oz (90.1 kg)    SpO2 95%    BMI 35.18 kg/m2       General: alert, well-appearing, in no apparent distress or pain  CVS: normal rate, regular rhythm, distinct S1 and S2  Lungs:clear to ausculation bilaterally, no crackles, wheezing or rhonchi noted  Abdomen: normoactive bowel sounds, soft, non-tender  Extremities: no edema, no cyanosis,  Skin: warm, no lesions, rashes noted  Psych:  mood and affect normal  Results for orders placed or performed during the hospital encounter of 08/06/18   HEMOGLOBIN A1C W/O EAG   Result Value Ref Range    Hemoglobin A1c 6.7 (H) 4.2 - 5.6 %   METABOLIC PANEL, BASIC   Result Value Ref Range    Sodium 142 136 - 145 mmol/L    Potassium 4.0 3.5 - 5.5 mmol/L    Chloride 107 100 - 108 mmol/L    CO2 29 21 - 32 mmol/L    Anion gap 6 3.0 - 18 mmol/L    Glucose 113 (H) 74 - 99 mg/dL    BUN 16 7.0 - 18 MG/DL    Creatinine 0.73 0.6 - 1.3 MG/DL    BUN/Creatinine ratio 22 (H) 12 - 20      GFR est AA >60 >60 ml/min/1.73m2    GFR est non-AA >60 >60 ml/min/1.73m2    Calcium 8.7 8.5 - 10.1 MG/DL     ASSESSMENT/PLAN  Gonzalo Valdivia was seen today for hypertension, other and gerd. Diagnoses and all orders for this visit:    Essential hypertension with goal blood pressure less than 140/90  controlled  Cont current med exforge    Prediabetes  Persistent  Advised to start metformin, wants to work on dietary measures first  Will monitor  Plan for rpt labs in 3 months, will order on next visit    Gastroesophageal reflux disease, esophagitis presence not specified  Stable, cont PPI. Sleep difficulty  Start trazodone 50 mg qhs    BMI 35  Encourage wt loss    Follow-up Disposition:  Return in about 4 weeks (around 9/7/2018), or if symptoms worsen or fail to improve. Patient understands plan of care. Patient has provided input and agrees with goals.

## 2018-08-10 NOTE — PATIENT INSTRUCTIONS
Medicare Wellness Visit, Female    The best way to live healthy is to have a lifestyle where you eat a well-balanced diet, exercise regularly, limit alcohol use, and quit all forms of tobacco/nicotine, if applicable. Regular preventive services are another way to keep healthy. Preventive services (vaccines, screening tests, monitoring & exams) can help personalize your care plan, which helps you manage your own care. Screening tests can find health problems at the earliest stages, when they are easiest to treat. [facility] follows the current, evidence-based guidelines published by the [employer] Nishant Mike ([facility]) when recommending preventive services for our patients. Because we follow these guidelines, sometimes recommendations change over time as research supports it. (For example, mammograms used to be recommended annually. Even though Medicare will still pay for an annual mammogram, the newer guidelines recommend a mammogram every two years for women of average risk.)    Of course, you and your provider may decide to screen more often for some diseases, based on your risk and co-morbidities (chronic disease you are already diagnosed with). Preventive services for you include:    - Medicare offers their members a free annual wellness visit, which is time for you and your primary care provider to discuss and plan for your preventive service needs. Take advantage of this benefit every year!    -All people over age 72 should receive the recommended pneumonia vaccines. Current USPSTF guidelines recommend a series of two vaccines for the best pneumonia protection.     -All adults should have a yearly flu vaccine and a tetanus vaccine every 10 years. All adults age 61 years should receive a shingles vaccine once in their lifetime.      -A bone mass density test is recommended when a woman turns 65 to screen for osteoporosis.  This test is only recommended once as a screening. Some providers will use this same test as a disease monitoring tool if you already have osteoporosis. -All adults age 38-68 years who are overweight should have a diabetes screening test once every three years.     -Other screening tests & preventive services for persons with diabetes include: an eye exam to screen for diabetic retinopathy, a kidney function test, a foot exam, and stricter control over your cholesterol.     -Cardiovascular screening for adults with routine risk involves an electrocardiogram (ECG) at intervals determined by the provider.     -Colorectal cancer screenings should be done for adults age 54-65 years with normal risk. There are a number of acceptable methods of screening for this type of cancer. Each test has its own benefits and drawbacks. Discuss with your provider what is most appropriate for you during your annual wellness visit. The different tests include: colonoscopy (considered the best screening method), a fecal occult blood test, a fecal DNA test, and sigmoidoscopy. -Breast cancer screenings are recommended every other year for women of normal risk age 54-69 years.     -Cervical cancer screenings for women over age 72 are only recommended with certain risk factors.     -All adults born between [facility] should be screened once for Hepatitis C.      Here is a list of your current Health Maintenance items (your personalized list of preventive services) with a due date:  Health Maintenance Due   Topic Date Due    Bone Mineral Density   07/07/2018    Pneumococcal Vaccine (1 of 2 - PCV13) 07/07/2018    Flu Vaccine  08/01/2018    Annual Well Visit  08/06/2018    Colonoscopy  12/24/2018

## 2018-08-17 ENCOUNTER — HOSPITAL ENCOUNTER (OUTPATIENT)
Dept: BONE DENSITY | Age: 63
Discharge: HOME | End: 2018-08-17
Attending: FAMILY MEDICINE
Payer: MEDICARE

## 2018-08-17 DIAGNOSIS — Z78.0 POST-MENOPAUSAL: ICD-10-CM

## 2018-08-17 PROCEDURE — 77080 DXA BONE DENSITY AXIAL: CPT

## 2018-08-20 NOTE — PROGRESS NOTES
+ osteopenia, borderline osteoporosis, will discuss further on next appt in September. pls notify pt.

## 2018-08-21 ENCOUNTER — PATIENT OUTREACH (OUTPATIENT)
Dept: FAMILY MEDICINE CLINIC | Age: 63
End: 2018-08-21

## 2018-08-21 NOTE — PROGRESS NOTES
Nurse Navigator attempted to contact patient inviting her to meet with me to discuss Honoring Choices. Message left introducing myself, the purpose of the call and giving my contact information.   Requested that patient call back at her earliest convenience

## 2018-08-21 NOTE — PROGRESS NOTES
Patient identified with 2 identifiers (name and ). Patient aware of   DEXA scan + osteopenia, borderline osteoporosis.  Patient reminded to keep appt 2018

## 2018-11-06 ENCOUNTER — TELEPHONE (OUTPATIENT)
Dept: FAMILY MEDICINE CLINIC | Age: 63
End: 2018-11-06

## 2018-11-06 DIAGNOSIS — I10 ESSENTIAL HYPERTENSION: Primary | ICD-10-CM

## 2018-11-06 DIAGNOSIS — R73.03 PREDIABETES: ICD-10-CM

## 2018-11-08 ENCOUNTER — HOSPITAL ENCOUNTER (OUTPATIENT)
Dept: LAB | Age: 63
Discharge: HOME | End: 2018-11-08
Payer: MEDICARE

## 2018-11-08 DIAGNOSIS — R73.03 PREDIABETES: ICD-10-CM

## 2018-11-08 DIAGNOSIS — I10 ESSENTIAL HYPERTENSION: ICD-10-CM

## 2018-11-08 LAB
ANION GAP SERPL CALC-SCNC: 9 MMOL/L (ref 3–18)
BUN SERPL-MCNC: 18 MG/DL (ref 7–18)
BUN/CREAT SERPL: 27 (ref 12–20)
CALCIUM SERPL-MCNC: 9.1 MG/DL (ref 8.5–10.1)
CHLORIDE SERPL-SCNC: 106 MMOL/L (ref 100–108)
CO2 SERPL-SCNC: 27 MMOL/L (ref 21–32)
CREAT SERPL-MCNC: 0.67 MG/DL (ref 0.6–1.3)
GLUCOSE SERPL-MCNC: 122 MG/DL (ref 74–99)
HBA1C MFR BLD: 6.1 % (ref 4.2–5.6)
POTASSIUM SERPL-SCNC: 3.9 MMOL/L (ref 3.5–5.5)
SODIUM SERPL-SCNC: 142 MMOL/L (ref 136–145)

## 2018-11-08 PROCEDURE — 36415 COLL VENOUS BLD VENIPUNCTURE: CPT

## 2018-11-08 PROCEDURE — 80048 BASIC METABOLIC PNL TOTAL CA: CPT

## 2018-11-08 PROCEDURE — 83036 HEMOGLOBIN GLYCOSYLATED A1C: CPT

## 2018-11-13 ENCOUNTER — OFFICE VISIT (OUTPATIENT)
Dept: FAMILY MEDICINE CLINIC | Age: 63
End: 2018-11-13

## 2018-11-13 VITALS
OXYGEN SATURATION: 98 % | WEIGHT: 190 LBS | TEMPERATURE: 98.5 F | DIASTOLIC BLOOD PRESSURE: 80 MMHG | HEART RATE: 67 BPM | RESPIRATION RATE: 16 BRPM | SYSTOLIC BLOOD PRESSURE: 118 MMHG | HEIGHT: 63 IN | BODY MASS INDEX: 33.66 KG/M2

## 2018-11-13 DIAGNOSIS — I10 ESSENTIAL HYPERTENSION: ICD-10-CM

## 2018-11-13 DIAGNOSIS — G47.9 SLEEP DIFFICULTIES: Primary | ICD-10-CM

## 2018-11-13 DIAGNOSIS — M85.852 OSTEOPENIA OF NECKS OF BOTH FEMURS: ICD-10-CM

## 2018-11-13 DIAGNOSIS — Z23 ENCOUNTER FOR IMMUNIZATION: ICD-10-CM

## 2018-11-13 DIAGNOSIS — R73.03 PREDIABETES: ICD-10-CM

## 2018-11-13 DIAGNOSIS — K21.9 GASTROESOPHAGEAL REFLUX DISEASE, ESOPHAGITIS PRESENCE NOT SPECIFIED: ICD-10-CM

## 2018-11-13 DIAGNOSIS — M85.851 OSTEOPENIA OF NECKS OF BOTH FEMURS: ICD-10-CM

## 2018-11-13 RX ORDER — OMEPRAZOLE 20 MG/1
20 CAPSULE, DELAYED RELEASE ORAL DAILY
Qty: 90 CAP | Refills: 3 | Status: SHIPPED | OUTPATIENT
Start: 2018-11-13

## 2018-11-13 RX ORDER — TRAZODONE HYDROCHLORIDE 50 MG/1
50 TABLET ORAL
Qty: 30 TAB | Refills: 0 | Status: CANCELLED | OUTPATIENT
Start: 2018-11-13

## 2018-11-13 RX ORDER — TRAZODONE HYDROCHLORIDE 100 MG/1
100 TABLET ORAL
Qty: 90 TAB | Refills: 0 | Status: SHIPPED | OUTPATIENT
Start: 2018-11-13 | End: 2018-12-18 | Stop reason: SDUPTHER

## 2018-11-13 NOTE — PROGRESS NOTES
Qi Nunez, 70 y.o.,  female SUBJECTIVE Routine ff-up Sleep problem- improved initiation of sleep with trazodone, however still wakes up around 2-3 am.  
 
HTN- taking exforge without problems. GERD- doing well on PPI Chronic low back pain/ Myofascial pain- followed by dr. Larance Fothergill Prediabetes-lost 8 lbs past 3 months, improved diet. Reviewed labs improved a1c levels 6.7>6.1. ROS: 
See HPI, all others negative Patient Active Problem List  
Diagnosis Code  
 HTN (hypertension) I10  
 Prediabetes R73.03  Left knee pain M25.562  Gastroesophageal reflux disease K21.9  Pruritus ani L29.0  Severe obesity (BMI 35.0-39.9) ([facility]) E66.01  
 
 
Current Outpatient Prescriptions Medication Sig Dispense Refill  traZODone (DESYREL) 50 mg tablet Take 1 Tab by mouth nightly. 30 Tab 0  
 amLODIPine-valsartan (EXFORGE) 5-160 mg per tablet TAKE 1 TABLET DAILY 90 Tab 1  
 diclofenac EC (VOLTAREN) 75 mg EC tablet Take 1 Tab by mouth two (2) times daily as needed. 60 Tab 5  cyclobenzaprine (FLEXERIL) 5 mg tablet TAKE ONE TABLET BY MOUTH NIGHTLY AS NEEDED FOR MUSCLE SPASM(S) 30 Tab 2  cetirizine HCl (ZYRTEC PO) Take  by mouth.  fluticasone (FLONASE) 50 mcg/actuation nasal spray 2 sprays each nostril once daily 1 Bottle 3  
 omeprazole (PRILOSEC) 20 mg capsule Take 20 mg by mouth daily.  omega-3 fatty acids-vitamin e (FISH OIL) 1,000 mg cap Take 1 Cap by mouth.  multivitamin (ONE A DAY) tablet Take 1 Tab by mouth daily.  triamcinolone acetonide (KENALOG) 0.025 % ointment Apply  to affected area two (2) times a day. use thin layer 30 g 0  
 LORATADINE/PSEUDOEPHEDRINE (CLARITIN-D 24 HOUR PO) Take  by mouth. Allergies Allergen Reactions  Pcn [Penicillins] Hives Past Medical History:  
Diagnosis Date  Eosinophilic esophagitis   
 started on flvent, dr. Brandon Keller  Gastric ulcer 04/2017  
 dr Brandon Keller, avoid nsaids  Gastritis 12/13/2017 gastritis, cont PPI dr. Maisha Martínez  GERD (gastroesophageal reflux disease)   
 erosive esophagitis 11/13 EGD  H/O colonoscopy 11/2013  
 normal  
 Hypertension  Left knee pain  Morbid obesity ([address]  Precordial pain  Prediabetes Social History Social History  Marital status:  Spouse name: N/A  
 Number of children: N/A  
 Years of education: N/A Occupational History  Not on file. Social History Main Topics  Smoking status: Never Smoker  Smokeless tobacco: Never Used  Alcohol use No  
 Drug use: No  
 Sexual activity: Not on file Other Topics Concern  Not on file Social History Narrative Family History Problem Relation Age of Onset  Heart Failure Mother  Heart Disease Mother   
  mi 52's  Asthma Other OBJECTIVE Physical Exam:  
 
Visit Vitals /80 (BP 1 Location: Left arm, BP Patient Position: Sitting) Pulse 67 Temp 98.5 °F (36.9 °C) (Oral) Resp 16 Ht 5' 3\" (1.6 m) Wt 190 lb (86.2 kg) SpO2 98% BMI 33.66 kg/m² General: alert, well-appearing, in no apparent distress or pain CVS: normal rate, regular rhythm, distinct S1 and S2 Lungs:clear to ausculation bilaterally, no crackles, wheezing or rhonchi noted Extremities: no edema, no cyanosis, 
Skin: warm, no lesions, rashes noted Psych:  mood and affect normal. 
 
Results for orders placed or performed during the hospital encounter of 11/08/18 HEMOGLOBIN A1C W/O EAG Result Value Ref Range Hemoglobin A1c 6.1 (H) 4.2 - 5.6 % METABOLIC PANEL, BASIC Result Value Ref Range Sodium 142 136 - 145 mmol/L Potassium 3.9 3.5 - 5.5 mmol/L Chloride 106 100 - 108 mmol/L  
 CO2 27 21 - 32 mmol/L Anion gap 9 3.0 - 18 mmol/L Glucose 122 (H) 74 - 99 mg/dL BUN 18 7.0 - 18 MG/DL Creatinine 0.67 0.6 - 1.3 MG/DL  
 BUN/Creatinine ratio 27 (H) 12 - 20 GFR est AA >60 >60 ml/min/1.73m2 GFR est non-AA >60 >60 ml/min/1.73m2 Calcium 9.1 8.5 - 10.1 MG/DL  
 
 
ASSESSMENT/PLAN Diagnoses and all orders for this visit: 1. Sleep difficulties Improving, increase trazodone dose to 100 mg 
 
2. Encounter for immunization 
-     INFLUENZA VACCINE INACTIVATED (IIV), SUBUNIT, ADJUVANTED, IM 3. Prediabetes Improving, commended on dietary changes/wt loss Monitoring 4. Essential hypertension Controlled, cont exforge 5. Gastroesophageal reflux disease, esophagitis presence not specified Stable, cont prilosec 6. Osteopenia of necks of both femurs Calc frax 7.6% and 0.6% Cont ca/vit d/wt bearing exercises Other orders 
-     omeprazole (PRILOSEC) 20 mg capsule; Take 1 Cap by mouth daily. -     traZODone (DESYREL) 100 mg tablet; Take 1 Tab by mouth nightly. Kell Crespo was seen today for hypertension, other and gerd. Diagnoses and all orders for this visit: 
 
Essential hypertension with goal blood pressure less than 140/90 
controlled Cont current med exforge Prediabetes Persistent Advised to start metformin, wants to work on dietary measures first 
Will monitor Plan for rpt labs in 3 months, will order on next visit Gastroesophageal reflux disease, esophagitis presence not specified Stable, cont PPI. Sleep difficulty Start trazodone 50 mg qhs 
 
BMI 35 Encourage wt loss Follow-up Disposition: 
Return in about 4 weeks (around 9/7/2018), or if symptoms worsen or fail to improve. Patient understands plan of care. Patient has provided input and agrees with goals.

## 2018-11-13 NOTE — PROGRESS NOTES
1. Have you been to the ER, urgent care clinic since your last visit? Hospitalized since your last visit? No 
 
2. Have you seen or consulted any other health care providers outside of the [address] [place] [address] since your last visit? Include any pap smears or colon screening. No  
 
Fluad 0.5 ml given IM in left deltoid. Lot # [id number], exp date 05/31/2019. Patient tolerated injection well. No adverse reaction noted.

## 2018-11-13 NOTE — PATIENT INSTRUCTIONS
Vaccine Information Statement Influenza (Flu) Vaccine (Inactivated or Recombinant): What you need to know Many Vaccine Information Statements are available in [place] and other languages. See www.immunize.org/vis Hojas de Información Sobre Vacunas están disponibles en Español y en muchos otros idiomas. Visite www.immunize.org/vis 1. Why get vaccinated? Influenza (flu) is a contagious disease that spreads around the [place] every year, usually between October and May. Flu is caused by influenza viruses, and is spread mainly by coughing, sneezing, and close contact. Anyone can get flu. Flu strikes suddenly and can last several days. Symptoms vary by age, but can include: 
 fever/chills  sore throat  muscle aches  fatigue  cough  headache  runny or stuffy nose Flu can also lead to pneumonia and blood infections, and cause diarrhea and seizures in children. If you have a medical condition, such as heart or lung disease, flu can make it worse. Flu is more dangerous for some people. Infants and young children, people 72years of age and older, pregnant women, and people with certain health conditions or a weakened immune system are at greatest risk. Each year thousands of people in the [facility] die from flu, and many more are hospitalized. Flu vaccine can: 
 keep you from getting flu, 
 make flu less severe if you do get it, and 
 keep you from spreading flu to your family and other people. 2. Inactivated and recombinant flu vaccines A dose of flu vaccine is recommended every flu season. Children 6 months through 6years of age may need two doses during the same flu season. Everyone else needs only one dose each flu season.   
 
 
Some inactivated flu vaccines contain a very small amount of a mercury-based preservative called thimerosal. Studies have not shown thimerosal in vaccines to be harmful, but flu vaccines that do not contain thimerosal are available. There is no live flu virus in flu shots. They cannot cause the flu. There are many flu viruses, and they are always changing. Each year a new flu vaccine is made to protect against three or four viruses that are likely to cause disease in the upcoming flu season. But even when the vaccine doesnt exactly match these viruses, it may still provide some protection Flu vaccine cannot prevent: 
 flu that is caused by a virus not covered by the vaccine, or 
 illnesses that look like flu but are not. It takes about 2 weeks for protection to develop after vaccination, and protection lasts through the flu season. 3. Some people should not get this vaccine Tell the person who is giving you the vaccine:  If you have any severe, life-threatening allergies. If you ever had a life-threatening allergic reaction after a dose of flu vaccine, or have a severe allergy to any part of this vaccine, you may be advised not to get vaccinated. Most, but not all, types of flu vaccine contain a small amount of egg protein.  If you ever had Guillain-Barré Syndrome (also called GBS). Some people with a history of GBS should not get this vaccine. This should be discussed with your doctor.  If you are not feeling well. It is usually okay to get flu vaccine when you have a mild illness, but you might be asked to come back when you feel better. 4. Risks of a vaccine reaction With any medicine, including vaccines, there is a chance of reactions. These are usually mild and go away on their own, but serious reactions are also possible. Most people who get a flu shot do not have any problems with it. Minor problems following a flu shot include:  
 soreness, redness, or swelling where the shot was given  hoarseness  sore, red or itchy eyes  cough  fever  aches  headache  itching  fatigue If these problems occur, they usually begin soon after the shot and last 1 or 2 days. More serious problems following a flu shot can include the following:  There may be a small increased risk of Guillain-Barré Syndrome (GBS) after inactivated flu vaccine. This risk has been estimated at 1 or 2 additional cases per million people vaccinated. This is much lower than the risk of severe complications from flu, which can be prevented by flu vaccine.  Young children who get the flu shot along with pneumococcal vaccine (PCV13) and/or DTaP vaccine at the same time might be slightly more likely to have a seizure caused by fever. Ask your doctor for more information. Tell your doctor if a child who is getting flu vaccine has ever had a seizure. Problems that could happen after any injected vaccine:  People sometimes faint after a medical procedure, including vaccination. Sitting or lying down for about 15 minutes can help prevent fainting, and injuries caused by a fall. Tell your doctor if you feel dizzy, or have vision changes or ringing in the ears.  Some people get severe pain in the shoulder and have difficulty moving the arm where a shot was given. This happens very rarely.  Any medication can cause a severe allergic reaction. Such reactions from a vaccine are very rare, estimated at about 1 in a million doses, and would happen within a few minutes to a few hours after the vaccination. As with any medicine, there is a very remote chance of a vaccine causing a serious injury or death. The safety of vaccines is always being monitored. For more information, visit: www.cdc.gov/vaccinesafety/ 
 
5. What if there is a serious reaction? What should I look for?  Look for anything that concerns you, such as signs of a severe allergic reaction, very high fever, or unusual behavior.  
 
Signs of a severe allergic reaction can include hives, swelling of the face and throat, difficulty breathing, a fast heartbeat, dizziness, and weakness  usually within a few minutes to a few hours after the vaccination. What should I do?  If you think it is a severe allergic reaction or other emergency that cant wait, call 9-1-1 and get the person to the nearest hospital. Otherwise, call your doctor.  Reactions should be reported to the Vaccine Adverse Event Reporting System (VAERS). Your doctor should file this report, or you can do it yourself through  the VAERS web site at www.vaers. [facility].gov, or by calling 2-151.278.7067. VAERS does not give medical advice. 6. The National Vaccine Injury Compensation Program 
 
The [facility] Vaccine Injury Compensation Program (VICP) is a federal program that was created to compensate people who may have been injured by certain vaccines. Persons who believe they may have been injured by a vaccine can learn about the program and about filing a claim by calling 5-107.228.4102 or visiting the [employer] website at www.[facility].gov/vaccinecompensation. There is a time limit to file a claim for compensation. 7. How can I learn more?  Ask your healthcare provider. He or she can give you the vaccine package insert or suggest other sources of information.  Call your local or state health department.  Contact the Centers for Disease Control and Prevention (CDC): 
- Call 5-990.885.5961 (1-800-CDC-INFO) or 
- Visit CDCs website at www.cdc.gov/flu Vaccine Information Statement Inactivated Influenza Vaccine 8/7/2015 
[id number] Department of [facility] and [facility] Centers for Disease Control and Prevention Office Use Only

## 2018-12-11 ENCOUNTER — OFFICE VISIT (OUTPATIENT)
Dept: ORTHOPEDIC SURGERY | Age: 63
End: 2018-12-11

## 2018-12-11 VITALS — SYSTOLIC BLOOD PRESSURE: 104 MMHG | DIASTOLIC BLOOD PRESSURE: 54 MMHG | RESPIRATION RATE: 19 BRPM | HEART RATE: 76 BPM

## 2018-12-11 DIAGNOSIS — M79.18 MYOFASCIAL PAIN: ICD-10-CM

## 2018-12-11 DIAGNOSIS — M62.830 MUSCLE SPASM OF BACK: ICD-10-CM

## 2018-12-11 DIAGNOSIS — M54.5 CHRONIC RIGHT-SIDED LOW BACK PAIN, WITH SCIATICA PRESENCE UNSPECIFIED: ICD-10-CM

## 2018-12-11 DIAGNOSIS — G89.29 CHRONIC RIGHT-SIDED LOW BACK PAIN, WITH SCIATICA PRESENCE UNSPECIFIED: ICD-10-CM

## 2018-12-11 RX ORDER — DICLOFENAC SODIUM 75 MG/1
75 TABLET, DELAYED RELEASE ORAL
Qty: 60 TAB | Refills: 5 | Status: SHIPPED | OUTPATIENT
Start: 2018-12-11 | End: 2020-01-14

## 2018-12-11 RX ORDER — CYCLOBENZAPRINE HCL 5 MG
TABLET ORAL
Qty: 30 TAB | Refills: 2 | Status: SHIPPED | OUTPATIENT
Start: 2018-12-11 | End: 2021-06-04 | Stop reason: SDUPTHER

## 2018-12-11 NOTE — PATIENT INSTRUCTIONS
Low Back Pain: Exercises  Your Care Instructions  Here are some examples of typical rehabilitation exercises for your condition. Start each exercise slowly. Ease off the exercise if you start to have pain. Your doctor or physical therapist will tell you when you can start these exercises and which ones will work best for you. How to do the exercises  Press-up    1. Lie on your stomach, supporting your body with your forearms. 2. Press your elbows down into the floor to raise your upper back. As you do this, relax your stomach muscles and allow your back to arch without using your back muscles. As your press up, do not let your hips or pelvis come off the floor. 3. Hold for 15 to 30 seconds, then relax. 4. Repeat 2 to 4 times. Alternate arm and leg (bird dog) exercise    1. Start on the floor, on your hands and knees. 2. Tighten your belly muscles. 3. Raise one leg off the floor, and hold it straight out behind you. Be careful not to let your hip drop down, because that will twist your trunk. 4. Hold for about 6 seconds, then lower your leg and switch to the other leg. 5. Repeat 8 to 12 times on each leg. 6. Over time, work up to holding for 10 to 30 seconds each time. 7. If you feel stable and secure with your leg raised, try raising the opposite arm straight out in front of you at the same time. Knee-to-chest exercise    1. Lie on your back with your knees bent and your feet flat on the floor. 2. Bring one knee to your chest, keeping the other foot flat on the floor (or keeping the other leg straight, whichever feels better on your lower back). 3. Keep your lower back pressed to the floor. Hold for at least 15 to 30 seconds. 4. Relax, and lower the knee to the starting position. 5. Repeat with the other leg. Repeat 2 to 4 times with each leg. 6. To get more stretch, put your other leg flat on the floor while pulling your knee to your chest.    Curl-ups    1.  Lie on the floor on your back with your knees bent at a 90-degree angle. Your feet should be flat on the floor, about 12 inches from your buttocks. 2. Cross your arms over your chest. If this bothers your neck, try putting your hands behind your neck (not your head), with your elbows spread apart. 3. Slowly tighten your belly muscles and raise your shoulder blades off the floor. 4. Keep your head in line with your body, and do not press your chin to your chest.  5. Hold this position for 1 or 2 seconds, then slowly lower yourself back down to the floor. 6. Repeat 8 to 12 times. Pelvic tilt exercise    1. Lie on your back with your knees bent. 2. \"Brace\" your stomach. This means to tighten your muscles by pulling in and imagining your belly button moving toward your spine. You should feel like your back is pressing to the floor and your hips and pelvis are rocking back. 3. Hold for about 6 seconds while you breathe smoothly. 4. Repeat 8 to 12 times. Heel dig bridging    1. Lie on your back with both knees bent and your ankles bent so that only your heels are digging into the floor. Your knees should be bent about 90 degrees. 2. Then push your heels into the floor, squeeze your buttocks, and lift your hips off the floor until your shoulders, hips, and knees are all in a straight line. 3. Hold for about 6 seconds as you continue to breathe normally, and then slowly lower your hips back down to the floor and rest for up to 10 seconds. 4. Do 8 to 12 repetitions. Hamstring stretch in doorway    1. Lie on your back in a doorway, with one leg through the open door. 2. Slide your leg up the wall to straighten your knee. You should feel a gentle stretch down the back of your leg. 3. Hold the stretch for at least 15 to 30 seconds. Do not arch your back, point your toes, or bend either knee. Keep one heel touching the floor and the other heel touching the wall. 4. Repeat with your other leg. 5. Do 2 to 4 times for each leg.     Hip flexor stretch    1. Kneel on the floor with one knee bent and one leg behind you. Place your forward knee over your foot. Keep your other knee touching the floor. 2. Slowly push your hips forward until you feel a stretch in the upper thigh of your rear leg. 3. Hold the stretch for at least 15 to 30 seconds. Repeat with your other leg. 4. Do 2 to 4 times on each side. Wall sit    1. Stand with your back 10 to 12 inches away from a wall. 2. Lean into the wall until your back is flat against it. 3. Slowly slide down until your knees are slightly bent, pressing your lower back into the wall. 4. Hold for about 6 seconds, then slide back up the wall. 5. Repeat 8 to 12 times. Follow-up care is a key part of your treatment and safety. Be sure to make and go to all appointments, and call your doctor if you are having problems. It's also a good idea to know your test results and keep a list of the medicines you take. Where can you learn more? Go to http://carlos eduardo-turner.info/. Enter R356 in the search box to learn more about \"Low Back Pain: Exercises. \"  Current as of: November 29, 2017  Content Version: 11.8  © 8621-6638 Healthwise, Incorporated. Care instructions adapted under license by [employer] (which disclaims liability or warranty for this information). If you have questions about a medical condition or this instruction, always ask your healthcare professional. [place] [address] any warranty or liability for your use of this information. Acute Low Back Pain: Exercises  Your Care Instructions  Here are some examples of typical rehabilitation exercises for your condition. Start each exercise slowly. Ease off the exercise if you start to have pain. Your doctor or physical therapist will tell you when you can start these exercises and which ones will work best for you.   When you are not being active, find a comfortable position for rest. Some people are comfortable on the floor or a medium-firm bed with a small pillow under their head and another under their knees. Some people prefer to lie on their side with a pillow between their knees. Don't stay in one position for too long. Take short walks (10 to 20 minutes) every 2 to 3 hours. Avoid slopes, hills, and stairs until you feel better. Walk only distances you can manage without pain, especially leg pain. How to do the exercises  Back stretches    5. Get down on your hands and knees on the floor. 6. Relax your head and allow it to droop. Round your back up toward the ceiling until you feel a nice stretch in your upper, middle, and lower back. Hold this stretch for as long as it feels comfortable, or about 15 to 30 seconds. 7. Return to the starting position with a flat back while you are on your hands and knees. 8. Let your back sway by pressing your stomach toward the floor. Lift your buttocks toward the ceiling. 9. Hold this position for 15 to 30 seconds. 10. Repeat 2 to 4 times. Follow-up care is a key part of your treatment and safety. Be sure to make and go to all appointments, and call your doctor if you are having problems. It's also a good idea to know your test results and keep a list of the medicines you take. Where can you learn more? Go to http://carlos eduardo-turner.info/. Enter D198 in the search box to learn more about \"Acute Low Back Pain: Exercises. \"  Current as of: November 29, 2017  Content Version: 11.8  © 6074-3559 Healthwise, Incorporated. Care instructions adapted under license by [employer] (which disclaims liability or warranty for this information). If you have questions about a medical condition or this instruction, always ask your healthcare professional. [address] any warranty or liability for your use of this information.

## 2018-12-11 NOTE — PROGRESS NOTES
[address]  [address], [address]  [place], [address] [place] [address] Street  Phone: [phone]  Fax: [phone]    Nusrat Ramirez  : 1955  PCP: Ranjeet Stoll MD    PROGRESS NOTE    HISTORY OF PRESENT ILLNESS:  Chief Complaint   Patient presents with    Back Pain     fu     Deepali Wharton a 57 y. o.  female with history of lumbar pain.  She has myofascial pain syndrome. She has completed PT with minimal relief.  She was scheduled for bilateral L4-5 L5-S1 facet joint injections but she never completed this. She was reading the information and was scared of this. She has been taking diclofenac and flexeril with good relief. She takes this very PRN. Today,  She continues to have good days and bad days. She has learned how to manage her symptoms. She continues to use Diclofenac and flexeril PRN. She states the muscle spasms cause the most discomfort. Denies bladder/bowel dysfunction, saddle paresthesia, weakness, gait disturbance, or other neurological deficit. Pt at this time desires to continue with current care/proceed with medication evaluation. ASSESSMENT  70 y.o. female with lumbar pain. Diagnoses and all orders for this visit:    1. Myofascial pain  -     diclofenac EC (VOLTAREN) 75 mg EC tablet; Take 1 Tab by mouth two (2) times daily as needed. -     cyclobenzaprine (FLEXERIL) 5 mg tablet; TAKE ONE TABLET BY MOUTH NIGHTLY AS NEEDED FOR MUSCLE SPASM(S)  -     REFERRAL TO PHYSICAL THERAPY    2. Chronic right-sided low back pain, with sciatica presence unspecified  -     diclofenac EC (VOLTAREN) 75 mg EC tablet; Take 1 Tab by mouth two (2) times daily as needed. -     cyclobenzaprine (FLEXERIL) 5 mg tablet; TAKE ONE TABLET BY MOUTH NIGHTLY AS NEEDED FOR MUSCLE SPASM(S)  -     REFERRAL TO PHYSICAL THERAPY    3. Muscle spasm of back  -     diclofenac EC (VOLTAREN) 75 mg EC tablet; Take 1 Tab by mouth two (2) times daily as needed.   -     cyclobenzaprine (FLEXERIL) 5 mg tablet; TAKE ONE TABLET BY MOUTH NIGHTLY AS NEEDED FOR MUSCLE SPASM(S)  -     REFERRAL TO PHYSICAL THERAPY         IMPRESSION/PLAN    1) Pt was given information on back exercises. 2) referral to PT for lumbar pain  3) Continue diclofenac and flexeril PRN. 4) Ms. Audrea Lefort has a reminder for a \"due or due soon\" health maintenance. I have asked that she contact her primary care provider, Toan Lau MD, for follow-up on this health maintenance. 5) We have informed patient to notify us for immediate appointment if he has any worsening neurogical symptoms or if an emergency situation presents, then call 911  6) Pt will follow-up in 6 months. Risks and benefits of ongoing therapy have been reviewed with the patient.  is appropriate. PAST MEDICAL HISTORY  Past Medical History:   Diagnosis Date    Eosinophilic esophagitis     started on flvent, dr. Amna Funes Gastric ulcer 04/2017    dr Jose Alberto [place], avoid nsaids    Gastritis 12/13/2017    gastritis, cont PPI dr. Amna Funes GERD (gastroesophageal reflux disease)     erosive esophagitis 11/13 EGD    H/O colonoscopy 11/2013    normal    Hypertension     Left knee pain     Morbid obesity (HCC)     Precordial pain     Prediabetes         MEDICATIONS  Current Outpatient Medications   Medication Sig Dispense Refill    diclofenac EC (VOLTAREN) 75 mg EC tablet Take 1 Tab by mouth two (2) times daily as needed. 60 Tab 5    cyclobenzaprine (FLEXERIL) 5 mg tablet TAKE ONE TABLET BY MOUTH NIGHTLY AS NEEDED FOR MUSCLE SPASM(S) 30 Tab 2    omeprazole (PRILOSEC) 20 mg capsule Take 1 Cap by mouth daily. 90 Cap 3    traZODone (DESYREL) 100 mg tablet Take 1 Tab by mouth nightly. 90 Tab 0    amLODIPine-valsartan (EXFORGE) 5-160 mg per tablet TAKE 1 TABLET DAILY 90 Tab 1    cetirizine HCl (ZYRTEC PO) Take  by mouth.       fluticasone (FLONASE) 50 mcg/actuation nasal spray 2 sprays each nostril once daily 1 Bottle 3    triamcinolone acetonide (KENALOG) 0.025 % ointment Apply  to affected area two (2) times a day. use thin layer 30 g 0    LORATADINE/PSEUDOEPHEDRINE (CLARITIN-D 24 HOUR PO) Take  by mouth.  omega-3 fatty acids-vitamin e (FISH OIL) 1,000 mg cap Take 1 Cap by mouth.  multivitamin (ONE A DAY) tablet Take 1 Tab by mouth daily. ALLERGIES  Allergies   Allergen Reactions    Pcn [Penicillins] Hives       SOCIAL HISTORY    Social History     Socioeconomic History    Marital status:      Spouse name: Not on file    Number of children: Not on file    Years of education: Not on file    Highest education level: Not on file   Social Needs    Financial resource strain: Not on file    Food insecurity - worry: Not on file    Food insecurity - inability: Not on file    Transportation needs - medical: Not on file   [employer] needs - non-medical: Not on file   Occupational History    Not on file   Tobacco Use    Smoking status: Never Smoker    Smokeless tobacco: Never Used   Substance and Sexual Activity    Alcohol use: No    Drug use: No    Sexual activity: Not on file   Other Topics Concern    Not on file   Social History Narrative    Not on file       SUBJECTIVE      Pain Scale: 8/10    Pain Assessment  12/11/2018   Location of Pain Back   Location Modifiers -   Severity of Pain 8   Quality of Pain Aching   Duration of Pain -   Frequency of Pain Constant   Aggravating Factors Standing;Straightening   Aggravating Factors Comment -   Limiting Behavior -   Relieving Factors (No Data)   Relieving Factors Comment diclofenac   Result of Injury -       Accompanied by self. REVIEW OF SYSTEMS  ROS    Constitutional: Negative for fever, chills, or weight change. Respiratory: Negative for cough or shortness of breath. Cardiovascular: Negative for chest pain or palpitations. Gastrointestinal: Negative for acid reflux, change in bowel habits, or constipation.   Genitourinary: Negative for incontinence, dysuria and flank pain. Musculoskeletal: Positive for lumbar pain. Skin: Negative for rash. Neurological: Negative for headaches, dizziness, or numbness. Endo/Heme/Allergies: Negative . Psychiatric/Behavioral: Negative. PHYSICAL EXAMINATION  Visit Vitals  /54 (BP 1 Location: Left arm, BP Patient Position: Sitting)   Pulse 76   Resp 19       Constitutional: Well developed,  well nourished,  awake, alert, and in no acute distress. Neurological:  Sensation to light touch is intact. Psychiatric: Affect and mood are appropriate. Integumentary: No rashes or abrasions noted on exposed areas,  warm, dry and intact. Cardiovascular/Peripheral Vascular:  No peripheral edema is noted. Lymphatic:  No evidence of lymphedema. No cervical lymphadenopathy. SPINE/MUSCULOSKELETAL EXAM    Lumbar spine:  No rash, ecchymosis, or gross obliquity. No fasciculations. No focal atrophy is noted. Range of motion is intact. Mild Tenderness to palpation to lumbar spine. SI joints non-tender. Trochanters non tender. Musculoskeletal:  No pain with extension, axial loading, or forward flexion. No pain with internal or external rotation of her hips. MOTOR     Hip Flex  Quads Hamstrings Ankle DF EHL Ankle PF   Right +4/5 +4/5 +4/5 +4/5 +4/5 +4/5   Left +4/5 +4/5 +4/5 +4/5 +4/5 +4/5     Straight Leg raise - R, +L.     normal gait and station    Ambulation without assistive device. full weight bearing, non-antalgic gait.     Car London NP

## 2018-12-12 ENCOUNTER — TELEPHONE (OUTPATIENT)
Dept: ORTHOPEDIC SURGERY | Age: 63
End: 2018-12-12

## 2018-12-12 NOTE — TELEPHONE ENCOUNTER
-PA sent for flexeril 5mg sent and approved fro 11/12/2018-12/12/2019.     -called patient and voicemail did not identify patient. A message was left for patient to call [phone] at [address] [place] [address] in regards to the patient's message.

## 2018-12-14 ENCOUNTER — TELEPHONE (OUTPATIENT)
Dept: FAMILY MEDICINE CLINIC | Age: 63
End: 2018-12-14

## 2018-12-14 ENCOUNTER — OFFICE VISIT (OUTPATIENT)
Dept: FAMILY MEDICINE CLINIC | Age: 63
End: 2018-12-14

## 2018-12-14 VITALS
TEMPERATURE: 97.6 F | OXYGEN SATURATION: 95 % | DIASTOLIC BLOOD PRESSURE: 68 MMHG | HEART RATE: 56 BPM | WEIGHT: 187.6 LBS | HEIGHT: 63 IN | SYSTOLIC BLOOD PRESSURE: 126 MMHG | BODY MASS INDEX: 33.24 KG/M2 | RESPIRATION RATE: 16 BRPM

## 2018-12-14 DIAGNOSIS — R73.03 PREDIABETES: ICD-10-CM

## 2018-12-14 DIAGNOSIS — M85.851 OSTEOPENIA OF NECKS OF BOTH FEMURS: ICD-10-CM

## 2018-12-14 DIAGNOSIS — G47.9 SLEEPING DIFFICULTY: Primary | ICD-10-CM

## 2018-12-14 DIAGNOSIS — I10 ESSENTIAL HYPERTENSION: ICD-10-CM

## 2018-12-14 DIAGNOSIS — M85.852 OSTEOPENIA OF NECKS OF BOTH FEMURS: ICD-10-CM

## 2018-12-14 NOTE — PROGRESS NOTES
1. Have you been to the ER, urgent care clinic since your last visit? Hospitalized since your last visit? No    2. Have you seen or consulted any other health care providers outside of the [address] [place] [address] since your last visit? Include any pap smears or colon screening.  No    Chief Complaint   Patient presents with    Hypertension    GERD    Osteopenia    Other     sleep difficulties    Other     Pre-DM    Back Pain     sees NP 70 Radha Larkin

## 2018-12-14 NOTE — TELEPHONE ENCOUNTER
Pt called and confirm the medicine Trazodone is 100mg. Also pt stated she would like to discuss with you to see and podiatric. Pt also stated she doesn't want to [id number]. Please call pt at your earliest convenience.

## 2018-12-14 NOTE — PATIENT INSTRUCTIONS

## 2018-12-14 NOTE — PROGRESS NOTES
Micky Quevedo, 70 y.o.,  female    SUBJECTIVE  ff-up sleep    increased trazodone dose to 100 mg, takes it around 10:30 and has cut down on caffeine. Reports only slight improvement most nights still up, thinking of conversations from years past, cannot switch mind off. HTN- taking exforge without problems. H/o prediabetes    GERD- doing well on PPI    Chronic low back pain/ Myofascial pain- followed by dr. Jered Moore, upcoming PT      ROS:  See HPI, all others negative        Patient Active Problem List   Diagnosis Code    HTN (hypertension) I10    Prediabetes R73.03    Left knee pain M25.562    Gastroesophageal reflux disease K21.9    Pruritus ani L29.0    Severe obesity (BMI 35.0-39.9) ([facility]) E66.01       Current Outpatient Prescriptions   Medication Sig Dispense Refill    traZODone (DESYREL) 50 mg tablet Take 1 Tab by mouth nightly. 30 Tab 0    amLODIPine-valsartan (EXFORGE) 5-160 mg per tablet TAKE 1 TABLET DAILY 90 Tab 1    diclofenac EC (VOLTAREN) 75 mg EC tablet Take 1 Tab by mouth two (2) times daily as needed. 60 Tab 5    cyclobenzaprine (FLEXERIL) 5 mg tablet TAKE ONE TABLET BY MOUTH NIGHTLY AS NEEDED FOR MUSCLE SPASM(S) 30 Tab 2    cetirizine HCl (ZYRTEC PO) Take  by mouth.  fluticasone (FLONASE) 50 mcg/actuation nasal spray 2 sprays each nostril once daily 1 Bottle 3    omeprazole (PRILOSEC) 20 mg capsule Take 20 mg by mouth daily.  omega-3 fatty acids-vitamin e (FISH OIL) 1,000 mg cap Take 1 Cap by mouth.  multivitamin (ONE A DAY) tablet Take 1 Tab by mouth daily.  triamcinolone acetonide (KENALOG) 0.025 % ointment Apply  to affected area two (2) times a day. use thin layer 30 g 0    LORATADINE/PSEUDOEPHEDRINE (CLARITIN-D 24 HOUR PO) Take  by mouth.          Allergies   Allergen Reactions    Pcn [Penicillins] Hives       Past Medical History:   Diagnosis Date    Eosinophilic esophagitis     started on flvent, dr. Tru Levine Gastric ulcer 04/2017    dr Julita Aguilera, avoid nsaids    Gastritis 12/13/2017    gastritis, cont PPI dr. Pierre Conti GERD (gastroesophageal reflux disease)     erosive esophagitis 11/13 EGD    H/O colonoscopy 11/2013    normal    Hypertension     Left knee pain     Morbid obesity ([address]     Precordial pain     Prediabetes        Social History     Social History    Marital status:      Spouse name: N/A    Number of children: N/A    Years of education: N/A     Occupational History    Not on file.      Social History Main Topics    Smoking status: Never Smoker    Smokeless tobacco: Never Used    Alcohol use No    Drug use: No    Sexual activity: Not on file     Other Topics Concern    Not on file     Social History Narrative       Family History   Problem Relation Age of Onset    Heart Failure Mother     Heart Disease Mother      mi 52's    Asthma Other          OBJECTIVE    Physical Exam:     Visit Vitals  /68 (BP 1 Location: Left arm, BP Patient Position: Sitting)   Pulse (!) 56   Temp 97.6 °F (36.4 °C) (Oral)   Resp 16   Ht 5' 3\" (1.6 m)   Wt 187 lb 9.6 oz (85.1 kg)   SpO2 95%   BMI 33.23 kg/m²         General: alert, well-appearing, in no apparent distress or pain  CVS: normal rate, regular rhythm, distinct S1 and S2  Lungs:clear to ausculation bilaterally, no crackles, wheezing or rhonchi noted  Extremities: no edema, no cyanosis,  Skin: warm, no lesions, rashes noted  Psych:  mood and affect normal.    Results for orders placed or performed during the hospital encounter of 11/08/18   HEMOGLOBIN A1C W/O EAG   Result Value Ref Range    Hemoglobin A1c 6.1 (H) 4.2 - 5.6 %   METABOLIC PANEL, BASIC   Result Value Ref Range    Sodium 142 136 - 145 mmol/L    Potassium 3.9 3.5 - 5.5 mmol/L    Chloride 106 100 - 108 mmol/L    CO2 27 21 - 32 mmol/L    Anion gap 9 3.0 - 18 mmol/L    Glucose 122 (H) 74 - 99 mg/dL    BUN 18 7.0 - 18 MG/DL    Creatinine 0.67 0.6 - 1.3 MG/DL    BUN/Creatinine ratio 27 (H) 12 - 20      GFR est AA >60 >60 ml/min/1.73m2    GFR est non-AA >60 >60 ml/min/1.73m2    Calcium 9.1 8.5 - 10.1 MG/DL       ASSESSMENT/PLAN  Diagnoses and all orders for this visit:    Sleep difficulties  Needs better control increase trazodone dose to 200 mg  Reiterated good sleep hygiene, exercise in the am. Take medication earlier around 7 pm    Prediabetes  Improving, commended on dietary changes/wt loss  Monitoring    Essential hypertension  Controlled, cont exforge    Gastroesophageal reflux disease, esophagitis presence not specified  Stable, cont prilosec    Osteopenia of necks of both femurs  Calc frax 7.6% and 0.6%  Cont ca/vit d/wt bearing exercises  Update 2020    BMI 33   Encourage wt loss    Ff-up in 4 weeks or sooner prn    Patient/guardian understands plan of care. Patient has provided input and agrees with goals. Future labs to be discussed on next visit.

## 2018-12-17 NOTE — TELEPHONE ENCOUNTER
As discussed, double to dose to 200 mg qhs trazodone (2 tabs). She reports from last visit she should have enough to last her until next visit. If not will refill.

## 2018-12-17 NOTE — TELEPHONE ENCOUNTER
Patient identified with 2 identifiers (name and ).   Spoke with patient and she confirmed that her Trazadone is 100 mg ( she was to call office and let Dr. Young Le know)  Patient is going to see a [facility] Podiatrist. Patient advised she did not need a referral.

## 2018-12-18 NOTE — TELEPHONE ENCOUNTER
Patient identified with 2 identifiers (name and ). Patient aware she is to increase Trazadone to 200 mg (2 tabs). Patient states she does need refills.  Refill request sent to Dr. Vashti Ivan.

## 2018-12-20 RX ORDER — TRAZODONE HYDROCHLORIDE 100 MG/1
200 TABLET ORAL
Qty: 180 TAB | Refills: 0 | Status: SHIPPED | OUTPATIENT
Start: 2018-12-20 | End: 2019-01-24

## 2018-12-26 RX ORDER — AMLODIPINE AND VALSARTAN 5; 160 MG/1; MG/1
TABLET ORAL
Qty: 90 TAB | Refills: 1 | Status: SHIPPED | OUTPATIENT
Start: 2018-12-26 | End: 2019-01-10

## 2018-12-27 ENCOUNTER — HOSPITAL ENCOUNTER (OUTPATIENT)
Dept: PHYSICAL THERAPY | Age: 63
Discharge: HOME | End: 2018-12-27
Payer: MEDICARE

## 2018-12-27 PROCEDURE — 97535 SELF CARE MNGMENT TRAINING: CPT

## 2018-12-27 PROCEDURE — 97162 PT EVAL MOD COMPLEX 30 MIN: CPT

## 2018-12-27 PROCEDURE — G8987 SELF CARE CURRENT STATUS: HCPCS

## 2018-12-27 PROCEDURE — G8988 SELF CARE GOAL STATUS: HCPCS

## 2018-12-27 PROCEDURE — 97110 THERAPEUTIC EXERCISES: CPT

## 2018-12-27 NOTE — PROGRESS NOTES
PT DAILY TREATMENT NOTE/LUMBAR EVAL 10-18    Patient Name: Pearl Jeter  Date:2018  : 1955  [x]  Patient  Verified  Payor: [place] MEDICARE / Plan: [place] MEDICARE PART A & B / Product Type: Medicare /    In time:10:14am  Out time:10:53am  Total Treatment Time (min): 39  Visit #: 1 of     Medicare/BCBS Only   Total Timed Codes (min): 24 1:1 Treatment Time:  39     Treatment Area: Low back pain [M54.5]  Myalgia, other site [M79.18]  SUBJECTIVE  Pain Level (0-10 scale): 8.5/10  []constant []intermittent []improving []worsening []no change since onset    Any medication changes, allergies to medications, adverse drug reactions, diagnosis change, or new procedure performed?: [x] No    [] Yes (see summary sheet for update)  Subjective functional status/changes:   Pt reports onset of intermittent back aching/burning and spasms, intermittent burning buttock pain sometimes right vs left, and aching into her thighs and legs. She reports no specific pattern of pain provocation, though she does report she avoids moving quickly 2/2 fear of pain. No acute mechanism of pain onset known. She denies numbness & tingling, but does report that she feels like her legs almost buckle when the pain is bad. PLOF: sedentary for many years, independent with all daily activity  Limitations to PLOF: slow performance of daily activity and transfers, avoids exercise. activities 2/2 fear of back pain  Mechanism of Injury: none  Current symptoms/Complaints: see above  Previous Treatment/Compliance: diclofen, cyclobenzol per pt report, walking per MD, denies prior PT treatment  PMHx/Surgical Hx: HTN, right foot pain, otherwise unremarkable per pt report  Work Hx: see intake  Living Situation:   Pt Goals: see intake  Barriers: []pain []financial []time []transportation []other  Motivation: some reported motivation, though pt reports limited willingness to f/u reporting she would like to f/u no more than 1x/week.   Substance use: []Alcohol []Tobacco []other:   FABQ Score: []low []elevate  Cognition: A & O x 4    Other:    OBJECTIVE/EXAMINATION  Domestic Life:   Activity/Recreational Limitations:   Mobility:   Self Care:     15 min [x]Eval                  []Re-Eval     14 min Therapeutic Exercise:  [] See flow sheet : HEP creation and instruction per handout   Rationale: increase ROM to improve the patients ability to improve ease of daily activity and reduce pain    Self Care: 10 minutes pt education regarding relevant anatomy, diagnosis, prognosis, plan of care, activity modification, self modality use to facilitate pt self management. With   [] TE   [] TA   [] neuro   [] other: Patient Education: [x] Review HEP    [] Progressed/Changed HEP based on:   [] positioning   [] body mechanics   [] transfers   [] heat/ice application    [] other:      Other Objective/Functional Measures:     Physical Therapy Evaluation - Lumbar Spine ([facility])    SUBJECTIVE  Chief Complaint:    Mechanism of injury:    Symptoms:  Aggravated by:   [] Bending [] Sitting [] Standing [] Walking   [x] Moving [] Cough [] Sneeze [] Valsalva   [] AM  [] PM  Lying:  [] sup   [] pro   [] sidelying   [] Other:     Eased by:    [] Bending [] Sitting [] Standing [] Walking   [] Moving [] AM  [] PM  Lying: [] sup  [] pro  [] sidelying   [x] Other: unknown per pt     General Health:  Red Flags Indicated? [] Yes    [x] No  [] Yes [] No Recent weight change (If yes, due to dieting?  [] Yes  [] No)   [] Yes [] No Weakness in legs during walking  [] Yes [] No Unremitting pain at night  [] Yes [] No Abdominal pain or problems  [] Yes [] No Rectal bleeding  [] Yes [] No Feet more cold or painful in cold weather  [] Yes [] No Menstrual irregularities  [] Yes [] No Blood or pain with urination  [] Yes [] No Dysfunction of bowel or bladder  [] Yes [] No Recent illness within past 3 weeks (i.e, cold, flu)  [] Yes [] No Numbness/tingling in buttock/genitalia region    Past History/Treatments:     Diagnostic Tests: [] Lab work [] X-rays    [] CT [x] MRI     [] Other:  Results: MRI impression 9/27/201: \"Sagittal and axial multisequence MR images of lumbar spine were obtained.     Mild straightening of lumbar lordosis with no significant spondylolisthesis. No  compression fracture or pathologic marrow signal. Conus medullaris ends at L1-L2  with normal morphology and signal intensity.     High T2 signal lesions in the right kidney and in the left kidney, partially  imaged. Likely cysts and can follow-up with CT or ultrasound.     L1-L2: Minimal posterior disc bulge with tiny right paracentral disc protrusion. No central stenosis or significant mass effect. No foraminal stenosis.     L2-L3, L3-L4: No disc herniation or central stenosis. Mild facet and ligamentous  hypertrophy. Patent neural foramina.     L4-L5: Minimal posterior disc bulge. Facet ligamentous hypertrophy with small  bilateral facet joint effusion. Mild left posterior lateral disc protrusion. Mild effacement of left lateral recess. Mild compression of the left descending  L5 nerve root possible. Patent neural foramina with no exiting L4 root  compression.     L5-S1: Posterior disc bulge. No significant central stenosis. Facet and  ligamentous hypertrophy with no significant foraminal stenosis. Disc material  slightly contacts the right exiting L5 nerve root within the right foramen.     IMPRESSION  IMPRESSION:  1. Mild disease at L4-L5 and L5-S1 as described. 2. Presumed cysts in the kidneys can be further followed up with CT or  Ultrasound. \"    Functional Status  Prior level of function:  Present functional limitations:  What position do you sleep in?:    OBJECTIVE  Posture:  Lateral Shift: [] R    [] L     [] +  [] -  Kyphosis: [] Increased [] Decreased   []  WNL  Lordosis:  [x] Increased [] Decreased   [] WNL  Pelvic symmetry: [] WNL    [] Other:    Gait:  [x] Normal     [] Abnormal:    Active Movements: [] N/A   [] Too acute   [] Other:  ROM % AROM % PROM Comments:pain, area   Forward flexion 40-60 Fingers 6 inches from ground  Increased lower back pain and tension reported   Extension 20-30 50%  Increased \"tension\" in lower back   SB right 20-30 75%     SB left 20-30 75%     Rotation right 5-10 25% 50% Tension and pain in lower back per pt, empty endfeel 2/2 pt resistance with PROM   Rotation left 5-10 25% 50% Tension and pain in lower back per pt, empty endfeel 2/2 pt resistance with PROM, reports increased pain with left rotation compared to right     Repeated Movements   Effects on present pain: produces (DE), abolishes (A), increases (incr), decreases (decr), centralizes (C), peripheral (PH), no effect (NE)   Pre-Test Sx Flexion Repeated Flexion Extension Repeated Extension Repeated SBL Repeated SBR   Sitting          Standing  inc NE inc NE     Lying      N/A N/A   Comments:  Side Glide:  Sustained passive positioning test:    Neuro Screen [] WNL  Myotome/Dermatome/Reflexes:  Comments: 0+ bilat achilles and patellar DTRs, WNL sensation to light touch, questionable validity/accuracy of myotomal testing 2/2 breakaway weakness with all testing    Dural Mobility:  SLR Sitting: [] R    [] L    [] +    [] -  @ (degrees):           Supine: [] R    [] L    [] +    [x] -  @ (degrees):   Slump Test: [] R    [] L    [] +    [x] -  @ (degrees):   Prone Knee Bend: [] R    [] L    [] +    [x] -     Palpation  [] Min  [] Mod  [x] Severe    Location: light touch to B lumbar paraspinals from L2-S1, allodynia  [] Min  [] Mod  [] Severe    Location:  [] Min  [] Mod  [] Severe    Location:    Strength   L(0-5) R (0-5) N/T   Hip Flexion (L1,2) ~4 ~4 []   Knee Extension (L3,4) ~4+ ~4+ []   Ankle Dorsiflexion (L4) ~4 ~4 []   Great Toe Extension (L5) ~4 ~4 []   Ankle Plantarflexion (S1) ~4 ~4 []   Knee Flexion (S1,2) ~4 ~4 []   Upper Abdominals   []   Lower Abdominals   []   Paraspinals   []   Back Rotators   []   Gluteus Blair ~3 ~3 [] Other   []     Questionable validity and accuracy of MMTs 2/2 breakaway weakness/cogwheeling with strength testing, variable strength with repeat testing    Special Tests  Lumbar:  Lumb. Compression: [] Pos  [x] Neg               Lumbar Distraction:   [] Pos  [x] Neg    Quadrant:  [] Pos  [x] Neg   [] Flex  [] Ext    Sacroilliac:  Gaenslen's: [] R    [] L    [] +    [] -     Compression: [] +    [x] -     Gapping:  [] +    [x] -     Thigh Thrust: [] R    [] L    [] +    [] -     Leg Length: [] +    [] -   Position:    Crests:    ASIS:    PSIS:    Sacral Sulcus:    Mobility: Standing flex:     Sitting flex:     Supine to sit:     Prone knee bend:         Hip: Ru Katos:  [x] R    [x] L    [x] +    [] - limited hip mobility bilat, reports of hip and buttock soreness, limited ability to assess 2/2 guarding     Scour:  [] R    [] L    [] +    [] - unable to assess 2/2 guarding bilat     Piriformis: [] R    [] L    [] +    [] -  unable to assess 2/2 guarding bilat          Deficits: Sky's: [] R    [] L    [] +    [] -     Dyane Heller: [] R    [] L    [] +    [] -   unable to assess 2/2 guarding bilat     Hamstrings 90/90: -25 bilat with reports of buttock & post thigh discomfort bilat, limited assessment 2/2 guarding and active pt resistance    Gastrocsoleus (to neutral): Right: Left:       Global Muscular Weakness:  Abdominals:  Quadratus Lumborum:  Paraspinals:   Other:    Other tests/comments:  Conversational void of signs of distress correlating with high pain reports, though vocal regarding tension and pain during some attempted assessment    SKC/hip flexion PROM: grossly > 100, unable to assess end range 2/2 active resistance and guarding with reports of \"tension\" in buttocks and back, empty end feel bilat    Somewhat limited evaluation 2/2 pt guarding and intermittent active resistance    WNL gait mechanics, normal stair negotiation    Pain Level (0-10 scale) post treatment: 9/10    ASSESSMENT/Changes in Function: Per POC.    Patient will continue to benefit from skilled PT services to modify and progress therapeutic interventions, address functional mobility deficits, address ROM deficits, address strength deficits, analyze and address soft tissue restrictions, analyze and cue movement patterns, analyze and modify body mechanics/ergonomics and instruct in home and community integration to attain remaining goals. [x]  See Plan of Care  []  See progress note/recertification  []  See Discharge Summary         Progress towards goals / Updated goals:  Per POC. PLAN  []  Upgrade activities as tolerated     [x]  Continue plan of care  []  Update interventions per flow sheet       []  Discharge due to:_  [x]  Other:_Aquatics trial with emphasis on trunk mobility, progress to land PRN. Advised 2x/week to pt, however, she declined reporting she does not want to do more than 1x/week at this time.       Minnie Larios, PT, DPT, ATC 12/27/2018  10:12 AM

## 2018-12-27 NOTE — PROGRESS NOTES
[facility]  [address]  [place], [address]  [phone] fax    Plan of Care/ Statement of Necessity for Physical Therapy Services    Patient name: Ignacio Morrison Start of Care: 2018   Referral source: Mikel Dumont NP : 1955    Medical Diagnosis: Low back pain [M54.5]  Myalgia, other site [M79.18]  Payor: Bi Lemon / Plan: [address] / Product Type: Medicare /  Onset Date:2-3 years    Treatment Diagnosis: mechanical LBP   Prior Hospitalization: see medical history Provider#: [id number]   Medications: Verified on Patient summary List    Comorbidities: HTN, right foot pain, otherwise unremarkable per pt report   Prior Level of Function: sedentary for many years, independent with all daily activity     The Plan of Care and following information is based on the information from the initial evaluation. Assessment/ key information: Pt is a 70year old female who reports onset of intermittent back aching/burning and spasms, intermittent burning buttock pain sometimes right vs left, and aching into her thighs and legs. She reports no specific pattern of pain provocation, though she does report she avoids moving quickly 2/2 fear of pain. No acute mechanism of pain onset known. She denies numbness & tingling, but does report that she feels like her legs almost buckle when the pain is bad. Signs and symptoms appear consistent with mechanically mediated LBP void of indications of neurological involvement, though exam was admittedly limited 2/2 intermittent pt guarding and active resistance, empty end feels 2/2 guarding, and breakaway weakness with strength testing. She presents with impairments consisting of global limitations in trunk mobility with provocation of back pain in multiple planes, limited hip and LE ROM/flexibility, and fear avoidance of activity.  Pt will benefit from trial of skilled PT to begin with aquatic PT interventions to address her impairments, however, f/u may be limited 2/2 pt's desire to limit f/u to no more than 1x/week, despite advisement otherwise. Evaluation Complexity History MEDIUM  Complexity : 1-2 comorbidities / personal factors will impact the outcome/ POC ; Examination MEDIUM Complexity : 3 Standardized tests and measures addressing body structure, function, activity limitation and / or participation in recreation  ;Presentation MEDIUM Complexity : Evolving with changing characteristics  ; Clinical Decision Making MEDIUM Complexity : FOTO score of 26-74  Overall Complexity Rating: MEDIUM  Problem List: pain affecting function, decrease ROM, decrease strength, decrease ADL/ functional abilitiies, decrease activity tolerance and decrease flexibility/ joint mobility   Treatment Plan may include any combination of the following: Therapeutic exercise, Therapeutic activities, Neuromuscular re-education, Physical agent/modality, Gait/balance training, Manual therapy, Aquatic therapy, Patient education and Self Care training  Patient / Family readiness to learn indicated by: asking questions  Persons(s) to be included in education: patient (P)  Barriers to Learning/Limitations: None  Patient Goal (s): Getting better.   Patient Self Reported Health Status: fair  Rehabilitation Potential: fair    Short term goals to be accomplished within 2 weeks:  1. The patient will be independent and compliant with HEP to maximize therapeutic benefit. 2. The patient will improve trunk rotation to 50% or better bilat void of reports of pain provocation. Long term goals: to be accomplished within 4 weeks:  1. The patient will improve FOTO score to anticipated outcome to maximize quality of life. 2. The patient will improve hip ABD/EXT strength bilaterally to 4/5 MMT to maximize stability in stance for improved efficiency of house work.   3. The patient will demonstrate effective squat form and adequate lifting technique for improved ease of chore production around home. 4. The patient will demonstrate trunk flexion fingers to toes to improve trunk mobility and ease of daily activity. Frequency / Duration: Patient to be seen 2 times per week for 8 weeks. (f/u initially 1x/week per pt request)    G-Codes (GP)  Self Care   Current  CL= 60-79%   Goal  CK= 40-59%    The severity rating is based on clinical judgment and the FOTO score. Certification Period: 12/27/2018 - 2/27/2018    Patient/ Caregiver education and instruction: Diagnosis, prognosis, self care, activity modification and exercises   [x]  Plan of care has been reviewed with WON Felton, PT, DPT, ATC 12/27/2018 11:56 AM    ________________________________________________________________________    I certify that the above Therapy Services are being furnished while the patient is under my care. I agree with the treatment plan and certify that this therapy is necessary.     Physician's Signature:____________Date:_________TIME:________    ** Signature, Date and Time must be completed for valid certification **    Please sign and return to [facility] [address]  [address]  [place], [address]  [phone] fax

## 2019-01-03 ENCOUNTER — HOSPITAL ENCOUNTER (OUTPATIENT)
Dept: PHYSICAL THERAPY | Age: 64
End: 2019-01-03
Payer: MEDICARE

## 2019-01-04 ENCOUNTER — HOSPITAL ENCOUNTER (OUTPATIENT)
Dept: PHYSICAL THERAPY | Age: 64
Discharge: HOME | End: 2019-01-04
Payer: MEDICARE

## 2019-01-04 PROCEDURE — 97113 AQUATIC THERAPY/EXERCISES: CPT

## 2019-01-04 NOTE — PROGRESS NOTES
PT DAILY TREATMENT NOTE 10-18 Patient Name: Janel Orantes Date:2019 : 1955 [x]  Patient  Verified Payor: [place] MEDICARE / Plan: Anneliese Baker / Product Type: Medicare / In time:9:44am  Out time:10:25am 
Total Treatment Time (min): 41 Visit #: 2 of  Medicare/BCBS Only Total Timed Codes (min):  41 1:1 Treatment Time:  41  
 
 
Treatment Area: Low back pain [M54.5] Myalgia, other site [M79.18] SUBJECTIVE Pain Level (0-10 scale): 8/10 Any medication changes, allergies to medications, adverse drug reactions, diagnosis change, or new procedure performed?: [x] No    [] Yes (see summary sheet for update) Subjective functional status/changes:   [] No changes reported Pt states, \"I've had back pain for years and I asked my doctor to send me to physical therapy\". She states that other than pain medication and doing \"exercises at home\", she has had no other treatment for her back. OBJECTIVE 41 min Therapeutic Exercise:  [x] See flow sheet : Aquatic therapy Rationale: increase ROM and increase strength to improve the patients ability to perform ADLs and functional mobility tasks with improved ease and decreased pain. With 
 [] TE 
 [] TA 
 [] neuro 
 [] other: Patient Education: [x] Review HEP [] Progressed/Changed HEP based on:  
[] positioning   [] body mechanics   [] transfers   [] heat/ice application   
[] other:   
 
Other Objective/Functional Measures: Initiated aquatic exercises (first f/u visit). Pain Level (0-10 scale) post treatment: 7.7/10 ASSESSMENT/Changes in Function: Pt was unable to perform hip flexor stretch as she was unable to maintain proper form. Max VCing throughout all exercises to maintain proper technique and to maintain abdominal bracing.   Pt voiced only wanting to come to PT once/week as she felt that more often than that would be \"too much\"; advised pt that twice/week would be more beneficial, but that it was her decision. Patient will continue to benefit from skilled PT services to modify and progress therapeutic interventions, address functional mobility deficits, address ROM deficits, address strength deficits, analyze and cue movement patterns, analyze and modify body mechanics/ergonomics and assess and modify postural abnormalities to attain remaining goals. []  See Plan of Care 
[]  See progress note/recertification 
[]  See Discharge Summary Progress towards goals / Updated goals: 
Short term goals: to be accomplished within 2 weeks: 1. The patient will be independent and compliant with HEP to maximize therapeutic benefit. Progressing: Pt reports performing 3 of the 4 home exercises 1-2 times per day. (1/4/19). 2. The patient will improve trunk rotation to 50% or better bilat void of reports of pain provocation. Long term goals: to be accomplished within 4 weeks: 1. The patient will improve FOTO score to anticipated outcome to maximize quality of life. 2. The patient will improve hip ABD/EXT strength bilaterally to 4/5 MMT to maximize stability in stance for improved efficiency of house work. 3. The patient will demonstrate effective squat form and adequate lifting technique for improved ease of chore production around home. 4. The patient will demonstrate trunk flexion fingers to toes to improve trunk mobility and ease of daily activity. PLAN 
[]  Upgrade activities as tolerated     [x]  Continue plan of care 
[]  Update interventions per flow sheet      
[]  Discharge due to:_ 
[]  Other:_   
 
Drew Blake PT 1/4/2019  9:52 AM 
 
Future Appointments Date Time Provider Karol Martin 1/10/2019  9:45 AM Mariola Thompson PT [facility]  
1/17/2019  9:45 AM [facility]  
1/22/2019  9:45 AM Mariola Thompson PT [facility]  
1/24/2019 10:30 AM Jerzy Wilhelm MD [facility]  
 1/31/2019  9:45 AM Ann Curtis, PT [facility]  
6/18/2019  8:30 AM Jimmy Swain,  [address]

## 2019-01-10 ENCOUNTER — HOSPITAL ENCOUNTER (OUTPATIENT)
Dept: PHYSICAL THERAPY | Age: 64
Discharge: HOME | End: 2019-01-10
Payer: MEDICARE

## 2019-01-10 ENCOUNTER — TELEPHONE (OUTPATIENT)
Dept: FAMILY MEDICINE CLINIC | Age: 64
End: 2019-01-10

## 2019-01-10 PROCEDURE — 97113 AQUATIC THERAPY/EXERCISES: CPT

## 2019-01-10 RX ORDER — LOSARTAN POTASSIUM 50 MG/1
50 TABLET ORAL DAILY
Qty: 90 TAB | Refills: 0 | Status: SHIPPED | OUTPATIENT
Start: 2019-01-10 | End: 2019-05-29 | Stop reason: SDUPTHER

## 2019-01-10 RX ORDER — AMLODIPINE BESYLATE 5 MG/1
5 TABLET ORAL DAILY
Qty: 90 TAB | Refills: 0 | Status: SHIPPED | OUTPATIENT
Start: 2019-01-10 | End: 2019-05-28 | Stop reason: SDUPTHER

## 2019-01-10 NOTE — PROGRESS NOTES
PT DAILY TREATMENT NOTE 10-18 Patient Name: Milton Hoyos Date:1/10/2019 : 1955 [x]  Patient  Verified Payor: [place] MEDICARE / Plan: Kenyetta Roberson / Product Type: Medicare / In time:9:54am  Out time:10:32am 
Total Treatment Time (min): 38 Visit #: 3 of  Medicare/BCBS Only Total Timed Codes (min):  38 1:1 Treatment Time:  45 Treatment Area: Low back pain [M54.5] Myalgia, other site [M79.18] SUBJECTIVE Pain Level (0-10 scale): 7.5/10 Any medication changes, allergies to medications, adverse drug reactions, diagnosis change, or new procedure performed?: [x] No    [] Yes (see summary sheet for update) Subjective functional status/changes:   [] No changes reported Pt states that her back pain is a \"7.5\" today and states, \"that's the norm for me\". Pt reports feeling \"less tight\" after last session, upon returning home. OBJECTIVE 38 min Therapeutic Exercise:  [x] See flow sheet : Aquatic therapy Rationale: increase ROM and increase strength to improve the patients ability to perform ADLs and functional mobility tasks with improved ease and decreased pain. With 
 [] TE 
 [] TA 
 [] neuro 
 [] other: Patient Education: [x] Review HEP [] Progressed/Changed HEP based on:  
[] positioning   [] body mechanics   [] transfers   [] heat/ice application   
[] other:   
 
Other Objective/Functional Measures: Increased squats to 12 reps and added B hip flexor stretch. Pain Level (0-10 scale) post treatment: 7.5/10 ASSESSMENT/Changes in Function: Pt reported pain level as \"the same\" as prior to treatment, yet reported feeling \"better\". Discussed Post-Rehab program for self-maintenance following discharge from PT and pt voiced interest.  Gave pt handout with program information.    
 
Patient will continue to benefit from skilled PT services to modify and progress therapeutic interventions, address functional mobility deficits, address ROM deficits, address strength deficits, analyze and cue movement patterns, analyze and modify body mechanics/ergonomics and assess and modify postural abnormalities to attain remaining goals. []  See Plan of Care 
[]  See progress note/recertification 
[]  See Discharge Summary Progress towards goals / Updated goals: 
Short term goals: to be accomplished within 2 weeks: 1. The patient will be independent and compliant with HEP to maximize therapeutic benefit. Progressing: Pt reports performing 3 of the 4 home exercises 1-2 times per day. (1/4/19). Goal met: Pt reports compliance 3x per day performing all 4 home exercises. (1/10/19). 2. The patient will improve trunk rotation to 50% or better bilat void of reports of pain provocation. Long term goals: to be accomplished within 4 weeks: 1. The patient will improve FOTO score to anticipated outcome to maximize quality of life. 2. The patient will improve hip ABD/EXT strength bilaterally to 4/5 MMT to maximize stability in stance for improved efficiency of house work. 3. The patient will demonstrate effective squat form and adequate lifting technique for improved ease of chore production around home. 4. The patient will demonstrate trunk flexion fingers to toes to improve trunk mobility and ease of daily activity. 
  
 
 
 
PLAN [x]  Upgrade activities as tolerated     [x]  Continue plan of care 
[]  Update interventions per flow sheet      
[]  Discharge due to:_ 
[]  Other:_   
 
Regi Rodriguez, PT 1/10/2019  9:59 AM 
 
Future Appointments Date Time Provider Karol Martin 1/17/2019  9:45 AM [facility]  
1/22/2019  9:45 AM Jesus Mann, PT [facility]  
1/24/2019 10:30 AM Aman Wood MD [facility] SCHED  
1/31/2019  9:45 AM Jesus Mann, PT [facility]  
6/18/2019  8:30 AM Abner Marc  [address]

## 2019-01-10 NOTE — TELEPHONE ENCOUNTER
Pt states that her BP medication Amlodipine has been recalled stating that it causes cancer. Please advise on what pt needs to do now.  Thank you

## 2019-01-11 NOTE — TELEPHONE ENCOUNTER
Patient identified with 2 identifiers (name and ). Spoke with patient and she states that her medication has not been recalled. She will cancel the order for the medications sent to [facility] and continue her Exforge as prescribed. Spoke with [facility] and cancelled the order for losartan and Norvasc.

## 2019-01-15 ENCOUNTER — HOSPITAL ENCOUNTER (OUTPATIENT)
Dept: PHYSICAL THERAPY | Age: 64
Discharge: HOME | End: 2019-01-15
Payer: MEDICARE

## 2019-01-15 PROCEDURE — 97113 AQUATIC THERAPY/EXERCISES: CPT

## 2019-01-15 NOTE — PROGRESS NOTES
PT DAILY TREATMENT NOTE 10-18 Patient Name: Lizy Germain Date:1/15/2019 : 1955 [x]  Patient  Verified Payor: [place] MED[place]ARE / Plan: Kenyetta Roberson / Product Type: Medicare / In time:9:45am  Out time:10:28am 
Total Treatment Time (min): 43 Visit #: 4 of - Medicare/BCBS Only Total Timed Codes (min):  43 1:1 Treatment Time:  37 Treatment Area: Low back pain [M54.5] Myalgia, other site [M79.18] SUBJECTIVE Pain Level (0-10 scale): 7.5/10 Any medication changes, allergies to medications, adverse drug reactions, diagnosis change, or new procedure performed?: [x] No    [] Yes (see summary sheet for update) Subjective functional status/changes:   [] No changes reported Pt states, \"I feel about the same\". OBJECTIVE 43 min Therapeutic Exercise:  [x] See flow sheet : Aquatic therapy Rationale: increase ROM and increase strength to improve the patients ability to perform ADLs and functional mobility tasks with improved ease and decreased pain. With 
 [] TE 
 [] TA 
 [] neuro 
 [] other: Patient Education: [x] Review HEP [] Progressed/Changed HEP based on:  
[] positioning   [] body mechanics   [] transfers   [] heat/ice application   
[] other:   
 
Other Objective/Functional Measures: Increased toe/heel raises to 15 reps, B hip flex/ext/abd, and HS curls to 12 reps each. Added step ups x 5 and SKTC x 10. Pain Level (0-10 scale) post treatment: 7.5/10 ASSESSMENT/Changes in Function: Pt had no difficulty with added exercises but reported no change in pain level following treatment. Patient will continue to benefit from skilled PT services to modify and progress therapeutic interventions, address functional mobility deficits, address ROM deficits, address strength deficits, analyze and cue movement patterns, analyze and modify body mechanics/ergonomics and assess and modify postural abnormalities to attain remaining goals. []  See Plan of Care 
[]  See progress note/recertification 
[]  See Discharge Summary Progress towards goals / Updated goals: 
Short term goals: to be accomplished within 2 weeks: 1. The patient will be independent and compliant with HEP to maximize therapeutic benefit. Progressing: Pt reports performing 3 of the 4 home exercises 1-2 times per day. (1/4/19).   Goal met: Pt reports compliance 3x per day performing all 4 home exercises. (1/10/19). 2. The patient will improve trunk rotation to 50% or better bilat void of reports of pain provocation. Long term goals: to be accomplished within 4 weeks: 1. The patient will improve FOTO score to anticipated outcome to maximize quality of life. 2. The patient will improve hip ABD/EXT strength bilaterally to 4/5 MMT to maximize stability in stance for improved efficiency of house work. 3. The patient will demonstrate effective squat form and adequate lifting technique for improved ease of chore production around home. 4. The patient will demonstrate trunk flexion fingers to toes to improve trunk mobility and ease of daily activity. 
  
 
 
 
PLAN 
[]  Upgrade activities as tolerated     [x]  Continue plan of care 
[]  Update interventions per flow sheet      
[]  Discharge due to:_ 
[]  Other:_   
 
Magdalena Marti, PT 1/15/2019  9:52 AM 
 
Future Appointments Date Time Provider Karol Martin 1/17/2019  9:45 AM [facility]  
1/22/2019  9:45 AM [facility]  
1/24/2019 10:30 AM Estuardo Khalil MD [facility] SCH[facility]  
1/31/2019  9:45 AM [facility]  
6/18/2019  8:30 AM Erroll Schlatter,  [address]

## 2019-01-16 ENCOUNTER — OFFICE VISIT (OUTPATIENT)
Dept: ORTHOPEDIC SURGERY | Age: 64
End: 2019-01-16

## 2019-01-16 VITALS
HEIGHT: 63 IN | RESPIRATION RATE: 16 BRPM | BODY MASS INDEX: 33.88 KG/M2 | WEIGHT: 191.2 LBS | TEMPERATURE: 98 F | HEART RATE: 70 BPM | DIASTOLIC BLOOD PRESSURE: 70 MMHG | OXYGEN SATURATION: 98 % | SYSTOLIC BLOOD PRESSURE: 109 MMHG

## 2019-01-16 DIAGNOSIS — M25.571 ACUTE RIGHT ANKLE PAIN: ICD-10-CM

## 2019-01-16 DIAGNOSIS — M79.671 RIGHT FOOT PAIN: ICD-10-CM

## 2019-01-16 DIAGNOSIS — M76.61 ACHILLES TENDINITIS OF RIGHT LOWER EXTREMITY: Primary | ICD-10-CM

## 2019-01-16 DIAGNOSIS — M79.672 LEFT FOOT PAIN: ICD-10-CM

## 2019-01-16 DIAGNOSIS — M25.572 ACUTE LEFT ANKLE PAIN: ICD-10-CM

## 2019-01-16 NOTE — PROGRESS NOTES
AMBULATORY PROGRESS NOTE Patient: Estefany Beal             MRN: [id number]     SSN: [id number] Body mass index is 33.87 kg/m². YOB: 1955     AGE: 70 y.o. EX: female PCP: Jany Mcgill MD 
 
 IMPRESSION/DIAGNOSIS AND TREATMENT PLAN  
 
DIAGNOSES 1. Achilles tendinitis of right lower extremity 2. Right foot pain 3. Acute right ankle pain 4. Left foot pain 5. Acute left ankle pain Orders Placed This Encounter  AMB SUPPLY ORDER  
 [33055] Foot Min 3V  [91427] Ankle 2V  [61403] Foot Min 3V  [18473] Ankle 2V Estefany Beal understands her diagnoses and the proposed plan. This patient has right insertional Achilles tendinitis. DIAGNOSTIC STUDIES:  Her x-rays, today, three views of the right foot, show a small prominence of bone to the posterior superior calcaneal tuberosity. There is no fracture, subluxation, or dislocation. No osteolytic or osteoblastic lesions are seen on these three views of the right foot. 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 Plan is listed as below. Plan: 
 
1) DME Order: Tuli's heel cups (LMS). 2) KIN - Sergey Reich ATC, has educated the patient on Achilles tendon specific exercises and/or stretches. 3) Take OTC NSAIDs as needed for pain. 4) Continue activity modification as directed. 5) Anticipate CAM walker boot if condition does not improve. RTO - 4 weeks HPI AND EXAMINATION Estefany Beal IS A 70 y.o. female who presents to my outpatient office complaining of right foot pain. Ms. Juan Luis Jeffries states that she has been experiencing pain along her right Achilles tendon for the past 3 months. The patient denies any recent falls, twists, or trauma.  She states that she experiences the most pain in the evenings, especially when she stands after sitting for prolonged periods of time. She reports that her right foot will be sore and stiff after sitting for some time. XR imaging has been reviewed with the patient. Of note, the patient presents with shoes that have reinforced arches, and she notes that these have helped greatly with her pain. She has been wearing these shoes for the past month. As it regards to her left foot, Ms. Damian Abernathy states that she had surgery on her left foot some time ago. She notes that her left foot is beginning to become sore as well, however, she believes this may be from putting additional weight on the LLE to accommodate for her RLE pain. Visit Vitals /70 Pulse 70 Temp 98 °F (36.7 °C) (Oral) Resp 16 Ht 5' 3\" (1.6 m) Wt 191 lb 3.2 oz (86.7 kg) SpO2 98% BMI 33.87 kg/m² ANKLE/FOOT right Psychiatry: Alert, Oriented x 3; Speech normal in context and clarity,  
         Memory intact grossly, no involuntary movements - tremors, no dementia Gait: Normal 
Tenderness: Mild tenderness to the Achilles tendon. NT to the posterior tibial tendon. Cutaneous: Fullness on the distal Achilles tendon. Joint Motion: WNL. Joint / Tendon Stability: No Ankle or Subtalar instability or joint laxity. No peroneal sublux ability or dislocation Alignment: Moderate pes planus, bilaterally Neuro Motor/Sensory: NL/NL. Vascular: NL foot/ankle pulses. Lymphatics: No extremity lymphedema, No calf swelling, no tenderness to calf muscles. CHART REVIEW Past Medical History:  
Diagnosis Date  Eosinophilic esophagitis   
 started on flvent, dr. Oleg Alamo  Gastric ulcer 04/2017  
 dr Oleg Alamo, avoid nsaids  Gastritis 12/13/2017  
 gastritis, cont PPI dr. Oleg Alamo  GERD (gastroesophageal reflux disease)   
 erosive esophagitis 11/13 EGD  H/O colonoscopy 11/2013  
 normal  
 Hypertension  Left knee pain  Morbid obesity ([address]  Precordial pain  Prediabetes Current Outpatient Medications Medication Sig  
 amLODIPine (NORVASC) 5 mg tablet Take 1 Tab by mouth daily.  losartan (COZAAR) 50 mg tablet Take 1 Tab by mouth daily.  traZODone (DESYREL) 100 mg tablet Take 2 Tabs by mouth nightly.  diclofenac EC (VOLTAREN) 75 mg EC tablet Take 1 Tab by mouth two (2) times daily as needed.  cyclobenzaprine (FLEXERIL) 5 mg tablet TAKE ONE TABLET BY MOUTH NIGHTLY AS NEEDED FOR MUSCLE SPASM(S)  omeprazole (PRILOSEC) 20 mg capsule Take 1 Cap by mouth daily.  cetirizine HCl (ZYRTEC PO) Take  by mouth.  fluticasone (FLONASE) 50 mcg/actuation nasal spray 2 sprays each nostril once daily  triamcinolone acetonide (KENALOG) 0.025 % ointment Apply  to affected area two (2) times a day. use thin layer  LORATADINE/PSEUDOEPHEDRINE (CLARITIN-D 24 HOUR PO) Take  by mouth.  omega-3 fatty acids-vitamin e (FISH OIL) 1,000 mg cap Take 1 Cap by mouth.  multivitamin (ONE A DAY) tablet Take 1 Tab by mouth daily. No current facility-administered medications for this visit. Allergies Allergen Reactions  Pcn [Penicillins] Hives Past Surgical History:  
Procedure Laterality Date  HX CATARACT REMOVAL Right 10/04/2016  HX COLONOSCOPY  11/06/2016  HX GYN    
 vaginal delivery x 2  
 HX ORTHOPAEDIC    
 foot surgery Social History Occupational History  Not on file Tobacco Use  Smoking status: Never Smoker  Smokeless tobacco: Never Used Substance and Sexual Activity  Alcohol use: No  
 Drug use: No  
 Sexual activity: Not on file Family History Problem Relation Age of Onset  Heart Failure Mother  Heart Disease Mother   
     mi 52's  Asthma Other REVIEW OF SYSTEMS : 1/16/2019  ALL BELOW ARE Negative except : SEE HPI Constitutional: Negative for fever, chills and weight loss. Neg Weight Loss Cardiovascular: Negative for chest pain, claudication and leg swelling.  SOB, ROOT  
 Gastrointestinal/Urological: Negative for  pain, N/V/D/C, Blood in stool or urine,dysuria                         Hematuria, Incontinence, pelvic pain Musculoskeletal: see HPI. Neurological: Negative for dizziness and weakness, headaches,Visual Changes             Confusion,  Or Seizures, Psychiatric/Behavioral: Negative for depression, memory loss and substance abuse. Extremities:  Negative for hair changes, rash or skin lesion changes. Hematologic: Negative for Bleeding problems, bruising, pallor or swollen lymph nodes. Peripheral Vascular: No calf pain, vascular vein tenderness to calf pain No calf throbbing, posterior knee throbbing pain DIAGNOSTIC IMAGING No notes on file Please see above section of this report. I have personally reviewed the results of the above study. The interpretation of this study is my professional opinion. Written by Lynsey Rodriguez, as dictated by Dr. Hakeem Lawson. I, Dr. Hakeem Lawson, confirm that all documentation is accurate.

## 2019-01-16 NOTE — PATIENT INSTRUCTIONS
Please follow up in 4 weeks. You are advised to contact us if your condition worsens. You have been provided with an order for durable medical equipment that you may  at an outside facility as our office does not carry the equipment you need. You may pick it up at any medical supply company you like. Listed below are a few different locations for your convenience: [facility] [address] [place] [address] [address] Phone: [phone] 
  
   
Achilles Tendinitis: Exercises Complete at least 2 sessions of each exercise each day to get started. If beneficial and able to fit into your schedule, more sessions are recommended. Toe stretch 1. Sit in a chair, and extend your affected leg so that your heel is on the floor. 2. With your hand, reach down and pull your big toe up and back. Pull toward your ankle and away from the floor. 3. Hold the position for at least 20 to 30 seconds. 4. Repeat 3 times a session. Towel stretch (calf) 1. Sit with your legs extended and knees straight. 2. Place a towel around your foot just under the toes. 3. Hold each end of the towel in each hand, with your hands above your knees. 4. Pull back with the towel so that your foot stretches toward you. 5. Hold the position for at least 20 to 30 seconds. 6. Repeat 3 times a session. 7. When 3 sets are completed, slightly bend the knee by placing a towel or pillow under it and completing 3 more sets per leg. Wall stretch (calf) 1. Stand about 2 feet from a wall, and place your hands on the wall at about shoulder height. Or you can stand behind a chair, placing your hands on the back of it for balance. 2. Step back with the leg you want to stretch. Keep the leg straight, and press your heel into the floor with your toe turned slightly in. 
3. Lean forward, and bend your other leg slightly. Feel the stretch in the Achilles tendon of your back leg. Hold for at least 20 to 30 seconds. Repeat with back knee slightly bent. 4. Repeat 3 times a session. Stair stretch 1. Stand with the balls of both feet on the edge of a step or curb. With at least one hand, hold onto something solid for balance, such as a banister or handrail. 2. Keeping your knees straight, slowly let the heels hang down off of the step or curb until you feel a stretch in the back of your calf and/or Achilles area. 3. Hold this position for at least 20 to 30 seconds. 4. Repeat 3 times a session. This stretch should also be repeated with your knees slightly bent. If too easy, progress to one leg at a time. Heel raises (eccentric emphasis) 1. Stand on a step with your heel off the edge of the step. Hold on to a handrail or wall for balance. 2. Push up on your toes, then slowly count to 10 as you lower yourself back down until your heel is below the step. If it hurts to push up on your toes, try putting most of your weight on your other foot as you push up, or try using your arms to help you. If you can't do this exercise without causing pain, stop the exercise. 3. Repeat 10 times. Repeat exercise with the knee slightly bent. If too easy, progress to one leg at a time. Ice Massage (pain relief) Fill small paper cup with water and freeze until needed. When frozen and ready for use, tear off top half inch of cup, exposing the ice. Place ice directly on your heel cord (or wherever you have pain). Move the ice in a circular motion for up to 5 minutes (no more). Use towels to catch the water drippings. You should feel 4 stages of sensations: 
1. Uncomfortable sensation of cold, then 2. Stinging, then 3. Burning or aching feeling, then 4. Numbness If the pain is too great to handle, lift it off your skin for a few seconds, dab with towel and then place it back on for a few circular motions and repeat.

## 2019-01-17 ENCOUNTER — HOSPITAL ENCOUNTER (OUTPATIENT)
Dept: PHYSICAL THERAPY | Age: 64
Discharge: HOME | End: 2019-01-17
Payer: MEDICARE

## 2019-01-17 PROCEDURE — 97113 AQUATIC THERAPY/EXERCISES: CPT

## 2019-01-17 NOTE — PROGRESS NOTES
PT DAILY TREATMENT NOTE 10-18 Patient Name: Juan José Goldstein Date:2019 : 1955 [x]  Patient  Verified Payor: [place] MEDICARE / Plan: Kenyteta Mendosa y / Product Type: Medicare / In time:9:46am  Out time:10:28am 
Total Treatment Time (min): 42 Visit #: 5 of  Medicare/BCBS Only Total Timed Codes (min):  42 1:1 Treatment Time:  42 Treatment Area: Low back pain [M54.5] Myalgia, other site [M79.18] SUBJECTIVE Pain Level (0-10 scale): 7.510 Any medication changes, allergies to medications, adverse drug reactions, diagnosis change, or new procedure performed?: [x] No    [] Yes (see summary sheet for update) Subjective functional status/changes:   [] No changes reported \"I was having muscle spasms in my back and my left thigh this morning, so I had to take the muscle spasm medicine before coming here\". Pt also states, \"when I woke up this morning it felt like I had extra weight on my back\". She reports no change in her back symptoms since beginning PT. She states, \"I may have to get those injections in my back but I really don't want to\". OBJECTIVE 42 min Therapeutic Exercise:  [x] See flow sheet : Aquatic therapy Rationale: increase ROM and increase strength to improve the patients ability to perform ADLs and functional mobility tasks with improved ease and decreased pain. With 
 [] TE 
 [] TA 
 [] neuro 
 [] other: Patient Education: [x] Review HEP [] Progressed/Changed HEP based on:  
[] positioning   [] body mechanics   [] transfers   [] heat/ice application   
[] other:   
 
Other Objective/Functional Measures: Increased squats and HS curls to 15 reps each. Pain Level (0-10 scale) post treatment: 7.5/10 ASSESSMENT/Changes in Function: Pt reports no increase in pain during aquatic exercises and states that the water exercises \"feel relaxing\"  But reports that after leaving the pool, her back pain \"is the same\". Patient will continue to benefit from skilled PT services to modify and progress therapeutic interventions, address functional mobility deficits, address ROM deficits, address strength deficits, analyze and cue movement patterns, analyze and modify body mechanics/ergonomics and assess and modify postural abnormalities to attain remaining goals. []  See Plan of Care 
[]  See progress note/recertification 
[]  See Discharge Summary Progress towards goals / Updated goals: 
Short term goals: to be accomplished within 2 weeks: 1. The patient will be independent and compliant with HEP to maximize therapeutic benefit. Progressing: Pt reports performing 3 of the 4 home exercises 1-2 times per day. (1/4/19).   Goal met: Pt reports compliance 3x per day performing all 4 home exercises. (1/10/19).    
2. The patient will improve trunk rotation to 50% or better bilat void of reports of pain provocation. Pt continues to c/o pain/\"pulling\" in her low back with trunk rotation. (1/17/19). Long term goals: to be accomplished within 4 weeks: 1. The patient will improve FOTO score to anticipated outcome to maximize quality of life. 2. The patient will improve hip ABD/EXT strength bilaterally to 4/5 MMT to maximize stability in stance for improved efficiency of house work. 3. The patient will demonstrate effective squat form and adequate lifting technique for improved ease of chore production around home. 4. The patient will demonstrate trunk flexion fingers to toes to improve trunk mobility and ease of daily activity. 
  
 
 
 
PLAN 
[]  Upgrade activities as tolerated     [x]  Continue plan of care  
[]  Update interventions per flow sheet      
[]  Discharge due to:_ 
[]  Other:_   
 
Do Pires PT 1/17/2019  9:48 AM 
 
Future Appointments Date Time Provider Karol Martin 1/22/2019  9:45 AM Ammon Overton, PT [facility]  
1/24/2019 10:30 AM Genaro Liang MD [address]  
 1/31/2019  9:45 AM Greta Bledsoe, PT [facility]  
2/13/2019  9:10 AM Caines, Mikle Ahumada, MD [place] [id number]  
6/18/2019  8:30 AM Acacia Torres,  [address]

## 2019-01-22 ENCOUNTER — HOSPITAL ENCOUNTER (OUTPATIENT)
Dept: PHYSICAL THERAPY | Age: 64
Discharge: HOME | End: 2019-01-22
Payer: MEDICARE

## 2019-01-22 PROCEDURE — 97113 AQUATIC THERAPY/EXERCISES: CPT

## 2019-01-22 NOTE — PROGRESS NOTES
PT DAILY TREATMENT NOTE 10-18 Patient Name: Sonam Cruz Date:2019 : 1955 [x]  Patient  Verified Payor: [place] MEDICARE / Plan: Kenyetta Mendosa mir / Product Type: Medicare / In time:9:50am  Out time:10:32am 
Total Treatment Time (min): 42 Visit #: 6 of 12 Medicare/BCBS Only Total Timed Codes (min):  42 1:1 Treatment Time:  42 Treatment Area: Low back pain [M54.5] Myalgia, other site [M79.18] SUBJECTIVE Pain Level (0-10 scale): 8/10 Any medication changes, allergies to medications, adverse drug reactions, diagnosis change, or new procedure performed?: [x] No    [] Yes (see summary sheet for update) Subjective functional status/changes:   [] No changes reported \"It's the same. It's always achy and sore\". Pt states that being in the pool helps \"relieve the tension, but when I go home I feel no different\". OBJECTIVE 42 min Therapeutic Exercise:  [x] See flow sheet : Aquatic therapy Rationale: increase ROM and increase strength to improve the patients ability to perform ADLs and functional mobility tasks with improved ease and decreased pain. With 
 [] TE 
 [] TA 
 [] neuro 
 [] other: Patient Education: [x] Review HEP [] Progressed/Changed HEP based on:  
[] positioning   [] body mechanics   [] transfers   [] heat/ice application   
[] other:   
 
Other Objective/Functional Measures: Increased step ups to 10 reps and B hip flex/ext/abd to 15 reps each. Pain Level (0-10 scale) post treatment: 8/10 ASSESSMENT/Changes in Function: Pt reports decreased \"stiffness\" and improved general mobility while in the water and during water exercises, but reports no carryover when home and during functional activities.    
 
Patient will continue to benefit from skilled PT services to modify and progress therapeutic interventions, address functional mobility deficits, address ROM deficits, address strength deficits, analyze and cue movement patterns, analyze and modify body mechanics/ergonomics and assess and modify postural abnormalities to attain remaining goals. []  See Plan of Care 
[]  See progress note/recertification 
[]  See Discharge Summary Progress towards goals / Updated goals: 
Short term goals: to be accomplished within 2 weeks: 1. The patient will be independent and compliant with HEP to maximize therapeutic benefit. Progressing: Pt reports performing 3 of the 4 home exercises 1-2 times per day. (1/4/19).   Goal met: Pt reports compliance 3x per day performing all 4 home exercises. (1/10/19).    
2. The patient will improve trunk rotation to 50% or better bilat void of reports of pain provocation. Pt continues to c/o pain/\"pulling\" in her low back with trunk rotation. (1/17/19). Long term goals: to be accomplished within 4 weeks: 1. The patient will improve FOTO score to anticipated outcome to maximize quality of life. 2. The patient will improve hip ABD/EXT strength bilaterally to 4/5 MMT to maximize stability in stance for improved efficiency of house work. 3. The patient will demonstrate effective squat form and adequate lifting technique for improved ease of chore production around home. 4. The patient will demonstrate trunk flexion fingers to toes to improve trunk mobility and ease of daily activity. 
  
 
 
 
PLAN 
[]  Upgrade activities as tolerated     [x]  Continue plan of care 
[]  Update interventions per flow sheet      
[]  Discharge due to:_ 
[]  Other:_   
 
Ulises Williamson, PT 1/22/2019  9:54 AM 
 
Future Appointments Date Time Provider Karol Silvai 1/24/2019 10:30 AM [facility]  
1/24/2019 12:45 PM Andrae Harrington, PT [facility]  
1/29/2019  9:00 AM Andrae Harrington, PT [facility]  
1/31/2019  9:45 AM Andrae Harrington, PT [facility]  
2/13/2019  9:10 AM Cristofer Vallecillo MD [address]  
 6/18/2019  8:30 AM Zaki Mays, [facility] [address]

## 2019-01-24 ENCOUNTER — HOSPITAL ENCOUNTER (OUTPATIENT)
Dept: PHYSICAL THERAPY | Age: 64
Discharge: HOME | End: 2019-01-24
Payer: MEDICARE

## 2019-01-24 ENCOUNTER — OFFICE VISIT (OUTPATIENT)
Dept: FAMILY MEDICINE CLINIC | Age: 64
End: 2019-01-24

## 2019-01-24 VITALS
WEIGHT: 192 LBS | BODY MASS INDEX: 34.02 KG/M2 | OXYGEN SATURATION: 98 % | TEMPERATURE: 98.1 F | HEIGHT: 63 IN | SYSTOLIC BLOOD PRESSURE: 132 MMHG | DIASTOLIC BLOOD PRESSURE: 80 MMHG | RESPIRATION RATE: 16 BRPM | HEART RATE: 78 BPM

## 2019-01-24 DIAGNOSIS — I10 ESSENTIAL HYPERTENSION: Primary | ICD-10-CM

## 2019-01-24 DIAGNOSIS — R73.03 PREDIABETES: ICD-10-CM

## 2019-01-24 DIAGNOSIS — K21.9 GASTROESOPHAGEAL REFLUX DISEASE, ESOPHAGITIS PRESENCE NOT SPECIFIED: ICD-10-CM

## 2019-01-24 DIAGNOSIS — G47.9 SLEEP DIFFICULTIES: ICD-10-CM

## 2019-01-24 PROCEDURE — 97113 AQUATIC THERAPY/EXERCISES: CPT

## 2019-01-24 NOTE — PROGRESS NOTES
PT DAILY TREATMENT NOTE 10-18 Patient Name: Estefany Beal Date:2019 : 1955 [x]  Patient  Verified Payor: [place] MEDICARE / Plan: Kenyetta Roberson / Product Type: Medicare / In time:1:03pm  Out time:1:31pm 
Total Treatment Time (min): 28 Visit #: 7 of 12 Medicare/BCBS Only Total Timed Codes (min):  28 1:1 Treatment Time:  28 Treatment Area: Low back pain [M54.5] Myalgia, other site [M79.18] SUBJECTIVE Pain Level (0-10 scale): 7/10 Any medication changes, allergies to medications, adverse drug reactions, diagnosis change, or new procedure performed?: [x] No    [] Yes (see summary sheet for update) Subjective functional status/changes:   [] No changes reported Pt states, \"I took a lot of medication last night (a sleeping pill and pain pill) and normally I take one or the other, so I feel better today\". Pt reports seeing her PCP this morning. OBJECTIVE 28 min Therapeutic Exercise:  [x] See flow sheet : Aquatic therapy Rationale: increase ROM and increase strength to improve the patients ability to perform ADLs and functional mobility tasks with improved ease and decreased pain. With 
 [] TE 
 [] TA 
 [] neuro 
 [] other: Patient Education: [x] Review HEP [] Progressed/Changed HEP based on:  
[] positioning   [] body mechanics   [] transfers   [] heat/ice application   
[] other:   
 
Other Objective/Functional Measures: Pt arrived 18 minutes late for appt, therefore omitted noted exercises due to time constraints. Pain Level (0-10 scale) post treatment: 7/10 ASSESSMENT/Changes in Function: Pt reported feeling better today, yet rated pain as 7/10 (as compared to 8/10 or 7.5/10 which has been reported every other visit).    
 
Patient will continue to benefit from skilled PT services to modify and progress therapeutic interventions, address functional mobility deficits, address ROM deficits, address strength deficits, analyze and cue movement patterns, analyze and modify body mechanics/ergonomics and assess and modify postural abnormalities to attain remaining goals. []  See Plan of Care 
[]  See progress note/recertification 
[]  See Discharge Summary Progress towards goals / Updated goals: 
Short term goals: to be accomplished within 2 weeks: 1. The patient will be independent and compliant with HEP to maximize therapeutic benefit. Progressing: Pt reports performing 3 of the 4 home exercises 1-2 times per day. (1/4/19).   Goal met: Pt reports compliance 3x per day performing all 4 home exercises. (1/10/19).    
2. The patient will improve trunk rotation to 50% or better bilat void of reports of pain provocation. Pt continues to c/o pain/\"pulling\" in her low back with trunk rotation. (1/17/19).   Goal met: B trunk rotation AROM to 75% of WNL with no pain reports, but mild \"pulling\". (1/24/19). Long term goals: to be accomplished within 4 weeks: 1. The patient will improve FOTO score to anticipated outcome to maximize quality of life. 2. The patient will improve hip ABD/EXT strength bilaterally to 4/5 MMT to maximize stability in stance for improved efficiency of house work. 3. The patient will demonstrate effective squat form and adequate lifting technique for improved ease of chore production around home. 4. The patient will demonstrate trunk flexion fingers to toes to improve trunk mobility and ease of daily activity. Progressing: Trunk flexion AROM to 75% of WNL. (1/24/19).   
  
 
 
 
PLAN 
[]  Upgrade activities as tolerated     [x]  Continue plan of care 
[]  Update interventions per flow sheet      
[]  Discharge due to:_ 
[]  Other:_   
 
Betty Moore PT 1/24/2019  1:15 PM 
 
Future Appointments Date Time Provider Karol Veronica 1/29/2019  9:00 AM [facility]  
 1/31/2019  9:45 AM Neetu Patterson, PT [facility]  
2/13/2019  9:10 AM Caines, Eppie Eisenmenger, MD [place] [id number]  
4/30/2019  9:15 AM Laverle Claude, MD [facility] SCHED  
6/18/2019  8:30 AM Javi Villanueva  [address]

## 2019-01-24 NOTE — PATIENT INSTRUCTIONS
DASH Diet: Care Instructions  Your Care Instructions    The DASH diet is an eating plan that can help lower your blood pressure. DASH stands for Dietary Approaches to Stop Hypertension. Hypertension is high blood pressure. The DASH diet focuses on eating foods that are high in calcium, potassium, and magnesium. These nutrients can lower blood pressure. The foods that are highest in these nutrients are fruits, vegetables, low-fat dairy products, nuts, seeds, and legumes. But taking calcium, potassium, and magnesium supplements instead of eating foods that are high in those nutrients does not have the same effect. The DASH diet also includes whole grains, fish, and poultry. The DASH diet is one of several lifestyle changes your doctor may recommend to lower your high blood pressure. Your doctor may also want you to decrease the amount of sodium in your diet. Lowering sodium while following the DASH diet can lower blood pressure even further than just the DASH diet alone. Follow-up care is a key part of your treatment and safety. Be sure to make and go to all appointments, and call your doctor if you are having problems. It's also a good idea to know your test results and keep a list of the medicines you take. How can you care for yourself at home? Following the DASH diet  · Eat 4 to 5 servings of fruit each day. A serving is 1 medium-sized piece of fruit, ½ cup chopped or canned fruit, 1/4 cup dried fruit, or 4 ounces (½ cup) of fruit juice. Choose fruit more often than fruit juice. · Eat 4 to 5 servings of vegetables each day. A serving is 1 cup of lettuce or raw leafy vegetables, ½ cup of chopped or cooked vegetables, or 4 ounces (½ cup) of vegetable juice. Choose vegetables more often than vegetable juice. · Get 2 to 3 servings of low-fat and fat-free dairy each day. A serving is 8 ounces of milk, 1 cup of yogurt, or 1 ½ ounces of cheese. · Eat 6 to 8 servings of grains each day.  A serving is 1 slice of bread, 1 ounce of dry cereal, or ½ cup of cooked rice, pasta, or cooked cereal. Try to choose whole-grain products as much as possible. · Limit lean meat, poultry, and fish to 2 servings each day. A serving is 3 ounces, about the size of a deck of cards. · Eat 4 to 5 servings of nuts, seeds, and legumes (cooked dried beans, lentils, and split peas) each week. A serving is 1/3 cup of nuts, 2 tablespoons of seeds, or ½ cup of cooked beans or peas. · Limit fats and oils to 2 to 3 servings each day. A serving is 1 teaspoon of vegetable oil or 2 tablespoons of salad dressing. · Limit sweets and added sugars to 5 servings or less a week. A serving is 1 tablespoon jelly or jam, ½ cup sorbet, or 1 cup of lemonade. · Eat less than 2,300 milligrams (mg) of sodium a day. If you limit your sodium to 1,500 mg a day, you can lower your blood pressure even more. Tips for success  · Start small. Do not try to make dramatic changes to your diet all at once. You might feel that you are missing out on your favorite foods and then be more likely to not follow the plan. Make small changes, and stick with them. Once those changes become habit, add a few more changes. · Try some of the following:  ? Make it a goal to eat a fruit or vegetable at every meal and at snacks. This will make it easy to get the recommended amount of fruits and vegetables each day. ? Try yogurt topped with fruit and nuts for a snack or healthy dessert. ? Add lettuce, tomato, cucumber, and onion to sandwiches. ? Combine a ready-made pizza crust with low-fat mozzarella cheese and lots of vegetable toppings. Try using tomatoes, squash, spinach, broccoli, carrots, cauliflower, and onions. ? Have a variety of cut-up vegetables with a low-fat dip as an appetizer instead of chips and dip. ? Sprinkle sunflower seeds or chopped almonds over salads. Or try adding chopped walnuts or almonds to cooked vegetables.   ? Try some vegetarian meals using beans and peas. Add garbanzo or kidney beans to salads. Make burritos and tacos with mashed valdez beans or black beans. Where can you learn more? Go to http://carlos eduardo-turner.info/. Enter H734 in the search box to learn more about \"DASH Diet: Care Instructions. \"  Current as of: July 22, 2018  Content Version: 11.9  © 5944-7089 [employer]. Care instructions adapted under license by [employer] (which disclaims liability or warranty for this information). If you have questions about a medical condition or this instruction, always ask your healthcare professional. [address] any warranty or liability for your use of this information.

## 2019-01-29 ENCOUNTER — HOSPITAL ENCOUNTER (OUTPATIENT)
Dept: PHYSICAL THERAPY | Age: 64
Discharge: HOME | End: 2019-01-29
Payer: MEDICARE

## 2019-01-29 PROCEDURE — G8987 SELF CARE CURRENT STATUS: HCPCS

## 2019-01-29 PROCEDURE — G8988 SELF CARE GOAL STATUS: HCPCS

## 2019-01-29 PROCEDURE — 97113 AQUATIC THERAPY/EXERCISES: CPT

## 2019-01-29 NOTE — PROGRESS NOTES
In [address]  [place] [address] [place], [address] 
[phone] fax Medicare Progress Report Patient name: Isak Slater Start of Care: 2018 Referral source: Rosangela Laws NP : 1955 Medical Diagnosis: Low back pain [M54.5] Myalgia, other site [M79.18] Payor: [place] MEDICARE / Plan: [address] [place], [place] [address] / Product Type: Medicare /  Onset Date:2-3 years Treatment Diagnosis: mechanical LBP Prior Hospitalization: see medical history Provider#: [id number] Medications: Verified on Patient summary List  
 Comorbidities: HTN, right foot pain, otherwise unremarkable per pt report Prior Level of Function: sedentary for many years, independent with all daily activity Visits from Start of Care: 8    Missed Visits: 1 Reporting Period: 18 to 19 Subjective Reports: Pt states, \"I don't really see any difference since I started therapy\". She states the water exercises are \"relaxing\" and she's planning to look into the [place] programs to continue aquatic exercises on her own after discharge.   
  
      
Progress towards goals / Updated goals: 
Short term goals: to be accomplished within 2 weeks: 1. The patient will be independent and compliant with HEP to maximize therapeutic benefit. Progressing: Pt reports performing 3 of the 4 home exercises 1-2 times per day. (19).   Goal met: Pt reports compliance 3x per day performing all 4 home exercises. (1/10/19).    
2. The patient will improve trunk rotation to 50% or better bilat void of reports of pain provocation. Pt continues to c/o pain/\"pulling\" in her low back with trunk rotation. (19).   Goal met: B trunk rotation AROM to 75% of WNL with no pain reports, but mild \"pulling\". (19).    
Long term goals: to be accomplished within 4 weeks: 1.  The patient will improve FOTO score to anticipated outcome to maximize quality of life. Goal met: FOTO score of 53. (1/29/19). 2. The patient will improve hip ABD/EXT strength bilaterally to 4/5 MMT to maximize stability in stance for improved efficiency of house work. Not met: MMT: B hip abd/flex/ext 4-/5 with \"breakaway\" weakness.  (1/29/19). 3. The patient will demonstrate effective squat form and adequate lifting technique for improved ease of chore production around home. 4. The patient will demonstrate trunk flexion fingers to toes to improve trunk mobility and ease of daily activity. Progressing: Trunk flexion AROM to 75% of WNL. (1/24/19).    
  
 
 
Key functional changes: Despite pt verbally reporting no significant change since beginning therapy, her FOTO goal was met indicating improved functional ability since initial evaluation. Problems/ barriers to goal attainment: None Assessment / Recommendations:FOTO goal met. Pt reports no change in overall pain level. Pt will transition to land-based PT for attempt at pain decrease and to educate on extensive HEP for self-management of symptoms. Problem List: pain affecting function, decrease ROM, decrease strength, decrease ADL/ functional abilitiies, decrease activity tolerance and decrease flexibility/ joint mobility Treatment Plan: Therapeutic exercise, Therapeutic activities, Neuromuscular re-education, Physical agent/modality, Gait/balance training, Manual therapy, Aquatic therapy, Patient education and Functional mobility training Updated Goals to be accomplished in 3 weeks: 
Long term goals: to be accomplished within 3 weeks: 1. The patient will improve hip ABD/EXT strength bilaterally to 4/5 MMT to maximize stability in stance for improved efficiency of house work. Not met: MMT: B hip abd/flex/ext 4-/5 with \"breakaway\" weakness.  (1/29/19).             
2. The patient will demonstrate effective squat form and adequate lifting technique for improved ease of chore production around home. 3. The patient will demonstrate trunk flexion fingers to toes to improve trunk mobility and ease of daily activity. Progressing: Trunk flexion AROM to 75% of WNL. (1/24/19).    
  
  
Frequency / Duration: Patient to be seen 2 times per week for 3 weeks: 
 
Ronny (GP) Self Care  Current  CK= 40-59%  Goal  CK= 40-59% The severity rating is based on clinical judgment and the FOTO score.  
 
 
 
Mary Mosley, PT 1/29/2019 9:27 AM

## 2019-01-29 NOTE — PROGRESS NOTES
PT DAILY TREATMENT NOTE 10-18 Patient Name: Shelly Hartley Date:2019 : 1955 [x]  Patient  Verified Payor: [place] MEDICARE / Plan: Kenyetta Mendosa y / Product Type: Medicare / In time:9:00am  Out time:9:43am 
Total Treatment Time (min): 43 Visit #: 8 of 12 Medicare/BCBS Only Total Timed Codes (min):  43 1:1 Treatment Time:  37 Treatment Area: Low back pain [M54.5] Myalgia, other site [M79.18] SUBJECTIVE Pain Level (0-10 scale): 7.5/10 Any medication changes, allergies to medications, adverse drug reactions, diagnosis change, or new procedure performed?: [x] No    [] Yes (see summary sheet for update) Subjective functional status/changes:   [] No changes reported Pt states, \"I don't really see any difference since I started therapy\". She states the water exercises are \"relaxing\" and she's planning to look into the [facility] programs to continue aquatic exercises on her own after discharge. OBJECTIVE 43 min Therapeutic Exercise:  [x] See flow sheet : Aquatic therapy Rationale: increase ROM and increase strength to improve the patients ability to perform ADLs and functional mobility tasks with improved ease and decreased pain. With 
 [] TE 
 [] TA 
 [] neuro 
 [] other: Patient Education: [x] Review HEP [] Progressed/Changed HEP based on:  
[] positioning   [] body mechanics   [] transfers   [] heat/ice application   
[] other:   
 
Other Objective/Functional Measures: FOTO score of 53 (increased from score of 31). MMT: B hip abd/flex/ext 4-/5 with \"breakaway\" weakness. Issued pt a pictorial HEP for aquatic exercises. Pain Level (0-10 scale) post treatment: 7.5/10 ASSESSMENT/Changes in Function: FOTO goal met. Pt reports no change in overall pain level. Pt will transition to land-based PT for attempt at pain decrease and to educate on extensive HEP for self-management of symptoms. Patient will continue to benefit from skilled PT services to modify and progress therapeutic interventions, address functional mobility deficits, address ROM deficits, address strength deficits, analyze and cue movement patterns, analyze and modify body mechanics/ergonomics and assess and modify postural abnormalities to attain remaining goals. []  See Plan of Care [x]  See progress note/recertification 
[]  See Discharge Summary Progress towards goals / Updated goals: 
Short term goals: to be accomplished within 2 weeks: 1. The patient will be independent and compliant with HEP to maximize therapeutic benefit. Progressing: Pt reports performing 3 of the 4 home exercises 1-2 times per day. (1/4/19).   Goal met: Pt reports compliance 3x per day performing all 4 home exercises. (1/10/19).    
2. The patient will improve trunk rotation to 50% or better bilat void of reports of pain provocation. Pt continues to c/o pain/\"pulling\" in her low back with trunk rotation. (1/17/19).   Goal met: B trunk rotation AROM to 75% of WNL with no pain reports, but mild \"pulling\". (1/24/19). Long term goals: to be accomplished within 4 weeks: 1. The patient will improve FOTO score to anticipated outcome to maximize quality of life. Goal met: FOTO score of 53. (1/29/19). 2. The patient will improve hip ABD/EXT strength bilaterally to 4/5 MMT to maximize stability in stance for improved efficiency of house work. Not met: MMT: B hip abd/flex/ext 4-/5 with \"breakaway\" weakness.  (1/29/19). 3. The patient will demonstrate effective squat form and adequate lifting technique for improved ease of chore production around home. 4. The patient will demonstrate trunk flexion fingers to toes to improve trunk mobility and ease of daily activity. Progressing: Trunk flexion AROM to 75% of WNL. (1/24/19).   
  
 
 
 
PLAN [x]  Upgrade activities as tolerated     [x]  Continue plan of care []  Update interventions per flow sheet      
[]  Discharge due to:_ 
[]  Other:_   
 
Michel Sutherland, PT 1/29/2019  9:15 AM 
 
Future Appointments Date Time Provider Karol Martin 1/31/2019  9:45 AM [facility]  
2/13/2019  9:10 AM Caines, Mikle Ahumada, MD [facility]  
4/30/2019  9:15 AM [facility]cre[facility]ellez [facility]  
6/18/2019  8:30 AM Acacia Torres, RE[facility]UNDO [address]

## 2019-01-31 ENCOUNTER — APPOINTMENT (OUTPATIENT)
Dept: PHYSICAL THERAPY | Age: 64
End: 2019-01-31
Payer: MEDICARE

## 2019-01-31 ENCOUNTER — HOSPITAL ENCOUNTER (OUTPATIENT)
Dept: PHYSICAL THERAPY | Age: 64
Discharge: HOME | End: 2019-01-31
Payer: MEDICARE

## 2019-01-31 PROCEDURE — 97110 THERAPEUTIC EXERCISES: CPT

## 2019-01-31 PROCEDURE — 97112 NEUROMUSCULAR REEDUCATION: CPT

## 2019-01-31 NOTE — PROGRESS NOTES
PT DAILY TREATMENT NOTE - [facility]  Patient Name: Alvin Peterson Date:2019 : 1955 [x]  Patient  Verified Payor: [place] MEDICARE / Plan: Kenyetta Roberson / Product Type: Medicare / In time:9:00am  Out time:9:50am 
Total Treatment Time (min): 50 Total Timed Codes (min): 40 
1:1 Treatment Time ( only): 29 Visit #: 2 of 6 Treatment Area: Low back pain [M54.5] Myalgia, other site [M79.18] SUBJECTIVE Pain Level (0-10 scale): 7/10 Any medication changes, allergies to medications, adverse drug reactions, diagnosis change, or new procedure performed?: [x] No    [] Yes (see summary sheet for update) Subjective functional status/changes:   [] No changes reported \"Its right in the middle of my back. \" OBJECTIVE 30 min Therapeutic Exercise:  [x] See flow sheet :  
Rationale: increase ROM and increase strength to improve the patients ability to improve ease of daily activity. 10 min Neuromuscular Re-education:  [x]  See flow sheet :  
Rationale: increase strength, improve coordination and increase proprioception  to improve the patients ability to improve lumbopelvic stability to reduce pain with daily activity. Modality rationale: decrease pain and increase tissue extensibility to improve the patients ability to improve ease of daily activity. Type Additional Details  
[] Estim:  []Unatt       []IFC  []Premod []Other:  []w/ice   []w/heat Position: Location:  
[] Estim: []Att    []TENS instruct  []NMES []Other:  []w/US   []w/ice   []w/heat Position: Location:  
[]  Traction: [] Cervical       []Lumbar 
                     [] Prone          []Supine []Intermittent   []Continuous Lbs: 
[] before manual 
[] after manual  
[]  Ultrasound: []Continuous   [] Pulsed []1MHz   []3MHz Location: 
W/cm2:  
[]  Iontophoresis with dexamethasone Location: [] Take home patch [] In clinic  
[]  Ice     []  heat 
[]  Ice massage 
[]  Laser  
[]  Anodyne Position: Location:  
[]  Laser with stim 
[]  Other: Position: Location:  
[]  Vasopneumatic Device Pressure:       [] lo [] med [] hi  
Temperature: [] lo [] med [] hi  
[] Skin assessment post-treatment:  []intact []redness- no adverse reaction 
  []redness  adverse reaction:  
       
With 
 [] TE 
 [] TA 
 [] neuro 
 [] other: Patient Education: [x] Review HEP [] Progressed/Changed HEP based on:  
[] positioning   [] body mechanics   [] transfers   [] heat/ice application   
[] other:   
 
Other Objective/Functional Measures: difficulty with bridges and QP LE 2/2 glute weakness bilat Rotational instability with quadruped LE and Alt, cueing to maintain neutral spine and for glute facilitation Pain Level (0-10 scale) post treatment: 6 
 
ASSESSMENT/Changes in Function: Pt reports no exacerbation of pain with interventions today progressed through trunk mobility and core stability exercises void of adverse response. She does demonstrate some limited rotational stability with core stability interventions and some gross glute weakness. Will plan to progress with core strength and stability and gradual progression into functional lifting mechanics in upcoming visits as tolerated with plan to progress to HEP and DC. Patient will continue to benefit from skilled PT services to modify and progress therapeutic interventions, address functional mobility deficits, address ROM deficits, address strength deficits, analyze and address soft tissue restrictions, analyze and cue movement patterns and instruct in home and community integration to attain remaining goals. []  See Plan of Care 
[]  See progress note/recertification 
[]  See Discharge Summary Progress towards goals / Updated goals: 
Short term goals: to be accomplished within 2 weeks: 1.  The patient will be independent and compliant with HEP to maximize therapeutic benefit. Progressing: Pt reports performing 3 of the 4 home exercises 1-2 times per day. (1/4/19).   Goal met: Pt reports compliance 3x per day performing all 4 home exercises. (1/10/19).    
2. The patient will improve trunk rotation to 50% or better bilat void of reports of pain provocation. Pt continues to c/o pain/\"pulling\" in her low back with trunk rotation. (1/17/19).   Goal met: B trunk rotation AROM to 75% of WNL with no pain reports, but mild \"pulling\". (1/24/19).    
Long term goals: to be accomplished within 4 weeks: 1. The patient will improve FOTO score to anticipated outcome to maximize quality of life. Goal met: FOTO score of 53. (1/29/19). 2. The patient will improve hip ABD/EXT strength bilaterally to 4/5 MMT to maximize stability in stance for improved efficiency of house work. Not met: MMT: B hip abd/flex/ext 4-/5 with \"breakaway\" weakness.  (1/29/19). 3. The patient will demonstrate effective squat form and adequate lifting technique for improved ease of chore production around home. 4. The patient will demonstrate trunk flexion fingers to toes to improve trunk mobility and ease of daily activity. Progressing: Trunk flexion AROM to 75% of WNL. (1/24/19).    
 
PLAN [x]  Upgrade activities as tolerated     [x]  Continue plan of care 
[]  Update interventions per flow sheet      
[]  Discharge due to:_ 
[]  Other:_ Eric Meals 1/31/2019  9:03 AM 
 
Future Appointments Date Time Provider Karol Martin 2/5/2019  9:00 AM Kalyn Stillwater, PT [facility]  
2/7/2019  9:00 AM Ru Patricio [facility]  
2/12/2019  9:00 AM Kalyn Still[place], PT [facility]  
2/13/2019  9:10 AM Mikel Vallecillo MD [facility]   
2/14/2019  8:00 AM Kalyn Stillwater, PT [facility]  
2/19/2019  9:00 AM Ru Patricio [facility]  
4/30/2019  9:15 AM Emily Zapata MD [facility]  
6/18/2019  8:30 AM Gama Young  [address]

## 2019-02-05 ENCOUNTER — HOSPITAL ENCOUNTER (OUTPATIENT)
Dept: PHYSICAL THERAPY | Age: 64
Discharge: HOME | End: 2019-02-05
Payer: MEDICARE

## 2019-02-05 PROCEDURE — 97112 NEUROMUSCULAR REEDUCATION: CPT

## 2019-02-05 PROCEDURE — 97110 THERAPEUTIC EXERCISES: CPT

## 2019-02-05 NOTE — PROGRESS NOTES
PT DAILY TREATMENT NOTE 10-18 Patient Name: Sonam Cruz Date:2019 : 1955 [x]  Patient  Verified Payor: [place] [employer] / Plan: Kenyetta Mendosa y / Product Type: Medicare / In time:8:56  Out time:9:45 Total Treatment Time (min): 49 Visit #: 2 of 6 Medicare/BCBS Only Total Timed Codes (min):  39 1:1 Treatment Time:  44 Treatment Area: Low back pain [M54.5] Myalgia, other site [M79.18] SUBJECTIVE Pain Level (0-10 scale): 8 Any medication changes, allergies to medications, adverse drug reactions, diagnosis change, or new procedure performed?: [x] No    [] Yes (see summary sheet for update) Subjective functional status/changes:   [] No changes reported No new complaints reported. OBJECTIVE Modality rationale: decrease pain and increase tissue extensibility to improve the patients ability to perform ADLs with improved ease. Type Additional Details  
[] Estim:  []Unatt       []IFC  []Premod []Other:  []w/ice   []w/heat Position: Location:  
[] Estim: []Att    []TENS instruct  []NMES []Other:  []w/US   []w/ice   []w/heat Position: Location:  
[]  Traction: [] Cervical       []Lumbar 
                     [] Prone          []Supine []Intermittent   []Continuous Lbs: 
[] before manual 
[] after manual  
[]  Ultrasound: []Continuous   [] Pulsed []1MHz   []3MHz W/cm2: 
Location:  
[]  Iontophoresis with dexamethasone Location: [] Take home patch  
[] In clinic  
[]  Ice     [x]  heat 
[]  Ice massage 
[]  Laser  
[]  Anodyne Position: seated Location: lumbar   
[]  Laser with stim 
[]  Other:  Position: Location:  
[]  Vasopneumatic Device Pressure:       [] lo [] med [] hi  
Temperature: [] lo [] med [] hi  
[] Skin assessment post-treatment:  []intact []redness- no adverse reaction 
  []redness  adverse reaction: 29 min Therapeutic Exercise:  [x] See flow sheet :  
Rationale: increase ROM and increase strength to improve the patients ability to perform ADLs with improved ease. 10 min Neuromuscular Re-education:  [x]  See flow sheet :  
Rationale: improve coordination and increase proprioception  to improve the patients ability to perform ADLs with improved core stability. With 
 [] TE 
 [] TA 
 [] neuro 
 [] other: Patient Education: [x] Review HEP [] Progressed/Changed HEP based on:  
[] positioning   [] body mechanics   [] transfers   [] heat/ice application   
[] other:   
 
Other Objective/Functional Measures: Added HS stretch on step today. Pain Level (0-10 scale) post treatment: 7.5 ASSESSMENT/Changes in Function: Core weakness noted during ezio pull downs with march and quadruped exercises. Patient required cueing in order to perform squat with proper mechanics. Slight decrease in pain level post session, patient stating the heat on her lumbar spine makes her feel better. Patient will continue to benefit from skilled PT services to modify and progress therapeutic interventions, address functional mobility deficits, address ROM deficits, address strength deficits, analyze and address soft tissue restrictions, analyze and cue movement patterns, analyze and modify body mechanics/ergonomics and assess and modify postural abnormalities to attain remaining goals. [x]  See Plan of Care 
[]  See progress note/recertification 
[]  See Discharge Summary Progress towards goals / Updated goals: 
Short term goals: to be accomplished within 2 weeks: 1. The patient will be independent and compliant with HEP to maximize therapeutic benefit. Progressing: Pt reports performing 3 of the 4 home exercises 1-2 times per day. (1/4/19).   Goal met: Pt reports compliance 3x per day performing all 4 home exercises. (1/10/19).    
 2. The patient will improve trunk rotation to 50% or better bilat void of reports of pain provocation. Pt continues to c/o pain/\"pulling\" in her low back with trunk rotation. (1/17/19).   Goal met: B trunk rotation AROM to 75% of WNL with no pain reports, but mild \"pulling\". (1/24/19).    
Long term goals: to be accomplished within 4 weeks: 1. The patient will improve FOTO score to anticipated outcome to maximize quality of life. Goal met: FOTO score of 53. (1/29/19).    
2. The patient will improve hip ABD/EXT strength bilaterally to 4/5 MMT to maximize stability in stance for improved efficiency of house work. Not met: MMT: B hip abd/flex/ext 4-/5 with \"breakaway\" weakness.  (1/29/19).            
3. The patient will demonstrate effective squat form and adequate lifting technique for improved ease of chore production around home. 4. The patient will demonstrate trunk flexion fingers to toes to improve trunk mobility and ease of daily activity. Progressing: Trunk flexion AROM to 75% of WNL. (1/24/19).    
 
 
PLAN 
[]  Upgrade activities as tolerated     [x]  Continue plan of care 
[]  Update interventions per flow sheet      
[]  Discharge due to:_ 
[]  Other:_ Jina Collet, PT 2/5/2019  8:57 AM 
 
Future Appointments Date Time Provider Karol Martin 2/5/2019  9:00 [facility]  
2/7/2019  9:00 AM Nadya Boone [facility]  
2/12/2019  9:00 AM Julio Ku PT [facility]  
2/13/2019  9:10 AM Jacqueline Vallecillo MD [facility] [address]  
2/14/2019  8:00 AM Julio Ku PT [facility]  
2/19/2019  9:00 AM Nadya Boone [facility]  
4/30/2019  9:15 AM Karlo Jiménez MD [facility] SCHED  
6/18/2019  8:30 AM Cuba Matthews  [address]

## 2019-02-07 ENCOUNTER — HOSPITAL ENCOUNTER (OUTPATIENT)
Dept: PHYSICAL THERAPY | Age: 64
Discharge: HOME | End: 2019-02-07
Payer: MEDICARE

## 2019-02-07 PROCEDURE — 97112 NEUROMUSCULAR REEDUCATION: CPT

## 2019-02-07 PROCEDURE — 97110 THERAPEUTIC EXERCISES: CPT

## 2019-02-07 NOTE — PROGRESS NOTES
PT DAILY TREATMENT NOTE - [facility]  Patient Name: Saulo Carbajal Date:2019 : 1955 [x]  Patient  Verified Payor: [place] MEDICARE / Plan: Kenyetta Mendosa mir / Product Type: Medicare / In time:8:59am  Out time:9:43am 
Total Treatment Time (min): 44 Total Timed Codes (min): 34 
1:1 Treatment Time ([address] only): 24 Visit #: 3 of 6 Treatment Area: Low back pain [M54.5] Myalgia, other site [M79.18] SUBJECTIVE Pain Level (0-10 scale): 8 Any medication changes, allergies to medications, adverse drug reactions, diagnosis change, or new procedure performed?: [x] No    [] Yes (see summary sheet for update) Subjective functional status/changes:   [x] No changes reported OBJECTIVE 24 min Therapeutic Exercise:  [x] See flow sheet :  
Rationale: increase ROM and increase strength to improve the patients ability to improve ease of ADLs. 10 min Neuromuscular Re-education:  [x]  See flow sheet :  
Rationale: increase strength, improve coordination and increase proprioception  to improve the patients ability to improve ease of daily activity. Modality rationale: decrease pain and increase tissue extensibility to improve the patients ability to improve ease of daily activity. Type Additional Details  
[] Estim:  []Unatt       []IFC  []Premod []Other:  []w/ice   []w/heat Position: Location:  
[] Estim: []Att    []TENS instruct  []NMES []Other:  []w/US   []w/ice   []w/heat Position: Location:  
[]  Traction: [] Cervical       []Lumbar 
                     [] Prone          []Supine []Intermittent   []Continuous Lbs: 
[] before manual 
[] after manual  
[]  Ultrasound: []Continuous   [] Pulsed []1MHz   []3MHz Location: 
W/cm2:  
[]  Iontophoresis with dexamethasone Location: [] Take home patch  
[] In clinic  
[]  Ice     [x]  heat 
[]  Ice massage 
[]  Laser  
[]  Anodyne Position: seated Location: L/S  
[]  Laser with stim 
[]  Other: Position: Location:  
[]  Vasopneumatic Device Pressure:       [] lo [] med [] hi  
Temperature: [] lo [] med [] hi  
[] Skin assessment post-treatment:  []intact []redness- no adverse reaction 
  []redness  adverse reaction:  
       
With 
 [] TE 
 [] TA 
 [] neuro 
 [] other: Patient Education: [x] Review HEP [] Progressed/Changed HEP based on:  
[] positioning   [] body mechanics   [] transfers   [] heat/ice application   
[] other:   
 
Other Objective/Functional Measures: Pt pleasant and conversant void of signs of distress or pain despite high pain reports Pain Level (0-10 scale) post treatment: 8 
 
ASSESSMENT/Changes in Function: Pt demonstrates fair trunk stability with mild instability with rotational stability interventions. She reports no pain with interventions, but also reports no change in pain post treatment. Continue emphasis on functional activity and trunk stability and progress to DC in 2 visits if no change. Patient will continue to benefit from skilled PT services to modify and progress therapeutic interventions, address strength deficits, analyze and cue movement patterns and instruct in home and community integration to attain remaining goals. []  See Plan of Care 
[]  See progress note/recertification 
[]  See Discharge Summary Progress towards goals / Updated goals: 
Short term goals: to be accomplished within 2 weeks: 1. The patient will be independent and compliant with HEP to maximize therapeutic benefit. Progressing: Pt reports performing 3 of the 4 home exercises 1-2 times per day. (1/4/19).   Goal met: Pt reports compliance 3x per day performing all 4 home exercises. (1/10/19).    
2. The patient will improve trunk rotation to 50% or better bilat void of reports of pain provocation. Pt continues to c/o pain/\"pulling\" in her low back with trunk rotation. (1/17/19).   Goal met: B trunk rotation AROM to 75% of WNL with no pain reports, but mild \"pulling\". (1/24/19).    
Long term goals: to be accomplished within 4 weeks: 1. The patient will improve FOTO score to anticipated outcome to maximize quality of life. Goal met: FOTO score of 53. (1/29/19).    
2. The patient will improve hip ABD/EXT strength bilaterally to 4/5 MMT to maximize stability in stance for improved efficiency of house work. Not met: MMT: B hip abd/flex/ext 4-/5 with \"breakaway\" weakness.  (1/29/19).            
3. The patient will demonstrate effective squat form and adequate lifting technique for improved ease of chore production around home. 4. The patient will demonstrate trunk flexion fingers to toes to improve trunk mobility and ease of daily activity. Progressing: Trunk flexion AROM to 75% of WNL. (1/24/19).    
 
 
PLAN [x]  Upgrade activities as tolerated     [x]  Continue plan of care 
[]  Update interventions per flow sheet      
[]  Discharge due to:_ 
[]  Other:_ Julia Hooks, PT ,DPT ,ATC 2/7/2019  9:14 AM 
 
Future Appointments Date Time Provider Karol Veronica 2/12/2019  9:00 AM [facility]  
2/13/2019  9:10 AM Anne Vallecillo MD [place] [id number]  
2/14/2019  8:00 AM [facility]  
2/19/2019  9:00 AM Gloria [facility]  
4/30/2019  9:15 AM Estuardo Khalil MD [facility]  
6/18/2019  8:30 AM Erroll Schlatter,  [address]

## 2019-02-12 ENCOUNTER — HOSPITAL ENCOUNTER (OUTPATIENT)
Dept: PHYSICAL THERAPY | Age: 64
Discharge: HOME | End: 2019-02-12
Payer: MEDICARE

## 2019-02-12 PROCEDURE — 97112 NEUROMUSCULAR REEDUCATION: CPT

## 2019-02-12 PROCEDURE — 97110 THERAPEUTIC EXERCISES: CPT

## 2019-02-12 NOTE — PROGRESS NOTES
PT DAILY TREATMENT NOTE 10-18 Patient Name: Bailey Betancourt Date:2019 : 1955 [x]  Patient  Verified Payor: [place] MEDICARE / Plan: Kenyetta Mendosa [facility] / Product Type: Medicare / In time:9:00  Out time:9:45 Total Treatment Time (min): 45 Visit #: 4 of 6 Medicare/BCBS Only Total Timed Codes (min):  35 1:1 Treatment Time:  32 Treatment Area: Low back pain [M54.5] Myalgia, other site [M79.18] SUBJECTIVE Pain Level (0-10 scale): 8 Any medication changes, allergies to medications, adverse drug reactions, diagnosis change, or new procedure performed?: [x] No    [] Yes (see summary sheet for update) Subjective functional status/changes:   [] No changes reported \"The pain has been about the same\". OBJECTIVE Modality rationale: decrease pain to improve the patients ability to perform ADLs with improved ease. Min Type Additional Details  
 [] Estim:  []Unatt       []IFC  []Premod []Other:  []w/ice   []w/heat Position: Location:  
 [] Estim: []Att    []TENS instruct  []NMES []Other:  []w/US   []w/ice   []w/heat Position: Location:  
 []  Traction: [] Cervical       []Lumbar 
                     [] Prone          []Supine []Intermittent   []Continuous Lbs: 
[] before manual 
[] after manual  
 []  Ultrasound: []Continuous   [] Pulsed []1MHz   []3MHz W/cm2: 
Location:  
 []  Iontophoresis with dexamethasone Location: [] Take home patch  
[] In clinic  
10 []  Ice     [x]  heat 
[]  Ice massage 
[]  Laser  
[]  Anodyne Position: Location:  
 []  Laser with stim 
[]  Other:  Position: Location:  
 []  Vasopneumatic Device Pressure:       [] lo [] med [] hi  
Temperature: [] lo [] med [] hi  
[] Skin assessment post-treatment:  []intact []redness- no adverse reaction 
  []redness  adverse reaction:  
 
  
25 min Therapeutic Exercise:  [x] See flow sheet :  
 Rationale: increase ROM and increase strength to improve the patients ability to perform ADLs with improved ease. 10 min Neuromuscular Re-education:  [x]  See flow sheet :  
Rationale: improve coordination, improve balance and increase proprioception  to improve the patients ability to perform ADLs with improved ease. With 
 [] TE 
 [] TA 
 [] neuro 
 [] other: Patient Education: [x] Review HEP [] Progressed/Changed HEP based on:  
[] positioning   [] body mechanics   [] transfers   [] heat/ice application   
[] other:   
 
Other Objective/Functional Measures: Added ladder barrel QL stretch today. Pain Level (0-10 scale) post treatment: 8 
 
ASSESSMENT/Changes in Function: Patient with complaints of QL pain today (right side worse than the left). Improved performance noted on ezio extension with a march. Improved squat mechanics, however still requires minimal cueing in order to demonstrate correctly each repetition. Patient reports same pain level post session despite stating her back pain intensity has decreased. Patient will continue to benefit from skilled PT services to modify and progress therapeutic interventions, address functional mobility deficits, address ROM deficits, address strength deficits, analyze and address soft tissue restrictions, analyze and cue movement patterns, analyze and modify body mechanics/ergonomics and assess and modify postural abnormalities to attain remaining goals. [x]  See Plan of Care 
[]  See progress note/recertification 
[]  See Discharge Summary Progress towards goals / Updated goals: 
Short term goals: to be accomplished within 2 weeks: 1. The patient will be independent and compliant with HEP to maximize therapeutic benefit. Progressing: Pt reports performing 3 of the 4 home exercises 1-2 times per day. (1/4/19).   Goal met: Pt reports compliance 3x per day performing all 4 home exercises. (1/10/19).    
 2. The patient will improve trunk rotation to 50% or better bilat void of reports of pain provocation. Pt continues to c/o pain/\"pulling\" in her low back with trunk rotation. (1/17/19).   Goal met: B trunk rotation AROM to 75% of WNL with no pain reports, but mild \"pulling\". (1/24/19).    
Long term goals: to be accomplished within 4 weeks: 1. The patient will improve FOTO score to anticipated outcome to maximize quality of life. Goal met: FOTO score of 53. (1/29/19).    
2. The patient will improve hip ABD/EXT strength bilaterally to 4/5 MMT to maximize stability in stance for improved efficiency of house work. Not met: MMT: B hip abd/flex/ext 4-/5 with \"breakaway\" weakness.  (1/29/19).            
3. The patient will demonstrate effective squat form and adequate lifting technique for improved ease of chore production around home. Progressing 2/12/19 - patient requires minimal cueing for proper squat mechanics 4. The patient will demonstrate trunk flexion fingers to toes to improve trunk mobility and ease of daily activity. Progressing: Trunk flexion AROM to 75% of WNL. (1/24/19).    
 
PLAN 
[]  Upgrade activities as tolerated     [x]  Continue plan of care 
[]  Update interventions per flow sheet      
[]  Discharge due to:_ 
[]  Other:_ Tasia Oswald, PT 2/12/2019  9:06 AM 
 
Future Appointments Date Time Provider Karol Huitronisti 2/13/2019  9:10 AM Hayden Vallecillo MD [place] [id number]  
2/14/2019  8:00 AM Esteban Warner PT [facility]  
2/19/2019  9[facility]  
4/30/2019  9:15 AM Devonte Siddiqui MD [facility] SCHED  
6/18/2019  8:30 AM Kian Orozco  [address]

## 2019-02-14 ENCOUNTER — APPOINTMENT (OUTPATIENT)
Dept: PHYSICAL THERAPY | Age: 64
End: 2019-02-14
Payer: MEDICARE

## 2019-02-19 ENCOUNTER — APPOINTMENT (OUTPATIENT)
Dept: PHYSICAL THERAPY | Age: 64
End: 2019-02-19
Payer: MEDICARE

## 2019-05-06 ENCOUNTER — HOSPITAL ENCOUNTER (OUTPATIENT)
Dept: MAMMOGRAPHY | Age: 64
Discharge: HOME | End: 2019-05-06
Attending: FAMILY MEDICINE
Payer: MEDICARE

## 2019-05-06 DIAGNOSIS — Z12.31 VISIT FOR SCREENING MAMMOGRAM: ICD-10-CM

## 2019-05-06 PROCEDURE — 77063 BREAST TOMOSYNTHESIS BI: CPT

## 2019-05-08 ENCOUNTER — HOSPITAL ENCOUNTER (OUTPATIENT)
Dept: LAB | Age: 64
Discharge: HOME | End: 2019-05-08
Payer: MEDICARE

## 2019-05-08 DIAGNOSIS — I10 ESSENTIAL HYPERTENSION: ICD-10-CM

## 2019-05-08 LAB
ALBUMIN SERPL-MCNC: 3.8 G/DL (ref 3.4–5)
ALBUMIN/GLOB SERPL: 1.4 {RATIO} (ref 0.8–1.7)
ALP SERPL-CCNC: 66 U/L (ref 45–117)
ALT SERPL-CCNC: 31 U/L (ref 13–56)
ANION GAP SERPL CALC-SCNC: 5 MMOL/L (ref 3–18)
AST SERPL-CCNC: 26 U/L (ref 15–37)
BILIRUB SERPL-MCNC: 0.6 MG/DL (ref 0.2–1)
BUN SERPL-MCNC: 20 MG/DL (ref 7–18)
BUN/CREAT SERPL: 26 (ref 12–20)
CALCIUM SERPL-MCNC: 9.1 MG/DL (ref 8.5–10.1)
CHLORIDE SERPL-SCNC: 111 MMOL/L (ref 100–108)
CHOLEST SERPL-MCNC: 165 MG/DL
CO2 SERPL-SCNC: 27 MMOL/L (ref 21–32)
CREAT SERPL-MCNC: 0.78 MG/DL (ref 0.6–1.3)
GLOBULIN SER CALC-MCNC: 2.7 G/DL (ref 2–4)
GLUCOSE SERPL-MCNC: 109 MG/DL (ref 74–99)
HDLC SERPL-MCNC: 81 MG/DL (ref 40–60)
HDLC SERPL: 2 {RATIO} (ref 0–5)
LDLC SERPL CALC-MCNC: 71.4 MG/DL (ref 0–100)
LIPID PROFILE,FLP: ABNORMAL
POTASSIUM SERPL-SCNC: 3.9 MMOL/L (ref 3.5–5.5)
PROT SERPL-MCNC: 6.5 G/DL (ref 6.4–8.2)
SODIUM SERPL-SCNC: 143 MMOL/L (ref 136–145)
TRIGL SERPL-MCNC: 63 MG/DL (ref ?–150)
VLDLC SERPL CALC-MCNC: 12.6 MG/DL

## 2019-05-08 PROCEDURE — 80061 LIPID PANEL: CPT

## 2019-05-08 PROCEDURE — 80053 COMPREHEN METABOLIC PANEL: CPT

## 2019-05-08 PROCEDURE — 36415 COLL VENOUS BLD VENIPUNCTURE: CPT

## 2019-05-24 ENCOUNTER — OFFICE VISIT (OUTPATIENT)
Dept: FAMILY MEDICINE CLINIC | Age: 64
End: 2019-05-24

## 2019-05-24 ENCOUNTER — HOSPITAL ENCOUNTER (OUTPATIENT)
Dept: GENERAL RADIOLOGY | Age: 64
Discharge: HOME | End: 2019-05-24
Payer: MEDICARE

## 2019-05-24 VITALS
OXYGEN SATURATION: 98 % | SYSTOLIC BLOOD PRESSURE: 128 MMHG | WEIGHT: 183 LBS | DIASTOLIC BLOOD PRESSURE: 76 MMHG | BODY MASS INDEX: 32.43 KG/M2 | RESPIRATION RATE: 16 BRPM | HEART RATE: 70 BPM | HEIGHT: 63 IN | TEMPERATURE: 98 F

## 2019-05-24 DIAGNOSIS — R73.03 PREDIABETES: ICD-10-CM

## 2019-05-24 DIAGNOSIS — R93.89 ABNORMAL CXR: Primary | ICD-10-CM

## 2019-05-24 DIAGNOSIS — R93.89 ABNORMAL CXR: ICD-10-CM

## 2019-05-24 DIAGNOSIS — I10 ESSENTIAL HYPERTENSION: ICD-10-CM

## 2019-05-24 DIAGNOSIS — G47.9 SLEEP DIFFICULTIES: ICD-10-CM

## 2019-05-24 DIAGNOSIS — M85.851 OSTEOPENIA OF NECKS OF BOTH FEMURS: ICD-10-CM

## 2019-05-24 DIAGNOSIS — K21.9 GASTROESOPHAGEAL REFLUX DISEASE, ESOPHAGITIS PRESENCE NOT SPECIFIED: ICD-10-CM

## 2019-05-24 DIAGNOSIS — M85.852 OSTEOPENIA OF NECKS OF BOTH FEMURS: ICD-10-CM

## 2019-05-24 PROCEDURE — 71046 X-RAY EXAM CHEST 2 VIEWS: CPT

## 2019-05-24 RX ORDER — HYDROXYZINE PAMOATE 25 MG/1
25 CAPSULE ORAL
Qty: 90 CAP | Refills: 0 | Status: SHIPPED | OUTPATIENT
Start: 2019-05-24 | End: 2019-10-14

## 2019-05-24 RX ORDER — HYDROXYZINE PAMOATE 25 MG/1
25 CAPSULE ORAL
Qty: 90 CAP | Refills: 0 | Status: SHIPPED | OUTPATIENT
Start: 2019-05-24 | End: 2019-05-24 | Stop reason: SDUPTHER

## 2019-05-24 NOTE — PATIENT INSTRUCTIONS
DASH Diet: Care Instructions Your Care Instructions The DASH diet is an eating plan that can help lower your blood pressure. DASH stands for Dietary Approaches to Stop Hypertension. Hypertension is high blood pressure. The DASH diet focuses on eating foods that are high in calcium, potassium, and magnesium. These nutrients can lower blood pressure. The foods that are highest in these nutrients are fruits, vegetables, low-fat dairy products, nuts, seeds, and legumes. But taking calcium, potassium, and magnesium supplements instead of eating foods that are high in those nutrients does not have the same effect. The DASH diet also includes whole grains, fish, and poultry. The DASH diet is one of several lifestyle changes your doctor may recommend to lower your high blood pressure. Your doctor may also want you to decrease the amount of sodium in your diet. Lowering sodium while following the DASH diet can lower blood pressure even further than just the DASH diet alone. Follow-up care is a key part of your treatment and safety. Be sure to make and go to all appointments, and call your doctor if you are having problems. It's also a good idea to know your test results and keep a list of the medicines you take. How can you care for yourself at home? Following the DASH diet · Eat 4 to 5 servings of fruit each day. A serving is 1 medium-sized piece of fruit, ½ cup chopped or canned fruit, 1/4 cup dried fruit, or 4 ounces (½ cup) of fruit juice. Choose fruit more often than fruit juice. · Eat 4 to 5 servings of vegetables each day. A serving is 1 cup of lettuce or raw leafy vegetables, ½ cup of chopped or cooked vegetables, or 4 ounces (½ cup) of vegetable juice. Choose vegetables more often than vegetable juice. · Get 2 to 3 servings of low-fat and fat-free dairy each day. A serving is 8 ounces of milk, 1 cup of yogurt, or 1 ½ ounces of cheese. · Eat 6 to 8 servings of grains each day. A serving is 1 slice of bread, 1 ounce of dry cereal, or ½ cup of cooked rice, pasta, or cooked cereal. Try to choose whole-grain products as much as possible. · Limit lean meat, poultry, and fish to 2 servings each day. A serving is 3 ounces, about the size of a deck of cards. · Eat 4 to 5 servings of nuts, seeds, and legumes (cooked dried beans, lentils, and split peas) each week. A serving is 1/3 cup of nuts, 2 tablespoons of seeds, or ½ cup of cooked beans or peas. · Limit fats and oils to 2 to 3 servings each day. A serving is 1 teaspoon of vegetable oil or 2 tablespoons of salad dressing. · Limit sweets and added sugars to 5 servings or less a week. A serving is 1 tablespoon jelly or jam, ½ cup sorbet, or 1 cup of lemonade. · Eat less than 2,300 milligrams (mg) of sodium a day. If you limit your sodium to 1,500 mg a day, you can lower your blood pressure even more. Tips for success · Start small. Do not try to make dramatic changes to your diet all at once. You might feel that you are missing out on your favorite foods and then be more likely to not follow the plan. Make small changes, and stick with them. Once those changes become habit, add a few more changes. · Try some of the following: ? Make it a goal to eat a fruit or vegetable at every meal and at snacks. This will make it easy to get the recommended amount of fruits and vegetables each day. ? Try yogurt topped with fruit and nuts for a snack or healthy dessert. ? Add lettuce, tomato, cucumber, and onion to sandwiches. ? Combine a ready-made pizza crust with low-fat mozzarella cheese and lots of vegetable toppings. Try using tomatoes, squash, spinach, broccoli, carrots, cauliflower, and onions. ? Have a variety of cut-up vegetables with a low-fat dip as an appetizer instead of chips and dip. ? Sprinkle sunflower seeds or chopped almonds over salads.  Or try adding chopped walnuts or almonds to cooked vegetables. ? Try some vegetarian meals using beans and peas. Add garbanzo or kidney beans to salads. Make burritos and tacos with mashed valdez beans or black beans. Where can you learn more? Go to http://carlos eduardo-turner.info/. Enter U507 in the search box to learn more about \"DASH Diet: Care Instructions. \" Current as of: July 22, 2018 Content Version: 11.9 © 2832-0166 [employer]. Care instructions adapted under license by [employer] (which disclaims liability or warranty for this information). If you have questions about a medical condition or this instruction, always ask your healthcare professional. [address] any warranty or liability for your use of this information. Learning About Sleeping Well What does sleeping well mean? Sleeping well means getting enough sleep. How much sleep is enough varies among people. The number of hours you sleep is not as important as how you feel when you wake up. If you do not feel refreshed, you probably need more sleep. Another sign of not getting enough sleep is feeling tired during the day. The average total nightly sleep time is 7½ to 8 hours. Healthy adults may need a little more or a little less than this. Why is getting enough sleep important? Getting enough quality sleep is a basic part of good health. When your sleep suffers, your mood and your thoughts can suffer too. You may find yourself feeling more grumpy or stressed. Not getting enough sleep also can lead to serious problems, including injury, accidents, anxiety, and depression. What might cause poor sleeping? Many things can cause sleep problems, including: · Stress. Stress can be caused by fear about a single event, such as giving a speech. Or you may have ongoing stress, such as worry about work or school. · Depression, anxiety, and other mental or emotional conditions. · Changes in your sleep habits or surroundings. This includes changes that happen where you sleep, such as noise, light, or sleeping in a different bed. It also includes changes in your sleep pattern, such as having jet lag or working a late shift. · Health problems, such as pain, breathing problems, and restless legs syndrome. · Lack of regular exercise. How can you help yourself? Here are some tips that may help you sleep more soundly and wake up feeling more refreshed. Your sleeping area · Use your bedroom only for sleeping and sex. A bit of light reading may help you fall asleep. But if it doesn't, do your reading elsewhere in the house. Don't watch TV in bed. · Be sure your bed is big enough to stretch out comfortably, especially if you have a sleep partner. · Keep your bedroom quiet, dark, and cool. Use curtains, blinds, or a sleep mask to block out light. To block out noise, use earplugs, soothing music, or a \"white noise\" machine. Your evening and bedtime routine · Create a relaxing bedtime routine. You might want to take a warm shower or bath, listen to soothing music, or drink a cup of noncaffeinated tea. · Go to bed at the same time every night. And get up at the same time every morning, even if you feel tired. What to avoid · Limit caffeine (coffee, tea, caffeinated sodas) during the day, and don't have any for at least 4 to 6 hours before bedtime. · Don't drink alcohol before bedtime. Alcohol can cause you to wake up more often during the night. · Don't smoke or use tobacco, especially in the evening. Nicotine can keep you awake. · Don't take naps during the day, especially close to bedtime. · Don't lie in bed awake for too long. If you can't fall asleep, or if you wake up in the middle of the night and can't get back to sleep within 15 minutes or so, get out of bed and go to another room until you feel sleepy.  
· Don't take medicine right before bed that may keep you awake or make you feel hyper or energized. Your doctor can tell you if your medicine may do this and if you can take it earlier in the day. If you can't sleep · Imagine yourself in a peaceful, pleasant scene. Focus on the details and feelings of being in a place that is relaxing. · Get up and do a quiet or boring activity until you feel sleepy. · Don't drink any liquids after 6 p.m. if you wake up often because you have to go to the bathroom. Where can you learn more? Go to http://carlos eduardo-turner.info/. Enter U470 in the search box to learn more about \"Learning About Sleeping Well. \" Current as of: September 11, 2018 Content Version: 11.9 © 0009-9694 [employer]. Care instructions adapted under license by [employer] (which disclaims liability or warranty for this information). If you have questions about a medical condition or this instruction, always ask your healthcare professional. [address] any warranty or liability for your use of this information.

## 2019-05-24 NOTE — PROGRESS NOTES
Elan James, 70 y.o.,  female    SUBJECTIVE  ff-up     HTN-taking norvasc and cozaar. H/o prediabetes- continues to improve diet, resultant weight loss    GERD- doing well on PPI prn now than daily    Chronic low back pain/ Myofascial pain- followed by dr. Loyd Venegas, ongoing PT she finds helpful    Sleep problem- difficulty falling asleep, tosses and turns. Says sporadic bedtime routine,  She does not watch tv in bed. Trazodone previously tried thought to cause hair loss. Wants to try something else. Reports RLL pneumonia 3/2019, reviewed urgent care notes. She reports complete resolution of cough. ROS:  See HPI, all others negative        Patient Active Problem List   Diagnosis Code    HTN (hypertension) I10    Prediabetes R73.03    Left knee pain M25.562    Gastroesophageal reflux disease K21.9    Pruritus ani L29.0    Severe obesity (BMI 35.0-39.9) (formerly [facility]) E66.01       Current Outpatient Prescriptions   Medication Sig Dispense Refill    traZODone (DESYREL) 50 mg tablet Take 1 Tab by mouth nightly. 30 Tab 0    amLODIPine-valsartan (EXFORGE) 5-160 mg per tablet TAKE 1 TABLET DAILY 90 Tab 1    diclofenac EC (VOLTAREN) 75 mg EC tablet Take 1 Tab by mouth two (2) times daily as needed. 60 Tab 5    cyclobenzaprine (FLEXERIL) 5 mg tablet TAKE ONE TABLET BY MOUTH NIGHTLY AS NEEDED FOR MUSCLE SPASM(S) 30 Tab 2    cetirizine HCl (ZYRTEC PO) Take  by mouth.  fluticasone (FLONASE) 50 mcg/actuation nasal spray 2 sprays each nostril once daily 1 Bottle 3    omeprazole (PRILOSEC) 20 mg capsule Take 20 mg by mouth daily.  omega-3 fatty acids-vitamin e (FISH OIL) 1,000 mg cap Take 1 Cap by mouth.  multivitamin (ONE A DAY) tablet Take 1 Tab by mouth daily.  triamcinolone acetonide (KENALOG) 0.025 % ointment Apply  to affected area two (2) times a day. use thin layer 30 g 0    LORATADINE/PSEUDOEPHEDRINE (CLARITIN-D 24 HOUR PO) Take  by mouth.          Allergies   Allergen Reactions    Pcn [Penicillins] Hives       Past Medical History:   Diagnosis Date    Eosinophilic esophagitis     started on flvent, dr. Theodore Piña Gastric ulcer 04/2017    dr Martinez Pulse, avoid nsaids    Gastritis 12/13/2017    gastritis, cont PPI dr. Theodore Piña GERD (gastroesophageal reflux disease)     erosive esophagitis 11/13 EGD    H/O colonoscopy 11/2013    normal    Hypertension     Left knee pain     Morbid obesity ([address]     Precordial pain     Prediabetes        Social History     Social History    Marital status:      Spouse name: N/A    Number of children: N/A    Years of education: N/A     Occupational History    Not on file.      Social History Main Topics    Smoking status: Never Smoker    Smokeless tobacco: Never Used    Alcohol use No    Drug use: No    Sexual activity: Not on file     Other Topics Concern    Not on file     Social History Narrative       Family History   Problem Relation Age of Onset    Heart Failure Mother     Heart Disease Mother      mi 52's    Asthma Other          OBJECTIVE    Physical Exam:     Visit Vitals  /76 (BP 1 Location: Left arm, BP Patient Position: Sitting)   Pulse 70   Temp 98 °F (36.7 °C) (Oral)   Resp 16   Ht 5' 3\" (1.6 m)   Wt 183 lb (83 kg)   SpO2 98%   BMI 32.42 kg/m²         General: alert, well-appearing, in no apparent distress or pain  CVS: normal rate, regular rhythm, distinct S1 and S2  Lungs:clear to ausculation bilaterally, no crackles, wheezing or rhonchi noted  Abdomen: soft, NT, ND  Extremities: no edema, no cyanosis,  Skin: warm, no lesions, rashes noted  Psych:  mood and affect normal.    Results for orders placed or performed during the hospital encounter of 05/08/19   LIPID PANEL   Result Value Ref Range    LIPID PROFILE          Cholesterol, total 165 <200 MG/DL    Triglyceride 63 <150 MG/DL    HDL Cholesterol 81 (H) 40 - 60 MG/DL    LDL, calculated 71.4 0 - 100 MG/DL    VLDL, calculated 12.6 MG/DL    CHOL/HDL Ratio 2.0 0 - 5. 0     METABOLIC PANEL, COMPREHENSIVE   Result Value Ref Range    Sodium 143 136 - 145 mmol/L    Potassium 3.9 3.5 - 5.5 mmol/L    Chloride 111 (H) 100 - 108 mmol/L    CO2 27 21 - 32 mmol/L    Anion gap 5 3.0 - 18 mmol/L    Glucose 109 (H) 74 - 99 mg/dL    BUN 20 (H) 7.0 - 18 MG/DL    Creatinine 0.78 0.6 - 1.3 MG/DL    BUN/Creatinine ratio 26 (H) 12 - 20      GFR est AA >60 >60 ml/min/1.73m2    GFR est non-AA >60 >60 ml/min/1.73m2    Calcium 9.1 8.5 - 10.1 MG/DL    Bilirubin, total 0.6 0.2 - 1.0 MG/DL    ALT (SGPT) 31 13 - 56 U/L    AST (SGOT) 26 15 - 37 U/L    Alk. phosphatase 66 45 - 117 U/L    Protein, total 6.5 6.4 - 8.2 g/dL    Albumin 3.8 3.4 - 5.0 g/dL    Globulin 2.7 2.0 - 4.0 g/dL    A-G Ratio 1.4 0.8 - 1.7         ASSESSMENT/PLAN  Diagnoses and all orders for this visit:    Sleep difficulties  Reiterated good sleep hygiene  Trial atarax    Prediabetes  Improving, commended on dietary changes/wt loss  Monitoring    Essential hypertension  Controlled,  Cont norvasc, cozaar    Gastroesophageal reflux disease, esophagitis presence not specified  Stable, cont prilosec prn    Osteopenia of necks of both femurs  Calc frax 7.6% and 0.6%  Cont ca/vit d/wt bearing exercises  Update 2020    Abnormal CXR  H/o RLL pneumonia on xray 3/2019  Repeat CXR, pt is now asymptomatic    BMI 32  Commended on wt loss, to cont    Ff-up in 1 months for sleep ff-up  Ff-up in 3 months for [facility]    Patient/guardian understands plan of care. Patient has provided input and agrees with goals. Future labs to be discussed on next visit.

## 2019-05-24 NOTE — PROGRESS NOTES
1. Have you been to the ER, urgent care clinic since your last visit? Hospitalized since your last visit? [facility]- chest strain/pneum 03/29/2019    2. Have you seen or consulted any other health care providers outside of the [address] [place] [address] since your last visit? Include any pap smears or colon screening.  No

## 2019-05-28 NOTE — TELEPHONE ENCOUNTER
This patient contacted office for the following prescriptions to be filled:    Medication requested :   Requested Prescriptions     Pending Prescriptions Disp Refills    amLODIPine (NORVASC) 5 mg tablet 90 Tab 0     Sig: Take 1 Tab by mouth daily.      PCP: tom  Pharmacy or Print: [facility]  Mail order or Local [facility]    Scheduled appointment if not seen by current providers in office: lov 5/24/19 chrystal 7/2/19

## 2019-05-29 ENCOUNTER — TELEPHONE (OUTPATIENT)
Dept: FAMILY MEDICINE CLINIC | Age: 64
End: 2019-05-29

## 2019-05-29 RX ORDER — AMLODIPINE BESYLATE 5 MG/1
5 TABLET ORAL DAILY
Qty: 90 TAB | Refills: 2 | Status: SHIPPED | OUTPATIENT
Start: 2019-05-29 | End: 2019-09-17 | Stop reason: SDUPTHER

## 2019-05-29 RX ORDER — LOSARTAN POTASSIUM 50 MG/1
50 TABLET ORAL DAILY
Qty: 90 TAB | Refills: 0 | Status: SHIPPED | OUTPATIENT
Start: 2019-05-29 | End: 2019-09-17 | Stop reason: SDUPTHER

## 2019-05-29 NOTE — TELEPHONE ENCOUNTER
Pt states that she has the wrong RX for her Amlodipine. She states that she normally takes Amlodipine- Valsartan. I tried to explain that her medication changed with the recall. It seemed to confuse her and now she would like to speak with the nurse. Please advise.

## 2019-05-29 NOTE — TELEPHONE ENCOUNTER
Spoke with patient and she is aware she should be taking losartan 50 mg and Amlodipine 5 mg daily. Patient verbalizes understanding.

## 2019-05-29 NOTE — TELEPHONE ENCOUNTER
Patient needs a refill on losartan 50 mg. Please advise. Patient had some confusion on which BP medications she should be taking. Patient was advised on Losartan 50 mg and Amlodipine 5 mg daily.

## 2019-06-04 NOTE — PROGRESS NOTES
[facility]  [address]  [place], [address]  [phone] fax    Physical Therapy Discharge Summary  Patient name: Tru Clarke Start of Care: 2018   Referral source: Pearl Painter NP : [id number]   Medical/Treatment Diagnosis: Low back pain [M54.5]  Myalgia, other site [M79.18]  Payor: Melia Maciel / Plan: [employer]ident [employer] / Product Type: Medicare /  Onset Date:2-3 years     Prior Hospitalization: see medical history Provider#: [id number]   Medications: Verified on Patient Summary List     Comorbidities: HTN, right foot pain, otherwise unremarkable per pt report   Prior Level of Function: sedentary for many years, independent with all daily activity  Visits from Start of Care: 12    Missed Visits: 1  Reporting Period : 2018 to 2019    Summary of Care:         Progress towards goals / Updated goals:      Short term goals: to be accomplished within 2 weeks:  1. The patient will be independent and compliant with HEP to maximize therapeutic benefit. Progressing: Pt reports performing 3 of the 4 home exercises 1-2 times per day. (19).   Goal met: Pt reports compliance 3x per day performing all 4 home exercises. (1/10/19).     2. The patient will improve trunk rotation to 50% or better bilat void of reports of pain provocation. Pt continues to c/o pain/\"pulling\" in her low back with trunk rotation. (19).   Goal met: B trunk rotation AROM to 75% of WNL with no pain reports, but mild \"pulling\". (19).     Long term goals: to be accomplished within 4 weeks:  1. The patient will improve FOTO score to anticipated outcome to maximize quality of life. Goal met: FOTO score of 53. (19).     2. The patient will improve hip ABD/EXT strength bilaterally to 4/5 MMT to maximize stability in stance for improved efficiency of house work. Not met: MMT: B hip abd/flex/ext 4-/5 with \"breakaway\" weakness.  (19).             3.  The patient will demonstrate effective squat form and adequate lifting technique for improved ease of chore production around home. Progressing 2/12/19 - patient requires minimal cueing for proper squat mechanics  4. The patient will demonstrate trunk flexion fingers to toes to improve trunk mobility and ease of daily activity. Progressing: Trunk flexion AROM to 75% of WNL. (1/24/19).       ASSESSMENT/RECOMMENDATIONS:  Pt had reported little to no improvement as of last visit, though she did report some brief relief with aquatic PT. Unable to further assess progress towards goals at this time due to non-compliance/lack of attendance. DC at this time with no further instructions to the patient.   Thank you for this referral.    [x]Discontinue therapy: []Patient has reached or is progressing toward set goals      [x]Patient is non-compliant or has abdicated      [x]Due to lack of appreciable progress towards set goals    Kylah Alex, PT, DPT, ATC 6/4/2019 3:09 PM

## 2019-07-10 ENCOUNTER — OFFICE VISIT (OUTPATIENT)
Dept: ORTHOPEDIC SURGERY | Age: 64
End: 2019-07-10

## 2019-07-10 VITALS
HEART RATE: 61 BPM | WEIGHT: 181 LBS | OXYGEN SATURATION: 98 % | TEMPERATURE: 96.5 F | HEIGHT: 63 IN | DIASTOLIC BLOOD PRESSURE: 69 MMHG | SYSTOLIC BLOOD PRESSURE: 128 MMHG | BODY MASS INDEX: 32.07 KG/M2 | RESPIRATION RATE: 17 BRPM

## 2019-07-10 DIAGNOSIS — M76.61 ACHILLES TENDINITIS OF RIGHT LOWER EXTREMITY: Primary | ICD-10-CM

## 2019-07-10 NOTE — LETTER
NOTIFICATION RETURN TO WORK / SCHOOL 
 
7/10/2019 8:08 AM 
 
Ms. Shantel Langford [address] [address] [address] [address] To Whom It May Concern: 
 
Shantel Langford is currently under the care of [address] [place] [address] Zoltan Mejia. She will not be able to participate in any gym activities for 4 weeks. If there are questions or concerns please have the patient contact our office.  
 
 
 
Sincerely, 
 
 
Elsa Marshall MD

## 2019-07-10 NOTE — PROGRESS NOTES
AMBULATORY PROGRESS NOTE      Patient: Lari Ganser             MRN: [id number]     SSN: [id number] Body mass index is 32.06 kg/m². YOB: 1955     AGE: 75 y.o. EX: female    PCP: Becca Soriano MD     IMPRESSION/DIAGNOSIS AND TREATMENT PLAN     DIAGNOSES  1. Achilles tendinitis of right lower extremity        Orders Placed This Encounter    MRI ANKLE RT WO CONT      Lari Ganser understands her diagnoses and the proposed plan. Because of her continued pain and discomfort to her right hindfoot along the right Achilles tendon and diagnosis of insertional Achilles tendinitis, recommend is for an MRI of her right Achilles tendon to assess any key component of tendinopathy. She had x-rays done on January 16, 2019, of her right foot, weightbearing, with comparison left foot films, and these show a prominence of bone to the posterior portion of the right calcaneus in the lateral radiographic x-ray. No calcium deposits within the tendon were seen on the x-rays at that time. On her left side, she had postoperative changes from her calcaneal surgery, her calcaneal ostectomy where she had her Achilles tendon surgery in the past.     She is doing well from her left Achilles tendon surgery that was done at another location by another orthopedic provider. On the right side, she is still having pain and discomfort. Again, because of her continued pain and discomfort, recommendation is for an MRI of the right hindfoot to assess the right Achilles tendon. She wants to have surgery due to worsening pain and discomfort and due to her having pain and discomfort to the Achilles tendon for over one years' duration now despite a home exercise program, physical therapy, anti-inflammatories, shoe wear changes, and activity modifications. Plan:    1) MRI without IV Contrast of the right ankle; please assess the Achilles tendon. 2) Continue activity modification as directed.     3) Plan for surgery after MRI. RTO - after MRI     HPI AND EXAMINATION     Paulette Valencia IS A 75 y.o. female who presents to my outpatient office for follow up of Achilles tendinitis of the RLE. At the last visit, I provided an order for Tuli's heel cups, provided an HEP, instructed the patient to continue activity modification as directed, to take OTC NSAIDs as needed for pain, and to anticipate a CAM walker boot if condition does not improve. Since we saw her last, Ms. Aubrie Pfeiffer states that she is still experiencing the same level of pain in her right Achilles tendon. She notes that she had surgery on her left Achilles tendon 2.5 years ago and recalls that it helped greatly with her pain. She reports that her right is now experiencing the same type of pain and she expresses that she wants to go forth with surgery. She reports that some days, the pain is beginning to affect her knee and leg, as she finds herself limping. Ms. Aubrie Pfeiffer reports that she has been dealing with this pain for over a year now, and she reports that she experiences the pain constantly. She finds that she needs to wear shoes with a bit of a lift in them always, even if she is just walking to the bathroom in the middle of the night. Visit Vitals  /69 (BP 1 Location: Left arm, BP Patient Position: Sitting)   Pulse 61   Temp 96.5 °F (35.8 °C) (Oral)   Resp 17   Ht 5' 3\" (1.6 m)   Wt 181 lb (82.1 kg)   SpO2 98%   BMI 32.06 kg/m²      Appearance: Alert, well appearing and pleasant patient who is in no distress, oriented to person, place/time, and who follows commands. This patient is accompanied in the examination room by her self. Dementia: no dementia  Psychiatric: Affect and mood are appropriate. Patient arrives to office via: without assistive device  HEENT: Head normocephalic & atraumatic.  Both pupils are round, non icteric sclera   Eye: EOM are intact and sclera are clear    Neck: ROM WNL and JVD neck is not present     Hearings Intact, does not require hearing aid device  Respiratory: Breathing is unlabored without accessory chest muscle use  Cardiovascular/Peripheral Vascular: Normal Pulses to each foot      ANKLE/FOOT right    Gait: antalgic  Tenderness: Mild tenderness to the Achilles tendon. NT to the posterior tibial tendon. Cutaneous: Fullness on the distal Achilles tendon. Joint Motion: WNL. Joint / Tendon Stability: No Ankle or Subtalar instability or joint laxity. No peroneal sublux ability or dislocation  Alignment: Moderate pes planus, bilaterally  Neuro Motor/Sensory: NL/NL. Vascular: NL foot/ankle pulses. Lymphatics: No extremity lymphedema, No calf swelling, no tenderness to calf muscles. CHART REVIEW     Past Medical History:   Diagnosis Date    Eosinophilic esophagitis     started on flvent, dr. Pierre Hsu Gastric ulcer 04/2017    dr Edi Shankar, avoid nsaids    Gastritis 12/13/2017    gastritis, cont PPI dr. Pierre Hsu GERD (gastroesophageal reflux disease)     erosive esophagitis 11/13 EGD    H/O colonoscopy 11/2013    normal    Hypertension     Left knee pain     Morbid obesity ([address]     Precordial pain     Prediabetes      Current Outpatient Medications   Medication Sig    amLODIPine (NORVASC) 5 mg tablet Take 1 Tab by mouth daily.  losartan (COZAAR) 50 mg tablet Take 1 Tab by mouth daily.  hydrOXYzine pamoate (VISTARIL) 25 mg capsule Take 1 Cap by mouth nightly.  diclofenac EC (VOLTAREN) 75 mg EC tablet Take 1 Tab by mouth two (2) times daily as needed.  cyclobenzaprine (FLEXERIL) 5 mg tablet TAKE ONE TABLET BY MOUTH NIGHTLY AS NEEDED FOR MUSCLE SPASM(S)    omeprazole (PRILOSEC) 20 mg capsule Take 1 Cap by mouth daily.  fluticasone (FLONASE) 50 mcg/actuation nasal spray 2 sprays each nostril once daily    triamcinolone acetonide (KENALOG) 0.025 % ointment Apply  to affected area two (2) times a day.  use thin layer    omega-3 fatty acids-vitamin e (FISH OIL) 1,000 mg cap Take 1 Cap by mouth.  multivitamin (ONE A DAY) tablet Take 1 Tab by mouth daily. No current facility-administered medications for this visit. Allergies   Allergen Reactions    Pcn [Penicillins] Hives     Past Surgical History:   Procedure Laterality Date    HX CATARACT REMOVAL Right 10/04/2016    HX COLONOSCOPY  11/06/2016    HX GYN      vaginal delivery x 2    HX ORTHOPAEDIC      foot surgery     Social History     Occupational History    Not on file   Tobacco Use    Smoking status: Never Smoker    Smokeless tobacco: Never Used   Substance and Sexual Activity    Alcohol use: No    Drug use: No    Sexual activity: Not on file     Family History   Problem Relation Age of Onset    Heart Failure Mother     Heart Disease Mother         mi 52's    Asthma Other         REVIEW OF SYSTEMS : 7/10/2019  ALL BELOW ARE Negative except : SEE HPI     Constitutional: Negative for fever, chills and weight loss. Neg Weight Loss  Cardiovascular: Negative for chest pain, claudication and leg swelling. SOB, ROOT   Gastrointestinal/Urological: Negative for  pain, N/V/D/C, Blood in stool or urine,dysuria                         Hematuria, Incontinence, pelvic pain  Musculoskeletal: see HPI. Neurological: Negative for dizziness and weakness, headaches,Visual Changes             Confusion,  Or Seizures,   Psychiatric/Behavioral: Negative for depression, memory loss and substance abuse. Extremities:  Negative for hair changes, rash or skin lesion changes. Hematologic: Negative for Bleeding problems, bruising, pallor or swollen lymph nodes. Peripheral Vascular: No calf pain, vascular vein tenderness to calf pain              No calf throbbing, posterior knee throbbing pain     DIAGNOSTIC IMAGING     No notes on file    Please see above section of this report. I have personally reviewed the results of the above study. The interpretation of this study is my professional opinion.     Written by Kar Weller, Oksana Gerard, as dictated by Dr. Galen Rodriguez. I, Dr. Gaeln Rodriguez, confirm that all documentation is accurate.

## 2019-07-10 NOTE — PATIENT INSTRUCTIONS
An MRI or CT has been ordered for you. A [facility] will be contacting you to schedule the appointment as soon as it has been approved with your insurance company. Please schedule an appointment to follow up with the doctor or the physicians assistant after the MRI or CT has been conducted. Tendon Injury (Tendinopathy): Care Instructions Your Care Instructions Tendons are tough, flexible tissues that connect muscle to bone. A tendon can hurt or get torn from overuse or aging, especially tendons in the shoulder, elbow, wrist, hip, knee, or ankle. Tendon injuries may be called tendinopathy or tendinitis. Tendon injuries can occur from any motion you have to repeat in a job, sports, or daily activities. Tennis elbow is one common tendon injury. You can treat most tendon problems with over-the-counter pain medicine, rest, changes in your activities, and physical therapy. Follow-up care is a key part of your treatment and safety. Be sure to make and go to all appointments, and call your doctor if you are having problems. It's also a good idea to know your test results and keep a list of the medicines you take. How can you care for yourself at home? · Rest the sore area. You may have to stop doing the activity that caused the tendon pain for a while. · Take an over-the-counter pain medicine, such as acetaminophen (Tylenol), ibuprofen (Advil, Motrin), or naproxen (Aleve). Read and follow all instructions on the label. · Do not take two or more pain medicines at the same time unless the doctor told you to. Many pain medicines have acetaminophen, which is Tylenol. Too much acetaminophen (Tylenol) can be harmful. · Put ice or a cold pack on the sore area for 10 to 20 minutes at a time. Try to do this every 1 to 2 hours for the next 3 days (when you are awake) or until any swelling goes down. Put a thin cloth between the ice and your skin. · Prop up the sore area on a pillow when you ice it or anytime you sit or lie down during the next 3 days. Try to keep it above the level of your heart. This will help reduce swelling. · Follow your doctor's advice for wearing and caring for a sling, splint, or cast. In some cases, you may wear one of these for a while to help your tendon heal. 
· Follow your doctor's advice for stretching and physical therapy. Gently move your joint through its full range of motion. This will prevent stiffness in your joint. · Go back to your activity slowly. Warm up before and stretch after the activity. You also can try making some changes. For example, if a sport caused your tendon pain, alternate the sport with another activity. If using a tool causes pain, switch hands or change your . Stop the activity if it hurts. After the activity, apply ice to prevent pain and swelling. · Do not smoke. Smoking can slow healing. If you need help quitting, talk to your doctor about stop-smoking programs and medicines. These can increase your chances of quitting for good. When should you call for help? Watch closely for changes in your health, and be sure to contact your doctor if: 
  · Your pain gets worse.  
  · You do not get better as expected. Where can you learn more? Go to http://carlos eduardo-turner.info/. Enter A157 in the search box to learn more about \"Tendon Injury (Tendinopathy): Care Instructions. \" Current as of: September 20, 2018 Content Version: 11.9 © 5482-3135 [employer]. Care instructions adapted under license by [employer] (which disclaims liability or warranty for this information). If you have questions about a medical condition or this instruction, always ask your healthcare professional. [address] any warranty or liability for your use of this information.

## 2019-07-10 NOTE — PROGRESS NOTES
1. Have you been to the ER, urgent care clinic since your last visit? Hospitalized since your last visit? No    2. Have you seen or consulted any other health care providers outside of the [address] [place] [address] since your last visit? Include any pap smears or colon screening.  No

## 2019-07-18 ENCOUNTER — HOSPITAL ENCOUNTER (OUTPATIENT)
Age: 64
Discharge: HOME | End: 2019-07-18
Attending: ORTHOPAEDIC SURGERY
Payer: MEDICARE

## 2019-07-18 DIAGNOSIS — M76.61 ACHILLES TENDINITIS OF RIGHT LOWER EXTREMITY: ICD-10-CM

## 2019-07-18 PROCEDURE — 73721 MRI JNT OF LWR EXTRE W/O DYE: CPT

## 2019-07-23 ENCOUNTER — OFFICE VISIT (OUTPATIENT)
Dept: ORTHOPEDIC SURGERY | Age: 64
End: 2019-07-23

## 2019-07-23 VITALS
TEMPERATURE: 96.7 F | RESPIRATION RATE: 15 BRPM | HEIGHT: 63 IN | OXYGEN SATURATION: 99 % | BODY MASS INDEX: 32.78 KG/M2 | WEIGHT: 185 LBS | SYSTOLIC BLOOD PRESSURE: 116 MMHG | DIASTOLIC BLOOD PRESSURE: 65 MMHG | HEART RATE: 72 BPM

## 2019-07-23 DIAGNOSIS — M76.61 ACHILLES TENDINITIS OF RIGHT LOWER EXTREMITY: Primary | ICD-10-CM

## 2019-07-23 NOTE — PROGRESS NOTES
1. Have you been to the ER, urgent care clinic since your last visit? Hospitalized since your last visit? No    2. Have you seen or consulted any other health care providers outside of the [address] [place] [address] since your last visit? Include any pap smears or colon screening.  No

## 2019-07-23 NOTE — PROGRESS NOTES
AMBULATORY PROGRESS NOTE      Patient: Zahra Neves             MRN: [id number]     SSN: [id number] Body mass index is 32.77 kg/m². YOB: 1955     AGE: 75 y.o. EX: female    PCP: Quinn Tejeda MD     IMPRESSION/DIAGNOSIS AND TREATMENT PLAN     DIAGNOSES  1. Achilles tendinitis of right lower extremity        No orders of the defined types were placed in this encounter. Zahra Neves understands her diagnoses and the proposed plan. She has severe, noninsertional and insertional right Achilles tendinopathy. She had an MRI on July 8, 2019, that shows severe Achilles tendinopathy/tendinosis involving at least 60% of the anterior portion of her Achilles tendon indicating a partial thickness tear at the insertion point and associated retrocalcaneal bursitis, no discreet mass. Because of her continued pain and discomfort, because she has failed conservative treatment, she tried anti-inflammatories, shoe wear changes, activity modification, CAM walker boot, bracing, and is having disabling pain, recommendation is for Achilles tendon surgery, specifically:    1. Sharp, excisional debridement of the right Achilles tendon. 2. Primary repair of the right Achilles tendon. 3. Right flexor hallucis longus tendon augmentation procedure using the [employer] tenodesis suture anchor technique. 4. I anticipate also using the [employer] speed bridge type system as well using fiber tape. 5. Prone position. We will get things scheduled for her at her convenience. She wants to have surgery sometime in September. She has had left Achilles tendon surgery in the past and has done well from this. Plan:    1) DME Order: Short right CAM walker boot. 2) Continue activity modification as directed. 3) Contact Dwayne Jean Baptiste if you decide you would like to proceed with surgery.      RTO - 10 weeks     HPI AND EXAMINATION     Zahra Neves IS A 75 y.o. female who presents to my outpatient office for follow up of Achilles tendinitis of the RLE. At the last visit, I ordered an MRI of the right ankle, instructed the patient to continue activity modification as directed, and to anticipate surgery after the MRI. Since we saw her last, Ms. Micaela Bustos states that she is still experiencing pain in her right Achilles tendon. She notes that she experiences pain with every step she takes. She reports that she tried to wear a compression sleeve, but notes that it was too tight and that she had to take it off. MRI results have been reviewed with the patient. Possible surgical interventions have been discussed with the patient. She recalls that she had surgery on the left Achilles tendon in the past. She notes that the pain in her right Achilles tendon is as bad as the pain was in her left tendon. She reports that she would like to wait until the fall to get surgery. Visit Vitals  /65   Pulse 72   Temp 96.7 °F (35.9 °C) (Oral)   Resp 15   Ht 5' 3\" (1.6 m)   Wt 185 lb (83.9 kg)   SpO2 99%   BMI 32.77 kg/m²      Appearance: Alert, well appearing and pleasant patient who is in no distress, oriented to person, place/time, and who follows commands. This patient is accompanied in the examination room by her self. Dementia: no dementia  Psychiatric: Affect and mood are appropriate. Patient arrives to office via: without assistive device  HEENT: Head normocephalic & atraumatic. Both pupils are round, non icteric sclera   Eye: EOM are intact and sclera are clear    Neck: ROM WNL and JVD neck is not present     Hearings Intact, does not require hearing aid device  Respiratory: Breathing is unlabored without accessory chest muscle use  Cardiovascular/Peripheral Vascular: Normal Pulses to each foot      ANKLE/FOOT right    Gait: antalgic  Tenderness: Mild tenderness to the Achilles tendon. NT to the posterior tibial tendon. Cutaneous: Fullness on the distal Achilles tendon.    Joint Motion: 5 degrees past neutral of DF  Joint / Tendon Stability: No Ankle or Subtalar instability or joint laxity. No peroneal sublux ability or dislocation  Alignment: Moderate pes planus, bilaterally  Neuro Motor/Sensory: NL/NL. Vascular: NL foot/ankle pulses. Lymphatics: No extremity lymphedema, No calf swelling, no tenderness to calf muscles. CHART REVIEW     Past Medical History:   Diagnosis Date    Eosinophilic esophagitis     started on flvent, dr. Marge Thomas Gastric ulcer 04/2017    dr Marco A Dumas, avoid nsaids    Gastritis 12/13/2017    gastritis, cont PPI dr. Marge Thomas GERD (gastroesophageal reflux disease)     erosive esophagitis 11/13 EGD    H/O colonoscopy 11/2013    normal    Hypertension     Left knee pain     Morbid obesity ([address]     Precordial pain     Prediabetes      Current Outpatient Medications   Medication Sig    amLODIPine (NORVASC) 5 mg tablet Take 1 Tab by mouth daily.  losartan (COZAAR) 50 mg tablet Take 1 Tab by mouth daily.  hydrOXYzine pamoate (VISTARIL) 25 mg capsule Take 1 Cap by mouth nightly.  diclofenac EC (VOLTAREN) 75 mg EC tablet Take 1 Tab by mouth two (2) times daily as needed.  cyclobenzaprine (FLEXERIL) 5 mg tablet TAKE ONE TABLET BY MOUTH NIGHTLY AS NEEDED FOR MUSCLE SPASM(S)    omeprazole (PRILOSEC) 20 mg capsule Take 1 Cap by mouth daily.  fluticasone (FLONASE) 50 mcg/actuation nasal spray 2 sprays each nostril once daily    triamcinolone acetonide (KENALOG) 0.025 % ointment Apply  to affected area two (2) times a day. use thin layer    omega-3 fatty acids-vitamin e (FISH OIL) 1,000 mg cap Take 1 Cap by mouth.  multivitamin (ONE A DAY) tablet Take 1 Tab by mouth daily. No current facility-administered medications for this visit.       Allergies   Allergen Reactions    Pcn [Penicillins] Hives     Past Surgical History:   Procedure Laterality Date    HX CATARACT REMOVAL Right 10/04/2016    HX COLONOSCOPY  11/06/2016    HX GYN      vaginal delivery x 2    HX ORTHOPAEDIC      foot surgery     Social History     Occupational History    Not on file   Tobacco Use    Smoking status: Never Smoker    Smokeless tobacco: Never Used   Substance and Sexual Activity    Alcohol use: No    Drug use: No    Sexual activity: Not on file     Family History   Problem Relation Age of Onset    Heart Failure Mother     Heart Disease Mother         mi 52's    Asthma Other        . [email or web address]       DIAGNOSTIC IMAGING     No notes on file         MRI Results (most recent):  Results from Hospital Encounter encounter on 07/18/19   MRI ANKLE RT WO CONT    Narrative Multisequence multiplanar MR images of the right ankle were obtained. HISTORY: Pain for one year. Severe Achilles tendinosis, with about 60% anterior partial-thickness tear at  the insertion site. Moderately severe retrocalcaneal bursitis with inflammation  at the pre-Achilles fat pad. No discrete mass. No significant protuberance of  the posterior superior calcaneus. Plantar calcaneal spur with no erosion. Mild chronic plantar fasciitis. No bone contusion or fracture. No ankle joint effusion. Talar dome is intact. No  focal cartilage defect. Sinus tarsi and tarsal tunnel unremarkable. Mild dorsal  talonavicular spur with thickening of the ligament, with mild sprain. Mild medial subcutaneous edema. Posterior tibialis and flexor tendons are  intact. Deltoid ligament is intact. Also mild lateral subcutaneous edema. Peroneus longus and brevis are intact. Tibiofibular, talofibular ligaments are intact. Mild edema present. Calcaneofibular ligament is intact. Extensor tendons are intact at the ankle level. Lisfranc ligament is intact. Impression IMPRESSION:  1. Severe Achilles tendinosis with about 60% anterior partial-thickness tear at  insertion site. Retrocalcaneal bursitis. No discrete mass. 2. Mild medial and lateral soft tissue edema with no significant ligamentous  injuries.  Medial and lateral tendons remain intact. Please see above section of this report. I have personally reviewed the results of the above study. The interpretation of this study is my professional opinion. Written by Lenore Jamil, as dictated by Dr. Jin Lucero. I, Dr. Jin Lucero, confirm that all documentation is accurate.

## 2019-09-17 RX ORDER — LOSARTAN POTASSIUM 50 MG/1
50 TABLET ORAL DAILY
Qty: 90 TAB | Refills: 0 | Status: SHIPPED | OUTPATIENT
Start: 2019-09-17 | End: 2019-11-14

## 2019-09-17 RX ORDER — AMLODIPINE BESYLATE 5 MG/1
5 TABLET ORAL DAILY
Qty: 90 TAB | Refills: 2 | Status: SHIPPED | OUTPATIENT
Start: 2019-09-17 | End: 2020-06-09 | Stop reason: SDUPTHER

## 2019-09-17 NOTE — TELEPHONE ENCOUNTER
This patient contacted office for the following prescriptions to be filled:    Medication requested :   Requested Prescriptions     Pending Prescriptions Disp Refills    amLODIPine (NORVASC) 5 mg tablet 90 Tab 2     Sig: Take 1 Tab by mouth daily.  losartan (COZAAR) 50 mg tablet 90 Tab 0     Sig: Take 1 Tab by mouth daily.      PCP: [address] [place] [address] or Print: PRINT  Mail order or Local pharmacy     Scheduled appointment if not seen by current providers in office: LOV: 5-24-19 YRE:[id number]

## 2019-10-14 ENCOUNTER — OFFICE VISIT (OUTPATIENT)
Dept: FAMILY MEDICINE CLINIC | Age: 64
End: 2019-10-14

## 2019-10-14 VITALS
HEART RATE: 78 BPM | OXYGEN SATURATION: 98 % | TEMPERATURE: 98.6 F | WEIGHT: 174 LBS | HEIGHT: 63 IN | SYSTOLIC BLOOD PRESSURE: 124 MMHG | DIASTOLIC BLOOD PRESSURE: 86 MMHG | BODY MASS INDEX: 30.83 KG/M2 | RESPIRATION RATE: 16 BRPM

## 2019-10-14 DIAGNOSIS — M79.18 MYOFASCIAL PAIN: ICD-10-CM

## 2019-10-14 DIAGNOSIS — G47.9 SLEEP DIFFICULTIES: Primary | ICD-10-CM

## 2019-10-14 DIAGNOSIS — Z23 ENCOUNTER FOR IMMUNIZATION: ICD-10-CM

## 2019-10-14 DIAGNOSIS — I10 ESSENTIAL HYPERTENSION: ICD-10-CM

## 2019-10-14 DIAGNOSIS — K21.9 GASTROESOPHAGEAL REFLUX DISEASE, ESOPHAGITIS PRESENCE NOT SPECIFIED: ICD-10-CM

## 2019-10-14 DIAGNOSIS — S29.012A UPPER BACK STRAIN, INITIAL ENCOUNTER: ICD-10-CM

## 2019-10-14 DIAGNOSIS — M85.851 OSTEOPENIA OF NECKS OF BOTH FEMURS: ICD-10-CM

## 2019-10-14 DIAGNOSIS — M85.852 OSTEOPENIA OF NECKS OF BOTH FEMURS: ICD-10-CM

## 2019-10-14 DIAGNOSIS — R73.03 PREDIABETES: ICD-10-CM

## 2019-10-14 RX ORDER — AMITRIPTYLINE HYDROCHLORIDE 10 MG/1
10 TABLET, FILM COATED ORAL
Qty: 90 TAB | Refills: 0 | Status: SHIPPED | OUTPATIENT
Start: 2019-10-14 | End: 2019-11-15

## 2019-10-14 RX ORDER — AMITRIPTYLINE HYDROCHLORIDE 10 MG/1
10 TABLET, FILM COATED ORAL
Qty: 90 TAB | Refills: 0 | Status: SHIPPED | OUTPATIENT
Start: 2019-10-14 | End: 2019-10-14 | Stop reason: SDUPTHER

## 2019-10-14 RX ORDER — NAPROXEN 500 MG/1
500 TABLET ORAL 2 TIMES DAILY WITH MEALS
Qty: 60 TAB | Refills: 0 | Status: SHIPPED | OUTPATIENT
Start: 2019-10-14 | End: 2020-01-14

## 2019-10-14 RX ORDER — NAPROXEN 500 MG/1
500 TABLET ORAL 2 TIMES DAILY WITH MEALS
Qty: 60 TAB | Refills: 0 | Status: SHIPPED | OUTPATIENT
Start: 2019-10-14 | End: 2019-10-14 | Stop reason: SDUPTHER

## 2019-10-14 NOTE — PROGRESS NOTES
1. Have you been to the ER, urgent care clinic since your last visit? Hospitalized since your last visit? No    2. Have you seen or consulted any other health care providers outside of the [address] [place] [address] since your last visit? Include any pap smears or colon screening. No   Fluad 0.5 ml given IM in left deltoid. Lot # [id number], exp date 06/30/2020. Patient tolerated injection well. No adverse reaction noted.

## 2019-10-14 NOTE — PATIENT INSTRUCTIONS
Vaccine Information Statement Influenza (Flu) Vaccine (Inactivated or Recombinant): What You Need to Know Many Vaccine Information Statements are available in Mongolian and other languages. See www.immunize.org/vis Hojas de información sobre vacunas están disponibles en español y en muchos otros idiomas. Visite www.immunize.org/vis 1. Why get vaccinated? Influenza vaccine can prevent influenza (flu). Flu is a contagious disease that spreads around the [facility] every year, usually between October and May. Anyone can get the flu, but it is more dangerous for some people. Infants and young children, people 72years of age and older, pregnant women, and people with certain health conditions or a weakened immune system are at greatest risk of flu complications. Pneumonia, bronchitis, sinus infections and ear infections are examples of flu-related complications. If you have a medical condition, such as heart disease, cancer or diabetes, flu can make it worse. Flu can cause fever and chills, sore throat, muscle aches, fatigue, cough, headache, and runny or stuffy nose. Some people may have vomiting and diarrhea, though this is more common in children than adults. Each year thousands of people in the [facility] die from flu, and many more are hospitalized. Flu vaccine prevents millions of illnesses and flu-related visits to the doctor each year. 2. Influenza vaccines CDC recommends everyone 10months of age and older get vaccinated every flu season. Children 6 months through 6years of age may need 2 doses during a single flu season. Everyone else needs only 1 dose each flu season. It takes about 2 weeks for protection to develop after vaccination. There are many flu viruses, and they are always changing. Each year a new flu vaccine is made to protect against three or four viruses that are likely to cause disease in the upcoming flu season.  Even when the vaccine doesnt exactly match these viruses, it may still provide some protection. Influenza vaccine does not cause flu. Influenza vaccine may be given at the same time as other vaccines. 3. Talk with your health care provider Tell your vaccine provider if the person getting the vaccine: 
 Has had an allergic reaction after a previous dose of influenza vaccine, or has any severe, life-threatening allergies.  Has ever had Guillain-Barré Syndrome (also called GBS). In some cases, your health care provider may decide to postpone influenza vaccination to a future visit. People with minor illnesses, such as a cold, may be vaccinated. People who are moderately or severely ill should usually wait until they recover before getting influenza vaccine. Your health care provider can give you more information. 4. Risks of a reaction  Soreness, redness, and swelling where shot is given, fever, muscle aches, and headache can happen after influenza vaccine.  There may be a very small increased risk of Guillain-Barré Syndrome (GBS) after inactivated influenza vaccine (the flu shot). [place] children who get the flu shot along with pneumococcal vaccine (PCV13), and/or DTaP vaccine at the same time might be slightly more likely to have a seizure caused by fever. Tell your health care provider if a child who is getting flu vaccine has ever had a seizure. People sometimes faint after medical procedures, including vaccination. Tell your provider if you feel dizzy or have vision changes or ringing in the ears. As with any medicine, there is a very remote chance of a vaccine causing a severe allergic reaction, other serious injury, or death. 5. What if there is a serious problem? An allergic reaction could occur after the vaccinated person leaves the clinic.  If you see signs of a severe allergic reaction (hives, swelling of the face and throat, difficulty breathing, a fast heartbeat, dizziness, or weakness), call 9-1-1 and get the person to the nearest hospital. 
 
For other signs that concern you, call your health care provider. Adverse reactions should be reported to the Vaccine Adverse Event Reporting System (VAERS). Your health care provider will usually file this report, or you can do it yourself. Visit the VAERS website at www.vaers. hhs.gov or call 6-486.942.2989. VAERS is only for reporting reactions, and VAERS staff do not give medical advice. 6. The National Vaccine Injury Compensation Program 
 
The [facility] Vaccine Injury Compensation Program (VICP) is a federal program that was created to compensate people who may have been injured by certain vaccines. Visit the VICP website at www.[facility].gov/vaccinecompensation or call 2-297.183.6845 to learn about the program and about filing a claim. There is a time limit to file a claim for compensation. 7. How can I learn more?  Ask your health care provider.  Call your local or state health department.  Contact the Centers for Disease Control and Prevention (CDC): 
- Call 3-275.506.1762 (2-176-BZJ-INFO) or 
- Visit CDCs influenza website at www.cdc.gov/flu Vaccine Information Statement (Interim) Inactivated Influenza Vaccine 8/15/2019 
[address] 430YN-51 Department of Health and [employer] Centers for Disease Control and Prevention Office Use Only Healthy Upper Back: Exercises Introduction Here are some examples of exercises for your upper back. Start each exercise slowly. Ease off the exercise if you start to have pain. Your doctor or physical therapist will tell you when you can start these exercises and which ones will work best for you. How to do the exercises Lower neck and upper back stretch 1. Stretch your arms out in front of your body. Clasp one hand on top of your other hand. 2. Gently reach out so that you feel your shoulder blades stretching away from each other. 3. Gently bend your head forward. 4. Hold for 15 to 30 seconds. 5. Repeat 2 to 4 times. Midback stretch 1. Kneel on the floor, and sit back on your ankles. 2. Lean forward, place your hands on the floor, and stretch your arms out in front of you. Rest your head between your arms. 3. Gently push your chest toward the floor, reaching as far in front of you as possible. 4. Hold for 15 to 30 seconds. 5. Repeat 2 to 4 times. Shoulder rolls 1. Sit comfortably with your feet shoulder-width apart. You can also do this exercise while standing. 2. Roll your shoulders up, then back, and then down in a smooth, circular motion. 3. Repeat 2 to 4 times. Wall push-up 1. Stand against a wall with your feet about 12 to 24 inches back from the wall. If you feel any pain when you do this exercise, stand closer to the wall. 2. Place your hands on the wall slightly wider apart than your shoulders, and lean forward. 3. Gently lean your body toward the wall. Then push back to your starting position. Keep the motion smooth and controlled. 4. Repeat 8 to 12 times. Resisted shoulder blade squeeze 1. Sit or stand, holding the band in both hands in front of you. Keep your elbows close to your sides, bent at a 90-degree angle. Your palms should face up. 2. Squeeze your shoulder blades together, and move your arms to the outside, stretching the band. Be sure to keep your elbows at your sides while you do this. 3. Relax. 4. Repeat 8 to 12 times. Resisted rows 1. Put the band around a solid object, such as a bedpost, at about waist level. Hold one end of the band in each hand. 2. With your elbows at your sides and bent to 90 degrees, pull the band back to move your shoulder blades toward each other. Return to the starting position. 3. Repeat 8 to 12 times. Follow-up care is a key part of your treatment and safety.  Be sure to make and go to all appointments, and call your doctor if you are having problems. It's also a good idea to know your test results and keep a list of the medicines you take. Where can you learn more? Go to http://carlos eduardo-turner.info/. Enter B922 in the search box to learn more about \"Healthy Upper Back: Exercises. \" Current as of: June 26, 2019 Content Version: 12.2 © 1441-1979 [employer]. Care instructions adapted under license by [employer] (which disclaims liability or warranty for this information). If you have questions about a medical condition or this instruction, always ask your healthcare professional. [place] [address] any warranty or liability for your use of this information.

## 2019-10-14 NOTE — PROGRESS NOTES
Tomasa Ruth, 70 y.o.,  female    SUBJECTIVE  ff-up     HTN-taking norvasc and cozaar. H/o prediabetes    GERD- doing well on PPI prn now than daily    Sleep problem- difficulty falling asleep, tosses and turns. Says sporadic bedtime routine,  She does not watch tv in bed. Trazodone previously tried thought to cause hair loss. She reports trial hydroxyzine from last visit is ineffective. C/o R upper back/shoulder pain described as soreness past 3 weeks. Denies trauma, weakness, paresthesia. She denies new activity or lifting. Says taking tylenol and ibuprofen without relief. Says she has left over flexeril at home for lower back myofascial pain she has not yet used. ROS:  See HPI, all others negative        Patient Active Problem List   Diagnosis Code    HTN (hypertension) I10    Prediabetes R73.03    Left knee pain M25.562    Gastroesophageal reflux disease K21.9    Pruritus ani L29.0    Severe obesity (BMI 35.0-39.9) ([facility]) E66.01       Current Outpatient Prescriptions   Medication Sig Dispense Refill    traZODone (DESYREL) 50 mg tablet Take 1 Tab by mouth nightly. 30 Tab 0    amLODIPine-valsartan (EXFORGE) 5-160 mg per tablet TAKE 1 TABLET DAILY 90 Tab 1    diclofenac EC (VOLTAREN) 75 mg EC tablet Take 1 Tab by mouth two (2) times daily as needed. 60 Tab 5    cyclobenzaprine (FLEXERIL) 5 mg tablet TAKE ONE TABLET BY MOUTH NIGHTLY AS NEEDED FOR MUSCLE SPASM(S) 30 Tab 2    cetirizine HCl (ZYRTEC PO) Take  by mouth.  fluticasone (FLONASE) 50 mcg/actuation nasal spray 2 sprays each nostril once daily 1 Bottle 3    omeprazole (PRILOSEC) 20 mg capsule Take 20 mg by mouth daily.  omega-3 fatty acids-vitamin e (FISH OIL) 1,000 mg cap Take 1 Cap by mouth.  multivitamin (ONE A DAY) tablet Take 1 Tab by mouth daily.  triamcinolone acetonide (KENALOG) 0.025 % ointment Apply  to affected area two (2) times a day.  use thin layer 30 g 0    LORATADINE/PSEUDOEPHEDRINE (CLARITIN-D 24 HOUR PO) Take  by mouth. Allergies   Allergen Reactions    Pcn [Penicillins] Hives       Past Medical History:   Diagnosis Date    Eosinophilic esophagitis     started on flvent, dr. Boris Nichols Gastric ulcer 04/2017    dr Jacky Florez, avoid nsaids    Gastritis 12/13/2017    gastritis, cont PPI dr. Boris Nichols GERD (gastroesophageal reflux disease)     erosive esophagitis 11/13 EGD    H/O colonoscopy 11/2013    normal    Hypertension     Left knee pain     Morbid obesity ([address]     Precordial pain     Prediabetes        Social History     Social History    Marital status:      Spouse name: N/A    Number of children: N/A    Years of education: N/A     Occupational History    Not on file.      Social History Main Topics    Smoking status: Never Smoker    Smokeless tobacco: Never Used    Alcohol use No    Drug use: No    Sexual activity: Not on file     Other Topics Concern    Not on file     Social History Narrative       Family History   Problem Relation Age of Onset    Heart Failure Mother     Heart Disease Mother      mi 52's    Asthma Other          OBJECTIVE    Physical Exam:     Visit Vitals  /86 (BP 1 Location: Left arm, BP Patient Position: Sitting)   Pulse 78   Temp 98.6 °F (37 °C) (Oral)   Resp 16   Ht 5' 3\" (1.6 m)   Wt 174 lb (78.9 kg)   SpO2 98%   BMI 30.82 kg/m²         General: alert, well-appearing, in no apparent distress or pain  CVS: normal rate, regular rhythm, distinct S1 and S2  Lungs:clear to ausculation bilaterally, no crackles, wheezing or rhonchi noted  Abdomen: soft, NT, ND  Extremities: no edema, no cyanosis, +MTTP on R trapz area, UE DTRs, motor intact  Skin: warm, no lesions, rashes noted  Psych:  mood and affect normal.    Results for orders placed or performed during the hospital encounter of 05/08/19   LIPID PANEL   Result Value Ref Range    LIPID PROFILE          Cholesterol, total 165 <200 MG/DL    Triglyceride 63 <150 MG/DL    HDL Cholesterol 81 (H) 40 - 60 MG/DL    LDL, calculated 71.4 0 - 100 MG/DL    VLDL, calculated 12.6 MG/DL    CHOL/HDL Ratio 2.0 0 - 5.0     METABOLIC PANEL, COMPREHENSIVE   Result Value Ref Range    Sodium 143 136 - 145 mmol/L    Potassium 3.9 3.5 - 5.5 mmol/L    Chloride 111 (H) 100 - 108 mmol/L    CO2 27 21 - 32 mmol/L    Anion gap 5 3.0 - 18 mmol/L    Glucose 109 (H) 74 - 99 mg/dL    BUN 20 (H) 7.0 - 18 MG/DL    Creatinine 0.78 0.6 - 1.3 MG/DL    BUN/Creatinine ratio 26 (H) 12 - 20      GFR est AA >60 >60 ml/min/1.73m2    GFR est non-AA >60 >60 ml/min/1.73m2    Calcium 9.1 8.5 - 10.1 MG/DL    Bilirubin, total 0.6 0.2 - 1.0 MG/DL    ALT (SGPT) 31 13 - 56 U/L    AST (SGOT) 26 15 - 37 U/L    Alk. phosphatase 66 45 - 117 U/L    Protein, total 6.5 6.4 - 8.2 g/dL    Albumin 3.8 3.4 - 5.0 g/dL    Globulin 2.7 2.0 - 4.0 g/dL    A-G Ratio 1.4 0.8 - 1.7         ASSESSMENT/PLAN  Diagnoses and all orders for this visit:    Sleep difficulties  Reiterated good sleep hygiene  Trial elavil 10 mg qhs  Se from trazodone, atarax ineffective    Prediabetes  Tlcs, monitoring    Essential hypertension  Controlled,  Cont norvasc, cozaar    Gastroesophageal reflux disease, esophagitis presence not specified  Stable, cont prilosec prn    Osteopenia of necks of both femurs  Calc frax 7.6% and 0.6%  Cont ca/vit d/wt bearing exercises  Update 2020    Upper back strain  Advised to resume flexeril, and add naprosyn rtc x 1 week then prn  HEP, Firm pillow    Myofascial pain    Encounter for vaccine  High dose flu vaccine given    Ff-up in 1 months for [facility]    Patient/guardian understands plan of care. Patient has provided input and agrees with goals. Future labs to be discussed on next visit.

## 2019-11-13 ENCOUNTER — TELEPHONE (OUTPATIENT)
Dept: FAMILY MEDICINE CLINIC | Age: 64
End: 2019-11-13

## 2019-11-13 NOTE — TELEPHONE ENCOUNTER
Patient identified with 2 identifiers (name and ). Patient called to state she received a letter regarding her Losartan being recalled. Patient has stopped taking as of today and would like new prescription to be sent to [address] [place] [address].  Please advise

## 2019-11-14 RX ORDER — VALSARTAN 80 MG/1
80 TABLET ORAL DAILY
Qty: 90 TAB | Refills: 0 | Status: SHIPPED | OUTPATIENT
Start: 2019-11-14 | End: 2019-11-18

## 2019-11-14 NOTE — TELEPHONE ENCOUNTER
Patient identified with 2 identifiers (name and ). Patient aware of switched medication to valsartan 80 mg once daily.

## 2019-11-15 ENCOUNTER — OFFICE VISIT (OUTPATIENT)
Dept: FAMILY MEDICINE CLINIC | Age: 64
End: 2019-11-15

## 2019-11-15 VITALS
BODY MASS INDEX: 31.01 KG/M2 | RESPIRATION RATE: 16 BRPM | TEMPERATURE: 98.1 F | WEIGHT: 175 LBS | OXYGEN SATURATION: 98 % | HEART RATE: 67 BPM | HEIGHT: 63 IN | SYSTOLIC BLOOD PRESSURE: 132 MMHG | DIASTOLIC BLOOD PRESSURE: 84 MMHG

## 2019-11-15 DIAGNOSIS — M85.852 OSTEOPENIA OF NECKS OF BOTH FEMURS: ICD-10-CM

## 2019-11-15 DIAGNOSIS — S29.012D UPPER BACK STRAIN, SUBSEQUENT ENCOUNTER: ICD-10-CM

## 2019-11-15 DIAGNOSIS — M85.851 OSTEOPENIA OF NECKS OF BOTH FEMURS: ICD-10-CM

## 2019-11-15 DIAGNOSIS — Z23 ENCOUNTER FOR IMMUNIZATION: ICD-10-CM

## 2019-11-15 DIAGNOSIS — Z12.11 COLON CANCER SCREENING: ICD-10-CM

## 2019-11-15 DIAGNOSIS — G47.9 SLEEP DIFFICULTIES: Primary | ICD-10-CM

## 2019-11-15 DIAGNOSIS — Z71.89 ADVANCE CARE PLANNING: ICD-10-CM

## 2019-11-15 DIAGNOSIS — I10 ESSENTIAL HYPERTENSION: ICD-10-CM

## 2019-11-15 DIAGNOSIS — Z00.00 MEDICARE ANNUAL WELLNESS VISIT, SUBSEQUENT: ICD-10-CM

## 2019-11-15 DIAGNOSIS — R73.03 PREDIABETES: ICD-10-CM

## 2019-11-15 DIAGNOSIS — K21.9 GASTROESOPHAGEAL REFLUX DISEASE, ESOPHAGITIS PRESENCE NOT SPECIFIED: ICD-10-CM

## 2019-11-15 RX ORDER — TRAZODONE HYDROCHLORIDE 100 MG/1
100 TABLET ORAL
Qty: 90 TAB | Refills: 0 | Status: SHIPPED | OUTPATIENT
Start: 2019-11-15 | End: 2020-11-05 | Stop reason: SINTOL

## 2019-11-15 NOTE — PROGRESS NOTES
1. Have you been to the ER, urgent care clinic since your last visit? Hospitalized since your last visit? No    2. Have you seen or consulted any other health care providers outside of the [address] [place] [address] since your last visit? Include any pap smears or colon screening. No     Pneumovax 23, 0.5 ml given IM in left deltoid. Lot # [id number], exp date 03/31/2021. Patient tolerated injection well. No adverse reaction noted. This is the Subsequent Medicare Annual Wellness Exam, performed 12 months or more after the Initial AWV or the last Subsequent AWV    I have reviewed the patient's medical history in detail and updated the computerized patient record. History     Patient Active Problem List   Diagnosis Code    HTN (hypertension) I10    Prediabetes R73.03    Left knee pain M25.562    Gastroesophageal reflux disease K21.9    Pruritus ani L29.0    Severe obesity (BMI 35.0-39. 9) E66.01    Sleep difficulties G47.9    Osteopenia of necks of both femurs M85.851, M85.852    Essential hypertension I10     Past Medical History:   Diagnosis Date    Eosinophilic esophagitis     started on flvent, dr. Betsey Loera Gastric ulcer 04/2017    dr Mark Hwang, avoid nsaids    Gastritis 12/13/2017    gastritis, cont PPI dr. Betsey Loera GERD (gastroesophageal reflux disease)     erosive esophagitis 11/13 EGD    H/O colonoscopy 11/2013    normal    Hypertension     Left knee pain     Morbid obesity ([address]     Precordial pain     Prediabetes       Past Surgical History:   Procedure Laterality Date    HX CATARACT REMOVAL Right 10/04/2016    HX COLONOSCOPY  11/06/2016    HX GYN      vaginal delivery x 2    HX ORTHOPAEDIC      foot surgery     Current Outpatient Medications   Medication Sig Dispense Refill    traZODone (DESYREL) 100 mg tablet Take 1 Tab by mouth nightly. 90 Tab 0    valsartan (DIOVAN) 80 mg tablet Take 1 Tab by mouth daily.  90 Tab 0    naproxen (NAPROSYN) 500 mg tablet Take 1 Tab by mouth two (2) times daily (with meals). 60 Tab 0    amLODIPine (NORVASC) 5 mg tablet Take 1 Tab by mouth daily. 90 Tab 2    diclofenac EC (VOLTAREN) 75 mg EC tablet Take 1 Tab by mouth two (2) times daily as needed. 60 Tab 5    cyclobenzaprine (FLEXERIL) 5 mg tablet TAKE ONE TABLET BY MOUTH NIGHTLY AS NEEDED FOR MUSCLE SPASM(S) 30 Tab 2    omeprazole (PRILOSEC) 20 mg capsule Take 1 Cap by mouth daily. 90 Cap 3    fluticasone (FLONASE) 50 mcg/actuation nasal spray 2 sprays each nostril once daily 1 Bottle 3    triamcinolone acetonide (KENALOG) 0.025 % ointment Apply  to affected area two (2) times a day. use thin layer 30 g 0    omega-3 fatty acids-vitamin e (FISH OIL) 1,000 mg cap Take 1 Cap by mouth.  multivitamin (ONE A DAY) tablet Take 1 Tab by mouth daily. Allergies   Allergen Reactions    Pcn [Penicillins] Hives       Family History   Problem Relation Age of Onset    Heart Failure Mother     Heart Disease Mother         mi 52's    Asthma Other      Social History     Tobacco Use    Smoking status: Never Smoker    Smokeless tobacco: Never Used   Substance Use Topics    Alcohol use: No       Depression Risk Factor Screening:     3 most recent PHQ Screens 11/15/2019   Little interest or pleasure in doing things Not at all   Feeling down, depressed, irritable, or hopeless Not at all   Total Score PHQ 2 0       Alcohol Risk Factor Screening:   Do you average 1 drink per night or more than 7 drinks a week:  No    On any one occasion in the past three months have you have had more than 3 drinks containing alcohol:  No      Functional Ability and Level of Safety:   Hearing: Hearing is good. Activities of Daily Living: The home contains: no safety equipment. Patient does total self care    Ambulation: with no difficulty    Fall Risk:  Fall Risk Assessment, last 12 mths 11/15/2019   Able to walk? Yes   Fall in past 12 months?  No       Abuse Screen:  Patient is not abused    Cognitive Screening Has your family/caregiver stated any concerns about your memory: no      Patient Care Team   Patient Care Team:  Dalila Pizano MD as PCP - General (Family Practice)  Dalila Pizano MD as PCP - [facility] Empaneled Provider  Rico Flores MD (Gastroenterology)  Michele Crawford MD (Ophthalmology)  Antony Hubbard MD (Gastroenterology)  Ambrocio Atkinson MD as Physician (Orthopedic Surgery)  Penelope Purcell MD (Podiatry)    Assessment/Plan   Education and counseling provided:  Are appropriate based on today's review and evaluation  End-of-Life planning (with patient's consent)- discussed, provided form  Pneumococcal Vaccine- 13 completed, 23 given today  Influenza Vaccine-annually, per pt utd  Screening Mammography- 5/19  Colorectal cancer screening tests- 2013 update 2018, referral placed  Cardiovascular screening blood test- 5/2019  Bone mass measurement (DEXA)- 8/18 osteopenia update 8/2020  Screening for glaucoma- due- pt reminded to schedule with  dr. Graciela Baird Maintenance Due   Topic Date Due    Shingrix Vaccine Age 50> (2 of 2) 07/25/2018    COLONOSCOPY  12/24/2018    Pneumococcal 65+ years (2 of 2 - PPSV23) 08/10/2019    MEDICARE YEARLY EXAM  08/11/2019    GLAUCOMA SCREENING Q2Y  11/15/2019       Saad Ding, 70 y.o.,  female    SUBJECTIVE  ff-up     HTN-taking norvasc and cozaar. H/o prediabetes    GERD- doing well on PPI prn     Sleep problem- difficulty falling asleep, tosses and turns. Reports better response on trazodone the trial elavil. C/o R upper back/shoulder pain described as soreness has improved. Says flexeril was effective in making her fall asleep at night, says ibuprofen better than naprosyn. ROS:  See HPI, all others negative        Patient Active Problem List   Diagnosis Code    HTN (hypertension) I10    Prediabetes R73.03    Left knee pain M25.562    Gastroesophageal reflux disease K21.9    Pruritus ani L29.0    Severe obesity (BMI 35.0-39. 9) ([facility] E66.01       Current Outpatient Prescriptions   Medication Sig Dispense Refill    traZODone (DESYREL) 50 mg tablet Take 1 Tab by mouth nightly. 30 Tab 0    amLODIPine-valsartan (EXFORGE) 5-160 mg per tablet TAKE 1 TABLET DAILY 90 Tab 1    diclofenac EC (VOLTAREN) 75 mg EC tablet Take 1 Tab by mouth two (2) times daily as needed. 60 Tab 5    cyclobenzaprine (FLEXERIL) 5 mg tablet TAKE ONE TABLET BY MOUTH NIGHTLY AS NEEDED FOR MUSCLE SPASM(S) 30 Tab 2    cetirizine HCl (ZYRTEC PO) Take  by mouth.  fluticasone (FLONASE) 50 mcg/actuation nasal spray 2 sprays each nostril once daily 1 Bottle 3    omeprazole (PRILOSEC) 20 mg capsule Take 20 mg by mouth daily.  omega-3 fatty acids-vitamin e (FISH OIL) 1,000 mg cap Take 1 Cap by mouth.  multivitamin (ONE A DAY) tablet Take 1 Tab by mouth daily.  triamcinolone acetonide (KENALOG) 0.025 % ointment Apply  to affected area two (2) times a day. use thin layer 30 g 0    LORATADINE/PSEUDOEPHEDRINE (CLARITIN-D 24 HOUR PO) Take  by mouth. Allergies   Allergen Reactions    Pcn [Penicillins] Hives       Past Medical History:   Diagnosis Date    Eosinophilic esophagitis     started on flvent, dr. Godfrey Terrazas Gastric ulcer 04/2017    dr Edson Mcgill, avoid nsaids    Gastritis 12/13/2017    gastritis, cont PPI dr. Godfrey Terrazas GERD (gastroesophageal reflux disease)     erosive esophagitis 11/13 EGD    H/O colonoscopy 11/2013    normal    Hypertension     Left knee pain     Morbid obesity ([facility]     Precordial pain     Prediabetes        Social History     Social History    Marital status:      Spouse name: N/A    Number of children: N/A    Years of education: N/A     Occupational History    Not on file.      Social History Main Topics    Smoking status: Never Smoker    Smokeless tobacco: Never Used    Alcohol use No    Drug use: No    Sexual activity: Not on file     Other Topics Concern    Not on file     Social History Narrative       Family History   Problem Relation Age of Onset    Heart Failure Mother     Heart Disease Mother      mi 52's    Asthma Other          OBJECTIVE    Physical Exam:     Visit Vitals  /84 (BP 1 Location: Left arm, BP Patient Position: Sitting)   Pulse 67   Temp 98.1 °F (36.7 °C) (Oral)   Resp 16   Ht 5' 3\" (1.6 m)   Wt 175 lb (79.4 kg)   SpO2 98%   BMI 31.00 kg/m²         General: alert, well-appearing, in no apparent distress or pain  HEENT: throat and pharynx clear, eac patent, tm intact  Breasts: breasts appear normal, no suspicious masses, no skin or nipple changes or axillary nodes. CVS: normal rate, regular rhythm, distinct S1 and S2  Lungs:clear to ausculation bilaterally, no crackles, wheezing or rhonchi noted  Abdomen: soft, NT, ND  Extremities: no edema, no cyanosis  Skin: warm, no lesions, rashes noted  Psych:  mood and affect normal.    Results for orders placed or performed during the hospital encounter of 05/08/19   LIPID PANEL   Result Value Ref Range    LIPID PROFILE          Cholesterol, total 165 <200 MG/DL    Triglyceride 63 <150 MG/DL    HDL Cholesterol 81 (H) 40 - 60 MG/DL    LDL, calculated 71.4 0 - 100 MG/DL    VLDL, calculated 12.6 MG/DL    CHOL/HDL Ratio 2.0 0 - 5.0     METABOLIC PANEL, COMPREHENSIVE   Result Value Ref Range    Sodium 143 136 - 145 mmol/L    Potassium 3.9 3.5 - 5.5 mmol/L    Chloride 111 (H) 100 - 108 mmol/L    CO2 27 21 - 32 mmol/L    Anion gap 5 3.0 - 18 mmol/L    Glucose 109 (H) 74 - 99 mg/dL    BUN 20 (H) 7.0 - 18 MG/DL    Creatinine 0.78 0.6 - 1.3 MG/DL    BUN/Creatinine ratio 26 (H) 12 - 20      GFR est AA >60 >60 ml/min/1.73m2    GFR est non-AA >60 >60 ml/min/1.73m2    Calcium 9.1 8.5 - 10.1 MG/DL    Bilirubin, total 0.6 0.2 - 1.0 MG/DL    ALT (SGPT) 31 13 - 56 U/L    AST (SGOT) 26 15 - 37 U/L    Alk.  phosphatase 66 45 - 117 U/L    Protein, total 6.5 6.4 - 8.2 g/dL    Albumin 3.8 3.4 - 5.0 g/dL    Globulin 2.7 2.0 - 4.0 g/dL    A-G Ratio 1.4 0.8 - 1.7         ASSESSMENT/PLAN  Diagnoses and all orders for this visit:    Sleep difficulties  Reiterated good sleep hygiene  Trial increase in trazodone from 50 mg to 100 mg, if she has am drowsiness she will reduce dose back to 50 mg    Prediabetes  Tlcs, monitoring  Check a1c/cmp/lipid panel prior to next visit    Essential hypertension  Controlled,  Cont norvasc, cozaar    Gastroesophageal reflux disease, esophagitis presence not specified  Stable, cont prilosec prn    Osteopenia of necks of both femurs  Calc frax 7.6% and 0.6%  Cont ca/vit d/wt bearing exercises  Update 8/ 2020    Upper back strain, subsequent encounter  Improving cont back strengthening/stretching    Encounter for vaccine  PCV 23 given today    Ff-up in 3 months or sooner prn    Patient/guardian understands plan of care. Patient has provided input and agrees with goals. Future labs to be discussed on next visit.

## 2019-11-15 NOTE — ACP (ADVANCE CARE PLANNING)
Advance Care Planning (ACP) Provider Note - Comprehensive     Date of ACP Conversation: 11/15/19  Persons included in Conversation:  patient  Length of ACP Conversation in minutes:  16 minutes    Authorized Decision Maker (if patient is incapable of making informed decisions): This person is:  has yet to delegate            General ACP for ALL Patients with Decision Making Capacity:   Importance of advance care planning, including choosing a healthcare agent to communicate patient's healthcare decisions if patient lost the ability to make decisions, such as after a sudden illness or accident  Understanding of the healthcare agent role was assessed and information provided  Exploration of values, goals, and preferences if recovery is not expected, even with continued medical treatment in the event of: Imminent death  Severe, permanent brain injury  \"In these circumstances, what matters most to you? \"  Other: still considering, leaning towards comfort care      Interventions Provided:  Recommended completion of Advance Directive form after review of ACP materials and conversation with prospective healthcare agent   Recommended communicating the plan and making copies for the healthcare agent, personal physician, and others as appropriate (e.g., health system)  Recommended review of completed ACP document annually or upon change in health status

## 2019-11-18 ENCOUNTER — TELEPHONE (OUTPATIENT)
Dept: FAMILY MEDICINE CLINIC | Age: 64
End: 2019-11-18

## 2019-11-18 RX ORDER — OLMESARTAN MEDOXOMIL 20 MG/1
20 TABLET ORAL DAILY
Qty: 90 TAB | Refills: 0 | Status: SHIPPED | OUTPATIENT
Start: 2019-11-18 | End: 2019-11-21 | Stop reason: RX

## 2019-11-18 NOTE — TELEPHONE ENCOUNTER
erx olmesartan 20 mg qday  Please check BP daily with this new medication and inform us if persistently >140/90

## 2019-11-18 NOTE — TELEPHONE ENCOUNTER
Pt called and stated that the valsartan is recalled as well. Please call pt at your earliest convenience.

## 2019-11-19 NOTE — TELEPHONE ENCOUNTER
Patient advised that Dr. Daya Holt has sent a script to pharmacy for olmesartan 20 mg qday. She was instructed check BP daily with this new medication and inform us if persistently >140/90. She voices understanding.

## 2019-11-21 ENCOUNTER — TELEPHONE (OUTPATIENT)
Dept: FAMILY MEDICINE CLINIC | Age: 64
End: 2019-11-21

## 2019-11-21 RX ORDER — TELMISARTAN 40 MG/1
40 TABLET ORAL DAILY
Qty: 90 TAB | Refills: 0 | Status: SHIPPED | OUTPATIENT
Start: 2019-11-21 | End: 2020-02-04 | Stop reason: SDUPTHER

## 2019-11-21 NOTE — TELEPHONE ENCOUNTER
Juana called from the [facility] stated they are out of stock of Benicar. Shirleyann Galeazzi also stated they have micardis and candesartan. Please call Juana at your earliest convenience.

## 2020-01-08 ENCOUNTER — HOSPITAL ENCOUNTER (OUTPATIENT)
Dept: LAB | Age: 65
Discharge: HOME | End: 2020-01-08
Payer: MEDICARE

## 2020-01-08 ENCOUNTER — HOSPITAL ENCOUNTER (OUTPATIENT)
Dept: GENERAL RADIOLOGY | Age: 65
Discharge: HOME | End: 2020-01-08
Payer: MEDICARE

## 2020-01-08 ENCOUNTER — OFFICE VISIT (OUTPATIENT)
Dept: ORTHOPEDIC SURGERY | Age: 65
End: 2020-01-08

## 2020-01-08 VITALS
BODY MASS INDEX: 30.69 KG/M2 | DIASTOLIC BLOOD PRESSURE: 61 MMHG | SYSTOLIC BLOOD PRESSURE: 109 MMHG | TEMPERATURE: 97.8 F | HEIGHT: 63 IN | WEIGHT: 173.2 LBS | HEART RATE: 99 BPM | RESPIRATION RATE: 16 BRPM | OXYGEN SATURATION: 98 %

## 2020-01-08 DIAGNOSIS — Z01.818 PRE-OPERATIVE EXAMINATION: ICD-10-CM

## 2020-01-08 DIAGNOSIS — M79.671 RIGHT FOOT PAIN: ICD-10-CM

## 2020-01-08 DIAGNOSIS — M76.61 ACHILLES TENDINITIS OF RIGHT LOWER EXTREMITY: Primary | ICD-10-CM

## 2020-01-08 LAB
ALBUMIN SERPL-MCNC: 3.9 G/DL (ref 3.4–5)
ALBUMIN/GLOB SERPL: 1.1 {RATIO} (ref 0.8–1.7)
ALP SERPL-CCNC: 88 U/L (ref 45–117)
ALT SERPL-CCNC: 20 U/L (ref 13–56)
ANION GAP SERPL CALC-SCNC: 2 MMOL/L (ref 3–18)
APPEARANCE UR: CLEAR
AST SERPL-CCNC: 15 U/L (ref 10–38)
ATRIAL RATE: 75 BPM
BACTERIA URNS QL MICRO: NEGATIVE /HPF
BASOPHILS # BLD: 0 K/UL (ref 0–0.1)
BASOPHILS NFR BLD: 0 % (ref 0–2)
BILIRUB SERPL-MCNC: 0.4 MG/DL (ref 0.2–1)
BILIRUB UR QL: ABNORMAL
BUN SERPL-MCNC: 15 MG/DL (ref 7–18)
BUN/CREAT SERPL: 18 (ref 12–20)
CALCIUM SERPL-MCNC: 9.1 MG/DL (ref 8.5–10.1)
CALCULATED P AXIS, ECG09: 57 DEGREES
CALCULATED R AXIS, ECG10: 4 DEGREES
CALCULATED T AXIS, ECG11: 56 DEGREES
CHLORIDE SERPL-SCNC: 103 MMOL/L (ref 100–111)
CO2 SERPL-SCNC: 33 MMOL/L (ref 21–32)
COLOR UR: ABNORMAL
CREAT SERPL-MCNC: 0.85 MG/DL (ref 0.6–1.3)
CRP SERPL-MCNC: 0.7 MG/DL (ref 0–0.3)
DIAGNOSIS, 93000: NORMAL
DIFFERENTIAL METHOD BLD: ABNORMAL
EOSINOPHIL # BLD: 0.1 K/UL (ref 0–0.4)
EOSINOPHIL NFR BLD: 2 % (ref 0–5)
EPITH CASTS URNS QL MICRO: ABNORMAL /LPF (ref 0–5)
ERYTHROCYTE [DISTWIDTH] IN BLOOD BY AUTOMATED COUNT: 12.4 % (ref 11.6–14.5)
ERYTHROCYTE [SEDIMENTATION RATE] IN BLOOD: 58 MM/HR (ref 0–30)
GLOBULIN SER CALC-MCNC: 3.5 G/DL (ref 2–4)
GLUCOSE SERPL-MCNC: 96 MG/DL (ref 74–99)
GLUCOSE UR STRIP.AUTO-MCNC: NEGATIVE MG/DL
HCT VFR BLD AUTO: 36.4 % (ref 35–45)
HGB BLD-MCNC: 11.8 G/DL (ref 12–16)
HGB UR QL STRIP: NEGATIVE
INR PPP: 1 (ref 0.8–1.2)
KETONES UR QL STRIP.AUTO: ABNORMAL MG/DL
LEUKOCYTE ESTERASE UR QL STRIP.AUTO: ABNORMAL
LYMPHOCYTES # BLD: 1.6 K/UL (ref 0.9–3.6)
LYMPHOCYTES NFR BLD: 27 % (ref 21–52)
MCH RBC QN AUTO: 29.9 PG (ref 24–34)
MCHC RBC AUTO-ENTMCNC: 32.4 G/DL (ref 31–37)
MCV RBC AUTO: 92.2 FL (ref 74–97)
MONOCYTES # BLD: 0.4 K/UL (ref 0.05–1.2)
MONOCYTES NFR BLD: 7 % (ref 3–10)
MUCOUS THREADS URNS QL MICRO: ABNORMAL /LPF
NEUTS SEG # BLD: 3.7 K/UL (ref 1.8–8)
NEUTS SEG NFR BLD: 64 % (ref 40–73)
NITRITE UR QL STRIP.AUTO: NEGATIVE
P-R INTERVAL, ECG05: 138 MS
PH UR STRIP: 5.5 [PH] (ref 5–8)
PLATELET # BLD AUTO: 241 K/UL (ref 135–420)
PMV BLD AUTO: 10.2 FL (ref 9.2–11.8)
POTASSIUM SERPL-SCNC: 3.7 MMOL/L (ref 3.5–5.5)
PROT SERPL-MCNC: 7.4 G/DL (ref 6.4–8.2)
PROT UR STRIP-MCNC: NEGATIVE MG/DL
PROTHROMBIN TIME: 13.2 SEC (ref 11.5–15.2)
Q-T INTERVAL, ECG07: 392 MS
QRS DURATION, ECG06: 74 MS
QTC CALCULATION (BEZET), ECG08: 437 MS
RBC # BLD AUTO: 3.95 M/UL (ref 4.2–5.3)
RBC #/AREA URNS HPF: ABNORMAL /HPF (ref 0–5)
SODIUM SERPL-SCNC: 138 MMOL/L (ref 136–145)
SP GR UR REFRACTOMETRY: 1.03 (ref 1–1.03)
URATE SERPL-MCNC: 5.1 MG/DL (ref 2.6–7.2)
UROBILINOGEN UR QL STRIP.AUTO: 1 EU/DL (ref 0.2–1)
VENTRICULAR RATE, ECG03: 75 BPM
WBC # BLD AUTO: 5.8 K/UL (ref 4.6–13.2)
WBC URNS QL MICRO: ABNORMAL /HPF (ref 0–4)

## 2020-01-08 PROCEDURE — 71046 X-RAY EXAM CHEST 2 VIEWS: CPT

## 2020-01-08 PROCEDURE — 93005 ELECTROCARDIOGRAM TRACING: CPT

## 2020-01-08 PROCEDURE — 85610 PROTHROMBIN TIME: CPT

## 2020-01-08 PROCEDURE — 85652 RBC SED RATE AUTOMATED: CPT

## 2020-01-08 PROCEDURE — 81001 URINALYSIS AUTO W/SCOPE: CPT

## 2020-01-08 PROCEDURE — 85025 COMPLETE CBC W/AUTO DIFF WBC: CPT

## 2020-01-08 PROCEDURE — 86140 C-REACTIVE PROTEIN: CPT

## 2020-01-08 PROCEDURE — 84550 ASSAY OF BLOOD/URIC ACID: CPT

## 2020-01-08 PROCEDURE — 36415 COLL VENOUS BLD VENIPUNCTURE: CPT

## 2020-01-08 PROCEDURE — 80053 COMPREHEN METABOLIC PANEL: CPT

## 2020-01-08 NOTE — PROGRESS NOTES
FOOT AND ANKLE HISTORY AND PHYSICAL      Patient: Chauncey Vasquez                   MRN: [id number]         SSN: [id number]  YOB: 1955                     AGE: 75 y.o. SEX: female    Patient scheduled for:    1. Sharp, excisional debridement of the right Achilles tendon. 2. Primary repair of the right Achilles tendon. 3. Right flexor hallucis longus tendon augmentation procedure   Date of surgery: 1/17/20   [facility]   Surgeon: Otilia Vallecillo MD  ANESTHESIA TYPE:  General, Popliteal block  CONSULTS: None          PRESCRIPTIONS AND/OR ORDERS PROVIDED DURING H&P:    Orders Placed This Encounter    Generic Supply Order     Rolling knee scooter    XR CHEST PA LAT     Preop assessment.      Standing Status:   Future     Number of Occurrences:   1     Standing Expiration Date:   2/7/2021     Order Specific Question:   Reason for Exam     Answer:   pre op    METABOLIC PANEL, COMPREHENSIVE     Standing Status:   Future     Number of Occurrences:   1     Standing Expiration Date:   7/8/2020    SED RATE (ESR)     Standing Status:   Future     Number of Occurrences:   1     Standing Expiration Date:   7/8/2020    URIC ACID     Standing Status:   Future     Number of Occurrences:   1     Standing Expiration Date:   7/8/2020    C REACTIVE PROTEIN, QT     Standing Status:   Future     Number of Occurrences:   1     Standing Expiration Date:   7/8/2020    PROTHROMBIN TIME + INR     Standing Status:   Future     Number of Occurrences:   1     Standing Expiration Date:   2/8/2020    CBC WITH AUTOMATED DIFF     Standing Status:   Future     Number of Occurrences:   1     Standing Expiration Date:   2/8/2020    URINALYSIS W/ RFLX MICROSCOPIC     Pre-Op testing to be performed in the HOSPITAL     Standing Status:   Future     Number of Occurrences:   1     Standing Expiration Date:   2/8/2020    EKG, 12 LEAD, INITIAL     Standing Status:   Future Number of Occurrences:   1     Standing Expiration Date:   2/8/2020     Order Specific Question:   Reason for Exam:     Answer: For pre-op assessment               HISTORY:     The patient was seen in the office today for a preoperative history and physical for an upcoming above listed surgery. The patient is a pleasant 75 y.o. female who has a history of pain in her right Achilles tendon. She notes that she experiences pain with every step she takes. She reports that she tried to wear a compression sleeve, but notes that it was too tight and that she had to take it off. MRI results have been reviewed with the patient. Possible surgical interventions have been discussed with the patient. She recalls that she had surgery on the left Achilles tendon in the past. She notes that the pain in her right Achilles tendon is as bad as the pain was in her left tendon. She has severe, noninsertional and insertional right Achilles tendinopathy. She had an MRI on July 8, 2019, that shows severe Achilles tendinopathy/tendinosis involving at least 60% of the anterior portion of her Achilles tendon indicating a partial thickness tear at the insertion point and associated retrocalcaneal bursitis, no discreet mass. Because of her continued pain and discomfort, because she has failed conservative treatment, she tried anti-inflammatories, shoe wear changes, activity modification, CAM walker boot, bracing, and is having disabling pain, recommendation is for Achilles tendon surgery, specifically:    1. Sharp, excisional debridement of the right Achilles tendon. 2. Primary repair of the right Achilles tendon. 3. Right flexor hallucis longus tendon augmentation procedure using the [employer] tenodesis suture anchor technique. 4. I anticipate also using the [employer] speed bridge type system as well using fiber tape. 5. Prone position.      The alternatives, risks, and complications, including but not limited to infection, blood loss, need for blood transfusion, neurovascular damage, joseph-incisional numbness, subcutaneous hematoma, bone fracture, anesthetic complications, DVT, PE, death, RSD, postoperative stiffness and pain, possible surgical scar, delayed healing and nonhealing, reflexive sympathetic dystrophy, damage to blood vessels and nerves, need for more surgery, MI, and stroke have been discussed. The patient understands and wishes to proceed with surgery. PAST MEDICAL HISTORY:     Past Medical History:   Diagnosis Date    Eosinophilic esophagitis     started on flvent, dr. Sharyle Heath Gastric ulcer 04/2017    dr Josh Maloney, avoid nsaids    Gastritis 12/13/2017    gastritis, cont PPI dr. Sharyle Heath GERD (gastroesophageal reflux disease)     erosive esophagitis 11/13 EGD    H/O colonoscopy 11/2013    normal    Hypertension     Left knee pain     Morbid obesity ([address]     Precordial pain     Prediabetes        CURRENT MEDICATIONS:     Current Outpatient Medications   Medication Sig Dispense Refill    telmisartan (MICARDIS) 40 mg tablet Take 1 Tab by mouth daily. 90 Tab 0    traZODone (DESYREL) 100 mg tablet Take 1 Tab by mouth nightly. 90 Tab 0    naproxen (NAPROSYN) 500 mg tablet Take 1 Tab by mouth two (2) times daily (with meals). 60 Tab 0    amLODIPine (NORVASC) 5 mg tablet Take 1 Tab by mouth daily. 90 Tab 2    diclofenac EC (VOLTAREN) 75 mg EC tablet Take 1 Tab by mouth two (2) times daily as needed. 60 Tab 5    cyclobenzaprine (FLEXERIL) 5 mg tablet TAKE ONE TABLET BY MOUTH NIGHTLY AS NEEDED FOR MUSCLE SPASM(S) 30 Tab 2    omeprazole (PRILOSEC) 20 mg capsule Take 1 Cap by mouth daily. 90 Cap 3    fluticasone (FLONASE) 50 mcg/actuation nasal spray 2 sprays each nostril once daily 1 Bottle 3    triamcinolone acetonide (KENALOG) 0.025 % ointment Apply  to affected area two (2) times a day. use thin layer 30 g 0    omega-3 fatty acids-vitamin e (FISH OIL) 1,000 mg cap Take 1 Cap by mouth.       multivitamin (ONE A DAY) tablet Take 1 Tab by mouth daily. ALLERGIES:     Allergies   Allergen Reactions    Pcn [Penicillins] Hives         SURGICAL HISTORY:     Past Surgical History:   Procedure Laterality Date    HX CATARACT REMOVAL Right 10/04/2016    HX COLONOSCOPY  11/06/2016    HX GYN      vaginal delivery x 2    HX ORTHOPAEDIC      foot surgery       SOCIAL HISTORY:     Social History     Socioeconomic History    Marital status:      Spouse name: Not on file    Number of children: Not on file    Years of education: Not on file    Highest education level: Not on file   Tobacco Use    Smoking status: Never Smoker    Smokeless tobacco: Never Used   Substance and Sexual Activity    Alcohol use: No    Drug use: No       FAMILY HISTORY:     Family History   Problem Relation Age of Onset    Heart Failure Mother     Heart Disease Mother         mi 52's   [de-identified] Asthma Other        REVIEW OF SYSTEMS:     Negative for fevers, chills, chest pain, shortness of breath, weight loss, recent illness    General: Negative for fever and chills. No unexpected change in weight. Denies fatigue. No change in appetite. Skin: Negative for rash or itching. HEENT: Negative for congestion, sore throat, neck pain and neck stiffness. No change in vision or hearing. Hasn't noted any enlarged lymph nodes in the neck. Cardiovascular:  Negative for chest pain and palpitations. Has not noted pedal edema. Respiratory: Negative for cough, colds, sinus, hemoptysis, shortness of breath and wheezing. Gastrointestinal: Negative for nausea and vomiting, rectal bleeding, coffee ground emesis, abdominal pain, diarrhea and constipation. Genitourinary: Negative for dysuria, frequency urgency, or burning on micturition. No flank pain, no foul smelling urine, no difficulty with initiating urination. Hematological: Negative for bleeding or easy bruising.   Musculoskeletal: Negative  for arthralgias, back pain or neck pain.  Neurological: Negative for dizziness, seizures or syncopal episodes. Denies headaches. Endocrine: Denies excessive thirst.  No heat/cold intolerance. Psychiatric: Negative for depression or insomnia. PHYSICAL EXAMINATION:     VITALS:   Visit Vitals  /61   Pulse 99   Temp 97.8 °F (36.6 °C) (Oral)   Resp 16   Ht 5' 3\" (1.6 m)   Wt 173 lb 3.2 oz (78.6 kg)   SpO2 98%   BMI 30.68 kg/m²       Pain Assessment  1/8/2020   Location of Pain Foot   Location Modifiers Right   Severity of Pain 4   Quality of Pain Throbbing   Duration of Pain Persistent   Frequency of Pain Constant   Aggravating Factors Walking;Standing;Stairs   Aggravating Factors Comment -   Limiting Behavior -   Relieving Factors Rest;Elevation   Relieving Factors Comment -   Result of Injury No        GEN:  Well developed, well nourished 75 y.o. female in no acute distress. PSYCH: Alert an oriented to person, place and time. Mood, memory, affect, behavior and judgment normal  HEENT: Normocephalic and atraumatic. Eyes: Conjunctivae and EOM are normal.Pupils are equal, round, and reactive to light. External ear normal appearance, external nose normal appearing. Mouth/Throat: Oropharynx is clear and moist, able to handle oral secretions w/out difficulty, airway patent. NECK: Supple. Normal ROM, No lymphadenopathy. Trachea is midline. No bruising, swelling or deformity  RESP: Clear to auscultation bilaterally. No wheezes, rales, rhonchi. Normal effort and breath sounds. No respiratory distress  CARDIO: Tachycardia noted, regular rhythm and normal heart sounds. No MGR. ABDOMEN: Soft, non-tender, non-distended, normoactive bowel sounds in all four quadrants. There is no tenderness. There is no rebound and no guarding.    BACK: No CVA or spinal tenderness  BREAST:  Deferred  PELVIC:    Deferred   RECTAL:  Deferred   :           Deferred  EXTREMITIES: EXAMINATION OF:   ANKLE/FOOT right    Gait: antalgic  Tenderness: Mild tenderness to the Achilles tendon. NT to the posterior tibial tendon. Cutaneous: Fullness on the distal Achilles tendon. Joint Motion: 5 degrees past neutral of DF  Joint / Tendon Stability: No Ankle or Subtalar instability or joint laxity. No peroneal sublux ability or dislocation  Alignment: Moderate pes planus, bilaterally  Neuro Motor/Sensory: NL/NL. Vascular: NL foot/ankle pulses. Lymphatics: No extremity lymphedema, No calf swelling, no tenderness to calf muscles. RADIOGRAPHS & DIAGNOSTIC STUDIES:     No notes on file      MRI Results (most recent):  Results from Hospital Encounter encounter on 07/18/19   MRI ANKLE RT WO CONT    Narrative Multisequence multiplanar MR images of the right ankle were obtained. HISTORY: Pain for one year. Severe Achilles tendinosis, with about 60% anterior partial-thickness tear at  the insertion site. Moderately severe retrocalcaneal bursitis with inflammation  at the pre-Achilles fat pad. No discrete mass. No significant protuberance of  the posterior superior calcaneus. Plantar calcaneal spur with no erosion. Mild chronic plantar fasciitis. No bone contusion or fracture. No ankle joint effusion. Talar dome is intact. No  focal cartilage defect. Sinus tarsi and tarsal tunnel unremarkable. Mild dorsal  talonavicular spur with thickening of the ligament, with mild sprain. Mild medial subcutaneous edema. Posterior tibialis and flexor tendons are  intact. Deltoid ligament is intact. Also mild lateral subcutaneous edema. Peroneus longus and brevis are intact. Tibiofibular, talofibular ligaments are intact. Mild edema present. Calcaneofibular ligament is intact. Extensor tendons are intact at the ankle level. Lisfranc ligament is intact. Impression IMPRESSION:  1. Severe Achilles tendinosis with about 60% anterior partial-thickness tear at  insertion site. Retrocalcaneal bursitis. No discrete mass.   2. Mild medial and lateral soft tissue edema with no significant ligamentous  injuries. Medial and lateral tendons remain intact. LABS:     @  CBC:   Lab Results   Component Value Date/Time    WBC 5.8 01/08/2020 12:17 PM    RBC 3.95 (L) 01/08/2020 12:17 PM    HGB 11.8 (L) 01/08/2020 12:17 PM    HCT 36.4 01/08/2020 12:17 PM    PLATELET 033 70/39/1259 12:17 PM   , CMP:   Lab Results   Component Value Date/Time    Glucose 96 01/08/2020 12:17 PM    Sodium 138 01/08/2020 12:17 PM    Potassium 3.7 01/08/2020 12:17 PM    Chloride 103 01/08/2020 12:17 PM    CO2 33 (H) 01/08/2020 12:17 PM    BUN 15 01/08/2020 12:17 PM    Creatinine 0.85 01/08/2020 12:17 PM    Calcium 9.1 01/08/2020 12:17 PM    Anion gap 2 (L) 01/08/2020 12:17 PM    BUN/Creatinine ratio 18 01/08/2020 12:17 PM    Alk. phosphatase 88 01/08/2020 12:17 PM    Protein, total 7.4 01/08/2020 12:17 PM    Albumin 3.9 01/08/2020 12:17 PM    Globulin 3.5 01/08/2020 12:17 PM    A-G Ratio 1.1 01/08/2020 12:17 PM    and Coagulation:   Lab Results   Component Value Date/Time    Prothrombin time 13.2 01/08/2020 12:17 PM    INR 1.0 01/08/2020 12:17 PM   @    Preoperative labs were reviewed and are substantially within normal limits   Chest X-ray revealed no acute cardiopulmonary process   EKG:   EKG RESULTS     Procedure Component Value Units Date/Time    EKG, 12 LEAD, INITIAL [[id number]] Collected:  01/08/20 1337    Order Status:  Completed Updated:  01/08/20 1722     Ventricular Rate 75 BPM      Atrial Rate 75 BPM      P-R Interval 138 ms      QRS Duration 74 ms      Q-T Interval 392 ms      QTC Calculation (Bezet) 437 ms      Calculated P Axis 57 degrees      Calculated R Axis 4 degrees      Calculated T Axis 56 degrees      Diagnosis --     Normal sinus rhythm  Normal ECG  When compared with ECG of 26-JUL-2017 10:42,  No significant change was found  Confirmed by Shara Hoskins ([id number]) on 1/8/2020 5:22:31 PM            ASSESSMENT:       Encounter Diagnoses   Name Primary?     Achilles tendinitis of right lower extremity Yes    Right foot pain     Pre-operative examination        PLAN:     Again, the alternatives, risks, and complications, as well as expected outcome were discussed. The patient understands and agrees to proceed with the above listed surgery. Patient has been given Hibiclens wash with instructions and/or prescriptions or orders listed above.     Clovis Shoemaker PA-C  1/8/2020  10:57 AM

## 2020-01-08 NOTE — PROGRESS NOTES
1. Have you been to the ER, urgent care clinic since your last visit? Hospitalized since your last visit? No    2. Have you seen or consulted any other health care providers outside of the [address] [place] [address] since your last visit? Include any pap smears or colon screening.  No

## 2020-01-08 NOTE — PATIENT INSTRUCTIONS
Dr. Anitra Elam Pre-operative Instructions:      Patient: Amari Woodall   :  1955     I understand I am to stop taking oral birth control pills, hormonal replacement therapy and all Aspirin, Aspirin containing medications, Non-Steroidal Anti-Inflammatory medications (such as Advil, Aleve, Motrin, Ibuprofen) and or Blood thinner medication such as Coumadin, Plavix, Heparin or others 5 days prior to surgery. I understand I am to STOP taking these Medications 5 days prior to surgery:  I am to get instructions from my prescribing physician. 1. __As listed above_______________________  2. _____________________________________  3. _____________________________________  4. _____________________________________    I understand that if I am taking daily medications for high blood pressure, I can take them the morning of surgery with a small sip of water. I will consult my prescribing physician or call [facility] with specific questions. I also understand that:     I am to report important observations or changes that may occur prior to surgery. If I have any changes in my physical condition, such as a rash, a fever, sore throat, abscess, ulcers, nausea, vomiting, or diarrhea. I am to call the office and I am to consult my primary care physician to assess and treat the problem.  I am not to eat or drink anything after midnight the night before my surgery.  I am not to drink alcoholic beverages 24 hours prior to surgery.  I am not to do any illegal drugs prior to surgery.  I am not to smoke at least 24 hours prior to surgery.  I am able and will shower or bathe before surgery. I will use the Hibiclens solution on my surgical site only. The hibiclens directions are one packet a day starting two days before surgery.  I will remove any nail polish, make-up or jewelry prior to arriving for my surgery.       If I wear glasses, contact lenses or dentures they must be removed prior to going to the operating room.  All body piercing and artifical eye-lashes must be removed prior to surgery     I will not wear any aerosol sprays, perfumes or skin creams.  I am to make arrangements for a family member or friend to accompany me to surgery and take me home after my surgery as I will not be allowed to leave the hospital alone. A cab or bus will not be acceptable. Please make arrange for someone to stay with you for 24 hours after surgery.  Patient has expressed understanding of the diagnosis, treatment and planned surgery        Surgery: What to Expect at [address] [place] [address]  This care sheet gives you a general idea about how long it will take for you to recover from your surgery. But each person recovers at a different pace. How can you care for yourself at home? Activity  · Allow your body to heal. Don't move quickly or lift anything heavy until you are feeling better. · Rest when you feel tired. · Your doctor may give you specific instructions on when you can do your normal activities again, such as driving and going back to work. · Be active. Walking is a good choice. Diet  · You can eat your normal diet when you feel well. If your stomach is upset, try bland, low-fat foods like plain rice, broiled chicken, toast, and yogurt. · If your bowel movements are not regular right after surgery, try to avoid constipation and straining. Drink plenty of water. Your doctor may suggest fiber, a stool softener, or a mild laxative. Medicines  · Your doctor will tell you if and when you can restart your medicines. He or she will also give you instructions about taking any new medicines. · If you take blood thinners, such as warfarin (Coumadin), clopidogrel (Plavix), or aspirin, be sure to talk to your doctor. He or she will tell you if and when to start taking those medicines again. Make sure that you understand exactly what your doctor wants you to do. · Be safe with medicines.  Read and follow all instructions on the label. ¨ If the doctor gave you a prescription medicine for pain, take it as prescribed. ¨ If you are not taking a prescription pain medicine, ask your doctor if you can take an over-the-counter medicine. Incision care  · You will have a dressing over the cut (incision). A dressing helps the incision heal and protects it. Your doctor will tell you how to take care of this. · If you have strips of tape on the cut the doctor made, leave the tape on for a week or until it falls off. · If you had stitches, your doctor will tell you when to come back to have them removed. · If you have skin adhesive on the cut, leave it on until it falls off. Skin adhesive is also called liquid stitches. · Change the bandage every day. · Wash the area daily with warm, soapy water, and pat it dry. Don't use hydrogen peroxide or alcohol. They can slow healing. · You may cover the area with a gauze bandage if it oozes fluid or rubs against clothing. · You may shower 24 to 48 hours after surgery. Pat the incision dry. Don't swim or take a bath for the first 2 weeks, or until your doctor tells you it is okay. Follow-up care is a key part of your treatment and safety. Be sure to make and go to all appointments, and call your doctor if you are having problems. It's also a good idea to know your test results and keep a list of the medicines you take. When should you call for help? Call 911 anytime you think you may need emergency care. For example, call if:  · You passed out (lost consciousness). · You have severe trouble breathing. · You have sudden chest pain and shortness of breath, or you cough up blood. Call your doctor now or seek immediate medical care if:  · You have pain that does not get better after you take pain medicine. · You have loose stitches, or your incision comes open. · You are bleeding through your dressing.  A small amount of blood is normal.  · You have signs of infection, such as:  ¨ Increased pain, swelling, warmth, or redness. ¨ Red streaks leading from the incision. ¨ Pus draining from the incision. ¨ A fever. · You have symptoms of a blood clot in your arm or leg (called a deep vein thrombosis). These may include:  ¨ Pain in your calf, back of the knee, thigh, or groin. ¨ Redness and swelling in the arm, leg, or groin. Watch closely for any changes in your health, and be sure to contact your doctor if:  · You do not have a bowel movement after taking a laxative. Where can you learn more? Go to http://carlos eduardo-turner.info/  Enter F931 in the search box to learn more about \"Surgery: What to Expect at Home. \"  © 2894-8050 [employer]. Care instructions adapted under license by [employer] (which disclaims liability or warranty for this information). This care instruction is for use with your licensed healthcare professional. If you have questions about a medical condition or this instruction, always ask your healthcare professional. [place] [address] any warranty or liability for your use of this information. Content Version: 75.5.399218; Current as of: November 20, 2015          Acute Pain After Surgery: Care Instructions  Your Care Instructions      It's common to have some pain after surgery. Pain doesn't mean that something is wrong or that the surgery didn't go well. But when the pain is severe, it's important to work with your doctor to manage it. It's also important to be aware of a few facts about pain and pain medicine. · You are the only person who knows what your pain feels like. So be sure to tell your doctor when you are in pain or when the pain changes. Then he or she will know how to adjust your medicines. · Pain is often easier to control right after it starts. So it may be better to take regular doses of pain medicine and not wait until the pain gets bad. · Medicine can help control pain.  But this doesn't mean you'll have no pain. Medicine works to keep the pain at a level you can live with. With time, you will feel better. Follow-up care is a key part of your treatment and safety. Be sure to make and go to all appointments, and call your doctor if you are having problems. It's also a good idea to know your test results and keep a list of the medicines you take. How can you care for yourself at home? · Be safe with medicines. Read and follow all instructions on the label. ¨ If the doctor gave you a prescription medicine for pain, take it as prescribed. ¨ If you are not taking a prescription pain medicine, ask your doctor if you can take an over-the-counter medicine. · If you take an over-the-counter pain medicine, such as acetaminophen (Tylenol), ibuprofen (Advil, Motrin), or naproxen (Aleve), read and follow all instructions on the label. · Do not take two or more pain medicines at the same time unless the doctor told you to. · Do not drink alcohol while you are taking pain medicines. · Try to walk each day if your doctor recommends it. Start by walking a little more than you did the day before. Bit by bit, increase the amount you walk. Walking increases blood flow. It also helps prevent pneumonia and constipation. · To prevent constipation from opioid pain medicines:  ¨ Talk to your doctor about a laxative. ¨ Include fruits, vegetables, beans, and whole grains in your diet each day. These foods are high in fiber. ¨ Drink plenty of fluids, enough so that your urine is light yellow or clear like water. Drink water, fruit juice, or other drinks that do not contain caffeine or alcohol. If you have kidney, heart, or liver disease and have to limit fluids, talk with your doctor before you increase the amount of fluids you drink. ¨ Take a fiber supplement, such as Citrucel or Metamucil, every day if needed. Read and follow all instructions on the label.  If you take pain medicine for more than a few days, talk to your doctor before you take fiber. When should you call for help? Call your doctor now or seek immediate medical care if:   · Your pain gets worse. · Your pain is not controlled by medicine. Watch closely for changes in your health, and be sure to contact your doctor if you have any problems. Where can you learn more? Go to http://carlos eduardo-turner.info/. Enter (36) 370-716 in the search box to learn more about \"Acute Pain After Surgery: Care Instructions. \"  Current as of: March 20, 2017  Content Version: 11.4  © 9778-5657 [employer]. Care instructions adapted under license by [employer] (which disclaims liability or warranty for this information). If you have questions about a medical condition or this instruction, always ask your healthcare professional. [address] any warranty or liability for your use of this information.

## 2020-01-09 RX ORDER — NITROFURANTOIN 25; 75 MG/1; MG/1
100 CAPSULE ORAL 2 TIMES DAILY
Qty: 14 CAP | Refills: 0 | Status: SHIPPED | OUTPATIENT
Start: 2020-01-09 | End: 2020-01-30

## 2020-01-09 NOTE — H&P
FOOT AND ANKLE HISTORY AND PHYSICAL      Patient: Debo Davis                   MRN: [id number]         SSN: [id number]  YOB: 1955                     AGE: 75 y.o. SEX: female    Patient scheduled for:    1. Sharp, excisional debridement of the right Achilles tendon. 2. Primary repair of the right Achilles tendon. 3. Right flexor hallucis longus tendon augmentation procedure   Date of surgery: 1/17/20   [facility]   Surgeon: Jenny Vallecillo MD  ANESTHESIA TYPE:  General, Popliteal block  CONSULTS: None          PRESCRIPTIONS AND/OR ORDERS PROVIDED DURING H&P:  Orders Placed This Encounter    Generic Supply Order     Rolling knee scooter    XR CHEST PA LAT     Preop assessment.      Standing Status:   Future     Number of Occurrences:   1     Standing Expiration Date:   2/7/2021     Order Specific Question:   Reason for Exam     Answer:   pre op    METABOLIC PANEL, COMPREHENSIVE     Standing Status:   Future     Number of Occurrences:   1     Standing Expiration Date:   7/8/2020    SED RATE (ESR)     Standing Status:   Future     Number of Occurrences:   1     Standing Expiration Date:   7/8/2020    URIC ACID     Standing Status:   Future     Number of Occurrences:   1     Standing Expiration Date:   7/8/2020    C REACTIVE PROTEIN, QT     Standing Status:   Future     Number of Occurrences:   1     Standing Expiration Date:   7/8/2020    PROTHROMBIN TIME + INR     Standing Status:   Future     Number of Occurrences:   1     Standing Expiration Date:   2/8/2020    CBC WITH AUTOMATED DIFF     Standing Status:   Future     Number of Occurrences:   1     Standing Expiration Date:   2/8/2020    URINALYSIS W/ RFLX MICROSCOPIC     Pre-Op testing to be performed in the HOSPITAL     Standing Status:   Future     Number of Occurrences:   1     Standing Expiration Date:   2/8/2020    EKG, 12 LEAD, INITIAL     Standing Status:   Future Number of Occurrences:   1     Standing Expiration Date:   2/8/2020     Order Specific Question:   Reason for Exam:     Answer: For pre-op assessment    oxyCODONE IR (ROXICODONE) 5 mg immediate release tablet     Sig: Take 1-2 Tabs by mouth every six (6) hours as needed for Pain for up to 7 days. Max Daily Amount: 40 mg. **FOR PAIN FOLLOWING SURGERY**DO NOT TAKE PRIOR TO SURGERY**     Dispense:  42 Tab     Refill:  0    promethazine (PHENERGAN) 25 mg tablet     Sig: Take 1 Tab by mouth every six (6) hours as needed for Nausea. **FOR USE AFTER SURGERY**     Dispense:  30 Tab     Refill:  0    polyethylene glycol (MIRALAX) 17 gram packet     Sig: Take 1 Packet by mouth daily. **FOR USE AFTER SURGERY**     Dispense:  10 Packet     Refill:  1    rivaroxaban (XARELTO) 10 mg tablet     Sig: TAKE ONE TABLET BY MOUTH PER DAY FOLLOWING SURGERY  Indications: Treatment to Prevent Recurrence of a Clot in a Deep Vein     Dispense:  30 Tab     Refill:  0     Prescriptions were e-scribed to patients [facility] pharmacy on [address]:     The patient was seen in the office today for a preoperative history and physical for an upcoming above listed surgery. The patient is a pleasant 75 y.o. female who has a history of pain in her right Achilles tendon. She notes that she experiences pain with every step she takes. She reports that she tried to wear a compression sleeve, but notes that it was too tight and that she had to take it off. MRI results have been reviewed with the patient. Possible surgical interventions have been discussed with the patient. She recalls that she had surgery on the left Achilles tendon in the past. She notes that the pain in her right Achilles tendon is as bad as the pain was in her left tendon. She has severe, noninsertional and insertional right Achilles tendinopathy.   She had an MRI on July 8, 2019, that shows severe Achilles tendinopathy/tendinosis involving at least 60% of the anterior portion of her Achilles tendon indicating a partial thickness tear at the insertion point and associated retrocalcaneal bursitis, no discreet mass. Because of her continued pain and discomfort, because she has failed conservative treatment, she tried anti-inflammatories, shoe wear changes, activity modification, CAM walker boot, bracing, and is having disabling pain, recommendation is for Achilles tendon surgery, specifically:    1. Sharp, excisional debridement of the right Achilles tendon. 2. Primary repair of the right Achilles tendon. 3. Right flexor hallucis longus tendon augmentation procedure using the [employer] bio tenodesis suture anchor technique. 4. I anticipate also using the [employer] speed bridge type system as well using fiber tape. 5. Prone position. The alternatives, risks, and complications, including but not limited to infection, blood loss, need for blood transfusion, neurovascular damage, joseph-incisional numbness, subcutaneous hematoma, bone fracture, anesthetic complications, DVT, PE, death, RSD, postoperative stiffness and pain, possible surgical scar, delayed healing and nonhealing, reflexive sympathetic dystrophy, damage to blood vessels and nerves, need for more surgery, MI, and stroke have been discussed. The patient understands and wishes to proceed with surgery.        PAST MEDICAL HISTORY:     Past Medical History:   Diagnosis Date    Eosinophilic esophagitis     started on flvent, dr. Taylor Palacio Gastric ulcer 04/2017    dr Sterling Mckoy, avoid nsaids    Gastritis 12/13/2017    gastritis, cont PPI dr. Taylor Palacio GERD (gastroesophageal reflux disease)     erosive esophagitis 11/13 EGD    H/O colonoscopy 11/2013    normal    Hypertension     Left knee pain     Morbid obesity ([address]     Precordial pain     Prediabetes        CURRENT MEDICATIONS:     Current Outpatient Medications   Medication Sig Dispense Refill    telmisartan (MICARDIS) 40 mg tablet Take 1 Tab by mouth daily. 90 Tab 0    traZODone (DESYREL) 100 mg tablet Take 1 Tab by mouth nightly. 90 Tab 0    naproxen (NAPROSYN) 500 mg tablet Take 1 Tab by mouth two (2) times daily (with meals). 60 Tab 0    amLODIPine (NORVASC) 5 mg tablet Take 1 Tab by mouth daily. 90 Tab 2    diclofenac EC (VOLTAREN) 75 mg EC tablet Take 1 Tab by mouth two (2) times daily as needed. 60 Tab 5    cyclobenzaprine (FLEXERIL) 5 mg tablet TAKE ONE TABLET BY MOUTH NIGHTLY AS NEEDED FOR MUSCLE SPASM(S) 30 Tab 2    omeprazole (PRILOSEC) 20 mg capsule Take 1 Cap by mouth daily. 90 Cap 3    fluticasone (FLONASE) 50 mcg/actuation nasal spray 2 sprays each nostril once daily 1 Bottle 3    triamcinolone acetonide (KENALOG) 0.025 % ointment Apply  to affected area two (2) times a day. use thin layer 30 g 0    omega-3 fatty acids-vitamin e (FISH OIL) 1,000 mg cap Take 1 Cap by mouth.  multivitamin (ONE A DAY) tablet Take 1 Tab by mouth daily. ALLERGIES:     Allergies   Allergen Reactions    Pcn [Penicillins] Hives         SURGICAL HISTORY:     Past Surgical History:   Procedure Laterality Date    HX CATARACT REMOVAL Right 10/04/2016    HX COLONOSCOPY  11/06/2016    HX GYN      vaginal delivery x 2    HX ORTHOPAEDIC      foot surgery       SOCIAL HISTORY:     Social History     Socioeconomic History    Marital status:      Spouse name: Not on file    Number of children: Not on file    Years of education: Not on file    Highest education level: Not on file   Tobacco Use    Smoking status: Never Smoker    Smokeless tobacco: Never Used   Substance and Sexual Activity    Alcohol use: No    Drug use: No       FAMILY HISTORY:     Family History   Problem Relation Age of Onset    Heart Failure Mother     Heart Disease Mother         mi 52's   [facility] [place] [facility] Other        REVIEW OF SYSTEMS:     Negative for fevers, chills, chest pain, shortness of breath, weight loss, recent illness    General: Negative for fever and chills. No unexpected change in weight. Denies fatigue. No change in appetite. Skin: Negative for rash or itching. HEENT: Negative for congestion, sore throat, neck pain and neck stiffness. No change in vision or hearing. Hasn't noted any enlarged lymph nodes in the neck. Cardiovascular:  Negative for chest pain and palpitations. Has not noted pedal edema. Respiratory: Negative for cough, colds, sinus, hemoptysis, shortness of breath and wheezing. Gastrointestinal: Negative for nausea and vomiting, rectal bleeding, coffee ground emesis, abdominal pain, diarrhea and constipation. Genitourinary: Negative for dysuria, frequency urgency, or burning on micturition. No flank pain, no foul smelling urine, no difficulty with initiating urination. Hematological: Negative for bleeding or easy bruising. Musculoskeletal: Negative  for arthralgias, back pain or neck pain. Neurological: Negative for dizziness, seizures or syncopal episodes. Denies headaches. Endocrine: Denies excessive thirst.  No heat/cold intolerance. Psychiatric: Negative for depression or insomnia. PHYSICAL EXAMINATION:     VITALS:   Visit Vitals  /61   Pulse 99   Temp 97.8 °F (36.6 °C) (Oral)   Resp 16   Ht 5' 3\" (1.6 m)   Wt 173 lb 3.2 oz (78.6 kg)   SpO2 98%   BMI 30.68 kg/m²       Pain Assessment  1/8/2020   Location of Pain Foot   Location Modifiers Right   Severity of Pain 4   Quality of Pain Throbbing   Duration of Pain Persistent   Frequency of Pain Constant   Aggravating Factors Walking;Standing;Stairs   Aggravating Factors Comment -   Limiting Behavior -   Relieving Factors Rest;Elevation   Relieving Factors Comment -   Result of Injury No        GEN:  Well developed, well nourished 75 y.o. female in no acute distress. PSYCH: Alert an oriented to person, place and time. Mood, memory, affect, behavior and judgment normal  HEENT: Normocephalic and atraumatic. Eyes: Conjunctivae and EOM are normal.Pupils are equal, round, and reactive to light. External ear normal appearance, external nose normal appearing. Mouth/Throat: Oropharynx is clear and moist, able to handle oral secretions w/out difficulty, airway patent. NECK: Supple. Normal ROM, No lymphadenopathy. Trachea is midline. No bruising, swelling or deformity  RESP: Clear to auscultation bilaterally. No wheezes, rales, rhonchi. Normal effort and breath sounds. No respiratory distress  CARDIO: Tachycardia noted, regular rhythm and normal heart sounds. No MGR. ABDOMEN: Soft, non-tender, non-distended, normoactive bowel sounds in all four quadrants. There is no tenderness. There is no rebound and no guarding. BACK: No CVA or spinal tenderness  BREAST:  Deferred  PELVIC:    Deferred   RECTAL:  Deferred   :           Deferred  EXTREMITIES: EXAMINATION OF:   ANKLE/FOOT right    Gait: antalgic  Tenderness: Mild tenderness to the Achilles tendon. NT to the posterior tibial tendon. Cutaneous: Fullness on the distal Achilles tendon. Joint Motion: 5 degrees past neutral of DF  Joint / Tendon Stability: No Ankle or Subtalar instability or joint laxity. No peroneal sublux ability or dislocation  Alignment: Moderate pes planus, bilaterally  Neuro Motor/Sensory: NL/NL. Vascular: NL foot/ankle pulses. Lymphatics: No extremity lymphedema, No calf swelling, no tenderness to calf muscles. RADIOGRAPHS & DIAGNOSTIC STUDIES:     No notes on file      MRI Results (most recent):  Results from Hospital Encounter encounter on 07/18/19   MRI ANKLE RT WO CONT    Narrative Multisequence multiplanar MR images of the right ankle were obtained. HISTORY: Pain for one year. Severe Achilles tendinosis, with about 60% anterior partial-thickness tear at  the insertion site. Moderately severe retrocalcaneal bursitis with inflammation  at the pre-Achilles fat pad. No discrete mass. No significant protuberance of  the posterior superior calcaneus.     Plantar calcaneal spur with no erosion. Mild chronic plantar fasciitis. No bone contusion or fracture. No ankle joint effusion. Talar dome is intact. No  focal cartilage defect. Sinus tarsi and tarsal tunnel unremarkable. Mild dorsal  talonavicular spur with thickening of the ligament, with mild sprain. Mild medial subcutaneous edema. Posterior tibialis and flexor tendons are  intact. Deltoid ligament is intact. Also mild lateral subcutaneous edema. Peroneus longus and brevis are intact. Tibiofibular, talofibular ligaments are intact. Mild edema present. Calcaneofibular ligament is intact. Extensor tendons are intact at the ankle level. Lisfranc ligament is intact. Impression IMPRESSION:  1. Severe Achilles tendinosis with about 60% anterior partial-thickness tear at  insertion site. Retrocalcaneal bursitis. No discrete mass. 2. Mild medial and lateral soft tissue edema with no significant ligamentous  injuries. Medial and lateral tendons remain intact. LABS:     @  CBC:   Lab Results   Component Value Date/Time    WBC 5.8 01/08/2020 12:17 PM    RBC 3.95 (L) 01/08/2020 12:17 PM    HGB 11.8 (L) 01/08/2020 12:17 PM    HCT 36.4 01/08/2020 12:17 PM    PLATELET 608 79/08/6235 12:17 PM   , CMP:   Lab Results   Component Value Date/Time    Glucose 96 01/08/2020 12:17 PM    Sodium 138 01/08/2020 12:17 PM    Potassium 3.7 01/08/2020 12:17 PM    Chloride 103 01/08/2020 12:17 PM    CO2 33 (H) 01/08/2020 12:17 PM    BUN 15 01/08/2020 12:17 PM    Creatinine 0.85 01/08/2020 12:17 PM    Calcium 9.1 01/08/2020 12:17 PM    Anion gap 2 (L) 01/08/2020 12:17 PM    BUN/Creatinine ratio 18 01/08/2020 12:17 PM    Alk.  phosphatase 88 01/08/2020 12:17 PM    Protein, total 7.4 01/08/2020 12:17 PM    Albumin 3.9 01/08/2020 12:17 PM    Globulin 3.5 01/08/2020 12:17 PM    A-G Ratio 1.1 01/08/2020 12:17 PM    and Coagulation:   Lab Results   Component Value Date/Time    Prothrombin time 13.2 01/08/2020 12:17 PM    INR 1.0 01/08/2020 12:17 PM @    Preoperative labs were reviewed and are substantially within normal limits   Chest X-ray revealed no acute cardiopulmonary process   EKG:   EKG RESULTS     Procedure Component Value Units Date/Time    EKG, 12 LEAD, INITIAL [[id number]] Collected:  01/08/20 1337    Order Status:  Completed Updated:  01/08/20 1722     Ventricular Rate 75 BPM      Atrial Rate 75 BPM      P-R Interval 138 ms      QRS Duration 74 ms      Q-T Interval 392 ms      QTC Calculation (Bezet) 437 ms      Calculated P Axis 57 degrees      Calculated R Axis 4 degrees      Calculated T Axis 56 degrees      Diagnosis --     Normal sinus rhythm  Normal ECG  When compared with ECG of 26-JUL-2017 10:42,  No significant change was found  Confirmed by Chase Suarez ([id number]) on 1/8/2020 5:22:31 PM            ASSESSMENT:       Encounter Diagnoses   Name Primary?  Achilles tendinitis of right lower extremity Yes    Right foot pain     Pre-operative examination        PLAN:     Again, the alternatives, risks, and complications, as well as expected outcome were discussed. The patient understands and agrees to proceed with the above listed surgery. Patient has been given Hibiclens wash with instructions and/or prescriptions or orders listed above.     Winnie Hanson PA-C  1/8/2020  10:57 AM

## 2020-01-09 NOTE — PROGRESS NOTES
UA lab reviewed. The following medication(s) e-scribed to patients pharmacy:    Orders Placed This Encounter    nitrofurantoin, macrocrystal-monohydrate, (MACROBID) 100 mg capsule     Sig: Take 1 Cap by mouth two (2) times a day.      Dispense:  14 Cap     Refill:  0        Yodit Levy PA-C  1/9/2020  11:40 AM

## 2020-01-09 NOTE — PROGRESS NOTES
LMOVM informing patient to give our office a call back. If patient calls back, please inform her of SAMARA Moses's message.

## 2020-01-14 RX ORDER — OXYCODONE HYDROCHLORIDE 5 MG/1
5-10 TABLET ORAL
Qty: 42 TAB | Refills: 0 | Status: SHIPPED | OUTPATIENT
Start: 2020-01-14 | End: 2020-01-21

## 2020-01-14 RX ORDER — PROMETHAZINE HYDROCHLORIDE 25 MG/1
25 TABLET ORAL
Qty: 30 TAB | Refills: 0 | Status: SHIPPED | OUTPATIENT
Start: 2020-01-14 | End: 2020-01-30

## 2020-01-14 RX ORDER — POLYETHYLENE GLYCOL 3350 17 G/17G
17 POWDER, FOR SOLUTION ORAL DAILY
Qty: 10 PACKET | Refills: 1 | Status: SHIPPED | OUTPATIENT
Start: 2020-01-14 | End: 2020-02-12

## 2020-01-16 ENCOUNTER — TELEPHONE (OUTPATIENT)
Dept: FAMILY MEDICINE CLINIC | Age: 65
End: 2020-01-16

## 2020-01-16 ENCOUNTER — OFFICE VISIT (OUTPATIENT)
Dept: FAMILY MEDICINE CLINIC | Age: 65
End: 2020-01-16

## 2020-01-16 VITALS
HEIGHT: 63 IN | BODY MASS INDEX: 30.76 KG/M2 | SYSTOLIC BLOOD PRESSURE: 104 MMHG | HEART RATE: 81 BPM | WEIGHT: 173.6 LBS | OXYGEN SATURATION: 98 % | RESPIRATION RATE: 16 BRPM | DIASTOLIC BLOOD PRESSURE: 70 MMHG | TEMPERATURE: 98.4 F

## 2020-01-16 DIAGNOSIS — M85.852 OSTEOPENIA OF NECKS OF BOTH FEMURS: ICD-10-CM

## 2020-01-16 DIAGNOSIS — J02.9 SORE THROAT: ICD-10-CM

## 2020-01-16 DIAGNOSIS — G47.9 SLEEP DIFFICULTIES: ICD-10-CM

## 2020-01-16 DIAGNOSIS — K21.9 GASTROESOPHAGEAL REFLUX DISEASE, ESOPHAGITIS PRESENCE NOT SPECIFIED: ICD-10-CM

## 2020-01-16 DIAGNOSIS — I10 ESSENTIAL HYPERTENSION: ICD-10-CM

## 2020-01-16 DIAGNOSIS — J20.9 ACUTE BRONCHITIS, UNSPECIFIED ORGANISM: Primary | ICD-10-CM

## 2020-01-16 DIAGNOSIS — M85.851 OSTEOPENIA OF NECKS OF BOTH FEMURS: ICD-10-CM

## 2020-01-16 DIAGNOSIS — R73.03 PREDIABETES: ICD-10-CM

## 2020-01-16 LAB
S PYO AG THROAT QL: NEGATIVE
VALID INTERNAL CONTROL?: YES

## 2020-01-16 RX ORDER — FERROUS FUMARATE/DOCUSATE 150-100 MG
TABLET, EXTENDED RELEASE ORAL
COMMUNITY
Start: 2020-01-02 | End: 2020-01-30

## 2020-01-16 RX ORDER — TRIAMCINOLONE ACETONIDE 0.25 MG/G
OINTMENT TOPICAL 2 TIMES DAILY
Qty: 30 G | Refills: 0 | Status: SHIPPED | OUTPATIENT
Start: 2020-01-16 | End: 2020-02-04 | Stop reason: SDUPTHER

## 2020-01-16 RX ORDER — AZITHROMYCIN 250 MG/1
TABLET, FILM COATED ORAL
Qty: 6 TAB | Refills: 0 | Status: SHIPPED | OUTPATIENT
Start: 2020-01-16 | End: 2020-01-30

## 2020-01-16 RX ORDER — DICLOFENAC SODIUM 50 MG/1
TABLET, DELAYED RELEASE ORAL AS NEEDED
COMMUNITY
Start: 2019-11-30 | End: 2021-06-04 | Stop reason: SDUPTHER

## 2020-01-16 NOTE — PROGRESS NOTES
1. Have you been to the ER, urgent care clinic since your last visit? Hospitalized since your last visit? [facility] [address] 1/01/2020    2. Have you seen or consulted any other health care providers outside of the [address] [place] [address] since your last visit? Include any pap smears or colon screening. No    Chief Complaint   Patient presents with    Cough     x 2 weeks with light yellow or clear mucus - [facility] [address] 1/01/2020    Sore Throat     had previous sore throat on 1/01/2020     Patient has right foot surgery with Dr. Omar Lynch on 1/17/2020.

## 2020-01-16 NOTE — PATIENT INSTRUCTIONS
Bronchitis: Care Instructions Your Care Instructions Bronchitis is inflammation of the bronchial tubes, which carry air to the lungs. The tubes swell and produce mucus, or phlegm. The mucus and inflamed bronchial tubes make you cough. You may have trouble breathing. Most cases of bronchitis are caused by viruses like those that cause colds. Antibiotics usually do not help and they may be harmful. Bronchitis usually develops rapidly and lasts about 2 to 3 weeks in otherwise healthy people. Follow-up care is a key part of your treatment and safety. Be sure to make and go to all appointments, and call your doctor if you are having problems. It's also a good idea to know your test results and keep a list of the medicines you take. How can you care for yourself at home? · Take all medicines exactly as prescribed. Call your doctor if you think you are having a problem with your medicine. · Get some extra rest. 
· Take an over-the-counter pain medicine, such as acetaminophen (Tylenol), ibuprofen (Advil, Motrin), or naproxen (Aleve) to reduce fever and relieve body aches. Read and follow all instructions on the label. · Do not take two or more pain medicines at the same time unless the doctor told you to. Many pain medicines have acetaminophen, which is Tylenol. Too much acetaminophen (Tylenol) can be harmful. · Take an over-the-counter cough medicine that contains dextromethorphan to help quiet a dry, hacking cough so that you can sleep. Avoid cough medicines that have more than one active ingredient. Read and follow all instructions on the label. · Breathe moist air from a humidifier, hot shower, or sink filled with hot water. The heat and moisture will thin mucus so you can cough it out. · Do not smoke. Smoking can make bronchitis worse. If you need help quitting, talk to your doctor about stop-smoking programs and medicines. These can increase your chances of quitting for good. When should you call for help? Call 911 anytime you think you may need emergency care. For example, call if: 
  · You have severe trouble breathing.  
 Call your doctor now or seek immediate medical care if: 
  · You have new or worse trouble breathing.  
  · You cough up dark brown or bloody mucus (sputum).  
  · You have a new or higher fever.  
  · You have a new rash.  
 Watch closely for changes in your health, and be sure to contact your doctor if: 
  · You cough more deeply or more often, especially if you notice more mucus or a change in the color of your mucus.  
  · You are not getting better as expected. Where can you learn more? Go to http://carlos eduardo-turner.info/. Enter H333 in the search box to learn more about \"Bronchitis: Care Instructions. \" Current as of: June 9, 2019 Content Version: 12.2 © 8755-7381 [employer]. Care instructions adapted under license by [employer] (which disclaims liability or warranty for this information). If you have questions about a medical condition or this instruction, always ask your healthcare professional. [address] any warranty or liability for your use of this information. DASH Diet: Care Instructions Your Care Instructions The DASH diet is an eating plan that can help lower your blood pressure. DASH stands for Dietary Approaches to Stop Hypertension. Hypertension is high blood pressure. The DASH diet focuses on eating foods that are high in calcium, potassium, and magnesium. These nutrients can lower blood pressure. The foods that are highest in these nutrients are fruits, vegetables, low-fat dairy products, nuts, seeds, and legumes. But taking calcium, potassium, and magnesium supplements instead of eating foods that are high in those nutrients does not have the same effect. The DASH diet also includes whole grains, fish, and poultry. The DASH diet is one of several lifestyle changes your doctor may recommend to lower your high blood pressure. Your doctor may also want you to decrease the amount of sodium in your diet. Lowering sodium while following the DASH diet can lower blood pressure even further than just the DASH diet alone. Follow-up care is a key part of your treatment and safety. Be sure to make and go to all appointments, and call your doctor if you are having problems. It's also a good idea to know your test results and keep a list of the medicines you take. How can you care for yourself at home? Following the DASH diet · Eat 4 to 5 servings of fruit each day. A serving is 1 medium-sized piece of fruit, ½ cup chopped or canned fruit, 1/4 cup dried fruit, or 4 ounces (½ cup) of fruit juice. Choose fruit more often than fruit juice. · Eat 4 to 5 servings of vegetables each day. A serving is 1 cup of lettuce or raw leafy vegetables, ½ cup of chopped or cooked vegetables, or 4 ounces (½ cup) of vegetable juice. Choose vegetables more often than vegetable juice. · Get 2 to 3 servings of low-fat and fat-free dairy each day. A serving is 8 ounces of milk, 1 cup of yogurt, or 1 ½ ounces of cheese. · Eat 6 to 8 servings of grains each day. A serving is 1 slice of bread, 1 ounce of dry cereal, or ½ cup of cooked rice, pasta, or cooked cereal. Try to choose whole-grain products as much as possible. · Limit lean meat, poultry, and fish to 2 servings each day. A serving is 3 ounces, about the size of a deck of cards. · Eat 4 to 5 servings of nuts, seeds, and legumes (cooked dried beans, lentils, and split peas) each week. A serving is 1/3 cup of nuts, 2 tablespoons of seeds, or ½ cup of cooked beans or peas. · Limit fats and oils to 2 to 3 servings each day. A serving is 1 teaspoon of vegetable oil or 2 tablespoons of salad dressing. · Limit sweets and added sugars to 5 servings or less a week.  A serving is 1 tablespoon jelly or jam, ½ cup sorbet, or 1 cup of lemonade. · Eat less than 2,300 milligrams (mg) of sodium a day. If you limit your sodium to 1,500 mg a day, you can lower your blood pressure even more. Tips for success · Start small. Do not try to make dramatic changes to your diet all at once. You might feel that you are missing out on your favorite foods and then be more likely to not follow the plan. Make small changes, and stick with them. Once those changes become habit, add a few more changes. · Try some of the following: ? Make it a goal to eat a fruit or vegetable at every meal and at snacks. This will make it easy to get the recommended amount of fruits and vegetables each day. ? Try yogurt topped with fruit and nuts for a snack or healthy dessert. ? Add lettuce, tomato, cucumber, and onion to sandwiches. ? Combine a ready-made pizza crust with low-fat mozzarella cheese and lots of vegetable toppings. Try using tomatoes, squash, spinach, broccoli, carrots, cauliflower, and onions. ? Have a variety of cut-up vegetables with a low-fat dip as an appetizer instead of chips and dip. ? Sprinkle sunflower seeds or chopped almonds over salads. Or try adding chopped walnuts or almonds to cooked vegetables. ? Try some vegetarian meals using beans and peas. Add garbanzo or kidney beans to salads. Make burritos and tacos with mashed valdez beans or black beans. Where can you learn more? Go to http://carlos eduardo-turner.info/. Enter M052 in the search box to learn more about \"DASH Diet: Care Instructions. \" Current as of: April 9, 2019 Content Version: 12.2 © 3061-2083 [employer]. Care instructions adapted under license by [employer] (which disclaims liability or warranty for this information).  If you have questions about a medical condition or this instruction, always ask your healthcare professional. [employer] disclaims any warranty or liability for your use of this information.

## 2020-01-16 NOTE — PROGRESS NOTES
Suri Goodwin, 70 y.o.,  female    SUBJECTIVE  ff-up  And acute concern    C/o productive cough, sore throat for the past 2 weeks. she denies fever, sob, wheezing, nasal congestion. [facility], given tessalon perles and was told was a viral infection. She is not better. HTN-taking norvasc and cozaar. H/o prediabetes    GERD- doing well on prilosec prn     Sleep problem- no changes,  responding fairly well with  Trazodone. Refractory to elavil previously       ROS:  See HPI, all others negative        Patient Active Problem List   Diagnosis Code    HTN (hypertension) I10    Prediabetes R73.03    Left knee pain M25.562    Gastroesophageal reflux disease K21.9    Pruritus ani L29.0    Severe obesity (BMI 35.0-39.9) ([facility]) E66.01     Current Outpatient Medications:     diclofenac EC (VOLTAREN) 50 mg EC tablet, , Disp: , Rfl:     NASAL DECONGESTANT, PSEUDOEPH, 30 mg tablet, , Disp: , Rfl:     azithromycin (ZITHROMAX) 250 mg tablet, Take two tablets today then one tablet daily, Disp: 6 Tab, Rfl: 0    triamcinolone acetonide (KENALOG) 0.025 % ointment, Apply  to affected area two (2) times a day. use thin layer, Disp: 30 g, Rfl: 0    oxyCODONE IR (ROXICODONE) 5 mg immediate release tablet, Take 1-2 Tabs by mouth every six (6) hours as needed for Pain for up to 7 days. Max Daily Amount: 40 mg. **FOR PAIN FOLLOWING SURGERY**DO NOT TAKE PRIOR TO SURGERY**, Disp: 42 Tab, Rfl: 0    promethazine (PHENERGAN) 25 mg tablet, Take 1 Tab by mouth every six (6) hours as needed for Nausea. **FOR USE AFTER SURGERY**, Disp: 30 Tab, Rfl: 0    polyethylene glycol (MIRALAX) 17 gram packet, Take 1 Packet by mouth daily.  **FOR USE AFTER SURGERY**, Disp: 10 Packet, Rfl: 1    rivaroxaban (XARELTO) 10 mg tablet, TAKE ONE TABLET BY MOUTH PER DAY FOLLOWING SURGERY  Indications: Treatment to Prevent Recurrence of a Clot in a Deep Vein, Disp: 30 Tab, Rfl: 0    nitrofurantoin, macrocrystal-monohydrate, (MACROBID) 100 mg capsule, Take 1 Cap by mouth two (2) times a day., Disp: 14 Cap, Rfl: 0    telmisartan (MICARDIS) 40 mg tablet, Take 1 Tab by mouth daily. , Disp: 90 Tab, Rfl: 0    traZODone (DESYREL) 100 mg tablet, Take 1 Tab by mouth nightly., Disp: 90 Tab, Rfl: 0    amLODIPine (NORVASC) 5 mg tablet, Take 1 Tab by mouth daily. , Disp: 90 Tab, Rfl: 2    cyclobenzaprine (FLEXERIL) 5 mg tablet, TAKE ONE TABLET BY MOUTH NIGHTLY AS NEEDED FOR MUSCLE SPASM(S), Disp: 30 Tab, Rfl: 2    omeprazole (PRILOSEC) 20 mg capsule, Take 1 Cap by mouth daily. , Disp: 90 Cap, Rfl: 3    fluticasone (FLONASE) 50 mcg/actuation nasal spray, 2 sprays each nostril once daily, Disp: 1 Bottle, Rfl: 3    omega-3 fatty acids-vitamin e (FISH OIL) 1,000 mg cap, Take 1 Cap by mouth., Disp: , Rfl:     multivitamin (ONE A DAY) tablet, Take 1 Tab by mouth daily. , Disp: , Rfl:       Allergies   Allergen Reactions    Pcn [Penicillins] Hives       Past Medical History:   Diagnosis Date    Eosinophilic esophagitis     started on flvent, dr. Joseluis Michelle Gastric ulcer 04/2017    dr Payton Louise, avoid nsaids    Gastritis 12/13/2017    gastritis, cont PPI dr. Joseluis Michelle GERD (gastroesophageal reflux disease)     erosive esophagitis 11/13 EGD    H/O colonoscopy 11/2013    normal    Hypertension     Left knee pain     Morbid obesity ([address]     Precordial pain     Prediabetes        Social History     Social History    Marital status:      Spouse name: N/A    Number of children: N/A    Years of education: N/A     Occupational History    Not on file.      Social History Main Topics    Smoking status: Never Smoker    Smokeless tobacco: Never Used    Alcohol use No    Drug use: No    Sexual activity: Not on file     Other Topics Concern    Not on file     Social History Narrative       Family History   Problem Relation Age of Onset    Heart Failure Mother     Heart Disease Mother      mi 52's    Asthma Other          OBJECTIVE    Physical Exam:   Visit Vitals  /70 (BP 1 Location: Left arm, BP Patient Position: Sitting)   Pulse 81   Temp 98.4 °F (36.9 °C) (Oral)   Resp 16   Ht 5' 3\" (1.6 m)   Wt 173 lb 9.6 oz (78.7 kg)   SpO2 98%   BMI 30.75 kg/m²         General: alert, well-appearing, in no apparent distress or pain  HEENT: throat and pharynx clear, eac patent, tm intact  Breasts: breasts appear normal, no suspicious masses, no skin or nipple changes or axillary nodes. CVS: normal rate, regular rhythm, distinct S1 and S2  Lungs:clear to ausculation bilaterally, no crackles, wheezing or rhonchi noted  Abdomen: soft, NT, ND  Extremities: no edema, no cyanosis  Skin: warm, no lesions, rashes noted  Psych:  mood and affect normal.    Results for orders placed or performed in visit on 01/16/20   AMB POC RAPID STREP A   Result Value Ref Range    VALID INTERNAL CONTROL POC Yes     Group A Strep Ag Negative Negative     ASSESSMENT/PLAN  Diagnoses and all orders for this visit:    Acute bronchitis, unspecified organism  Start zpack    Sore throat  Strep test neg    Sleep difficulties  Fair control  Cont trazodone qhs    Prediabetes  Tlcs, monitoring  Check a1c/cmp/lipid panel prior to next visit    Essential hypertension  Controlled,  Cont norvasc, cozaar    Gastroesophageal reflux disease, esophagitis presence not specified  Stable, cont prilosec prn    Osteopenia of necks of both femurs  Calc frax 7.6% and 0.6%  Cont ca/vit d/wt bearing exercises  Update 8/ 2020      Ff-up in 3 months or sooner prn    Patient/guardian understands plan of care. Patient has provided input and agrees with goals. Future labs to be discussed on next visit.

## 2020-01-16 NOTE — TELEPHONE ENCOUNTER
Pt is having a right achilles heel tendon repair. Pt surgery has been delayed due to her recent diagnosis of bronchitis. Please call Lucinda wesley to schedule a pre opt once the pt is cleared.

## 2020-01-30 ENCOUNTER — OFFICE VISIT (OUTPATIENT)
Dept: FAMILY MEDICINE CLINIC | Age: 65
End: 2020-01-30

## 2020-01-30 VITALS
DIASTOLIC BLOOD PRESSURE: 76 MMHG | SYSTOLIC BLOOD PRESSURE: 124 MMHG | BODY MASS INDEX: 31.01 KG/M2 | HEIGHT: 63 IN | RESPIRATION RATE: 16 BRPM | HEART RATE: 66 BPM | TEMPERATURE: 98.6 F | OXYGEN SATURATION: 98 % | WEIGHT: 175 LBS

## 2020-01-30 DIAGNOSIS — I10 ESSENTIAL HYPERTENSION: ICD-10-CM

## 2020-01-30 DIAGNOSIS — K21.9 GASTROESOPHAGEAL REFLUX DISEASE, ESOPHAGITIS PRESENCE NOT SPECIFIED: ICD-10-CM

## 2020-01-30 DIAGNOSIS — Z01.818 PRE-OP EVALUATION: Primary | ICD-10-CM

## 2020-01-30 DIAGNOSIS — R82.71 ASYMPTOMATIC BACTERIURIA: ICD-10-CM

## 2020-01-30 NOTE — PROGRESS NOTES
Preoperative Evaluation    Date of Exam: 2020    Rianna Officer is a 75 y.o. female (:1955) who presents for preoperative evaluation. For achilles tendon repair dr. Wanda Patel 2020    Allergies: Allergies   Allergen Reactions    Pcn [Penicillins] Hives      Medications:     Current Outpatient Medications   Medication Sig    diclofenac EC (VOLTAREN) 50 mg EC tablet     triamcinolone acetonide (KENALOG) 0.025 % ointment Apply  to affected area two (2) times a day. use thin layer    polyethylene glycol (MIRALAX) 17 gram packet Take 1 Packet by mouth daily. **FOR USE AFTER SURGERY**    telmisartan (MICARDIS) 40 mg tablet Take 1 Tab by mouth daily.  traZODone (DESYREL) 100 mg tablet Take 1 Tab by mouth nightly.  amLODIPine (NORVASC) 5 mg tablet Take 1 Tab by mouth daily.  cyclobenzaprine (FLEXERIL) 5 mg tablet TAKE ONE TABLET BY MOUTH NIGHTLY AS NEEDED FOR MUSCLE SPASM(S)    omeprazole (PRILOSEC) 20 mg capsule Take 1 Cap by mouth daily.  fluticasone (FLONASE) 50 mcg/actuation nasal spray 2 sprays each nostril once daily    omega-3 fatty acids-vitamin e (FISH OIL) 1,000 mg cap Take 1 Cap by mouth.  multivitamin (ONE A DAY) tablet Take 1 Tab by mouth daily.  NASAL DECONGESTANT, PSEUDOEPH, 30 mg tablet     azithromycin (ZITHROMAX) 250 mg tablet Take two tablets today then one tablet daily    promethazine (PHENERGAN) 25 mg tablet Take 1 Tab by mouth every six (6) hours as needed for Nausea. **FOR USE AFTER SURGERY**    rivaroxaban (XARELTO) 10 mg tablet TAKE ONE TABLET BY MOUTH PER DAY FOLLOWING SURGERY  Indications: Treatment to Prevent Recurrence of a Clot in a Deep Vein    nitrofurantoin, macrocrystal-monohydrate, (MACROBID) 100 mg capsule Take 1 Cap by mouth two (2) times a day. No current facility-administered medications for this visit.       Surgical History:     Past Surgical History:   Procedure Laterality Date    HX CATARACT REMOVAL Right 10/04/2016    HX COLONOSCOPY  11/06/2016    HX GYN      vaginal delivery x 2    HX ORTHOPAEDIC      foot surgery     Social History:     Social History     Socioeconomic History    Marital status:      Spouse name: Not on file    Number of children: Not on file    Years of education: Not on file    Highest education level: Not on file   Tobacco Use    Smoking status: Never Smoker    Smokeless tobacco: Never Used   Substance and Sexual Activity    Alcohol use: No    Drug use: No       She has h/o HTN, controlled on norvasc and micardis. She denies any exertional cardiorespiratory symptoms, able to do home chores , go up flight of stairs without problems    She was treated for acute bronchitis recently and reports resolution of cough. CXR 1/8/2020 neg    GERD- responding to prilosec      Objective:     ROS:   Feeling well. No dyspnea or chest pain on exertion. No abdominal pain, change in bowel habits, black or bloody stools. No urinary tract symptoms. No neurological complaints. OBJECTIVE:   The patient appears well, alert, oriented x 3, in no distress. Visit Vitals  /76 (BP 1 Location: Left arm, BP Patient Position: Sitting)   Pulse 66   Temp 98.6 °F (37 °C) (Oral)   Resp 16   Ht 5' 3\" (1.6 m)   Wt 175 lb (79.4 kg)   SpO2 98%   BMI 31.00 kg/m²     HEENT:ENT normal.  Neck supple. No adenopathy or thyromegaly. EMMIE. Chest: Lungs are clear, good air entry, no wheezes, rhonchi or rales. Cardiovascular: S1 and S2 normal, no murmurs, regular rate and rhythm. Abdomen: soft without tenderness, guarding, mass or organomegaly. Extremities: show no edema, normal peripheral pulses. Neurological: is normal, no focal findings. DIAGNOSTICS:   1. EKG: EKG FINDINGS - normal EKG, normal sinus rhythm  2. CXR: was negative for infiltrate, effusion, pneumothorax, or wide mediastinum    IMPRESSION:   Diagnoses and all orders for this visit:    1.  Pre-op evaluation  Low risk for planned surgery  No contraindications to planned surgery  She was previously treated with macrobid for asymptomatic bacteriuria  She says she has a pending repeat UA order with ortho office    2. Asymptomatic bacteriuria    3. Essential hypertension  Controlled  Cont norvasc, micardis    4. Gastroesophageal reflux disease, esophagitis presence not specified  Stable  Cont edvin Fajardo MD   1/30/2020    Keep appt in April 2020 for routine ff-up    Patient/guardian understands plan of care. Patient has provided input and agrees with goals. Future labs to be discussed on next visit.

## 2020-01-30 NOTE — PROGRESS NOTES
1. Have you been to the ER, urgent care clinic since your last visit? Hospitalized since your last visit? No    2. Have you seen or consulted any other health care providers outside of the [address] [place] [address] since your last visit? Include any pap smears or colon screening.  No

## 2020-01-31 ENCOUNTER — HOSPITAL ENCOUNTER (OUTPATIENT)
Dept: LAB | Age: 65
Discharge: HOME | End: 2020-01-31
Payer: MEDICARE

## 2020-01-31 DIAGNOSIS — Z01.818 PREOP EXAMINATION: Primary | ICD-10-CM

## 2020-01-31 DIAGNOSIS — Z01.818 PREOP EXAMINATION: ICD-10-CM

## 2020-01-31 LAB
ALBUMIN SERPL-MCNC: 4 G/DL (ref 3.4–5)
ALBUMIN/GLOB SERPL: 1.1 {RATIO} (ref 0.8–1.7)
ALP SERPL-CCNC: 82 U/L (ref 45–117)
ALT SERPL-CCNC: 28 U/L (ref 13–56)
ANION GAP SERPL CALC-SCNC: 4 MMOL/L (ref 3–18)
APPEARANCE UR: CLEAR
APTT PPP: 32.3 SEC (ref 23–36.4)
AST SERPL-CCNC: 22 U/L (ref 10–38)
BASOPHILS # BLD: 0 K/UL (ref 0–0.1)
BASOPHILS NFR BLD: 1 % (ref 0–2)
BILIRUB SERPL-MCNC: 1 MG/DL (ref 0.2–1)
BILIRUB UR QL: ABNORMAL
BUN SERPL-MCNC: 16 MG/DL (ref 7–18)
BUN/CREAT SERPL: 16 (ref 12–20)
CALCIUM SERPL-MCNC: 9.3 MG/DL (ref 8.5–10.1)
CHLORIDE SERPL-SCNC: 106 MMOL/L (ref 100–111)
CO2 SERPL-SCNC: 31 MMOL/L (ref 21–32)
COLOR UR: ABNORMAL
CREAT SERPL-MCNC: 1.03 MG/DL (ref 0.6–1.3)
CRP SERPL-MCNC: <0.3 MG/DL (ref 0–0.3)
DIFFERENTIAL METHOD BLD: ABNORMAL
EOSINOPHIL # BLD: 0.1 K/UL (ref 0–0.4)
EOSINOPHIL NFR BLD: 2 % (ref 0–5)
ERYTHROCYTE [DISTWIDTH] IN BLOOD BY AUTOMATED COUNT: 12.7 % (ref 11.6–14.5)
ERYTHROCYTE [SEDIMENTATION RATE] IN BLOOD: 45 MM/HR (ref 0–30)
GLOBULIN SER CALC-MCNC: 3.5 G/DL (ref 2–4)
GLUCOSE SERPL-MCNC: 89 MG/DL (ref 74–99)
GLUCOSE UR STRIP.AUTO-MCNC: NEGATIVE MG/DL
HCT VFR BLD AUTO: 37.9 % (ref 35–45)
HGB BLD-MCNC: 12.4 G/DL (ref 12–16)
HGB UR QL STRIP: NEGATIVE
KETONES UR QL STRIP.AUTO: ABNORMAL MG/DL
LEUKOCYTE ESTERASE UR QL STRIP.AUTO: NEGATIVE
LYMPHOCYTES # BLD: 1.3 K/UL (ref 0.9–3.6)
LYMPHOCYTES NFR BLD: 39 % (ref 21–52)
MCH RBC QN AUTO: 30.2 PG (ref 24–34)
MCHC RBC AUTO-ENTMCNC: 32.7 G/DL (ref 31–37)
MCV RBC AUTO: 92.4 FL (ref 74–97)
MONOCYTES # BLD: 0.2 K/UL (ref 0.05–1.2)
MONOCYTES NFR BLD: 7 % (ref 3–10)
NEUTS SEG # BLD: 1.7 K/UL (ref 1.8–8)
NEUTS SEG NFR BLD: 51 % (ref 40–73)
NITRITE UR QL STRIP.AUTO: NEGATIVE
PH UR STRIP: 5 [PH] (ref 5–8)
PLATELET # BLD AUTO: 192 K/UL (ref 135–420)
PMV BLD AUTO: 11 FL (ref 9.2–11.8)
POTASSIUM SERPL-SCNC: 3.8 MMOL/L (ref 3.5–5.5)
PROT SERPL-MCNC: 7.5 G/DL (ref 6.4–8.2)
PROT UR STRIP-MCNC: NEGATIVE MG/DL
RBC # BLD AUTO: 4.1 M/UL (ref 4.2–5.3)
SODIUM SERPL-SCNC: 141 MMOL/L (ref 136–145)
SP GR UR REFRACTOMETRY: 1.03 (ref 1–1.03)
UROBILINOGEN UR QL STRIP.AUTO: 1 EU/DL (ref 0.2–1)
WBC # BLD AUTO: 3.4 K/UL (ref 4.6–13.2)

## 2020-01-31 PROCEDURE — 85730 THROMBOPLASTIN TIME PARTIAL: CPT

## 2020-01-31 PROCEDURE — 81003 URINALYSIS AUTO W/O SCOPE: CPT

## 2020-01-31 PROCEDURE — 36415 COLL VENOUS BLD VENIPUNCTURE: CPT

## 2020-01-31 PROCEDURE — 85025 COMPLETE CBC W/AUTO DIFF WBC: CPT

## 2020-01-31 PROCEDURE — 80053 COMPREHEN METABOLIC PANEL: CPT

## 2020-01-31 PROCEDURE — 86140 C-REACTIVE PROTEIN: CPT

## 2020-01-31 PROCEDURE — 85652 RBC SED RATE AUTOMATED: CPT

## 2020-02-04 RX ORDER — TELMISARTAN 40 MG/1
40 TABLET ORAL DAILY
Qty: 90 TAB | Refills: 1 | Status: SHIPPED | OUTPATIENT
Start: 2020-02-04 | End: 2020-08-13 | Stop reason: SDUPTHER

## 2020-02-04 RX ORDER — TRIAMCINOLONE ACETONIDE 0.25 MG/G
OINTMENT TOPICAL 2 TIMES DAILY
Qty: 30 G | Refills: 0 | Status: SHIPPED | OUTPATIENT
Start: 2020-02-04 | End: 2020-02-06 | Stop reason: RX

## 2020-02-04 NOTE — TELEPHONE ENCOUNTER
This patient contacted office for the following prescriptions to be filled:    Medication requested :   Requested Prescriptions     Pending Prescriptions Disp Refills    triamcinolone acetonide (KENALOG) 0.025 % ointment 30 g 0     Sig: Apply  to affected area two (2) times a day. use thin layer    telmisartan (MICARDIS) 40 mg tablet 90 Tab 0     Sig: Take 1 Tab by mouth daily.      PCP:  [address] [place] [address] or Print:  Satish Marshall   Mail order or Local pharmacy [address]     Scheduled appointment if not seen by current providers in office:  LOV 1/30/2020 f/u 4/16/2020

## 2020-02-05 ENCOUNTER — TELEPHONE (OUTPATIENT)
Dept: FAMILY MEDICINE CLINIC | Age: 65
End: 2020-02-05

## 2020-02-05 NOTE — TELEPHONE ENCOUNTER
triamcinolone acetonide (KENALOG) 0.025 % ointment not available. They have the .1% ointment. Want to know if it can be switched out. Please send new prescription over if able to.

## 2020-02-06 DIAGNOSIS — R21 RASH: Primary | ICD-10-CM

## 2020-02-06 RX ORDER — TRIAMCINOLONE ACETONIDE 1 MG/G
OINTMENT TOPICAL 2 TIMES DAILY
Qty: 30 G | Refills: 0 | Status: SHIPPED | OUTPATIENT
Start: 2020-02-06 | End: 2021-05-13

## 2020-02-10 ENCOUNTER — OFFICE VISIT (OUTPATIENT)
Dept: ORTHOPEDIC SURGERY | Age: 65
End: 2020-02-10

## 2020-02-10 VITALS
RESPIRATION RATE: 10 BRPM | SYSTOLIC BLOOD PRESSURE: 115 MMHG | WEIGHT: 174.8 LBS | OXYGEN SATURATION: 100 % | BODY MASS INDEX: 30.97 KG/M2 | HEIGHT: 63 IN | DIASTOLIC BLOOD PRESSURE: 59 MMHG | TEMPERATURE: 97.5 F | HEART RATE: 82 BPM

## 2020-02-10 DIAGNOSIS — M76.61 ACHILLES TENDINITIS OF RIGHT LOWER EXTREMITY: Primary | ICD-10-CM

## 2020-02-10 DIAGNOSIS — M79.671 RIGHT FOOT PAIN: ICD-10-CM

## 2020-02-10 DIAGNOSIS — Z01.818 PRE-OPERATIVE EXAMINATION: ICD-10-CM

## 2020-02-10 NOTE — PROGRESS NOTES
FOOT AND ANKLE HISTORY AND PHYSICAL      Patient: Heidy Kaur                   MRN: [id number]         SSN: [id number]  YOB: 1955                     AGE: 75 y.o. SEX: female    Patient scheduled for:  Excisional debridement and repair of the right Achilles tendon, retrocalcaneal bursectomy, excision of bone spur, flexor hallucis longus tendon augmentation procedure   Date of surgery: 2/14/20   Location of Surgery: [facility]   Surgeon: Ish Carter. MD Avel  ANESTHESIA TYPE:  General, Popliteal block  CONSULTS: None              PRESCRIPTIONS AND/OR ORDERS PROVIDED DURING H&P:  Orders Placed This Encounter    Generic Supply Order     Rolling knee scooter    XR CHEST PA LAT     Preop assessment.      Standing Status:   Future     Number of Occurrences:   1     Standing Expiration Date:   2/7/2021     Order Specific Question:   Reason for Exam     Answer:   pre op    METABOLIC PANEL, COMPREHENSIVE     Standing Status:   Future     Number of Occurrences:   1     Standing Expiration Date:   7/8/2020    SED RATE (ESR)     Standing Status:   Future     Number of Occurrences:   1     Standing Expiration Date:   7/8/2020    URIC ACID     Standing Status:   Future     Number of Occurrences:   1     Standing Expiration Date:   7/8/2020    C REACTIVE PROTEIN, QT     Standing Status:   Future     Number of Occurrences:   1     Standing Expiration Date:   7/8/2020    PROTHROMBIN TIME + INR     Standing Status:   Future     Number of Occurrences:   1     Standing Expiration Date:   2/8/2020    CBC WITH AUTOMATED DIFF     Standing Status:   Future     Number of Occurrences:   1     Standing Expiration Date:   2/8/2020    URINALYSIS W/ RFLX MICROSCOPIC     Pre-Op testing to be performed in the HOSPITAL     Standing Status:   Future     Number of Occurrences:   1     Standing Expiration Date:   2/8/2020    EKG, 12 LEAD, INITIAL     Standing Status:   Future     Number of Occurrences:   1     Standing Expiration Date:   2/8/2020     Order Specific Question:   Reason for Exam:     Answer: For pre-op assessment    oxyCODONE IR (ROXICODONE) 5 mg immediate release tablet     Sig: Take 1-2 Tabs by mouth every six (6) hours as needed for Pain for up to 7 days. Max Daily Amount: 40 mg. **FOR PAIN FOLLOWING SURGERY**DO NOT TAKE PRIOR TO SURGERY**     Dispense:  42 Tab     Refill:  0    promethazine (PHENERGAN) 25 mg tablet     Sig: Take 1 Tab by mouth every six (6) hours as needed for Nausea. **FOR USE AFTER SURGERY**     Dispense:  30 Tab     Refill:  0    polyethylene glycol (MIRALAX) 17 gram packet     Sig: Take 1 Packet by mouth daily. **FOR USE AFTER SURGERY**     Dispense:  10 Packet     Refill:  1    rivaroxaban (XARELTO) 10 mg tablet     Sig: TAKE ONE TABLET BY MOUTH PER DAY FOLLOWING SURGERY  Indications: Treatment to Prevent Recurrence of a Clot in a Deep Vein     Dispense:  30 Tab     Refill:  0     Prescriptions were e-scribed to patients [facility] pharmacy on [facility] [address]:     The patient was seen in the office today for a preoperative history and physical for an upcoming above listed surgery. The patient is a pleasant 75 y.o. female who has a history of pain in her right Achilles tendon. She notes that she experiences pain with every step she takes. She reports that she tried to wear a compression sleeve, but notes that it was too tight and that she had to take it off. MRI results have been reviewed with the patient. Possible surgical interventions have been discussed with the patient. She recalls that she had surgery on the left Achilles tendon in the past. She notes that the pain in her right Achilles tendon is as bad as the pain was in her left tendon. She has severe, noninsertional and insertional right Achilles tendinopathy.   She had an MRI on July 8, 2019, that shows severe Achilles tendinopathy/tendinosis involving at least 60% of the anterior portion of her Achilles tendon indicating a partial thickness tear at the insertion point and associated retrocalcaneal bursitis, no discreet mass. Because of her continued pain and discomfort, because she has failed conservative treatment, she tried anti-inflammatories, shoe wear changes, activity modification, CAM walker boot, bracing, and is having disabling pain, recommendation is for Achilles tendon surgery, specifically:    1. Sharp, excisional debridement of the right Achilles tendon. 2. Primary repair of the right Achilles tendon. 3. Right flexor hallucis longus tendon augmentation procedure using the [employer] bio tenodesis suture anchor technique. 4. I anticipate also using the [employer] speed bridge type system as well using fiber tape. 5. Prone position. The alternatives, risks, and complications, including but not limited to infection, blood loss, need for blood transfusion, neurovascular damage, joseph-incisional numbness, subcutaneous hematoma, bone fracture, anesthetic complications, DVT, PE, death, RSD, postoperative stiffness and pain, possible surgical scar, delayed healing and nonhealing, reflexive sympathetic dystrophy, damage to blood vessels and nerves, need for more surgery, MI, and stroke have been discussed. The patient understands and wishes to proceed with surgery. Patient was previously scheduled for surgery on 1/17/19 and was rescheduled due to Bronchitis. Patient has been treated for both bronchitis and UTI which have resolved. She is non-symptomatic at this time.     PAST MEDICAL HISTORY:     Past Medical History:   Diagnosis Date    Eosinophilic esophagitis     started on flvent, dr. Dayo Marcelino Gastric ulcer 04/2017    dr Zoila Zuñiga, avoid nsaids    Gastritis 12/13/2017    gastritis, cont PPI dr. Dayo Marceilno GERD (gastroesophageal reflux disease)     erosive esophagitis 11/13 EGD    H/O colonoscopy 11/2013    normal    Hypertension     Left knee pain     Morbid obesity (HCC)     Precordial pain     Prediabetes          CURRENT MEDICATIONS:     Current Outpatient Medications   Medication Sig    triamcinolone acetonide (KENALOG) 0.1 % ointment Apply  to affected area two (2) times a day. use thin layer    telmisartan (MICARDIS) 40 mg tablet Take 1 Tab by mouth daily.  diclofenac EC (VOLTAREN) 50 mg EC tablet     polyethylene glycol (MIRALAX) 17 gram packet Take 1 Packet by mouth daily. **FOR USE AFTER SURGERY**    rivaroxaban (XARELTO) 10 mg tablet TAKE ONE TABLET BY MOUTH PER DAY FOLLOWING SURGERY  Indications: Treatment to Prevent Recurrence of a Clot in a Deep Vein    traZODone (DESYREL) 100 mg tablet Take 1 Tab by mouth nightly.  amLODIPine (NORVASC) 5 mg tablet Take 1 Tab by mouth daily.  cyclobenzaprine (FLEXERIL) 5 mg tablet TAKE ONE TABLET BY MOUTH NIGHTLY AS NEEDED FOR MUSCLE SPASM(S)    omeprazole (PRILOSEC) 20 mg capsule Take 1 Cap by mouth daily.  fluticasone (FLONASE) 50 mcg/actuation nasal spray 2 sprays each nostril once daily    omega-3 fatty acids-vitamin e (FISH OIL) 1,000 mg cap Take 1 Cap by mouth.  multivitamin (ONE A DAY) tablet Take 1 Tab by mouth daily. No current facility-administered medications for this visit.         ALLERGIES:     Allergies   Allergen Reactions    Pcn [Penicillins] Hives         SURGICAL HISTORY:       Past Surgical History:   Procedure Laterality Date    HX CATARACT REMOVAL Right 10/04/2016    HX COLONOSCOPY  11/06/2016    HX GYN      vaginal delivery x 2    HX ORTHOPAEDIC      foot surgery         SOCIAL HISTORY:     Social History     Occupational History    Not on file   Tobacco Use    Smoking status: Never Smoker    Smokeless tobacco: Never Used   Substance and Sexual Activity    Alcohol use: No    Drug use: No    Sexual activity: Not on file       FAMILY HISTORY:     Family History   Problem Relation Age of Onset    Heart Failure Mother     Heart Disease Mother         mi 52's   [facility] Other        REVIEW OF SYSTEMS:     Negative for fevers, chills, chest pain, shortness of breath, weight loss, recent illness    General: Negative for fever and chills. No unexpected change in weight. Denies fatigue. No change in appetite. Skin: Negative for rash or itching. HEENT: Negative for congestion, sore throat, neck pain and neck stiffness. No change in vision or hearing. Hasn't noted any enlarged lymph nodes in the neck. Cardiovascular:  Negative for chest pain and palpitations. Has not noted pedal edema. Respiratory: Negative for cough, colds, sinus, hemoptysis, shortness of breath and wheezing. Gastrointestinal: Negative for nausea and vomiting, rectal bleeding, coffee ground emesis, abdominal pain, diarrhea and constipation. Genitourinary: Negative for dysuria, frequency urgency, or burning on micturition. No flank pain, no foul smelling urine, no difficulty with initiating urination. Hematological: Negative for bleeding or easy bruising. Musculoskeletal: Negative  for arthralgias, back pain or neck pain. Neurological: Negative for dizziness, seizures or syncopal episodes. Denies headaches. Endocrine: Denies excessive thirst.  No heat/cold intolerance. Psychiatric: Negative for depression or insomnia. PHYSICAL EXAMINATION:     VITALS:   Visit Vitals  /59   Pulse 82   Temp 97.5 °F (36.4 °C) (Oral)   Resp 10   Ht 5' 3\" (1.6 m)   Wt 174 lb 12.8 oz (79.3 kg)   SpO2 100%   BMI 30.96 kg/m²     Pain Assessment  2/10/2020   Location of Pain Foot   Location Modifiers Right   Severity of Pain 5   Quality of Pain Aching; Throbbing   Duration of Pain Persistent   Frequency of Pain Constant   Aggravating Factors Walking;Standing   Aggravating Factors Comment -   Limiting Behavior -   Relieving Factors Rest;Ice;Heat;Elevation   Relieving Factors Comment -   Result of Injury No       GEN:  Well developed, well nourished 75 y.o. female in no acute distress.    PSYCH: Alert an oriented to person, place and time. Mood, memory, affect, behavior and judgment normal  HEENT: Normocephalic and atraumatic. Eyes: Conjunctivae and EOM are normal.Pupils are equal, round, and reactive to light. External ear normal appearance, external nose normal appearing. Mouth/Throat: Oropharynx is clear and moist, able to handle oral secretions w/out difficulty, airway patent. NECK: Supple. Normal ROM, No lymphadenopathy. Trachea is midline. No bruising, swelling or deformity  RESP: Clear to auscultation bilaterally. No wheezes, rales, rhonchi. Normal effort and breath sounds. No respiratory distress  CARDIO: Tachycardia noted, regular rhythm and normal heart sounds. No MGR. ABDOMEN: Soft, non-tender, non-distended, normoactive bowel sounds in all four quadrants. There is no tenderness. There is no rebound and no guarding. BACK: No CVA or spinal tenderness  BREAST:  Deferred  PELVIC:    Deferred   RECTAL:  Deferred   :           Deferred  EXTREMITIES: EXAMINATION OF:   ANKLE/FOOT right    Gait: antalgic  Tenderness: Mild tenderness to the Achilles tendon. NT to the posterior tibial tendon. Cutaneous: Fullness on the distal Achilles tendon. Joint Motion: 5 degrees past neutral of DF  Joint / Tendon Stability: No Ankle or Subtalar instability or joint laxity. No peroneal sublux ability or dislocation  Alignment: Moderate pes planus, bilaterally  Neuro Motor/Sensory: NL/NL. Vascular: NL foot/ankle pulses. Lymphatics: No extremity lymphedema, No calf swelling, no tenderness to calf muscles. RADIOGRAPHS & DIAGNOSTIC STUDIES:     No notes on file      MRI Results (most recent):  Results from Hospital Encounter encounter on 07/18/19   MRI ANKLE RT WO CONT    Narrative Multisequence multiplanar MR images of the right ankle were obtained. HISTORY: Pain for one year. Severe Achilles tendinosis, with about 60% anterior partial-thickness tear at  the insertion site.  Moderately severe retrocalcaneal bursitis with inflammation  at the pre-Achilles fat pad. No discrete mass. No significant protuberance of  the posterior superior calcaneus. Plantar calcaneal spur with no erosion. Mild chronic plantar fasciitis. No bone contusion or fracture. No ankle joint effusion. Talar dome is intact. No  focal cartilage defect. Sinus tarsi and tarsal tunnel unremarkable. Mild dorsal  talonavicular spur with thickening of the ligament, with mild sprain. Mild medial subcutaneous edema. Posterior tibialis and flexor tendons are  intact. Deltoid ligament is intact. Also mild lateral subcutaneous edema. Peroneus longus and brevis are intact. Tibiofibular, talofibular ligaments are intact. Mild edema present. Calcaneofibular ligament is intact. Extensor tendons are intact at the ankle level. Lisfranc ligament is intact. Impression IMPRESSION:  1. Severe Achilles tendinosis with about 60% anterior partial-thickness tear at  insertion site. Retrocalcaneal bursitis. No discrete mass. 2. Mild medial and lateral soft tissue edema with no significant ligamentous  injuries. Medial and lateral tendons remain intact.        LABS:     Lab Results   Component Value Date/Time    WBC 3.4 (L) 01/31/2020 11:32 AM    HGB 12.4 01/31/2020 11:32 AM    HCT 37.9 01/31/2020 11:32 AM    PLATELET 458 67/93/2280 11:32 AM    MCV 92.4 01/31/2020 11:32 AM     Lab Results   Component Value Date/Time    Hemoglobin A1c 6.1 (H) 11/08/2018 11:35 AM    Hemoglobin A1c 6.7 (H) 08/06/2018 08:35 AM    Hemoglobin A1c 6.6 (H) 04/30/2018 07:43 AM    Glucose 89 01/31/2020 11:32 AM    LDL, calculated 71.4 05/08/2019 07:12 AM    Creatinine 1.03 01/31/2020 11:32 AM      Lab Results   Component Value Date/Time    INR 1.0 01/08/2020 12:17 PM    Prothrombin time 13.2 01/08/2020 12:17 PM      Lab Results   Component Value Date/Time    TSH 1.06 03/29/2018 12:26 PM    T4, Free 1.1 11/17/2016 02:19 PM      Lab Results   Component Value Date/Time Glucose 89 01/31/2020 11:32 AM      Lab Results   Component Value Date/Time    Sodium 141 01/31/2020 11:32 AM    Potassium 3.8 01/31/2020 11:32 AM    Chloride 106 01/31/2020 11:32 AM    CO2 31 01/31/2020 11:32 AM    Anion gap 4 01/31/2020 11:32 AM    Glucose 89 01/31/2020 11:32 AM    BUN 16 01/31/2020 11:32 AM    Creatinine 1.03 01/31/2020 11:32 AM    BUN/Creatinine ratio 16 01/31/2020 11:32 AM    GFR est AA >60 01/31/2020 11:32 AM    GFR est non-AA 54 (L) 01/31/2020 11:32 AM    Calcium 9.3 01/31/2020 11:32 AM    Bilirubin, total 1.0 01/31/2020 11:32 AM    ALT (SGPT) 28 01/31/2020 11:32 AM    AST (SGOT) 22 01/31/2020 11:32 AM    Alk. phosphatase 82 01/31/2020 11:32 AM    Protein, total 7.5 01/31/2020 11:32 AM    Albumin 4.0 01/31/2020 11:32 AM    Globulin 3.5 01/31/2020 11:32 AM    A-G Ratio 1.1 01/31/2020 11:32 AM      Lab Results   Component Value Date/Time    Hemoglobin A1c 6.1 (H) 11/08/2018 11:35 AM      Preoperative labs were reviewed and are substantially within normal limits   Chest X-ray revealed no acute cardiopulmonary process   EKG:   EKG RESULTS     Procedure Component Value Units Date/Time    EKG, 12 LEAD, INITIAL [[id number]] Collected:  01/08/20 1337    Order Status:  Completed Updated:  01/08/20 1722     Ventricular Rate 75 BPM      Atrial Rate 75 BPM      P-R Interval 138 ms      QRS Duration 74 ms      Q-T Interval 392 ms      QTC Calculation (Bezet) 437 ms      Calculated P Axis 57 degrees      Calculated R Axis 4 degrees      Calculated T Axis 56 degrees      Diagnosis --     Normal sinus rhythm  Normal ECG  When compared with ECG of 26-JUL-2017 10:42,  No significant change was found  Confirmed by Shilo Rene ([id number]) on 1/8/2020 5:22:31 PM            ASSESSMENT:       Encounter Diagnoses   Name Primary?     Achilles tendinitis of right lower extremity Yes    Right foot pain     Pre-operative examination        PLAN:     Again, the alternatives, risks, and complications, as well as expected outcome were discussed. The patient understands and agrees to proceed with the above listed surgery. Patient has been given Hibiclens wash with instructions and/or prescriptions or orders listed above.     Francisco Art PA-C  2/10/2020  10:49 AM

## 2020-02-10 NOTE — PATIENT INSTRUCTIONS
Dr. Brody Garcia Pre-operative Instructions:      Patient: Diane Ashton   :  1955     I understand I am to stop taking oral birth control pills, hormonal replacement therapy and all Aspirin, Aspirin containing medications, Non-Steroidal Anti-Inflammatory medications (such as Advil, Aleve, Motrin, Ibuprofen) and or Blood thinner medication such as Coumadin, Plavix, Heparin or others 5 days prior to surgery. I understand I am to STOP taking these Medications 5 days prior to surgery:  I am to get instructions from my prescribing physician. 1. __As listed above_______________________  2. _____________________________________  3. _____________________________________  4. _____________________________________    I understand that if I am taking daily medications for high blood pressure, I can take them the morning of surgery with a small sip of water. I will consult my prescribing physician or call [facility] with specific questions. I also understand that:     I am to report important observations or changes that may occur prior to surgery. If I have any changes in my physical condition, such as a rash, a fever, sore throat, abscess, ulcers, nausea, vomiting, or diarrhea. I am to call the office and I am to consult my primary care physician to assess and treat the problem.  I am not to eat or drink anything after midnight the night before my surgery.  I am not to drink alcoholic beverages 24 hours prior to surgery.  I am not to do any illegal drugs prior to surgery.  I am not to smoke at least 24 hours prior to surgery.  I am able and will shower or bathe before surgery. I will use the Hibiclens solution on my surgical site only. The hibiclens directions are one packet a day starting two days before surgery.  I will remove any nail polish, make-up or jewelry prior to arriving for my surgery.       If I wear glasses, contact lenses or dentures they must be removed prior to going to the operating room.  All body piercing and artifical eye-lashes must be removed prior to surgery     I will not wear any aerosol sprays, perfumes or skin creams.  I am to make arrangements for a family member or friend to accompany me to surgery and take me home after my surgery as I will not be allowed to leave the hospital alone. A cab or bus will not be acceptable. Please make arrange for someone to stay with you for 24 hours after surgery.  Patient has expressed understanding of the diagnosis, treatment and planned surgery        Surgery: What to Expect at [address] [place] [address]  This care sheet gives you a general idea about how long it will take for you to recover from your surgery. But each person recovers at a different pace. How can you care for yourself at home? Activity  · Allow your body to heal. Don't move quickly or lift anything heavy until you are feeling better. · Rest when you feel tired. · Your doctor may give you specific instructions on when you can do your normal activities again, such as driving and going back to work. · Be active. Walking is a good choice. Diet  · You can eat your normal diet when you feel well. If your stomach is upset, try bland, low-fat foods like plain rice, broiled chicken, toast, and yogurt. · If your bowel movements are not regular right after surgery, try to avoid constipation and straining. Drink plenty of water. Your doctor may suggest fiber, a stool softener, or a mild laxative. Medicines  · Your doctor will tell you if and when you can restart your medicines. He or she will also give you instructions about taking any new medicines. · If you take blood thinners, such as warfarin (Coumadin), clopidogrel (Plavix), or aspirin, be sure to talk to your doctor. He or she will tell you if and when to start taking those medicines again. Make sure that you understand exactly what your doctor wants you to do. · Be safe with medicines.  Read and follow all instructions on the label. ¨ If the doctor gave you a prescription medicine for pain, take it as prescribed. ¨ If you are not taking a prescription pain medicine, ask your doctor if you can take an over-the-counter medicine. Incision care  · You will have a dressing over the cut (incision). A dressing helps the incision heal and protects it. Your doctor will tell you how to take care of this. · If you have strips of tape on the cut the doctor made, leave the tape on for a week or until it falls off. · If you had stitches, your doctor will tell you when to come back to have them removed. · If you have skin adhesive on the cut, leave it on until it falls off. Skin adhesive is also called liquid stitches. · Change the bandage every day. · Wash the area daily with warm, soapy water, and pat it dry. Don't use hydrogen peroxide or alcohol. They can slow healing. · You may cover the area with a gauze bandage if it oozes fluid or rubs against clothing. · You may shower 24 to 48 hours after surgery. Pat the incision dry. Don't swim or take a bath for the first 2 weeks, or until your doctor tells you it is okay. Follow-up care is a key part of your treatment and safety. Be sure to make and go to all appointments, and call your doctor if you are having problems. It's also a good idea to know your test results and keep a list of the medicines you take. When should you call for help? Call 911 anytime you think you may need emergency care. For example, call if:  · You passed out (lost consciousness). · You have severe trouble breathing. · You have sudden chest pain and shortness of breath, or you cough up blood. Call your doctor now or seek immediate medical care if:  · You have pain that does not get better after you take pain medicine. · You have loose stitches, or your incision comes open. · You are bleeding through your dressing.  A small amount of blood is normal.  · You have signs of infection, such as:  ¨ Increased pain, swelling, warmth, or redness. ¨ Red streaks leading from the incision. ¨ Pus draining from the incision. ¨ A fever. · You have symptoms of a blood clot in your arm or leg (called a deep vein thrombosis). These may include:  ¨ Pain in your calf, back of the knee, thigh, or groin. ¨ Redness and swelling in the arm, leg, or groin. Watch closely for any changes in your health, and be sure to contact your doctor if:  · You do not have a bowel movement after taking a laxative. Where can you learn more? Go to http://carlos eduardo-turner.info/  Enter L023 in the search box to learn more about \"Surgery: What to Expect at Home. \"  © 9576-7200 [employer]. Care instructions adapted under license by [employer] (which disclaims liability or warranty for this information). This care instruction is for use with your licensed healthcare professional. If you have questions about a medical condition or this instruction, always ask your healthcare professional. [place] [address] any warranty or liability for your use of this information. Content Version: 90.2.321004; Current as of: November 20, 2015          Acute Pain After Surgery: Care Instructions  Your Care Instructions      It's common to have some pain after surgery. Pain doesn't mean that something is wrong or that the surgery didn't go well. But when the pain is severe, it's important to work with your doctor to manage it. It's also important to be aware of a few facts about pain and pain medicine. · You are the only person who knows what your pain feels like. So be sure to tell your doctor when you are in pain or when the pain changes. Then he or she will know how to adjust your medicines. · Pain is often easier to control right after it starts. So it may be better to take regular doses of pain medicine and not wait until the pain gets bad. · Medicine can help control pain.  But this doesn't mean you'll have no pain. Medicine works to keep the pain at a level you can live with. With time, you will feel better. Follow-up care is a key part of your treatment and safety. Be sure to make and go to all appointments, and call your doctor if you are having problems. It's also a good idea to know your test results and keep a list of the medicines you take. How can you care for yourself at home? · Be safe with medicines. Read and follow all instructions on the label. ¨ If the doctor gave you a prescription medicine for pain, take it as prescribed. ¨ If you are not taking a prescription pain medicine, ask your doctor if you can take an over-the-counter medicine. · If you take an over-the-counter pain medicine, such as acetaminophen (Tylenol), ibuprofen (Advil, Motrin), or naproxen (Aleve), read and follow all instructions on the label. · Do not take two or more pain medicines at the same time unless the doctor told you to. · Do not drink alcohol while you are taking pain medicines. · Try to walk each day if your doctor recommends it. Start by walking a little more than you did the day before. Bit by bit, increase the amount you walk. Walking increases blood flow. It also helps prevent pneumonia and constipation. · To prevent constipation from opioid pain medicines:  ¨ Talk to your doctor about a laxative. ¨ Include fruits, vegetables, beans, and whole grains in your diet each day. These foods are high in fiber. ¨ Drink plenty of fluids, enough so that your urine is light yellow or clear like water. Drink water, fruit juice, or other drinks that do not contain caffeine or alcohol. If you have kidney, heart, or liver disease and have to limit fluids, talk with your doctor before you increase the amount of fluids you drink. ¨ Take a fiber supplement, such as Citrucel or Metamucil, every day if needed. Read and follow all instructions on the label.  If you take pain medicine for more than a few days, talk to your doctor before you take fiber. When should you call for help? Call your doctor now or seek immediate medical care if:   · Your pain gets worse. · Your pain is not controlled by medicine. Watch closely for changes in your health, and be sure to contact your doctor if you have any problems. Where can you learn more? Go to http://carlos eduardo-turner.info/. Enter (41) 155-097 in the search box to learn more about \"Acute Pain After Surgery: Care Instructions. \"  Current as of: March 20, 2017  Content Version: 11.4  © 9320-6615 [employer]. Care instructions adapted under license by [employer] (which disclaims liability or warranty for this information). If you have questions about a medical condition or this instruction, always ask your healthcare professional. [address] any warranty or liability for your use of this information.

## 2020-02-10 NOTE — H&P
FOOT AND ANKLE HISTORY AND PHYSICAL      Patient: Moris Woody                   MRN: [id number]         SSN: [id number]  YOB: 1955                     AGE: 75 y.o. SEX: female    Patient scheduled for:  Excisional debridement and repair of the right Achilles tendon, retrocalcaneal bursectomy, excision of bone spur, flexor hallucis longus tendon augmentation procedure   Date of surgery: 2/14/20   [facility]   Surgeon: Deuce Hernandez. MD Avel  ANESTHESIA TYPE:  General, Popliteal block  CONSULTS: None              PRESCRIPTIONS AND/OR ORDERS PROVIDED DURING H&P:  Orders Placed This Encounter    Generic Supply Order     Rolling knee scooter    XR CHEST PA LAT     Preop assessment.      Standing Status:   Future     Number of Occurrences:   1     Standing Expiration Date:   2/7/2021     Order Specific Question:   Reason for Exam     Answer:   pre op    METABOLIC PANEL, COMPREHENSIVE     Standing Status:   Future     Number of Occurrences:   1     Standing Expiration Date:   7/8/2020    SED RATE (ESR)     Standing Status:   Future     Number of Occurrences:   1     Standing Expiration Date:   7/8/2020    URIC ACID     Standing Status:   Future     Number of Occurrences:   1     Standing Expiration Date:   7/8/2020    C REACTIVE PROTEIN, QT     Standing Status:   Future     Number of Occurrences:   1     Standing Expiration Date:   7/8/2020    PROTHROMBIN TIME + INR     Standing Status:   Future     Number of Occurrences:   1     Standing Expiration Date:   2/8/2020    CBC WITH AUTOMATED DIFF     Standing Status:   Future     Number of Occurrences:   1     Standing Expiration Date:   2/8/2020    URINALYSIS W/ RFLX MICROSCOPIC     Pre-Op testing to be performed in the HOSPITAL     Standing Status:   Future     Number of Occurrences:   1     Standing Expiration Date:   2/8/2020    EKG, 12 LEAD, INITIAL     Standing Status:   Future     Number of Occurrences:   1     Standing Expiration Date:   2/8/2020     Order Specific Question:   Reason for Exam:     Answer: For pre-op assessment    oxyCODONE IR (ROXICODONE) 5 mg immediate release tablet     Sig: Take 1-2 Tabs by mouth every six (6) hours as needed for Pain for up to 7 days. Max Daily Amount: 40 mg. **FOR PAIN FOLLOWING SURGERY**DO NOT TAKE PRIOR TO SURGERY**     Dispense:  42 Tab     Refill:  0    promethazine (PHENERGAN) 25 mg tablet     Sig: Take 1 Tab by mouth every six (6) hours as needed for Nausea. **FOR USE AFTER SURGERY**     Dispense:  30 Tab     Refill:  0    polyethylene glycol (MIRALAX) 17 gram packet     Sig: Take 1 Packet by mouth daily. **FOR USE AFTER SURGERY**     Dispense:  10 Packet     Refill:  1    rivaroxaban (XARELTO) 10 mg tablet     Sig: TAKE ONE TABLET BY MOUTH PER DAY FOLLOWING SURGERY  Indications: Treatment to Prevent Recurrence of a Clot in a Deep Vein     Dispense:  30 Tab     Refill:  0     Prescriptions were e-scribed to patients [facility] pharmacy on [address]:     The patient was seen in the office today for a preoperative history and physical for an upcoming above listed surgery. The patient is a pleasant 75 y.o. female who has a history of pain in her right Achilles tendon. She notes that she experiences pain with every step she takes. She reports that she tried to wear a compression sleeve, but notes that it was too tight and that she had to take it off. MRI results have been reviewed with the patient. Possible surgical interventions have been discussed with the patient. She recalls that she had surgery on the left Achilles tendon in the past. She notes that the pain in her right Achilles tendon is as bad as the pain was in her left tendon. She has severe, noninsertional and insertional right Achilles tendinopathy.   She had an MRI on July 8, 2019, that shows severe Achilles tendinopathy/tendinosis involving at least 60% of the anterior portion of her Achilles tendon indicating a partial thickness tear at the insertion point and associated retrocalcaneal bursitis, no discreet mass. Because of her continued pain and discomfort, because she has failed conservative treatment, she tried anti-inflammatories, shoe wear changes, activity modification, CAM walker boot, bracing, and is having disabling pain, recommendation is for Achilles tendon surgery, specifically:    1. Sharp, excisional debridement of the right Achilles tendon. 2. Primary repair of the right Achilles tendon. 3. Right flexor hallucis longus tendon augmentation procedure using the [employer] bio tenodesis suture anchor technique. 4. I anticipate also using the [employer] speed bridge type system as well using fiber tape. 5. Prone position. The alternatives, risks, and complications, including but not limited to infection, blood loss, need for blood transfusion, neurovascular damage, joseph-incisional numbness, subcutaneous hematoma, bone fracture, anesthetic complications, DVT, PE, death, RSD, postoperative stiffness and pain, possible surgical scar, delayed healing and nonhealing, reflexive sympathetic dystrophy, damage to blood vessels and nerves, need for more surgery, MI, and stroke have been discussed. The patient understands and wishes to proceed with surgery. Patient was previously scheduled for surgery on 1/17/19 and was rescheduled due to Bronchitis. Patient has been treated for both bronchitis and UTI which have resolved. She is non-symptomatic at this time.     PAST MEDICAL HISTORY:     Past Medical History:   Diagnosis Date    Eosinophilic esophagitis     started on flvent, dr. Joann Yin Gastric ulcer 04/2017    dr Ashish Wharton, avoid nsaids    Gastritis 12/13/2017    gastritis, cont PPI dr. Joann Yin GERD (gastroesophageal reflux disease)     erosive esophagitis 11/13 EGD    H/O colonoscopy 11/2013    normal    Hypertension     Left knee pain     Morbid obesity (HCC)     Precordial pain     Prediabetes          CURRENT MEDICATIONS:     Current Outpatient Medications   Medication Sig    triamcinolone acetonide (KENALOG) 0.1 % ointment Apply  to affected area two (2) times a day. use thin layer    telmisartan (MICARDIS) 40 mg tablet Take 1 Tab by mouth daily.  diclofenac EC (VOLTAREN) 50 mg EC tablet     polyethylene glycol (MIRALAX) 17 gram packet Take 1 Packet by mouth daily. **FOR USE AFTER SURGERY**    rivaroxaban (XARELTO) 10 mg tablet TAKE ONE TABLET BY MOUTH PER DAY FOLLOWING SURGERY  Indications: Treatment to Prevent Recurrence of a Clot in a Deep Vein    traZODone (DESYREL) 100 mg tablet Take 1 Tab by mouth nightly.  amLODIPine (NORVASC) 5 mg tablet Take 1 Tab by mouth daily.  cyclobenzaprine (FLEXERIL) 5 mg tablet TAKE ONE TABLET BY MOUTH NIGHTLY AS NEEDED FOR MUSCLE SPASM(S)    omeprazole (PRILOSEC) 20 mg capsule Take 1 Cap by mouth daily.  fluticasone (FLONASE) 50 mcg/actuation nasal spray 2 sprays each nostril once daily    omega-3 fatty acids-vitamin e (FISH OIL) 1,000 mg cap Take 1 Cap by mouth.  multivitamin (ONE A DAY) tablet Take 1 Tab by mouth daily. No current facility-administered medications for this visit.         ALLERGIES:     Allergies   Allergen Reactions    Pcn [Penicillins] Hives         SURGICAL HISTORY:       Past Surgical History:   Procedure Laterality Date    HX CATARACT REMOVAL Right 10/04/2016    HX COLONOSCOPY  11/06/2016    HX GYN      vaginal delivery x 2    HX ORTHOPAEDIC      foot surgery         SOCIAL HISTORY:     Social History     Occupational History    Not on file   Tobacco Use    Smoking status: Never Smoker    Smokeless tobacco: Never Used   Substance and Sexual Activity    Alcohol use: No    Drug use: No    Sexual activity: Not on file       FAMILY HISTORY:     Family History   Problem Relation Age of Onset    Heart Failure Mother     Heart Disease Mother         mi 52's   [address] [place] [address] Asthma Other        REVIEW OF SYSTEMS:     Negative for fevers, chills, chest pain, shortness of breath, weight loss, recent illness    General: Negative for fever and chills. No unexpected change in weight. Denies fatigue. No change in appetite. Skin: Negative for rash or itching. HEENT: Negative for congestion, sore throat, neck pain and neck stiffness. No change in vision or hearing. Hasn't noted any enlarged lymph nodes in the neck. Cardiovascular:  Negative for chest pain and palpitations. Has not noted pedal edema. Respiratory: Negative for cough, colds, sinus, hemoptysis, shortness of breath and wheezing. Gastrointestinal: Negative for nausea and vomiting, rectal bleeding, coffee ground emesis, abdominal pain, diarrhea and constipation. Genitourinary: Negative for dysuria, frequency urgency, or burning on micturition. No flank pain, no foul smelling urine, no difficulty with initiating urination. Hematological: Negative for bleeding or easy bruising. Musculoskeletal: Negative  for arthralgias, back pain or neck pain. Neurological: Negative for dizziness, seizures or syncopal episodes. Denies headaches. Endocrine: Denies excessive thirst.  No heat/cold intolerance. Psychiatric: Negative for depression or insomnia. PHYSICAL EXAMINATION:     VITALS:   Visit Vitals  /59   Pulse 82   Temp 97.5 °F (36.4 °C) (Oral)   Resp 10   Ht 5' 3\" (1.6 m)   Wt 174 lb 12.8 oz (79.3 kg)   SpO2 100%   BMI 30.96 kg/m²     Pain Assessment  2/10/2020   Location of Pain Foot   Location Modifiers Right   Severity of Pain 5   Quality of Pain Aching; Throbbing   Duration of Pain Persistent   Frequency of Pain Constant   Aggravating Factors Walking;Standing   Aggravating Factors Comment -   Limiting Behavior -   Relieving Factors Rest;Ice;Heat;Elevation   Relieving Factors Comment -   Result of Injury No       GEN:  Well developed, well nourished 75 y.o. female in no acute distress.    PSYCH: Alert an oriented to person, place and time. Mood, memory, affect, behavior and judgment normal  HEENT: Normocephalic and atraumatic. Eyes: Conjunctivae and EOM are normal.Pupils are equal, round, and reactive to light. External ear normal appearance, external nose normal appearing. Mouth/Throat: Oropharynx is clear and moist, able to handle oral secretions w/out difficulty, airway patent. NECK: Supple. Normal ROM, No lymphadenopathy. Trachea is midline. No bruising, swelling or deformity  RESP: Clear to auscultation bilaterally. No wheezes, rales, rhonchi. Normal effort and breath sounds. No respiratory distress  CARDIO: Tachycardia noted, regular rhythm and normal heart sounds. No MGR. ABDOMEN: Soft, non-tender, non-distended, normoactive bowel sounds in all four quadrants. There is no tenderness. There is no rebound and no guarding. BACK: No CVA or spinal tenderness  BREAST:  Deferred  PELVIC:    Deferred   RECTAL:  Deferred   :           Deferred  EXTREMITIES: EXAMINATION OF:   ANKLE/FOOT right    Gait: antalgic  Tenderness: Mild tenderness to the Achilles tendon. NT to the posterior tibial tendon. Cutaneous: Fullness on the distal Achilles tendon. Joint Motion: 5 degrees past neutral of DF  Joint / Tendon Stability: No Ankle or Subtalar instability or joint laxity. No peroneal sublux ability or dislocation  Alignment: Moderate pes planus, bilaterally  Neuro Motor/Sensory: NL/NL. Vascular: NL foot/ankle pulses. Lymphatics: No extremity lymphedema, No calf swelling, no tenderness to calf muscles. RADIOGRAPHS & DIAGNOSTIC STUDIES:     No notes on file      MRI Results (most recent):  Results from Hospital Encounter encounter on 07/18/19   MRI ANKLE RT WO CONT    Narrative Multisequence multiplanar MR images of the right ankle were obtained. HISTORY: Pain for one year. Severe Achilles tendinosis, with about 60% anterior partial-thickness tear at  the insertion site.  Moderately severe retrocalcaneal bursitis with inflammation  at the pre-Achilles fat pad. No discrete mass. No significant protuberance of  the posterior superior calcaneus. Plantar calcaneal spur with no erosion. Mild chronic plantar fasciitis. No bone contusion or fracture. No ankle joint effusion. Talar dome is intact. No  focal cartilage defect. Sinus tarsi and tarsal tunnel unremarkable. Mild dorsal  talonavicular spur with thickening of the ligament, with mild sprain. Mild medial subcutaneous edema. Posterior tibialis and flexor tendons are  intact. Deltoid ligament is intact. Also mild lateral subcutaneous edema. Peroneus longus and brevis are intact. Tibiofibular, talofibular ligaments are intact. Mild edema present. Calcaneofibular ligament is intact. Extensor tendons are intact at the ankle level. Lisfranc ligament is intact. Impression IMPRESSION:  1. Severe Achilles tendinosis with about 60% anterior partial-thickness tear at  insertion site. Retrocalcaneal bursitis. No discrete mass. 2. Mild medial and lateral soft tissue edema with no significant ligamentous  injuries. Medial and lateral tendons remain intact.        LABS:     Lab Results   Component Value Date/Time    WBC 3.4 (L) 01/31/2020 11:32 AM    HGB 12.4 01/31/2020 11:32 AM    HCT 37.9 01/31/2020 11:32 AM    PLATELET 491 96/71/1256 11:32 AM    MCV 92.4 01/31/2020 11:32 AM     Lab Results   Component Value Date/Time    Hemoglobin A1c 6.1 (H) 11/08/2018 11:35 AM    Hemoglobin A1c 6.7 (H) 08/06/2018 08:35 AM    Hemoglobin A1c 6.6 (H) 04/30/2018 07:43 AM    Glucose 89 01/31/2020 11:32 AM    LDL, calculated 71.4 05/08/2019 07:12 AM    Creatinine 1.03 01/31/2020 11:32 AM      Lab Results   Component Value Date/Time    INR 1.0 01/08/2020 12:17 PM    Prothrombin time 13.2 01/08/2020 12:17 PM      Lab Results   Component Value Date/Time    TSH 1.06 03/29/2018 12:26 PM    T4, Free 1.1 11/17/2016 02:19 PM      Lab Results   Component Value Date/Time Glucose 89 01/31/2020 11:32 AM      Lab Results   Component Value Date/Time    Sodium 141 01/31/2020 11:32 AM    Potassium 3.8 01/31/2020 11:32 AM    Chloride 106 01/31/2020 11:32 AM    CO2 31 01/31/2020 11:32 AM    Anion gap 4 01/31/2020 11:32 AM    Glucose 89 01/31/2020 11:32 AM    BUN 16 01/31/2020 11:32 AM    Creatinine 1.03 01/31/2020 11:32 AM    BUN/Creatinine ratio 16 01/31/2020 11:32 AM    GFR est AA >60 01/31/2020 11:32 AM    GFR est non-AA 54 (L) 01/31/2020 11:32 AM    Calcium 9.3 01/31/2020 11:32 AM    Bilirubin, total 1.0 01/31/2020 11:32 AM    ALT (SGPT) 28 01/31/2020 11:32 AM    AST (SGOT) 22 01/31/2020 11:32 AM    Alk. phosphatase 82 01/31/2020 11:32 AM    Protein, total 7.5 01/31/2020 11:32 AM    Albumin 4.0 01/31/2020 11:32 AM    Globulin 3.5 01/31/2020 11:32 AM    A-G Ratio 1.1 01/31/2020 11:32 AM      Lab Results   Component Value Date/Time    Hemoglobin A1c 6.1 (H) 11/08/2018 11:35 AM      Preoperative labs were reviewed and are substantially within normal limits   Chest X-ray revealed no acute cardiopulmonary process   EKG:   EKG RESULTS     Procedure Component Value Units Date/Time    EKG, 12 LEAD, INITIAL [[id number]] Collected:  01/08/20 1337    Order Status:  Completed Updated:  01/08/20 1722     Ventricular Rate 75 BPM      Atrial Rate 75 BPM      P-R Interval 138 ms      QRS Duration 74 ms      Q-T Interval 392 ms      QTC Calculation (Bezet) 437 ms      Calculated P Axis 57 degrees      Calculated R Axis 4 degrees      Calculated T Axis 56 degrees      Diagnosis --     Normal sinus rhythm  Normal ECG  When compared with ECG of 26-JUL-2017 10:42,  No significant change was found  Confirmed by Bright Shields ([id number]) on 1/8/2020 5:22:31 PM            ASSESSMENT:       Encounter Diagnoses   Name Primary?     Achilles tendinitis of right lower extremity Yes    Right foot pain     Pre-operative examination        PLAN:     Again, the alternatives, risks, and complications, as well as expected outcome were discussed. The patient understands and agrees to proceed with the above listed surgery. Patient has been given Hibiclens wash with instructions and/or prescriptions or orders listed above.     Petr Salvador PA-C  2/10/2020  10:49 AM  Leopoldo Jacobs, MD  2/14/2020  9:11 AM

## 2020-02-10 NOTE — H&P (VIEW-ONLY)
FOOT AND ANKLE HISTORY AND PHYSICAL Patient: Amari Woodall                   MRN: [id number]         SSN: [id number] YOB: 1955                     AGE: 75 y.o. SEX: female Patient scheduled for:  Excisional debridement and repair of the right Achilles tendon, retrocalcaneal bursectomy, excision of bone spur, flexor hallucis longus tendon augmentation procedure Date of surgery: 2/14/20 [facility]geon: Maribell Kuhn. MD Avel 
ANESTHESIA TYPE:  General, Popliteal block CONSULTS: None PRESCRIPTIONS AND/OR ORDERS PROVIDED DURING H&P: 
Orders Placed This Encounter  Generic Supply Order Rolling knee scooter  XR CHEST PA LAT Preop assessment. Standing Status:   Future Number of Occurrences:   1 Standing Expiration Date:   2/7/2021 Order Specific Question:   Reason for Exam  
  Answer:   pre op  METABOLIC PANEL, COMPREHENSIVE Standing Status:   Future Number of Occurrences:   1 Standing Expiration Date:   7/8/2020  SED RATE (ESR) Standing Status:   Future Number of Occurrences:   1 Standing Expiration Date:   7/8/2020  URIC ACID Standing Status:   Future Number of Occurrences:   1 Standing Expiration Date:   7/8/2020  C REACTIVE PROTEIN, QT Standing Status:   Future Number of Occurrences:   1 Standing Expiration Date:   7/8/2020  
 PROTHROMBIN TIME + INR Standing Status:   Future Number of Occurrences:   1 Standing Expiration Date:   2/8/2020  CBC WITH AUTOMATED DIFF Standing Status:   Future Number of Occurrences:   1 Standing Expiration Date:   2/8/2020  URINALYSIS W/ RFLX MICROSCOPIC Pre-Op testing to be performed in the HOSPITAL Standing Status:   Future Number of Occurrences:   1 Standing Expiration Date:   2/8/2020  EKG, 12 LEAD, INITIAL Standing Status:   Future Number of Occurrences:   1 Standing Expiration Date:   2/8/2020 Order Specific Question:   Reason for Exam: Answer: For pre-op assessment  oxyCODONE IR (ROXICODONE) 5 mg immediate release tablet Sig: Take 1-2 Tabs by mouth every six (6) hours as needed for Pain for up to 7 days. Max Daily Amount: 40 mg. **FOR PAIN FOLLOWING SURGERY**DO NOT TAKE PRIOR TO SURGERY**  
  Dispense:  42 Tab Refill:  0  
 promethazine (PHENERGAN) 25 mg tablet Sig: Take 1 Tab by mouth every six (6) hours as needed for Nausea. **FOR USE AFTER SURGERY**  
  Dispense:  30 Tab Refill:  0  
 polyethylene glycol (MIRALAX) 17 gram packet Sig: Take 1 Packet by mouth daily. **FOR USE AFTER SURGERY**  
  Dispense:  10 Packet Refill:  1  rivaroxaban (XARELTO) 10 mg tablet Sig: TAKE ONE TABLET BY MOUTH PER DAY FOLLOWING SURGERY  Indications: Treatment to Prevent Recurrence of a Clot in a Deep Vein Dispense:  30 Tab Refill:  0 Prescriptions were e-scribed to patients [address] on [facility] [address] HISTORY:  
 
The patient was seen in the office today for a preoperative history and physical for an upcoming above listed surgery. The patient is a pleasant 75 y.o. female who has a history of pain in her right Achilles tendon. She notes that she experiences pain with every step she takes. She reports that she tried to wear a compression sleeve, but notes that it was too tight and that she had to take it off. MRI results have been reviewed with the patient. Possible surgical interventions have been discussed with the patient. She recalls that she had surgery on the left Achilles tendon in the past. She notes that the pain in her right Achilles tendon is as bad as the pain was in her left tendon. She has severe, noninsertional and insertional right Achilles tendinopathy.   She had an MRI on July 8, 2019, that shows severe Achilles tendinopathy/tendinosis involving at least 60% of the anterior portion of her Achilles tendon indicating a partial thickness tear at the insertion point and associated retrocalcaneal bursitis, no discreet mass. Because of her continued pain and discomfort, because she has failed conservative treatment, she tried anti-inflammatories, shoe wear changes, activity modification, CAM walker boot, bracing, and is having disabling pain, recommendation is for Achilles tendon surgery, specifically: 1. Sharp, excisional debridement of the right Achilles tendon. 2. Primary repair of the right Achilles tendon. 3. Right flexor hallucis longus tendon augmentation procedure using the Arthrex bio tenodesis suture anchor technique. 4. I anticipate also using the Arthrex speed bridge type system as well using fiber tape. 5. Prone position. The alternatives, risks, and complications, including but not limited to infection, blood loss, need for blood transfusion, neurovascular damage, joseph-incisional numbness, subcutaneous hematoma, bone fracture, anesthetic complications, DVT, PE, death, RSD, postoperative stiffness and pain, possible surgical scar, delayed healing and nonhealing, reflexive sympathetic dystrophy, damage to blood vessels and nerves, need for more surgery, MI, and stroke have been discussed. The patient understands and wishes to proceed with surgery. Patient was previously scheduled for surgery on 1/17/19 and was rescheduled due to Bronchitis. Patient has been treated for both bronchitis and UTI which have resolved. She is non-symptomatic at this time. PAST MEDICAL HISTORY:  
 
Past Medical History:  
Diagnosis Date  Eosinophilic esophagitis   
 started on flvent, dr. Lucian Layton  Gastric ulcer 04/2017  
 dr Lucian Layton, avoid nsaids  Gastritis 12/13/2017  
 gastritis, cont PPI dr. Lucian Layton  GERD (gastroesophageal reflux disease)   
 erosive esophagitis 11/13 EGD  H/O colonoscopy 11/2013  
 normal  
 Hypertension  Left knee pain  Morbid obesity ([facility] [address]  Precordial pain  Prediabetes CURRENT MEDICATIONS:  
 
Current Outpatient Medications Medication Sig  
 triamcinolone acetonide (KENALOG) 0.1 % ointment Apply  to affected area two (2) times a day. use thin layer  telmisartan (MICARDIS) 40 mg tablet Take 1 Tab by mouth daily.  diclofenac EC (VOLTAREN) 50 mg EC tablet  polyethylene glycol (MIRALAX) 17 gram packet Take 1 Packet by mouth daily. **FOR USE AFTER SURGERY**  
 rivaroxaban (XARELTO) 10 mg tablet TAKE ONE TABLET BY MOUTH PER DAY FOLLOWING SURGERY  Indications: Treatment to Prevent Recurrence of a Clot in a Deep Vein  traZODone (DESYREL) 100 mg tablet Take 1 Tab by mouth nightly.  amLODIPine (NORVASC) 5 mg tablet Take 1 Tab by mouth daily.  cyclobenzaprine (FLEXERIL) 5 mg tablet TAKE ONE TABLET BY MOUTH NIGHTLY AS NEEDED FOR MUSCLE SPASM(S)  omeprazole (PRILOSEC) 20 mg capsule Take 1 Cap by mouth daily.  fluticasone (FLONASE) 50 mcg/actuation nasal spray 2 sprays each nostril once daily  omega-3 fatty acids-vitamin e (FISH OIL) 1,000 mg cap Take 1 Cap by mouth.  multivitamin (ONE A DAY) tablet Take 1 Tab by mouth daily. No current facility-administered medications for this visit. ALLERGIES:  
 
Allergies Allergen Reactions  Pcn [Penicillins] Hives SURGICAL HISTORY:  
 
 
Past Surgical History:  
Procedure Laterality Date  HX CATARACT REMOVAL Right 10/04/2016  HX COLONOSCOPY  11/06/2016  HX GYN    
 vaginal delivery x 2  
 HX ORTHOPAEDIC    
 foot surgery SOCIAL HISTORY:  
 
Social History Occupational History  Not on file Tobacco Use  Smoking status: Never Smoker  Smokeless tobacco: Never Used Substance and Sexual Activity  Alcohol use: No  
 Drug use: No  
 Sexual activity: Not on file FAMILY HISTORY:  
 
Family History Problem Relation Age of Onset  Heart Failure Mother  Heart Disease Mother mi 52's  Asthma Other REVIEW OF SYSTEMS:  
 
Negative for fevers, chills, chest pain, shortness of breath, weight loss, recent illness General: Negative for fever and chills. No unexpected change in weight. Denies fatigue. No change in appetite. Skin: Negative for rash or itching. HEENT: Negative for congestion, sore throat, neck pain and neck stiffness. No change in vision or hearing. Hasn't noted any enlarged lymph nodes in the neck. Cardiovascular:  Negative for chest pain and palpitations. Has not noted pedal edema. Respiratory: Negative for cough, colds, sinus, hemoptysis, shortness of breath and wheezing. Gastrointestinal: Negative for nausea and vomiting, rectal bleeding, coffee ground emesis, abdominal pain, diarrhea and constipation. Genitourinary: Negative for dysuria, frequency urgency, or burning on micturition. No flank pain, no foul smelling urine, no difficulty with initiating urination. Hematological: Negative for bleeding or easy bruising. Musculoskeletal: Negative  for arthralgias, back pain or neck pain. Neurological: Negative for dizziness, seizures or syncopal episodes. Denies headaches. Endocrine: Denies excessive thirst.  No heat/cold intolerance. Psychiatric: Negative for depression or insomnia. PHYSICAL EXAMINATION:  
 
VITALS:  
Visit Vitals /59 Pulse 82 Temp 97.5 °F (36.4 °C) (Oral) Resp 10 Ht 5' 3\" (1.6 m) Wt 174 lb 12.8 oz (79.3 kg) SpO2 100% BMI 30.96 kg/m² Pain Assessment  2/10/2020 Location of Pain Foot Location Modifiers Right Severity of Pain 5 Quality of Pain Aching; Throbbing Duration of Pain Persistent Frequency of Pain Constant Aggravating Factors Walking;Standing Aggravating Factors Comment - Limiting Behavior -  
Relieving Factors Rest;Ice;Heat;Elevation Relieving Factors Comment - Result of Injury No  
 
 
GEN:  Well developed, well nourished 75 y.o. female in no acute distress. PSYCH: Alert an oriented to person, place and time. Mood, memory, affect, behavior and judgment normal 
HEENT: Normocephalic and atraumatic. Eyes: Conjunctivae and EOM are normal.Pupils are equal, round, and reactive to light. External ear normal appearance, external nose normal appearing. Mouth/Throat: Oropharynx is clear and moist, able to handle oral secretions w/out difficulty, airway patent. NECK: Supple. Normal ROM, No lymphadenopathy. Trachea is midline. No bruising, swelling or deformity RESP: Clear to auscultation bilaterally. No wheezes, rales, rhonchi. Normal effort and breath sounds. No respiratory distress CARDIO: Tachycardia noted, regular rhythm and normal heart sounds. No MGR. ABDOMEN: Soft, non-tender, non-distended, normoactive bowel sounds in all four quadrants. There is no tenderness. There is no rebound and no guarding. BACK: No CVA or spinal tenderness BREAST:  Deferred PELVIC:    Deferred RECTAL:  Deferred :           Deferred EXTREMITIES: EXAMINATION OF:  
ANKLE/FOOT right Gait: antalgic Tenderness: Mild tenderness to the Achilles tendon. NT to the posterior tibial tendon. Cutaneous: Fullness on the distal Achilles tendon. Joint Motion: 5 degrees past neutral of DF Joint / Tendon Stability: No Ankle or Subtalar instability or joint laxity. No peroneal sublux ability or dislocation Alignment: Moderate pes planus, bilaterally Neuro Motor/Sensory: NL/NL. Vascular: NL foot/ankle pulses. Lymphatics: No extremity lymphedema, No calf swelling, no tenderness to calf muscles. RADIOGRAPHS & DIAGNOSTIC STUDIES:  
 
No notes on file MRI Results (most recent): 
Results from Hospital Encounter encounter on 07/18/19 MRI ANKLE RT WO CONT Narrative Multisequence multiplanar MR images of the right ankle were obtained. HISTORY: Pain for one year. Severe Achilles tendinosis, with about 60% anterior partial-thickness tear at the insertion site. Moderately severe retrocalcaneal bursitis with inflammation 
at the pre-Achilles fat pad. No discrete mass. No significant protuberance of 
the posterior superior calcaneus. Plantar calcaneal spur with no erosion. Mild chronic plantar fasciitis. No bone contusion or fracture. No ankle joint effusion. Talar dome is intact. No 
focal cartilage defect. Sinus tarsi and tarsal tunnel unremarkable. Mild dorsal 
talonavicular spur with thickening of the ligament, with mild sprain. Mild medial subcutaneous edema. Posterior tibialis and flexor tendons are 
intact. Deltoid ligament is intact. Also mild lateral subcutaneous edema. Peroneus longus and brevis are intact. Tibiofibular, talofibular ligaments are intact. Mild edema present. Calcaneofibular ligament is intact. Extensor tendons are intact at the ankle level. Lisfranc ligament is intact. Impression IMPRESSION: 
1. Severe Achilles tendinosis with about 60% anterior partial-thickness tear at 
insertion site. Retrocalcaneal bursitis. No discrete mass. 2. Mild medial and lateral soft tissue edema with no significant ligamentous 
injuries. Medial and lateral tendons remain intact. LABS:  
 
Lab Results Component Value Date/Time WBC 3.4 (L) 01/31/2020 11:32 AM  
 HGB 12.4 01/31/2020 11:32 AM  
 HCT 37.9 01/31/2020 11:32 AM  
 PLATELET 625 32/71/6974 11:32 AM  
 MCV 92.4 01/31/2020 11:32 AM  
 
Lab Results Component Value Date/Time Hemoglobin A1c 6.1 (H) 11/08/2018 11:35 AM  
 Hemoglobin A1c 6.7 (H) 08/06/2018 08:35 AM  
 Hemoglobin A1c 6.6 (H) 04/30/2018 07:43 AM  
 Glucose 89 01/31/2020 11:32 AM  
 LDL, calculated 71.4 05/08/2019 07:12 AM  
 Creatinine 1.03 01/31/2020 11:32 AM  
  
Lab Results Component Value Date/Time INR 1.0 01/08/2020 12:17 PM  
 Prothrombin time 13.2 01/08/2020 12:17 PM  
  
Lab Results Component Value Date/Time  TSH 1.06 03/29/2018 12:26 PM  
 T4, Free 1.1 11/17/2016 02:19 PM  
  
 Lab Results Component Value Date/Time Glucose 89 01/31/2020 11:32 AM  
  
Lab Results Component Value Date/Time Sodium 141 01/31/2020 11:32 AM  
 Potassium 3.8 01/31/2020 11:32 AM  
 Chloride 106 01/31/2020 11:32 AM  
 CO2 31 01/31/2020 11:32 AM  
 Anion gap 4 01/31/2020 11:32 AM  
 Glucose 89 01/31/2020 11:32 AM  
 BUN 16 01/31/2020 11:32 AM  
 Creatinine 1.03 01/31/2020 11:32 AM  
 BUN/Creatinine ratio 16 01/31/2020 11:32 AM  
 GFR est AA >60 01/31/2020 11:32 AM  
 GFR est non-AA 54 (L) 01/31/2020 11:32 AM  
 Calcium 9.3 01/31/2020 11:32 AM  
 Bilirubin, total 1.0 01/31/2020 11:32 AM  
 ALT (SGPT) 28 01/31/2020 11:32 AM  
 AST (SGOT) 22 01/31/2020 11:32 AM  
 Alk. phosphatase 82 01/31/2020 11:32 AM  
 Protein, total 7.5 01/31/2020 11:32 AM  
 Albumin 4.0 01/31/2020 11:32 AM  
 Globulin 3.5 01/31/2020 11:32 AM  
 A-G Ratio 1.1 01/31/2020 11:32 AM  
  
Lab Results Component Value Date/Time Hemoglobin A1c 6.1 (H) 11/08/2018 11:35 AM  
  
Preoperative labs were reviewed and are substantially within normal limits Chest X-ray revealed no acute cardiopulmonary process EKG:  
EKG RESULTS Procedure Component Value Units Date/Time EKG, 12 LEAD, INITIAL [[id number]] Collected:  01/08/20 1337 Order Status:  Completed Updated:  01/08/20 1722 Ventricular Rate 75 BPM   
  Atrial Rate 75 BPM   
  P-R Interval 138 ms QRS Duration 74 ms Q-T Interval 392 ms QTC Calculation (Bezet) 437 ms Calculated P Axis 57 degrees Calculated R Axis 4 degrees Calculated T Axis 56 degrees Diagnosis --  
  Normal sinus rhythm Normal ECG When compared with ECG of 26-JUL-2017 10:42, No significant change was found Confirmed by Shantel Gee ([id number]) on 1/8/2020 5:22:31 PM 
  
  
 
 
ASSESSMENT:  
 
 
Encounter Diagnoses Name Primary?  Achilles tendinitis of right lower extremity Yes  Right foot pain  Pre-operative examination PLAN:  
 
 Again, the alternatives, risks, and complications, as well as expected outcome were discussed. The patient understands and agrees to proceed with the above listed surgery. Patient has been given Hibiclens wash with instructions and/or prescriptions or orders listed above.  
 
Francisco Art PA-C 
2/10/2020 
10:49 AM 
Nigel Mcghee MD 
2/14/2020 
9:11 AM

## 2020-02-12 NOTE — OP NOTES
Patient: Levester Sandhoff                MRN:[id number]           N: [id number]   YOB: 1955         AGE: 75 y.o. SEX: female       OPERATIVE REPORT    Date of Procedure: 2/14/2020   Preoperative Diagnosis: Achilles tendinitis of right lower extremity [M76.61]  Postoperative Diagnosis: AS ABOVE  Procedure(s):  1. SHARP EXCISIONAL DEBRIDEMENT AND REPAIR RIGHT ACHILLES TENDON/  2. OSTECTOMY OF CALCANEUS  3. RETROCALCANEAL BURSECTOMY  4 .RIGHT FLEXOR HALLUCIS LONGUS TENDON AUGMENTATION/ARTHREX/NERVE BLOCK  Surgeon(s) and Role:     * Adriel Vallecillo MD - Primary         Surgical Assistant:     Kenn Mata PA:  ASSISTANT    Kar Mcdaniel MPA, MA, PA-C was medically necessary for the procedure. She assisted in : patient positioning, surgical incision retraction, placement of hardware, incision closure, placement of DME, and transfer of the patient to the PACU. IV FLUIDS:  800 ml IVF  Tourniquet Time:  33 minutes at  300  Mm HG     Surgical Staff:  Circ-1: Zulay Escobar RN  Physician Assistant: (Unknown)  Scrub Tech-1: Tami Brownlee  Surg Asst-1: Lisa Agrawal  No case tracking events are documented in the log. Anesthesia: General and Regional Anesthesia  Estimated Blood Loss:  5 ml  Specimens: * No specimens in log *   Findings: insertional achilles tendinopathy   Complications: none  Implants:   Implant Name Type Inv. Item Serial No.  Lot No. LRB No. Used Action   BIOCOMPOSITE ACHILLES SPEED BRIDGE WITH JUMPSTART    N/A ARTHREX [id number] Right 1 Implanted             OPERATIVE REPORT         Levester Sandhoff was taken to the operating room on 2/14/2020  . General anesthesia was performed. Regional anesthesia was performed by anesthesia service prior to her entering the operating room. A thigh tourniquet was placed on the right well protected thigh (web roll cotton padded thigh). This patient was positioned prone after general anesthesia successful anesthesia was attained. The right leg was thoroughly cleaned/prepped with: Chlorhexidine gluconate scrub and Chlorhexidine yellow prep stick (all tip of toes, entire foot, prepped all the way up to the tourniquet), surgery then commenced. A formal time out was conducted: identifying the patient, the surgical location, the informed signed consent for procedure, and the signature was visible at the surgical field. It was also confirmed the patient did receive preoperative antibiotics. All surgical members agreed to the signed consent    A formal verbal time out was conducted: identifying the patient, identifying the surgical location, reading the informed written signed consent for procedure, confirmation that  the patient did receive preoperative antibiotics and that my signature on the patient was visible at the surgical field. All members of the surgical team agreed to the consent process. The tourniquet was inflated with a pressure of 300 mm HG after the limb was exsanguinated. DISTAL ACHILLES    A typical 8 cm  J type incision was made medial to the Right Achilles tendon, and the curved distally from the medial to lateral direction. I identified the paratenon and the achilles tendon. I split the distal achilles tendon directly midline (starting distal and going proximal direction about 4 to 6 cm). I sharply elevated the distal achilles from the calcaneus (central towards medial direction and central towards lateral direction. I removed the degenerative spurs and degenerative portions of the distal achilles tendon. The distal achilles remained attached at the lateral and medial margin regions and I detached 60 % of the distal achilles. Linear strips of longitudinal portions of the distal achilles was performed. A posterior calcaneal ostectomy was performed as well as a partial retrocalcaneal bursectomy. I used the [employer]raft Technique to repair the achilles tendon back to bone.  I used two (parallel placed) 4.75 mm bio absorbable threaded suture screws and then the 4.75 mm push lock system (placed parallel fashion). I repaired the central surgical split in the achilles tendon with O un dyed vicryl suture. I tensioned the sutures appropriately and tested the repair with gently ROM. An excellent repair was conducted. FHL    A FHL tendon transfer was not performed as I did not have to remove more than 50% of the achilles tendon. I tested the repair and an excellent repair was attained. I thoroughly irrigated the incision and selective electro cauterization was used for hemostasis. The tourniquet was deflated after 33 minutes of insufflation. The incision was closed after selective electro cauterization was used. The incision was closed with:   3.0 vicryl and Monocryl suture and 3.0 nylon suture. A well padded posterior splint/ short leg bivalved fiberglass cast was placed with the foot in gentle plantar flexion. This patient was transferred to the recovery room without any complications. Prior to closure, correct needle count, tape counts, and instruments were noted and documented in the chart.              Karyn Quinteros MD  2/14/2020  11:01 AM

## 2020-02-13 ENCOUNTER — ANESTHESIA EVENT (OUTPATIENT)
Dept: SURGERY | Age: 65
End: 2020-02-13
Payer: MEDICARE

## 2020-02-14 ENCOUNTER — HOSPITAL ENCOUNTER (OUTPATIENT)
Age: 65
Setting detail: OUTPATIENT SURGERY
Discharge: HOME | End: 2020-02-14
Attending: ORTHOPAEDIC SURGERY | Admitting: ORTHOPAEDIC SURGERY
Payer: MEDICARE

## 2020-02-14 ENCOUNTER — ANESTHESIA (OUTPATIENT)
Dept: SURGERY | Age: 65
End: 2020-02-14
Payer: MEDICARE

## 2020-02-14 ENCOUNTER — TELEPHONE (OUTPATIENT)
Dept: ORTHOPEDIC SURGERY | Age: 65
End: 2020-02-14

## 2020-02-14 VITALS
HEART RATE: 66 BPM | DIASTOLIC BLOOD PRESSURE: 62 MMHG | RESPIRATION RATE: 20 BRPM | SYSTOLIC BLOOD PRESSURE: 105 MMHG | OXYGEN SATURATION: 96 % | BODY MASS INDEX: 30.48 KG/M2 | HEIGHT: 63 IN | WEIGHT: 172 LBS | TEMPERATURE: 96 F

## 2020-02-14 PROBLEM — M76.61 ACHILLES TENDINITIS OF RIGHT LOWER EXTREMITY: Status: ACTIVE | Noted: 2020-02-14

## 2020-02-14 LAB — GLUCOSE BLD STRIP.AUTO-MCNC: 96 MG/DL (ref 70–110)

## 2020-02-14 PROCEDURE — 77030020753 HC CUF TRNQT 1BLA STRY -B: Performed by: ORTHOPAEDIC SURGERY

## 2020-02-14 PROCEDURE — 74011250636 HC RX REV CODE- 250/636: Performed by: NURSE ANESTHETIST, CERTIFIED REGISTERED

## 2020-02-14 PROCEDURE — 77030000032 HC CUF TRNQT ZIMM -B: Performed by: ORTHOPAEDIC SURGERY

## 2020-02-14 PROCEDURE — 76060000034 HC ANESTHESIA 1.5 TO 2 HR: Performed by: ORTHOPAEDIC SURGERY

## 2020-02-14 PROCEDURE — 77030002933 HC SUT MCRYL J&J -A: Performed by: ORTHOPAEDIC SURGERY

## 2020-02-14 PROCEDURE — 82962 GLUCOSE BLOOD TEST: CPT

## 2020-02-14 PROCEDURE — 74011000250 HC RX REV CODE- 250: Performed by: NURSE ANESTHETIST, CERTIFIED REGISTERED

## 2020-02-14 PROCEDURE — 74011250637 HC RX REV CODE- 250/637: Performed by: NURSE ANESTHETIST, CERTIFIED REGISTERED

## 2020-02-14 PROCEDURE — 74011250636 HC RX REV CODE- 250/636: Performed by: ANESTHESIOLOGY

## 2020-02-14 PROCEDURE — 76210000020 HC REC RM PH II FIRST 0.5 HR: Performed by: ORTHOPAEDIC SURGERY

## 2020-02-14 PROCEDURE — C1713 ANCHOR/SCREW BN/BN,TIS/BN: HCPCS | Performed by: ORTHOPAEDIC SURGERY

## 2020-02-14 PROCEDURE — 74011000250 HC RX REV CODE- 250: Performed by: ORTHOPAEDIC SURGERY

## 2020-02-14 PROCEDURE — 77030018836 HC SOL IRR NACL ICUM -A: Performed by: ORTHOPAEDIC SURGERY

## 2020-02-14 PROCEDURE — 64450 NJX AA&/STRD OTHER PN/BRANCH: CPT | Performed by: ANESTHESIOLOGY

## 2020-02-14 PROCEDURE — 77030006773 HC BLD SAW OSC BRSM -A: Performed by: ORTHOPAEDIC SURGERY

## 2020-02-14 PROCEDURE — 77030031139 HC SUT VCRL2 J&J -A: Performed by: ORTHOPAEDIC SURGERY

## 2020-02-14 PROCEDURE — 77030027688 HC DRSG MEPILEX 16-48IN NO BORD MOLN -A: Performed by: ORTHOPAEDIC SURGERY

## 2020-02-14 PROCEDURE — 77030003666 HC NDL SPINAL BD -A: Performed by: ORTHOPAEDIC SURGERY

## 2020-02-14 PROCEDURE — 77030040361 HC SLV COMPR DVT MDII -B: Performed by: ORTHOPAEDIC SURGERY

## 2020-02-14 PROCEDURE — 76010000153 HC OR TIME 1.5 TO 2 HR: Performed by: ORTHOPAEDIC SURGERY

## 2020-02-14 PROCEDURE — 76942 ECHO GUIDE FOR BIOPSY: CPT | Performed by: ANESTHESIOLOGY

## 2020-02-14 PROCEDURE — 77030040922 HC BLNKT HYPOTHRM STRY -A: Performed by: ORTHOPAEDIC SURGERY

## 2020-02-14 PROCEDURE — 74011000258 HC RX REV CODE- 258: Performed by: NURSE ANESTHETIST, CERTIFIED REGISTERED

## 2020-02-14 PROCEDURE — 74011000272 HC RX REV CODE- 272: Performed by: ORTHOPAEDIC SURGERY

## 2020-02-14 PROCEDURE — 76210000016 HC OR PH I REC 1 TO 1.5 HR: Performed by: ORTHOPAEDIC SURGERY

## 2020-02-14 PROCEDURE — 77030002916 HC SUT ETHLN J&J -A: Performed by: ORTHOPAEDIC SURGERY

## 2020-02-14 DEVICE — SYSTEM IMPL W/ BIOCOMPOSITE SUT ANCHR 2X5IN JUMPSTART DSG: Type: IMPLANTABLE DEVICE | Site: ACHILLES TENDON | Status: FUNCTIONAL

## 2020-02-14 RX ORDER — MIDAZOLAM HYDROCHLORIDE 1 MG/ML
INJECTION, SOLUTION INTRAMUSCULAR; INTRAVENOUS
Status: COMPLETED | OUTPATIENT
Start: 2020-02-14 | End: 2020-02-14

## 2020-02-14 RX ORDER — SODIUM CHLORIDE 0.9 % (FLUSH) 0.9 %
5-40 SYRINGE (ML) INJECTION EVERY 8 HOURS
Status: DISCONTINUED | OUTPATIENT
Start: 2020-02-14 | End: 2020-02-14 | Stop reason: HOSPADM

## 2020-02-14 RX ORDER — PROPOFOL 10 MG/ML
INJECTION, EMULSION INTRAVENOUS AS NEEDED
Status: DISCONTINUED | OUTPATIENT
Start: 2020-02-14 | End: 2020-02-14 | Stop reason: HOSPADM

## 2020-02-14 RX ORDER — FENTANYL CITRATE 50 UG/ML
100 INJECTION, SOLUTION INTRAMUSCULAR; INTRAVENOUS ONCE
Status: COMPLETED | OUTPATIENT
Start: 2020-02-14 | End: 2020-02-14

## 2020-02-14 RX ORDER — ROPIVACAINE HYDROCHLORIDE 5 MG/ML
30 INJECTION, SOLUTION EPIDURAL; INFILTRATION; PERINEURAL
Status: COMPLETED | OUTPATIENT
Start: 2020-02-14 | End: 2020-02-14

## 2020-02-14 RX ORDER — ROCURONIUM BROMIDE 10 MG/ML
INJECTION, SOLUTION INTRAVENOUS AS NEEDED
Status: DISCONTINUED | OUTPATIENT
Start: 2020-02-14 | End: 2020-02-14 | Stop reason: HOSPADM

## 2020-02-14 RX ORDER — ONDANSETRON 2 MG/ML
INJECTION INTRAMUSCULAR; INTRAVENOUS AS NEEDED
Status: DISCONTINUED | OUTPATIENT
Start: 2020-02-14 | End: 2020-02-14 | Stop reason: HOSPADM

## 2020-02-14 RX ORDER — SODIUM CHLORIDE, SODIUM LACTATE, POTASSIUM CHLORIDE, CALCIUM CHLORIDE 600; 310; 30; 20 MG/100ML; MG/100ML; MG/100ML; MG/100ML
INJECTION, SOLUTION INTRAVENOUS
Status: DISCONTINUED | OUTPATIENT
Start: 2020-02-14 | End: 2020-02-14 | Stop reason: HOSPADM

## 2020-02-14 RX ORDER — SODIUM CHLORIDE 0.9 % (FLUSH) 0.9 %
5-40 SYRINGE (ML) INJECTION AS NEEDED
Status: DISCONTINUED | OUTPATIENT
Start: 2020-02-14 | End: 2020-02-14 | Stop reason: HOSPADM

## 2020-02-14 RX ORDER — FAMOTIDINE 20 MG/1
20 TABLET, FILM COATED ORAL ONCE
Status: COMPLETED | OUTPATIENT
Start: 2020-02-14 | End: 2020-02-14

## 2020-02-14 RX ORDER — SUCCINYLCHOLINE CHLORIDE 20 MG/ML
INJECTION INTRAMUSCULAR; INTRAVENOUS AS NEEDED
Status: DISCONTINUED | OUTPATIENT
Start: 2020-02-14 | End: 2020-02-14 | Stop reason: HOSPADM

## 2020-02-14 RX ORDER — DEXTROSE 50 % IN WATER (D50W) INTRAVENOUS SYRINGE
25-50 AS NEEDED
Status: DISCONTINUED | OUTPATIENT
Start: 2020-02-14 | End: 2020-02-14 | Stop reason: HOSPADM

## 2020-02-14 RX ORDER — HYDROMORPHONE HYDROCHLORIDE 2 MG/ML
0.2 INJECTION, SOLUTION INTRAMUSCULAR; INTRAVENOUS; SUBCUTANEOUS
Status: DISCONTINUED | OUTPATIENT
Start: 2020-02-14 | End: 2020-02-14 | Stop reason: HOSPADM

## 2020-02-14 RX ORDER — EPHEDRINE SULFATE/0.9% NACL/PF 50 MG/5 ML
SYRINGE (ML) INTRAVENOUS AS NEEDED
Status: DISCONTINUED | OUTPATIENT
Start: 2020-02-14 | End: 2020-02-14 | Stop reason: HOSPADM

## 2020-02-14 RX ORDER — KETOROLAC TROMETHAMINE 15 MG/ML
INJECTION, SOLUTION INTRAMUSCULAR; INTRAVENOUS AS NEEDED
Status: DISCONTINUED | OUTPATIENT
Start: 2020-02-14 | End: 2020-02-14 | Stop reason: HOSPADM

## 2020-02-14 RX ORDER — MIDAZOLAM HYDROCHLORIDE 1 MG/ML
2 INJECTION, SOLUTION INTRAMUSCULAR; INTRAVENOUS ONCE
Status: COMPLETED | OUTPATIENT
Start: 2020-02-14 | End: 2020-02-14

## 2020-02-14 RX ORDER — ROPIVACAINE HYDROCHLORIDE 5 MG/ML
INJECTION, SOLUTION EPIDURAL; INFILTRATION; PERINEURAL
Status: COMPLETED | OUTPATIENT
Start: 2020-02-14 | End: 2020-02-14

## 2020-02-14 RX ORDER — MAGNESIUM SULFATE 100 %
4 CRYSTALS MISCELLANEOUS AS NEEDED
Status: DISCONTINUED | OUTPATIENT
Start: 2020-02-14 | End: 2020-02-14 | Stop reason: HOSPADM

## 2020-02-14 RX ORDER — SODIUM CHLORIDE, SODIUM LACTATE, POTASSIUM CHLORIDE, CALCIUM CHLORIDE 600; 310; 30; 20 MG/100ML; MG/100ML; MG/100ML; MG/100ML
100 INJECTION, SOLUTION INTRAVENOUS CONTINUOUS
Status: DISCONTINUED | OUTPATIENT
Start: 2020-02-14 | End: 2020-02-14 | Stop reason: HOSPADM

## 2020-02-14 RX ORDER — SODIUM CHLORIDE, SODIUM LACTATE, POTASSIUM CHLORIDE, CALCIUM CHLORIDE 600; 310; 30; 20 MG/100ML; MG/100ML; MG/100ML; MG/100ML
50 INJECTION, SOLUTION INTRAVENOUS CONTINUOUS
Status: DISCONTINUED | OUTPATIENT
Start: 2020-02-14 | End: 2020-02-14 | Stop reason: HOSPADM

## 2020-02-14 RX ORDER — FENTANYL CITRATE 50 UG/ML
25 INJECTION, SOLUTION INTRAMUSCULAR; INTRAVENOUS
Status: DISCONTINUED | OUTPATIENT
Start: 2020-02-14 | End: 2020-02-14 | Stop reason: HOSPADM

## 2020-02-14 RX ORDER — FENTANYL CITRATE 50 UG/ML
INJECTION, SOLUTION INTRAMUSCULAR; INTRAVENOUS
Status: COMPLETED | OUTPATIENT
Start: 2020-02-14 | End: 2020-02-14

## 2020-02-14 RX ADMIN — FENTANYL CITRATE 50 MCG: 50 INJECTION INTRAMUSCULAR; INTRAVENOUS at 09:06

## 2020-02-14 RX ADMIN — ROCURONIUM BROMIDE 10 MG: 10 INJECTION, SOLUTION INTRAVENOUS at 09:26

## 2020-02-14 RX ADMIN — SUCCINYLCHOLINE CHLORIDE 120 MG: 20 INJECTION, SOLUTION INTRAMUSCULAR; INTRAVENOUS at 09:26

## 2020-02-14 RX ADMIN — Medication 10 MG: at 10:10

## 2020-02-14 RX ADMIN — ROPIVACAINE HYDROCHLORIDE 30 ML: 5 INJECTION, SOLUTION EPIDURAL; INFILTRATION; PERINEURAL at 09:06

## 2020-02-14 RX ADMIN — ONDANSETRON 4 MG: 2 INJECTION INTRAMUSCULAR; INTRAVENOUS at 10:05

## 2020-02-14 RX ADMIN — CLINDAMYCIN 900 MG: 150 INJECTION, SOLUTION INTRAMUSCULAR; INTRAVENOUS at 09:18

## 2020-02-14 RX ADMIN — SODIUM CHLORIDE, SODIUM LACTATE, POTASSIUM CHLORIDE, AND CALCIUM CHLORIDE: 600; 310; 30; 20 INJECTION, SOLUTION INTRAVENOUS at 09:17

## 2020-02-14 RX ADMIN — PROPOFOL 150 MG: 10 INJECTION, EMULSION INTRAVENOUS at 09:26

## 2020-02-14 RX ADMIN — Medication 10 MG: at 10:21

## 2020-02-14 RX ADMIN — FAMOTIDINE 20 MG: 20 TABLET, FILM COATED ORAL at 08:49

## 2020-02-14 RX ADMIN — ROPIVACAINE HYDROCHLORIDE 150 MG: 150 INJECTION, SOLUTION EPIDURAL; INFILTRATION; PERINEURAL at 09:10

## 2020-02-14 RX ADMIN — SODIUM CHLORIDE, SODIUM LACTATE, POTASSIUM CHLORIDE, AND CALCIUM CHLORIDE 50 ML/HR: 600; 310; 30; 20 INJECTION, SOLUTION INTRAVENOUS at 08:49

## 2020-02-14 RX ADMIN — KETOROLAC TROMETHAMINE 15 MG: 15 INJECTION, SOLUTION INTRAMUSCULAR; INTRAVENOUS at 10:23

## 2020-02-14 RX ADMIN — FENTANYL CITRATE 50 MCG: 50 INJECTION INTRAMUSCULAR; INTRAVENOUS at 09:00

## 2020-02-14 RX ADMIN — Medication 10 MG: at 09:47

## 2020-02-14 RX ADMIN — MIDAZOLAM 2 MG: 1 INJECTION INTRAMUSCULAR; INTRAVENOUS at 09:00

## 2020-02-14 RX ADMIN — SUGAMMADEX 200 MG: 100 INJECTION, SOLUTION INTRAVENOUS at 11:01

## 2020-02-14 RX ADMIN — MIDAZOLAM HYDROCHLORIDE 2 MG: 2 INJECTION, SOLUTION INTRAMUSCULAR; INTRAVENOUS at 09:06

## 2020-02-14 NOTE — ANESTHESIA PREPROCEDURE EVALUATION
Relevant Problems   No relevant active problems       Anesthetic History   No history of anesthetic complications            Review of Systems / Medical History  Patient summary reviewed and pertinent labs reviewed    Pulmonary  Within defined limits                 Neuro/Psych   Within defined limits           Cardiovascular    Hypertension: well controlled              Exercise tolerance: >4 METS     GI/Hepatic/Renal     GERD: well controlled      PUD     Endo/Other        Morbid obesity     Other Findings              Physical Exam    Airway  Mallampati: III  TM Distance: 4 - 6 cm  Neck ROM: decreased range of motion   Mouth opening: Normal     Cardiovascular    Rhythm: regular  Rate: normal         Dental  No notable dental hx       Pulmonary  Breath sounds clear to auscultation               Abdominal  GI exam deferred       Other Findings            Anesthetic Plan    ASA: 2  Anesthesia type: general and regional - popliteal fossa block          Induction: Intravenous  Anesthetic plan and risks discussed with: Patient

## 2020-02-14 NOTE — TELEPHONE ENCOUNTER
Patient called stating there was an issue at the pharmacy with her medication. I contacted [facility] and they informed me the Xarelto requires a PA. They have already started the process in Cover My Meds. I am unable to locate. Can a nurse access this and process for the patient.  Thanks

## 2020-02-14 NOTE — INTERVAL H&P NOTE
H&P Update: 
Mari Armstrong was seen and examined. History and physical has been reviewed. The patient has been examined. There have been no significant clinical changes since the completion of the originally dated History and Physical. 
Patient identified by surgeon; surgical site was confirmed by patient and surgeon.

## 2020-02-14 NOTE — ANESTHESIA PROCEDURE NOTES
Peripheral Block    Start time: 2/14/2020 9:00 AM  End time: 2/14/2020 9:09 AM  Performed by: Ingris Rios MD  Authorized by: Ingris Rios MD       Pre-procedure:    Indications: at surgeon's request and post-op pain management    Preanesthetic Checklist: patient identified, risks and benefits discussed, site marked, timeout performed, anesthesia consent given and patient being monitored    Timeout Time: 09:00          Block Type:   Block Type:  Popliteal  Laterality:  Right  Monitoring:  Standard ASA monitoring, continuous pulse ox, frequent vital sign checks, heart rate, oxygen and responsive to questions  Injection Technique:  Single shot  Procedures: ultrasound guided    Patient Position: left lateral decubitus  Prep: chlorhexidine    Needle Type:  Ultraplex  Needle Gauge:  21 G  Needle Localization:  Ultrasound guidance    Assessment:  Number of attempts:  1  Injection Assessment:  Incremental injection every 5 mL, negative aspiration for CSF, no paresthesia, ultrasound image on chart, low pressure verified by pressure monitor, no intravascular symptoms, negative aspiration for blood and local visualized surrounding nerve on ultrasound  Patient tolerance:  Patient tolerated the procedure well with no immediate complications

## 2020-02-14 NOTE — ANESTHESIA POSTPROCEDURE EVALUATION
Procedure(s):  SHARP EXCISIONAL DEBRIDEMENT AND REPAIR RIGHT ACHILLES TENDON/AUGMENTATION/ARTHREX/NERVE BLOCK.    general, regional    Anesthesia Post Evaluation      Multimodal analgesia: multimodal analgesia used between 6 hours prior to anesthesia start to PACU discharge  Patient location during evaluation: bedside  Patient participation: complete - patient cannot participate  Level of consciousness: awake  Pain score: 1  Pain management: adequate  Airway patency: patent  Anesthetic complications: no  Cardiovascular status: acceptable  Respiratory status: acceptable  Hydration status: acceptable  Post anesthesia nausea and vomiting:  none      Vitals Value Taken Time   /62 2/14/2020 12:41 PM   Temp 35.6 °C (96 °F) 2/14/2020 12:41 PM   Pulse 66 2/14/2020 12:41 PM   Resp 20 2/14/2020 12:41 PM   SpO2 96 % 2/14/2020 12:41 PM
Name band;

## 2020-02-14 NOTE — DISCHARGE INSTRUCTIONS
ORTHOPAEDIC DISCHARGE PLAN     1. DISCHARGE DISPOSITION:  Home    2. FOLLOW UP RETURN TO OFFICE: 5 days    Call [phone] to confirm your appointment to see Dr. Tosha Oconnor. Glory Villanueva MD.   Follow up with Primary Care Provider in 7 to 10 days. 3. WEIGHT BEARING STATUS/ROM:    NO WEIGHT BEARING TO   the Right LOWER EXTREMITY      Home Care Equipment:    oxyCODONE IR (ROXICODONE) 5 mg immediate release tablet        Sig: Take 1-2 Tabs by mouth every six (6) hours as needed for Pain for up to 7 days. Max Daily Amount: 40 mg. **FOR PAIN FOLLOWING SURGERY**DO NOT TAKE PRIOR TO SURGERY**       Dispense:  42 Tab       Refill:  0    promethazine (PHENERGAN) 25 mg tablet       Sig: Take 1 Tab by mouth every six (6) hours as needed for Nausea. **FOR USE AFTER SURGERY**       Dispense:  30 Tab       Refill:  0    polyethylene glycol (MIRALAX) 17 gram packet       Sig: Take 1 Packet by mouth daily. **FOR USE AFTER SURGERY**       Dispense:  10 Packet       Refill:  1    rivaroxaban (XARELTO) 10 mg tablet       Sig: TAKE ONE TABLET BY MOUTH PER DAY FOLLOWING SURGERY  Indications: Treatment to Prevent Recurrence of a Clot in a Deep Vein       Dispense:  30 Tab       Refill:  0      Prescriptions were e-scribed to patients [facility] pharmacy on [address] Order       Rolling knee scooter         4. ELEVATE the Right lower extremity on 1 pillow. Place the pillow horizontal so that no pressure is on the back of your heel    Please leave your current dressings and in place. Keep your dressings clean and dry to the: Right lower extremity      Please call [phone] ([facility]) if any: fever, shakes, chills, intractable pain, or for any questions you have regarding your care/medical condition   1.  If you experience any calf pain or swelling, or are having any shortness of  breath, chest pain, or extremity swelling, or bleeding thru any surgical dressings,  or Bleeding at any body location while you are taking on any blood thinners. ie (mouth,nose, skin sites:)   2. Please go to closest ER  ASAP for assessment to rule out a leg clot and to  assess any  bleeding. 3. After general anesthesia or intravenous sedation, for 24 hours or while taking  prescription Narcotics:      [x]  Limit your activities     [x]   Do not drive and operate hazardous machinery    [x]   Do not make important personal or business decisions    [x]   Do  not drink alcoholic beverages    [x]  If you have not urinated within 8 hours after discharge, please contact    your surgeon on call. Report the following to your surgeon: If any below is true         [x]   Excessive pain, swelling, redness or odor of or around the surgical area       [x]   Temperature over 100.5       [x]  Nausea and vomiting lasting longer than 4 hours or if unable to take medications       [x]    Any signs of decreased circulation or nerve impairment to extremity:    Change in color, persistent  numbness, tingling, coldness or increase pain          [x]  No smoking/ No tobacco products/ Avoid exposure to second hand smoke    5. DIET:  Regular Diet  OTC (Nutritional supplements/multivitamins/calcium w/ Vitamin  D     6. ACTIVITY:   // No lifting, twisting, squatting, deep bending. 7. INCISION CARE/DRESSINGS: Keep wound clean and dry ,ACE Wraps    Keep all pets away from  any wound present in order to prevent infection. 8. PAIN CONTROL: Prescriptions written: see above       Start taking your pain medications when you get home    9. VTE prophylaxis : Start Xarelto on date : START ON 2/15/2020 AT: 9 AM TIME. ONE TABLET EACH DAY.     10. ANTIBIOTICS:   None at this time       Paulina Avelar MD  2/12/2020  11:57 AM    DISCHARGE SUMMARY from Nurse    PATIENT INSTRUCTIONS:    After general anesthesia or intravenous sedation, for 24 hours or while taking prescription Narcotics:  · Limit your activities  · Do not drive and operate hazardous machinery  · Do not make important personal or business decisions  · Do  not drink alcoholic beverages  · If you have not urinated within 8 hours after discharge, please contact your surgeon on call. Report the following to your surgeon:  · Excessive pain, swelling, redness or odor of or around the surgical area  · Temperature over 100.5  · Nausea and vomiting lasting longer than 4 hours or if unable to take medications  · Any signs of decreased circulation or nerve impairment to extremity: change in color, persistent  numbness, tingling, coldness or increase pain  · Any questions    What to do at Home:    *  Please give a list of your current medications to your Primary Care Provider. *  Please update this list whenever your medications are discontinued, doses are      changed, or new medications (including over-the-counter products) are added. *  Please carry medication information at all times in case of emergency situations. These are general instructions for a healthy lifestyle:    No smoking/ No tobacco products/ Avoid exposure to second hand smoke  Surgeon General's Warning:  Quitting smoking now greatly reduces serious risk to your health. Obesity, smoking, and sedentary lifestyle greatly increases your risk for illness    A healthy diet, regular physical exercise & weight monitoring are important for maintaining a healthy lifestyle    You may be retaining fluid if you have a history of heart failure or if you experience any of the following symptoms:  Weight gain of 3 pounds or more overnight or 5 pounds in a week, increased swelling in our hands or feet or shortness of breath while lying flat in bed. Please call your doctor as soon as you notice any of these symptoms; do not wait until your next office visit. The discharge information has been reviewed with the patient. The patient verbalized understanding.   Discharge medications reviewed with the patient and appropriate educational materials and side effects teaching were provided.   ___________________________________________________________________________________________________________________________________

## 2020-02-17 NOTE — TELEPHONE ENCOUNTER
Pharmacy notified via phone. Claim went through and copay is $43.50. I contacted patient, name and  were verified, and advised her of this. Patient needs to speak with a nurse because her surgery was Friday and she doesn't know if she even needs to take this now or not because of so many days being past the surgery date. Please advise patient at [phone].

## 2020-02-17 NOTE — TELEPHONE ENCOUNTER
Spoke with patient, advised her that she will need to start the Xarelto today. Patient verbalized understanding.

## 2020-02-17 NOTE — TELEPHONE ENCOUNTER
Prior Apoorva Chadwick was started through [employer]. Prior Apoorva Chadwick was approved.

## 2020-02-18 ENCOUNTER — OFFICE VISIT (OUTPATIENT)
Dept: ORTHOPEDIC SURGERY | Age: 65
End: 2020-02-18

## 2020-02-18 ENCOUNTER — DOCUMENTATION ONLY (OUTPATIENT)
Dept: ORTHOPEDIC SURGERY | Age: 65
End: 2020-02-18

## 2020-02-18 VITALS
HEART RATE: 86 BPM | DIASTOLIC BLOOD PRESSURE: 55 MMHG | RESPIRATION RATE: 12 BRPM | WEIGHT: 172 LBS | BODY MASS INDEX: 30.48 KG/M2 | SYSTOLIC BLOOD PRESSURE: 121 MMHG | TEMPERATURE: 97.2 F | HEIGHT: 63 IN | OXYGEN SATURATION: 91 %

## 2020-02-18 DIAGNOSIS — M79.671 RIGHT FOOT PAIN: ICD-10-CM

## 2020-02-18 DIAGNOSIS — M76.61 ACHILLES TENDINITIS OF RIGHT LOWER EXTREMITY: Primary | ICD-10-CM

## 2020-02-18 DIAGNOSIS — Z98.890 POST-OPERATIVE STATE: ICD-10-CM

## 2020-02-18 NOTE — PROGRESS NOTES
Patient: Debo Davis                MRN: [id number]       SSN: [id number]  YOB: 1955                 AGE: 75 y.o. SEX: female    Fco Cordoba MD      Chief Complaint:   Chief Complaint   Patient presents with    Ankle Pain     RIGHT ANKLE PAIN         DOS: 2/14/20      HPI:     The patient is a 75 y.o. female who presents today for follow up 4 days s/p:     Sharp Excisional Debridement And Repair Right Achilles Tendon/augmentation/arthrex/nerve Block - Right. Patient has been NWB to the right lower extremity. Patient denies any fever, chills, chest pain, shortness of breath or calf pain. There are no new illness or injuries to report since last seen in the office. She has been taking Tylenol only for pain as her pharmacy needed authorization. Patient was prescribed Xarelto for DVT prophylaxis. However, she has not picked up the medication from the pharmacy yet. She states she will pick it up today. Constipation has not been a concern. No changes in medications, allergies, social or family history. Patient reports doing well other than her pain assessment indicators as listed below. Pain Assessment  2/18/2020   Location of Pain Ankle   Location Modifiers Right   Severity of Pain 4   Quality of Pain Aching   Duration of Pain Persistent   Frequency of Pain Constant   Aggravating Factors Standing;Walking   Aggravating Factors Comment -   Limiting Behavior -   Relieving Factors Rest;Elevation   Relieving Factors Comment -   Result of Injury No         PHYSICAL EXAM:     Visit Vitals  /55   Pulse 86   Temp 97.2 °F (36.2 °C) (Oral)   Resp 12   Ht 5' 3\" (1.6 m)   Wt 172 lb (78 kg)   SpO2 91%   BMI 30.47 kg/m²         Pain Scale: 4/10    Pain Location: Ankle      GEN:  Alert, well developed, well nourished, well appearing 75 y.o. female seated comfortably in no acute distress. Speech normal in context and clarity.     Psychiatric: Affect, mood and conduct are appropriate. Alert, oriented x 3 alert, oriented x 3, memory grossly intact, no dementia    Patient accompanied in the examination room by: self    M/S EXAMINATION OF: right foot/ankle  DRESSINGS: CDI other than mild soilage on dressing   DRAINAGE: none  INCISION: Incision looks good, skin well approximated, no dehiscence, nylon sutures in place without disruption. SKIN: mild edema, no erythema, no ecchymosis, no warmth    TENDERNESS:  mild tenderness to palpation   NEUROVASCULAR:  grossly intact. Positive distal pulses and capillary refill. DVT ASSESSMENT:  The calf is not tender to palpation. No evidence of DVT seen on physical exam.  ROM: not tested                  RADIOGRAPHS & DIAGNOSTIC STUDIES     None    IMPRESSION:     Encounter Diagnoses     ICD-10-CM ICD-9-CM   1. Achilles tendinitis of right lower extremity M76.61 726.71   2. Right foot pain M79.671 729.5   3. Post-operative state Z98.890 V45.89       PLAN:       No orders of the defined types were placed in this encounter. · Continue activity modification    · Weight bearing status:  no weight bearing to the surgical extremity    · No lifting, twisting, squatting, deep bending, driving or strenuous activity. · Please follow up as instructed    · Please take medication as directed    · Follow RICE protocol (protecting skin)    · Maintain proper Nutrition    · Take a multivitamin, calcium + Vitamin D supplement daily (if tolerated)    · If you are a current smoker, quit or limit smoking    · Keep the current dressings on and in place. You need to keep this incision clean and dry. If you have a splint or cast on please keep this clean and dry as well. · You should expect swelling and bruising in the area over the next several days. You may elevate the surgical extremity on 1 pillow. Place the pillow horizontal so that no pressure is on the back of your heel.  You may apply an icepack on top of the dressing to help minimize the swelling. PLEASE SEEK IMMEDIATE ASSESSMENT BY ER PHYSICIAN IF ANY OF THE FOLLOWING EXIST:      Excessive pain, swelling, redness or odor of or around the surgical area    Temperature over 100.5    Nausea and vomiting lasting longer than 4 hours or if unable to take medications    Any signs of decreased circulation or nerve impairment to extremity: change in color, persistent numbness, tingling, coldness or increase pain    If any calf pain, calf tightness, shortness of breath, chest pain    Any difficulty breathing at rest or with ambulation, any chest tightness/soreness   Severe intractable pain, persistent swelling or drainage, development of a wound, incisional redness, finger/toe swelling or color changes, or CALF PAIN    · Perform ankle pumps with non-surgical/non-injured extremity to help reduce the risk of blood clots    · Keep all pets away from  any wound present in order to prevent infection. Patient has been consulted on with Dr. Anitra Elam during this visit and he agrees with the assessment and plan    Patient expresses understanding of the plan. Patient education provided on post surgical care.       REVIEW OF SYSTEMS:     Review of Systems: Chest pain: no  Shortness of breath: no  Fever: no  Night sweats: no  Weight loss: no  Constitutional signs: no  Review of all other systems is negative    Otherwise as noted in HPI      PAST MEDICAL HISTORY:     Past Medical History:   Diagnosis Date    Eosinophilic esophagitis     started on flvent, dr. Modesto Zamorano Gastric ulcer 04/2017    dr Brian Guerrero, avoid nsaids    Gastritis 12/13/2017    gastritis, cont PPI dr. Modesto Zamorano GERD (gastroesophageal reflux disease)     erosive esophagitis 11/13 EGD    H/O colonoscopy 11/2013    normal    Hypertension     Left knee pain     Morbid obesity ([address]     Precordial pain     Prediabetes        MEDICATIONS:     Current Outpatient Medications   Medication Sig    triamcinolone acetonide (KENALOG) 0.1 % ointment Apply  to affected area two (2) times a day. use thin layer    telmisartan (MICARDIS) 40 mg tablet Take 1 Tab by mouth daily.  diclofenac EC (VOLTAREN) 50 mg EC tablet as needed.  traZODone (DESYREL) 100 mg tablet Take 1 Tab by mouth nightly. (Patient taking differently: Take 100 mg by mouth as needed.)    amLODIPine (NORVASC) 5 mg tablet Take 1 Tab by mouth daily.  cyclobenzaprine (FLEXERIL) 5 mg tablet TAKE ONE TABLET BY MOUTH NIGHTLY AS NEEDED FOR MUSCLE SPASM(S)    omeprazole (PRILOSEC) 20 mg capsule Take 1 Cap by mouth daily. (Patient taking differently: Take 20 mg by mouth as needed.)    omega-3 fatty acids-vitamin e (FISH OIL) 1,000 mg cap Take 1 Cap by mouth.  multivitamin (ONE A DAY) tablet Take 1 Tab by mouth daily. No current facility-administered medications for this visit.         ALLERGIES:     Allergies   Allergen Reactions    Pcn [Penicillins] Hives         PAST SURGICAL HISTORY:     Past Surgical History:   Procedure Laterality Date    HX CATARACT REMOVAL Right 10/04/2016    HX COLONOSCOPY  11/06/2016    HX GYN      vaginal delivery x 2    HX ORTHOPAEDIC      foot surgery       SOCIAL HISTORY:     Social History     Socioeconomic History    Marital status:      Spouse name: Not on file    Number of children: Not on file    Years of education: Not on file    Highest education level: Not on file   Occupational History    Not on file   Social Needs    Financial resource strain: Not on file    Food insecurity:     Worry: Not on file     Inability: Not on file    Transportation needs:     Medical: Not on file     Non-medical: Not on file   Tobacco Use    Smoking status: Never Smoker    Smokeless tobacco: Never Used   Substance and Sexual Activity    Alcohol use: No    Drug use: No    Sexual activity: Not on file   Lifestyle    Physical activity:     Days per week: Not on file     Minutes per session: Not on file    Stress: Not on file   Relationships    Social connections:     Talks on phone: Not on file     Gets together: Not on file     Attends Protestant service: Not on file     Active member of club or organization: Not on file     Attends meetings of clubs or organizations: Not on file     Relationship status: Not on file    Intimate partner violence:     Fear of current or ex partner: Not on file     Emotionally abused: Not on file     Physically abused: Not on file     Forced sexual activity: Not on file   Other Topics Concern    Not on file   Social History Narrative    Not on file       FAMILY HISTORY:     Family History   Problem Relation Age of Onset    Heart Failure Mother     Heart Disease Mother         mi 52's    Asthma Other            Tara Thomas PA-C  2/18/2020

## 2020-02-18 NOTE — PATIENT INSTRUCTIONS
· Continue activity modification    · Weight bearing status:  no weight bearing to the surgical extremity    · No lifting, twisting, squatting, deep bending, driving or strenuous activity. · Please follow up as instructed    · Please take medication as directed    · Follow RICE protocol (protecting skin)    · Maintain proper Nutrition    · Take a multivitamin, calcium + Vitamin D supplement daily (if tolerated)    · If you are a current smoker, quit or limit smoking    · Keep the current dressings on and in place. You need to keep this incision clean and dry. If you have a splint or cast on please keep this clean and dry as well. · You should expect swelling and bruising in the area over the next several days. You may elevate the surgical extremity on 1 pillow. Place the pillow horizontal so that no pressure is on the back of your heel. You may apply an icepack on top of the dressing to help minimize the swelling. PLEASE SEEK IMMEDIATE ASSESSMENT BY ER PHYSICIAN IF ANY OF THE FOLLOWING EXIST:      Excessive pain, swelling, redness or odor of or around the surgical area    Temperature over 100.5    Nausea and vomiting lasting longer than 4 hours or if unable to take medications    Any signs of decreased circulation or nerve impairment to extremity: change in color, persistent numbness, tingling, coldness or increase pain    If any calf pain, calf tightness, shortness of breath, chest pain    Any difficulty breathing at rest or with ambulation, any chest tightness/soreness   Severe intractable pain, persistent swelling or drainage, development of a wound, incisional redness, finger/toe swelling or color changes, or CALF PAIN    · Perform ankle pumps with non-surgical/non-injured extremity to help reduce the risk of blood clots    · Keep all pets away from  any wound present in order to prevent infection.     Acute Pain After Surgery: Care Instructions  Your Care Instructions    It's common to have some pain after surgery. Pain doesn't mean that something is wrong or that the surgery didn't go well. But when the pain is severe, it's important to work with your doctor to manage it. It's also important to be aware of a few facts about pain and pain medicine. · You are the only person who knows what your pain feels like. So be sure to tell your doctor when you are in pain or when the pain changes. Then he or she will know how to adjust your medicines. · Pain is often easier to control right after it starts. So it may be better to take regular doses of pain medicine and not wait until the pain gets bad. · Medicine can help control pain. But this doesn't mean you'll have no pain. Medicine works to keep the pain at a level you can live with. With time, you will feel better. Follow-up care is a key part of your treatment and safety. Be sure to make and go to all appointments, and call your doctor if you are having problems. It's also a good idea to know your test results and keep a list of the medicines you take. How can you care for yourself at home? · Be safe with medicines. Read and follow all instructions on the label. ? If the doctor gave you a prescription medicine for pain, take it as prescribed. ? If you are not taking a prescription pain medicine, ask your doctor if you can take an over-the-counter medicine. · If you take an over-the-counter pain medicine, such as acetaminophen (Tylenol), ibuprofen (Advil, Motrin), or naproxen (Aleve), read and follow all instructions on the label. · Do not take two or more pain medicines at the same time unless the doctor told you to. · Do not drink alcohol while you are taking pain medicines. · Try to walk each day if your doctor recommends it. Start by walking a little more than you did the day before. Bit by bit, increase the amount you walk. Walking increases blood flow. It also helps prevent pneumonia and constipation.   · To prevent constipation from opioid pain medicines:  ? Talk to your doctor about a laxative. ? Include fruits, vegetables, beans, and whole grains in your diet each day. These foods are high in fiber. ? Drink plenty of fluids, enough so that your urine is light yellow or clear like water. Drink water, fruit juice, or other drinks that do not contain caffeine or alcohol. If you have kidney, heart, or liver disease and have to limit fluids, talk with your doctor before you increase the amount of fluids you drink. ? Take a fiber supplement, such as Citrucel or Metamucil, every day if needed. Read and follow all instructions on the label. If you take pain medicine for more than a few days, talk to your doctor before you take fiber. When should you call for help? Call your doctor now or seek immediate medical care if:    · Your pain gets worse. · Your pain is not controlled by medicine. Watch closely for changes in your health, and be sure to contact your doctor if you have any problems. Where can you learn more? Go to http://carlos eduardo-turner.info/. Enter (07) 637-411 in the search box to learn more about \"Acute Pain After Surgery: Care Instructions. \"  Current as of: September 23, 2018  Content Version: 11.9  © 6430-8845 [employer]. Care instructions adapted under license by [employer] (which disclaims liability or warranty for this information). If you have questions about a medical condition or this instruction, always ask your healthcare professional. [place] [address] any warranty or liability for your use of this information.

## 2020-02-18 NOTE — PROGRESS NOTES
1. Have you been to the ER, urgent care clinic since your last visit? Hospitalized since your last visit? No    2. Have you seen or consulted any other health care providers outside of the [address] [place] [address] since your last visit? Include any pap smears or colon screening.  No

## 2020-03-10 ENCOUNTER — OFFICE VISIT (OUTPATIENT)
Dept: ORTHOPEDIC SURGERY | Age: 65
End: 2020-03-10

## 2020-03-10 VITALS
WEIGHT: 172 LBS | HEART RATE: 85 BPM | HEIGHT: 64 IN | BODY MASS INDEX: 29.37 KG/M2 | DIASTOLIC BLOOD PRESSURE: 85 MMHG | SYSTOLIC BLOOD PRESSURE: 124 MMHG | TEMPERATURE: 97.1 F | OXYGEN SATURATION: 100 %

## 2020-03-10 DIAGNOSIS — M76.61 ACHILLES TENDINITIS OF RIGHT LOWER EXTREMITY: Primary | ICD-10-CM

## 2020-03-10 DIAGNOSIS — Z98.890 POST-OPERATIVE STATE: ICD-10-CM

## 2020-03-10 NOTE — PATIENT INSTRUCTIONS
· Continue activity modification · Weight bearing status:  no weight bearing to the surgical extremity · No lifting, twisting, squatting, deep bending, driving or strenuous activity. · Please follow up as instructed · Please take medication as directed · Follow RICE protocol (protecting skin) · Maintain proper Nutrition · Take a multivitamin, calcium + Vitamin D supplement daily (if tolerated) · If you are a current smoker, quit or limit smoking · Keep the current dressings on and in place. You need to keep this incision clean and dry. If you have a splint or cast on please keep this clean and dry as well. · You should expect swelling and bruising in the area over the next several days. You may elevate the surgical extremity on 1 pillow. Place the pillow horizontal so that no pressure is on the back of your heel. You may apply an icepack on top of the dressing to help minimize the swelling. PLEASE SEEK IMMEDIATE ASSESSMENT BY ER PHYSICIAN IF ANY OF THE FOLLOWING EXIST:  
 
? Excessive pain, swelling, redness or odor of or around the surgical area ? Temperature over 100.5 ? Nausea and vomiting lasting longer than 4 hours or if unable to take medications ? Any signs of decreased circulation or nerve impairment to extremity: change in color, persistent numbness, tingling, coldness or increase pain ? If any calf pain, calf tightness, shortness of breath, chest pain ? Any difficulty breathing at rest or with ambulation, any chest tightness/soreness ? Severe intractable pain, persistent swelling or drainage, development of a wound, incisional redness, finger/toe swelling or color changes, or CALF PAIN 
 
· Perform ankle pumps with non-surgical/non-injured extremity to help reduce the risk of blood clots · Keep all pets away from  any wound present in order to prevent infection. Acute Pain After Surgery: Care Instructions Your Care Instructions It's common to have some pain after surgery. Pain doesn't mean that something is wrong or that the surgery didn't go well. But when the pain is severe, it's important to work with your doctor to manage it. It's also important to be aware of a few facts about pain and pain medicine. · You are the only person who knows what your pain feels like. So be sure to tell your doctor when you are in pain or when the pain changes. Then he or she will know how to adjust your medicines. · Pain is often easier to control right after it starts. So it may be better to take regular doses of pain medicine and not wait until the pain gets bad. · Medicine can help control pain. But this doesn't mean you'll have no pain. Medicine works to keep the pain at a level you can live with. With time, you will feel better. Follow-up care is a key part of your treatment and safety. Be sure to make and go to all appointments, and call your doctor if you are having problems. It's also a good idea to know your test results and keep a list of the medicines you take. How can you care for yourself at home? · Be safe with medicines. Read and follow all instructions on the label. ? If the doctor gave you a prescription medicine for pain, take it as prescribed. ? If you are not taking a prescription pain medicine, ask your doctor if you can take an over-the-counter medicine. · If you take an over-the-counter pain medicine, such as acetaminophen (Tylenol), ibuprofen (Advil, Motrin), or naproxen (Aleve), read and follow all instructions on the label. · Do not take two or more pain medicines at the same time unless the doctor told you to. · Do not drink alcohol while you are taking pain medicines. · Try to walk each day if your doctor recommends it. Start by walking a little more than you did the day before. Bit by bit, increase the amount you walk. Walking increases blood flow. It also helps prevent pneumonia and constipation. · To prevent constipation from opioid pain medicines: 
? Talk to your doctor about a laxative. ? Include fruits, vegetables, beans, and whole grains in your diet each day. These foods are high in fiber. ? Drink plenty of fluids, enough so that your urine is light yellow or clear like water. Drink water, fruit juice, or other drinks that do not contain caffeine or alcohol. If you have kidney, heart, or liver disease and have to limit fluids, talk with your doctor before you increase the amount of fluids you drink. ? Take a fiber supplement, such as Citrucel or Metamucil, every day if needed. Read and follow all instructions on the label. If you take pain medicine for more than a few days, talk to your doctor before you take fiber. When should you call for help? Call your doctor now or seek immediate medical care if: 
  · Your pain gets worse. · Your pain is not controlled by medicine. Watch closely for changes in your health, and be sure to contact your doctor if you have any problems. Where can you learn more? Go to http://carlos eduardo-turner.info/. Enter (41) [phone] in the search box to learn more about \"Acute Pain After Surgery: Care Instructions. \" Current as of: September 23, 2018 Content Version: 11.9 © 5629-8916 [employer]. Care instructions adapted under license by [employer] (which disclaims liability or warranty for this information). If you have questions about a medical condition or this instruction, always ask your healthcare professional. [place] [address] any warranty or liability for your use of this information.

## 2020-03-10 NOTE — PROGRESS NOTES
Patient: Suri Goodwin                MRN: [id number]       SSN: [id number]  YOB: 1955                 AGE: 75 y.o. SEX: female    Loly Rivas MD      Chief Complaint:   Chief Complaint   Patient presents with    Ankle Pain     RIGHT         DOS: 2/14/20      HPI:     The patient is a 75 y.o. female who presents today for follow up 4 days s/p:     Sharp Excisional Debridement And Repair Right Achilles Tendon/augmentation/arthrex/nerve Block - Right. Patient has been NWB to the right lower extremity. Patient denies any fever, chills, chest pain, shortness of breath or calf pain. There are no new illness or injuries to report since last seen in the office. She has been taking Tylenol only for pain as her pharmacy needed authorization. Patient is on ASA for DVT prophylaxis. No changes in medications, allergies, social or family history. Patient reports doing well other than her pain assessment indicators as listed below. Pain Assessment  3/10/2020   Location of Pain Ankle   Location Modifiers Right   Severity of Pain 3   Quality of Pain Dull; Throbbing   Duration of Pain -   Frequency of Pain Constant   Aggravating Factors -   Aggravating Factors Comment -   Limiting Behavior Yes   Relieving Factors Ice;Elevation   Relieving Factors Comment -   Result of Injury No         PHYSICAL EXAM:     Visit Vitals  /85   Pulse 85   Temp 97.1 °F (36.2 °C) (Oral)   Ht 5' 3.5\" (1.613 m)   Wt 172 lb (78 kg)   SpO2 100%   BMI 29.99 kg/m²         Pain Scale: 3/10    Pain Location: Ankle      GEN:  Alert, well developed, well nourished, well appearing 75 y.o. female seated comfortably in no acute distress. Speech normal in context and clarity. Psychiatric: Affect, mood and conduct are appropriate.  Alert, oriented x 3 alert, oriented x 3, memory grossly intact, no dementia    Patient accompanied in the examination room by: self    M/S EXAMINATION OF: right foot/ankle  DRESSINGS: CDI other than mild soilage on dressing   DRAINAGE: none  INCISION: Incision looks good, skin well approximated, no dehiscence, nylon sutures in place without disruption. SKIN: mild edema, no erythema, no ecchymosis, no warmth    TENDERNESS:  mild tenderness to palpation   NEUROVASCULAR:  grossly intact. Positive distal pulses and capillary refill. DVT ASSESSMENT:  The calf is not tender to palpation. No evidence of DVT seen on physical exam.  ROM: not tested                  RADIOGRAPHS & DIAGNOSTIC STUDIES     None    IMPRESSION:     Encounter Diagnoses     ICD-10-CM ICD-9-CM   1. Achilles tendinitis of right lower extremity M76.61 726.71   2. Post-operative state Z98.890 V45.89       PLAN:       No orders of the defined types were placed in this encounter. · Follow up in 2 weeks for cast removal and placement of tall CAM boot with wedges  · Continue activity modification    · Weight bearing status:  no weight bearing to the surgical extremity    · No lifting, twisting, squatting, deep bending, driving or strenuous activity. · Please follow up as instructed    · Please take medication as directed    · Follow RICE protocol (protecting skin)    · Maintain proper Nutrition    · Take a multivitamin, calcium + Vitamin D supplement daily (if tolerated)    · If you are a current smoker, quit or limit smoking    · Keep the current dressings on and in place. You need to keep this incision clean and dry. If you have a splint or cast on please keep this clean and dry as well. · You should expect swelling and bruising in the area over the next several days. You may elevate the surgical extremity on 1 pillow. Place the pillow horizontal so that no pressure is on the back of your heel. You may apply an icepack on top of the dressing to help minimize the swelling.      PLEASE SEEK IMMEDIATE ASSESSMENT BY ER PHYSICIAN IF ANY OF THE FOLLOWING EXIST:      Excessive pain, swelling, redness or odor of or around the surgical area    Temperature over 100.5    Nausea and vomiting lasting longer than 4 hours or if unable to take medications    Any signs of decreased circulation or nerve impairment to extremity: change in color, persistent numbness, tingling, coldness or increase pain    If any calf pain, calf tightness, shortness of breath, chest pain    Any difficulty breathing at rest or with ambulation, any chest tightness/soreness   Severe intractable pain, persistent swelling or drainage, development of a wound, incisional redness, finger/toe swelling or color changes, or CALF PAIN    · Perform ankle pumps with non-surgical/non-injured extremity to help reduce the risk of blood clots    · Keep all pets away from  any wound present in order to prevent infection. Dr. Pedro Patel has been consulted during this visit and he agrees with the assessment and plan    Patient expresses understanding of the plan. Patient education provided on post surgical care. REVIEW OF SYSTEMS:     Review of Systems: Chest pain: no  Shortness of breath: no  Fever: no  Night sweats: no  Weight loss: no  Constitutional signs: no  Review of all other systems is negative    Otherwise as noted in HPI      PAST MEDICAL HISTORY:     Past Medical History:   Diagnosis Date    Eosinophilic esophagitis     started on flvent, dr. Tru Starr Gastric ulcer 04/2017    dr Ambrocio Maki, avoid nsaids    Gastritis 12/13/2017    gastritis, cont PPI dr. Tru Starr GERD (gastroesophageal reflux disease)     erosive esophagitis 11/13 EGD    H/O colonoscopy 11/2013    normal    Hypertension     Left knee pain     Morbid obesity ([facility] [address]     Precordial pain     Prediabetes        MEDICATIONS:     Current Outpatient Medications   Medication Sig    triamcinolone acetonide (KENALOG) 0.1 % ointment Apply  to affected area two (2) times a day.  use thin layer    telmisartan (MICARDIS) 40 mg tablet Take 1 Tab by mouth daily.  diclofenac EC (VOLTAREN) 50 mg EC tablet as needed.  traZODone (DESYREL) 100 mg tablet Take 1 Tab by mouth nightly. (Patient taking differently: Take 100 mg by mouth as needed.)    amLODIPine (NORVASC) 5 mg tablet Take 1 Tab by mouth daily.  cyclobenzaprine (FLEXERIL) 5 mg tablet TAKE ONE TABLET BY MOUTH NIGHTLY AS NEEDED FOR MUSCLE SPASM(S)    omeprazole (PRILOSEC) 20 mg capsule Take 1 Cap by mouth daily. (Patient taking differently: Take 20 mg by mouth as needed.)    omega-3 fatty acids-vitamin e (FISH OIL) 1,000 mg cap Take 1 Cap by mouth.  multivitamin (ONE A DAY) tablet Take 1 Tab by mouth daily. No current facility-administered medications for this visit.         ALLERGIES:     Allergies   Allergen Reactions    Pcn [Penicillins] Hives         PAST SURGICAL HISTORY:     Past Surgical History:   Procedure Laterality Date    HX CATARACT REMOVAL Right 10/04/2016    HX COLONOSCOPY  11/06/2016    HX GYN      vaginal delivery x 2    HX ORTHOPAEDIC      foot surgery       SOCIAL HISTORY:     Social History     Socioeconomic History    Marital status:      Spouse name: Not on file    Number of children: Not on file    Years of education: Not on file    Highest education level: Not on file   Occupational History    Not on file   Social Needs    Financial resource strain: Not on file    Food insecurity:     Worry: Not on file     Inability: Not on file    Transportation needs:     Medical: Not on file     Non-medical: Not on file   Tobacco Use    Smoking status: Never Smoker    Smokeless tobacco: Never Used   Substance and Sexual Activity    Alcohol use: No    Drug use: No    Sexual activity: Not on file   Lifestyle    Physical activity:     Days per week: Not on file     Minutes per session: Not on file    Stress: Not on file   Relationships    Social connections:     Talks on phone: Not on file     Gets together: Not on file     Attends Islam service: Not on file     Active member of club or organization: Not on file     Attends meetings of clubs or organizations: Not on file     Relationship status: Not on file    Intimate partner violence:     Fear of current or ex partner: Not on file     Emotionally abused: Not on file     Physically abused: Not on file     Forced sexual activity: Not on file   Other Topics Concern    Not on file   Social History Narrative    Not on file       FAMILY HISTORY:     Family History   Problem Relation Age of Onset    Heart Failure Mother     Heart Disease Mother         mi 52's    Asthma Other            Gavin Ragsdale PA-C  3/10/2020

## 2020-03-23 ENCOUNTER — TELEPHONE (OUTPATIENT)
Dept: ORTHOPEDIC SURGERY | Age: 65
End: 2020-03-23

## 2020-03-24 ENCOUNTER — OFFICE VISIT (OUTPATIENT)
Dept: ORTHOPEDIC SURGERY | Age: 65
End: 2020-03-24

## 2020-03-24 VITALS
RESPIRATION RATE: 19 BRPM | SYSTOLIC BLOOD PRESSURE: 108 MMHG | HEIGHT: 64 IN | OXYGEN SATURATION: 96 % | TEMPERATURE: 97.4 F | WEIGHT: 172 LBS | DIASTOLIC BLOOD PRESSURE: 62 MMHG | HEART RATE: 88 BPM | BODY MASS INDEX: 29.37 KG/M2

## 2020-03-24 DIAGNOSIS — M76.61 ACHILLES TENDINITIS OF RIGHT LOWER EXTREMITY: Primary | ICD-10-CM

## 2020-03-24 DIAGNOSIS — Z98.890 POST-OPERATIVE STATE: ICD-10-CM

## 2020-03-24 NOTE — PATIENT INSTRUCTIONS
· Continue activity modification · Weight bearing status:  no weight bearing to the surgical extremity in CAM boot with 2 wedges. Remove one wedge every 4-5 days · No lifting, twisting, squatting, deep bending, driving or strenuous activity. · Please follow up as instructed · Please take medication as directed · Follow RICE protocol (protecting skin) · Maintain proper Nutrition · Take a multivitamin, calcium + Vitamin D supplement daily (if tolerated) · If you are a current smoker, quit or limit smoking · Keep the current dressings on and in place. You need to keep this incision clean and dry. If you have a splint or cast on please keep this clean and dry as well. · You should expect swelling and bruising in the area over the next several days. You may elevate the surgical extremity on 1 pillow. Place the pillow horizontal so that no pressure is on the back of your heel. You may apply an icepack on top of the dressing to help minimize the swelling. PLEASE SEEK IMMEDIATE ASSESSMENT BY ER PHYSICIAN IF ANY OF THE FOLLOWING EXIST:  
 
? Excessive pain, swelling, redness or odor of or around the surgical area ? Temperature over 100.5 ? Nausea and vomiting lasting longer than 4 hours or if unable to take medications ? Any signs of decreased circulation or nerve impairment to extremity: change in color, persistent numbness, tingling, coldness or increase pain ? If any calf pain, calf tightness, shortness of breath, chest pain ? Any difficulty breathing at rest or with ambulation, any chest tightness/soreness ? Severe intractable pain, persistent swelling or drainage, development of a wound, incisional redness, finger/toe swelling or color changes, or CALF PAIN 
 
· Perform ankle pumps with non-surgical/non-injured extremity to help reduce the risk of blood clots · Keep all pets away from  any wound present in order to prevent infection.

## 2020-03-24 NOTE — PROGRESS NOTES
Patient: Wyman Galeazzi                MRN: [id number]       SSN: [id number]  YOB: 1955                 AGE: 75 y.o. SEX: female    Ki Ribeiro MD      Chief Complaint:   Chief Complaint   Patient presents with    Ankle Pain     fu right achilles tendon repair         DOS: 2/14/20      HPI:     The patient is a 75 y.o. female who presents today for follow up 39 days s/p:     Sharp Excisional Debridement And Repair Right Achilles Tendon/augmentation/arthrex/nerve Block - Right. Patient has been NWB to the right lower extremity. Patient denies any fever, chills, chest pain, shortness of breath or calf pain. There are no new illness or injuries to report since last seen in the office. Patient is on ASA for DVT prophylaxis. No changes in medications, allergies, social or family history. Patient reports doing well other than her pain assessment indicators as listed below. Pain Assessment  3/24/2020   Location of Pain Ankle   Location Modifiers Right   Severity of Pain 3   Quality of Pain Dull   Duration of Pain Persistent   Frequency of Pain Constant   Aggravating Factors Other (Comment)   Aggravating Factors Comment always there   Limiting Behavior Yes   Relieving Factors Rest;Elevation   Relieving Factors Comment -   Result of Injury No         PHYSICAL EXAM:     Visit Vitals  /62 (BP 1 Location: Left arm)   Pulse 88   Temp 97.4 °F (36.3 °C) (Oral)   Resp 19   Ht 5' 3.5\" (1.613 m)   Wt 172 lb (78 kg)   SpO2 96%   BMI 29.99 kg/m²         Pain Scale: 3/10    Pain Location:       GEN:  Alert, well developed, well nourished, well appearing 75 y.o. female seated comfortably in no acute distress. Speech normal in context and clarity. Psychiatric: Affect, mood and conduct are appropriate.  Alert, oriented x 3 alert, oriented x 3, memory grossly intact, no dementia    Patient accompanied in the examination room by: self    M/S EXAMINATION OF: right foot/ankle  INCISION: Incision looks good, skin well approximated, no dehiscence, one single nylon suture remained in place and was removed without difficulty  SKIN: mild edema, no erythema, no ecchymosis, no warmth    TENDERNESS:  mild tenderness to palpation   NEUROVASCULAR:  grossly intact. Positive distal pulses and capillary refill. DVT ASSESSMENT:  The calf is not tender to palpation. No evidence of DVT seen on physical exam.  ROM: patient can easily bring foot to neutral                  RADIOGRAPHS & DIAGNOSTIC STUDIES     None    IMPRESSION:     Encounter Diagnoses     ICD-10-CM ICD-9-CM   1. Achilles tendinitis of right lower extremity M76.61 726.71   2. Post-operative state Z98.890 V45.89       PLAN:       No orders of the defined types were placed in this encounter. · Continue activity modification    · Weight bearing status:  no weight bearing to the surgical extremity in CAM boot with 2 wedges. Remove one wedge every 4-5 days. Apply large bandage along incision when wearing CAM boot    · Possible Tele-visit in 2 weeks. Will possibly start WB and HEP at that time. Formal PT is not possible at this time due to COVID-19 contact restrictions    · No lifting, twisting, squatting, deep bending, driving or strenuous activity. · Please follow up as instructed    · Please take medication as directed    · Follow RICE protocol (protecting skin)    · Maintain proper Nutrition    · Take a multivitamin, calcium + Vitamin D supplement daily (if tolerated)    · If you are a current smoker, quit or limit smoking    · Keep the current dressings on and in place. You need to keep this incision clean and dry. If you have a splint or cast on please keep this clean and dry as well. · You should expect swelling and bruising in the area over the next several days. You may elevate the surgical extremity on 1 pillow. Place the pillow horizontal so that no pressure is on the back of your heel.  You may apply an icepack on top of the dressing to help minimize the swelling. PLEASE SEEK IMMEDIATE ASSESSMENT BY ER PHYSICIAN IF ANY OF THE FOLLOWING EXIST:      Excessive pain, swelling, redness or odor of or around the surgical area    Temperature over 100.5    Nausea and vomiting lasting longer than 4 hours or if unable to take medications    Any signs of decreased circulation or nerve impairment to extremity: change in color, persistent numbness, tingling, coldness or increase pain    If any calf pain, calf tightness, shortness of breath, chest pain    Any difficulty breathing at rest or with ambulation, any chest tightness/soreness   Severe intractable pain, persistent swelling or drainage, development of a wound, incisional redness, finger/toe swelling or color changes, or CALF PAIN    · Perform ankle pumps with non-surgical/non-injured extremity to help reduce the risk of blood clots    · Keep all pets away from  any wound present in order to prevent infection. Dr. Louis Bean has been consulted during this visit and he agrees with the assessment and plan    Patient expresses understanding of the plan. Patient education provided on post surgical care.       REVIEW OF SYSTEMS:     Review of Systems: Chest pain: no  Shortness of breath: no  Fever: no  Night sweats: no  Weight loss: no  Constitutional signs: no  Review of all other systems is negative    Otherwise as noted in HPI      PAST MEDICAL HISTORY:     Past Medical History:   Diagnosis Date    Eosinophilic esophagitis     started on flvent, dr. Jose Carlos Enciso Gastric ulcer 04/2017    dr Reagan William, avoid nsaids    Gastritis 12/13/2017    gastritis, cont PPI dr. Jose Carlos Enciso GERD (gastroesophageal reflux disease)     erosive esophagitis 11/13 EGD    H/O colonoscopy 11/2013    normal    Hypertension     Left knee pain     Morbid obesity ([address]     Precordial pain     Prediabetes        MEDICATIONS:     Current Outpatient Medications   Medication Sig  triamcinolone acetonide (KENALOG) 0.1 % ointment Apply  to affected area two (2) times a day. use thin layer    telmisartan (MICARDIS) 40 mg tablet Take 1 Tab by mouth daily.  diclofenac EC (VOLTAREN) 50 mg EC tablet as needed.  traZODone (DESYREL) 100 mg tablet Take 1 Tab by mouth nightly. (Patient taking differently: Take 100 mg by mouth as needed.)    amLODIPine (NORVASC) 5 mg tablet Take 1 Tab by mouth daily.  cyclobenzaprine (FLEXERIL) 5 mg tablet TAKE ONE TABLET BY MOUTH NIGHTLY AS NEEDED FOR MUSCLE SPASM(S)    omeprazole (PRILOSEC) 20 mg capsule Take 1 Cap by mouth daily. (Patient taking differently: Take 20 mg by mouth as needed.)    omega-3 fatty acids-vitamin e (FISH OIL) 1,000 mg cap Take 1 Cap by mouth.  multivitamin (ONE A DAY) tablet Take 1 Tab by mouth daily. No current facility-administered medications for this visit.         ALLERGIES:     Allergies   Allergen Reactions    Pcn [Penicillins] Hives         PAST SURGICAL HISTORY:     Past Surgical History:   Procedure Laterality Date    HX CATARACT REMOVAL Right 10/04/2016    HX COLONOSCOPY  11/06/2016    HX GYN      vaginal delivery x 2    HX ORTHOPAEDIC      foot surgery       SOCIAL HISTORY:     Social History     Socioeconomic History    Marital status:      Spouse name: Not on file    Number of children: Not on file    Years of education: Not on file    Highest education level: Not on file   Occupational History    Not on file   Social Needs    Financial resource strain: Not on file    Food insecurity     Worry: Not on file     Inability: Not on file    Transportation needs     Medical: Not on file     Non-medical: Not on file   Tobacco Use    Smoking status: Never Smoker    Smokeless tobacco: Never Used   Substance and Sexual Activity    Alcohol use: No    Drug use: No    Sexual activity: Not on file   Lifestyle    Physical activity     Days per week: Not on file     Minutes per session: Not on file    Stress: Not on file   Relationships    Social connections     Talks on phone: Not on file     Gets together: Not on file     Attends Che[employer] service: Not on file     Active member of club or organization: Not on file     Attends meetings of clubs or organizations: Not on file     Relationship status: Not on file    Intimate partner violence     Fear of current or ex partner: Not on file     Emotionally abused: Not on file     Physically abused: Not on file     Forced sexual activity: Not on file   Other Topics Concern    Not on file   Social History Narrative    Not on file       FAMILY HISTORY:     Family History   Problem Relation Age of Onset    Heart Failure Mother     Heart Disease Mother         mi 52's    Asthma Other            Tita Zuniga PA-C  3/24/2020

## 2020-04-14 ENCOUNTER — VIRTUAL VISIT (OUTPATIENT)
Dept: ORTHOPEDIC SURGERY | Age: 65
End: 2020-04-14

## 2020-04-14 DIAGNOSIS — Z98.890 POST-OPERATIVE STATE: ICD-10-CM

## 2020-04-14 DIAGNOSIS — M76.61 ACHILLES TENDINITIS OF RIGHT LOWER EXTREMITY: Primary | ICD-10-CM

## 2020-04-14 NOTE — PROGRESS NOTES
Patient: Mason Stuart                     MRN: [id number]         SSN: [id number]    Eri Torre MD  YOB: 1955                      AGE: 75 y.o. SEX: female      VIRTUAL VISIT         Mason Stuart is a 75 y.o. female who was seen by virtual synchronous (real-time) audio-video technology on 4/14/2020. This virtual Telehealth visit was provided through [email or web address] me while the patient was located at home and whiles I was located at [facility]. The time of the visit was 9:30 AM, 4/14/2020. Consent: Mason Stuart and/or the patient's healthcare decision maker is aware that this patient-initiated Telehealth encounter is a billable service, with coverage as determined by their insurance carrier. The patient is aware that they may receive a bill and has provided verbal consent to proceed. ASSESSMENT:       ICD-10-CM ICD-9-CM   1. Achilles tendinitis of right lower extremity M76.61 726.71   2. Post-operative state Z98.890 V45.89          PLAN:         No orders of the defined types were placed in this encounter. Continue WBAT in supportive shoe using heel cup  Continue ROM exercises  Use Tubigrip or compression hose for swelling  Follow RICE protocol as needed    Follow-up and Dispositions    · Return in about 6 weeks (around 5/26/2020) for Follow up virtual visit. I spent at least 15 minutes with this established patient, and >50% of the time was spent counseling and/or coordinating care regarding her right Achilles tendon. We discussed the expected course, resolution and complications of the diagnosis(es) in detail. Medication risks, benefits, costs, interactions, and alternatives were discussed as indicated. I advised her to contact the office if her condition worsens, changes or fails to improve as anticipated. She expressed understanding with the diagnosis(es).  This plan was reviewed with the patient and patient agrees and all questions were answered. Pursuant to the emergency declaration under the [facility] waiver authority and the [employer] Act, this Virtual  Visit was conducted, with patient's consent, to reduce the patient's risk of exposure to COVID-19 and provide continuity of care for an established patient. Services were provided through a video synchronous discussion virtually to substitute for in-person clinic visit.  reviewed    HPI:     DOS: 2/14/20 [place] Artist Excisional Debridement And Repair Right Achilles Tendon/augmentation/arthrex/nerve Block - Right    Catina Castañeda is a 75 y.o. female who is being evaluated 60 days s/p surgery. Since the patient was last seen in the office on 3/24/2020, Catina Castañeda states that she discontinued the CAM boot on yesterday and has been wearing supportive shoes/boots with a heel cup. She has been performing ROM exercises and has some residual swelling when she has been on her feet for a long period of time. She has been practicing social distancing and stayiing at home most of the time per COVID-19 recommendations. She understands that PT cannot be ordered and she will continue home exercises. She states she is doing well with minimal pain that is rated 3/10 at max on occasion. Other than previously noted, patient reports no changes in medications, allergies, social or family history. Catina Castañeda  has a past medical history of Eosinophilic esophagitis, Gastric ulcer (04/2017), Gastritis (12/13/2017), GERD (gastroesophageal reflux disease), H/O colonoscopy (11/2013), Hypertension, Left knee pain, Morbid obesity ([address] Precordial pain, and Prediabetes.     Lab Results   Component Value Date/Time    Glucose 89 01/31/2020 11:32 AM    Glucose (POC) 96 02/14/2020 08:45 AM        Lab Results   Component Value Date/Time    Hemoglobin A1c 6.1 (H) 11/08/2018 11:35 AM         Subjective: Adelia Steele was seen for Ankle Pain (right ankle pain)        Patient Active Problem List   Diagnosis Code    HTN (hypertension) I10    Prediabetes R73.03    Left knee pain M25.562    Gastroesophageal reflux disease K21.9    Pruritus ani L29.0    Severe obesity (BMI 35.0-39. 9) E66.01    Sleep difficulties G47.9    Osteopenia of necks of both femurs M85.851, M85.852    Essential hypertension I10    Achilles tendinitis of right lower extremity M76.61     Patient Active Problem List    Diagnosis Date Noted    Achilles tendinitis of right lower extremity 02/14/2020    Sleep difficulties 11/13/2018    Osteopenia of necks of both femurs 11/13/2018    Essential hypertension 11/13/2018    Severe obesity (BMI 35.0-39.9) 06/26/2018    Pruritus ani 02/20/2018    Gastroesophageal reflux disease 07/18/2016    Left knee pain     Prediabetes 12/11/2014    HTN (hypertension) 09/21/2011     Current Outpatient Medications   Medication Sig Dispense Refill    triamcinolone acetonide (KENALOG) 0.1 % ointment Apply  to affected area two (2) times a day. use thin layer 30 g 0    telmisartan (MICARDIS) 40 mg tablet Take 1 Tab by mouth daily. 90 Tab 1    diclofenac EC (VOLTAREN) 50 mg EC tablet as needed.  traZODone (DESYREL) 100 mg tablet Take 1 Tab by mouth nightly. (Patient taking differently: Take 100 mg by mouth as needed.) 90 Tab 0    amLODIPine (NORVASC) 5 mg tablet Take 1 Tab by mouth daily. 90 Tab 2    cyclobenzaprine (FLEXERIL) 5 mg tablet TAKE ONE TABLET BY MOUTH NIGHTLY AS NEEDED FOR MUSCLE SPASM(S) 30 Tab 2    omeprazole (PRILOSEC) 20 mg capsule Take 1 Cap by mouth daily. (Patient taking differently: Take 20 mg by mouth as needed.) 90 Cap 3    omega-3 fatty acids-vitamin e (FISH OIL) 1,000 mg cap Take 1 Cap by mouth.  multivitamin (ONE A DAY) tablet Take 1 Tab by mouth daily.        Allergies   Allergen Reactions    Pcn [Penicillins] Hives     Past Medical History:   Diagnosis Date  Eosinophilic esophagitis     started on flvent, dr. Maday Howell Gastric ulcer 04/2017    dr Padmini Braxton, avoid nsaids    Gastritis 12/13/2017    gastritis, cont PPI dr. Maday Howell GERD (gastroesophageal reflux disease)     erosive esophagitis 11/13 EGD    H/O colonoscopy 11/2013    normal    Hypertension     Left knee pain     Morbid obesity ([address]     Precordial pain     Prediabetes      Past Surgical History:   Procedure Laterality Date    HX CATARACT REMOVAL Right 10/04/2016    HX COLONOSCOPY  11/06/2016    HX GYN      vaginal delivery x 2    HX ORTHOPAEDIC      foot surgery     Family History   Problem Relation Age of Onset    Heart Failure Mother     Heart Disease Mother         mi 52's   [address] [place] [address] Oscar Asthma Other      Social History     Tobacco Use    Smoking status: Never Smoker    Smokeless tobacco: Never Used   Substance Use Topics    Alcohol use: No          ROS  See HPI    Objective:     General: alert, cooperative, no distress   Mental  status: mental status: alert, oriented to person, place, and time, normal mood, behavior, speech, dress, motor activity, and thought processes   Resp: resp: normal effort and no respiratory distress   Neuro: neuro: no gross deficits            Skin: skin: no discoloration or lesions of concern on visible areas. Some mild hypertrophy noted to the scar. Due to this being a TeleHealth evaluation, many elements of the physical examination are unable to be assessed. We discussed the expected course, resolution and complications of the diagnosis(es) in detail. Medication risks, benefits, costs, interactions, and alternatives were discussed as indicated. I advised her to contact the office if her condition worsens, changes or fails to improve as anticipated. She expressed understanding with the diagnosis(es) and plan.          Pursuant to the emergency declaration under the [facility] waiver authority and the Coronavirus Preparedness and Response Supplemental Appropriations Act, this Virtual  Visit was conducted, with patient's consent, to reduce the patient's risk of exposure to COVID-19 and provide continuity of care for an established patient. Services were provided through a video synchronous discussion virtually to substitute for in-person clinic visit.       Chen Venegas PA-C  4/14/2020  9:30 AM

## 2020-04-14 NOTE — PROGRESS NOTES
Tiffany Arredondo is a 75 y.o. female evaluated via telephone on 4/14/2020. Consent:  She and/or health care decision maker is aware that that she may receive a bill for this telephone service, depending on her insurance coverage, and has provided verbal consent to proceed: Yes      Documentation:  I communicated with the patient and/or health care decision maker about right ankle. Details of this discussion including any medical advice provided:       I affirm this is a Patient Initiated Episode with an Established Patient who has not had a related appointment within my department in the past 7 days or scheduled within the next 24 hours. Note: not billable if this call serves to triage the patient into an appointment for the relevant concern    Since your last office visit have you had any    1. New medical diagnoses No  2. Changes in your medications  No  3. Surgical procedures No  4. Changes in your Allergies to medication No  5.  What is your pain level today 3/10     Araceli Goldstein

## 2020-06-09 RX ORDER — AMLODIPINE BESYLATE 5 MG/1
5 TABLET ORAL DAILY
Qty: 90 TAB | Refills: 0 | Status: SHIPPED | OUTPATIENT
Start: 2020-06-09 | End: 2020-09-15 | Stop reason: SDUPTHER

## 2020-06-09 NOTE — TELEPHONE ENCOUNTER
This patient contacted office for the following prescriptions to be filled:    Medication requested :   Requested Prescriptions     Pending Prescriptions Disp Refills    amLODIPine (NORVASC) 5 mg tablet 90 Tab 2     Sig: Take 1 Tab by mouth daily.      PCP: tom  Pharmacy or Print: [facility]  Mail order or Local pharmacy [phone]    Scheduled appointment if not seen by current providers in office: lov 1/30/2020

## 2020-06-10 NOTE — TELEPHONE ENCOUNTER
Pt's RX for amlodipine went to the wrong pharmacy. Can this please be resent to [facility].  Thank you

## 2020-06-23 ENCOUNTER — HOSPITAL ENCOUNTER (OUTPATIENT)
Dept: MAMMOGRAPHY | Age: 65
Discharge: HOME | End: 2020-06-23
Attending: FAMILY MEDICINE
Payer: MEDICARE

## 2020-06-23 DIAGNOSIS — Z12.31 VISIT FOR SCREENING MAMMOGRAM: ICD-10-CM

## 2020-06-23 PROCEDURE — 77063 BREAST TOMOSYNTHESIS BI: CPT

## 2020-06-25 NOTE — PROGRESS NOTES
Patient identified with 2 identifiers (name and ).  Patient aware of normal mammogram . Patient has been scheduled for a wwe/pap for 2020

## 2020-06-26 ENCOUNTER — HOSPITAL ENCOUNTER (OUTPATIENT)
Dept: LAB | Age: 65
Discharge: HOME | End: 2020-06-26
Payer: MEDICARE

## 2020-06-26 DIAGNOSIS — I10 ESSENTIAL HYPERTENSION: ICD-10-CM

## 2020-06-26 DIAGNOSIS — R73.03 PREDIABETES: ICD-10-CM

## 2020-06-26 LAB
ALBUMIN SERPL-MCNC: 4.2 G/DL (ref 3.4–5)
ALBUMIN/GLOB SERPL: 1.3 {RATIO} (ref 0.8–1.7)
ALP SERPL-CCNC: 91 U/L (ref 45–117)
ALT SERPL-CCNC: 23 U/L (ref 13–56)
ANION GAP SERPL CALC-SCNC: 5 MMOL/L (ref 3–18)
AST SERPL-CCNC: 20 U/L (ref 10–38)
BILIRUB SERPL-MCNC: 1.1 MG/DL (ref 0.2–1)
BUN SERPL-MCNC: 13 MG/DL (ref 7–18)
BUN/CREAT SERPL: 18 (ref 12–20)
CALCIUM SERPL-MCNC: 9.4 MG/DL (ref 8.5–10.1)
CHLORIDE SERPL-SCNC: 108 MMOL/L (ref 100–111)
CHOLEST SERPL-MCNC: 192 MG/DL
CO2 SERPL-SCNC: 29 MMOL/L (ref 21–32)
CREAT SERPL-MCNC: 0.71 MG/DL (ref 0.6–1.3)
GLOBULIN SER CALC-MCNC: 3.2 G/DL (ref 2–4)
GLUCOSE SERPL-MCNC: 103 MG/DL (ref 74–99)
HBA1C MFR BLD: 5.6 % (ref 4.2–5.6)
HDLC SERPL-MCNC: 108 MG/DL (ref 40–60)
HDLC SERPL: 1.8 {RATIO} (ref 0–5)
LDLC SERPL CALC-MCNC: 69.8 MG/DL (ref 0–100)
LIPID PROFILE,FLP: ABNORMAL
POTASSIUM SERPL-SCNC: 4.2 MMOL/L (ref 3.5–5.5)
PROT SERPL-MCNC: 7.4 G/DL (ref 6.4–8.2)
SODIUM SERPL-SCNC: 142 MMOL/L (ref 136–145)
TRIGL SERPL-MCNC: 71 MG/DL (ref ?–150)
VLDLC SERPL CALC-MCNC: 14.2 MG/DL

## 2020-06-26 PROCEDURE — 80061 LIPID PANEL: CPT

## 2020-06-26 PROCEDURE — 80053 COMPREHEN METABOLIC PANEL: CPT

## 2020-06-26 PROCEDURE — 36415 COLL VENOUS BLD VENIPUNCTURE: CPT

## 2020-06-26 PROCEDURE — 83036 HEMOGLOBIN GLYCOSYLATED A1C: CPT

## 2020-07-10 ENCOUNTER — TELEPHONE (OUTPATIENT)
Dept: FAMILY MEDICINE CLINIC | Age: 65
End: 2020-07-10

## 2020-07-10 NOTE — TELEPHONE ENCOUNTER
Have you been diagnosed with, tested for, or told that you are suspected of having COVID-19 (coronovirus)? Did Covid screening on her own. Was negative   Have you had a fever or taken medication to treat a fever in the past 72 hours? No   Have you had a cough, SOB, or flu-like symptoms within the past 3 days? No   Have you had direct contact with someone who tested positive for COVID-19 within the past 14 days? No   Do you have a household member with flu-like symptoms, including fever, cough, or SOB? No   Do you currently have flu-like symptoms including fever, cough, or SOB?  No

## 2020-07-13 ENCOUNTER — HOSPITAL ENCOUNTER (OUTPATIENT)
Dept: LAB | Age: 65
Discharge: HOME | End: 2020-07-13
Payer: MEDICARE

## 2020-07-13 ENCOUNTER — OFFICE VISIT (OUTPATIENT)
Dept: FAMILY MEDICINE CLINIC | Age: 65
End: 2020-07-13

## 2020-07-13 VITALS
RESPIRATION RATE: 16 BRPM | SYSTOLIC BLOOD PRESSURE: 126 MMHG | DIASTOLIC BLOOD PRESSURE: 84 MMHG | HEIGHT: 65 IN | WEIGHT: 175 LBS | TEMPERATURE: 98.2 F | BODY MASS INDEX: 29.16 KG/M2 | HEART RATE: 78 BPM | OXYGEN SATURATION: 98 %

## 2020-07-13 DIAGNOSIS — R73.03 PREDIABETES: ICD-10-CM

## 2020-07-13 DIAGNOSIS — G47.9 SLEEP DIFFICULTIES: ICD-10-CM

## 2020-07-13 DIAGNOSIS — Z78.0 POST-MENOPAUSAL: ICD-10-CM

## 2020-07-13 DIAGNOSIS — Z12.4 CERVICAL CANCER SCREENING: ICD-10-CM

## 2020-07-13 DIAGNOSIS — M85.80 OSTEOPENIA, UNSPECIFIED LOCATION: ICD-10-CM

## 2020-07-13 DIAGNOSIS — I10 ESSENTIAL HYPERTENSION: ICD-10-CM

## 2020-07-13 DIAGNOSIS — Z01.419 WELL WOMAN EXAM WITH ROUTINE GYNECOLOGICAL EXAM: Primary | ICD-10-CM

## 2020-07-13 DIAGNOSIS — R35.0 URINARY FREQUENCY: ICD-10-CM

## 2020-07-13 DIAGNOSIS — K21.9 GASTROESOPHAGEAL REFLUX DISEASE, ESOPHAGITIS PRESENCE NOT SPECIFIED: ICD-10-CM

## 2020-07-13 LAB
BILIRUB UR QL STRIP: NEGATIVE
GLUCOSE UR-MCNC: NEGATIVE MG/DL
KETONES P FAST UR STRIP-MCNC: NEGATIVE MG/DL
PH UR STRIP: 6.5 [PH] (ref 4.6–8)
PROT UR QL STRIP: NEGATIVE
SP GR UR STRIP: 1010 (ref 1–1.03)
UA UROBILINOGEN AMB POC: ABNORMAL (ref 0.2–1)
URINALYSIS CLARITY POC: CLEAR
URINALYSIS COLOR POC: YELLOW
URINE BLOOD POC: NEGATIVE
URINE LEUKOCYTES POC: NEGATIVE
URINE NITRITES POC: NEGATIVE

## 2020-07-13 PROCEDURE — 88142 CYTOPATH C/V THIN LAYER: CPT

## 2020-07-13 PROCEDURE — 87624 HPV HI-RISK TYP POOLED RSLT: CPT

## 2020-07-13 NOTE — PROGRESS NOTES
Subjective:   77 y.o. female for Well Woman Check. No LMP recorded. Patient is postmenopausal.    Social History: single partner, contraception - post menopausal status. HTN-taking norvasc, micardis without problems. Labs reviewed c/ IFG    GERD- responding to PPI    Insomnia- responding to trazodone    Mentions urinary freq, no other symptoms past few months. She drinks few cups coffee/day. Patient Active Problem List    Diagnosis Date Noted    Achilles tendinitis of right lower extremity 02/14/2020    Sleep difficulties 11/13/2018    Osteopenia of necks of both femurs 11/13/2018    Essential hypertension 11/13/2018    Severe obesity (BMI 35.0-39.9) 06/26/2018    Pruritus ani 02/20/2018    Gastroesophageal reflux disease 07/18/2016    Left knee pain     Prediabetes 12/11/2014    HTN (hypertension) 09/21/2011     Current Outpatient Medications   Medication Sig Dispense Refill    amLODIPine (NORVASC) 5 mg tablet Take 1 Tab by mouth daily. 90 Tab 0    triamcinolone acetonide (KENALOG) 0.1 % ointment Apply  to affected area two (2) times a day. use thin layer 30 g 0    telmisartan (MICARDIS) 40 mg tablet Take 1 Tab by mouth daily. 90 Tab 1    diclofenac EC (VOLTAREN) 50 mg EC tablet as needed.  traZODone (DESYREL) 100 mg tablet Take 1 Tab by mouth nightly. (Patient taking differently: Take 100 mg by mouth as needed.) 90 Tab 0    cyclobenzaprine (FLEXERIL) 5 mg tablet TAKE ONE TABLET BY MOUTH NIGHTLY AS NEEDED FOR MUSCLE SPASM(S) 30 Tab 2    omeprazole (PRILOSEC) 20 mg capsule Take 1 Cap by mouth daily. (Patient taking differently: Take 20 mg by mouth as needed.) 90 Cap 3    omega-3 fatty acids-vitamin e (FISH OIL) 1,000 mg cap Take 1 Cap by mouth.  multivitamin (ONE A DAY) tablet Take 1 Tab by mouth daily.        Allergies   Allergen Reactions    Pcn [Penicillins] Hives     Past Medical History:   Diagnosis Date    Eosinophilic esophagitis     started on flvent, dr. Raza Zaragoza Gastric ulcer 04/2017    dr Kendrick Cary, avoid nsaids    Gastritis 12/13/2017    gastritis, cont PPI dr. Cari Elizabeth GERD (gastroesophageal reflux disease)     erosive esophagitis 11/13 EGD    H/O colonoscopy 11/2013    normal    Hypertension     Left knee pain     Morbid obesity ([address]     Precordial pain     Prediabetes      Past Surgical History:   Procedure Laterality Date    HX CATARACT REMOVAL Right 10/04/2016    HX COLONOSCOPY  11/06/2016    HX GYN      vaginal delivery x 2    HX ORTHOPAEDIC      foot surgery     Family History   Problem Relation Age of Onset    Heart Failure Mother     Heart Disease Mother         mi 52's   [facility] Other      Social History     Tobacco Use    Smoking status: Never Smoker    Smokeless tobacco: Never Used   Substance Use Topics    Alcohol use: No        ROS:  Feeling well. No dyspnea or chest pain on exertion. No abdominal pain, change in bowel habits, black or bloody stools. No urinary tract symptoms. GYN ROS: no breast pain or new or enlarging lumps on self exam. No neurological complaints. Objective:     Visit Vitals  /84 (BP 1 Location: Left arm, BP Patient Position: Sitting)   Pulse 78   Temp 98.2 °F (36.8 °C) (Oral)   Resp 16   Ht 5' 5\" (1.651 m)   Wt 175 lb (79.4 kg)   SpO2 98%   BMI 29.12 kg/m²     The patient appears well, alert, oriented x 3, in no distress. ENT normal.  Neck supple. No adenopathy or thyromegaly. EMMIE. Lungs are clear, good air entry, no wheezes, rhonchi or rales. S1 and S2 normal, no murmurs, regular rate and rhythm. Abdomen soft without tenderness, guarding, mass or organomegaly. Extremities show no edema, normal peripheral pulses. Neurological is normal, no focal findings.     BREAST EXAM: breasts appear normal, no suspicious masses, no skin or nipple changes or axillary nodes    PELVIC EXAM: normal external genitalia, vulva, vagina, cervix, uterus and adnexa    Assessment/Plan:   Diagnoses and all orders for this visit: 1. Well woman exam with routine gynecological exam  well woman  Mammogram- 6/2020  pap smear- done today  return annually or prn  reviewed diet, exercise and weight control. [facility] colonoscopy 7/10/2020-await results  glauco screen scheulded oct dr. Mayito Scott  Due for repeat dexa  pcv 13/23 completed  tdap 2013    2. Post-menopausal  -     DEXA BONE DENSITY STUDY AXIAL; Future    3. Osteopenia, unspecified location  -     DEXA BONE DENSITY STUDY AXIAL; Future    4. Prediabetes  Monitoring  -     HEMOGLOBIN A1C W/O EAG; Future  -     METABOLIC PANEL, BASIC; Future    5. Sleep difficulties  Controlled  Cont trazodone    6. Essential hypertension  Controlled  Cont norvasc, micardis    7. Gastroesophageal reflux disease, esophagitis presence not specified  Stable  Cont omeprazole    8. Urinary frequency  Neg UA  Cut down on caffeine  Monitoring    9. Cervical cancer screening  Pap smear done    Ff-up in 6 months or sooner prn    Patient/guardian understands plan of care. Patient has provided input and agrees with goals. Future labs to be discussed on next visit.

## 2020-07-13 NOTE — PROGRESS NOTES
1. Have you been to the ER, urgent care clinic since your last visit? Hospitalized since your last visit? No    2. Have you seen or consulted any other health care providers outside of the [address] [place] [address] since your last visit? Include any pap smears or colon screening.  No

## 2020-07-13 NOTE — PATIENT INSTRUCTIONS
DASH Diet: Care Instructions Your Care Instructions The DASH diet is an eating plan that can help lower your blood pressure. DASH stands for Dietary Approaches to Stop Hypertension. Hypertension is high blood pressure. The DASH diet focuses on eating foods that are high in calcium, potassium, and magnesium. These nutrients can lower blood pressure. The foods that are highest in these nutrients are fruits, vegetables, low-fat dairy products, nuts, seeds, and legumes. But taking calcium, potassium, and magnesium supplements instead of eating foods that are high in those nutrients does not have the same effect. The DASH diet also includes whole grains, fish, and poultry. The DASH diet is one of several lifestyle changes your doctor may recommend to lower your high blood pressure. Your doctor may also want you to decrease the amount of sodium in your diet. Lowering sodium while following the DASH diet can lower blood pressure even further than just the DASH diet alone. Follow-up care is a key part of your treatment and safety. Be sure to make and go to all appointments, and call your doctor if you are having problems. It's also a good idea to know your test results and keep a list of the medicines you take. How can you care for yourself at home? Following the DASH diet · Eat 4 to 5 servings of fruit each day. A serving is 1 medium-sized piece of fruit, ½ cup chopped or canned fruit, 1/4 cup dried fruit, or 4 ounces (½ cup) of fruit juice. Choose fruit more often than fruit juice. · Eat 4 to 5 servings of vegetables each day. A serving is 1 cup of lettuce or raw leafy vegetables, ½ cup of chopped or cooked vegetables, or 4 ounces (½ cup) of vegetable juice. Choose vegetables more often than vegetable juice. · Get 2 to 3 servings of low-fat and fat-free dairy each day. A serving is 8 ounces of milk, 1 cup of yogurt, or 1 ½ ounces of cheese. · Eat 6 to 8 servings of grains each day. A serving is 1 slice of bread, 1 ounce of dry cereal, or ½ cup of cooked rice, pasta, or cooked cereal. Try to choose whole-grain products as much as possible. · Limit lean meat, poultry, and fish to 2 servings each day. A serving is 3 ounces, about the size of a deck of cards. · Eat 4 to 5 servings of nuts, seeds, and legumes (cooked dried beans, lentils, and split peas) each week. A serving is 1/3 cup of nuts, 2 tablespoons of seeds, or ½ cup of cooked beans or peas. · Limit fats and oils to 2 to 3 servings each day. A serving is 1 teaspoon of vegetable oil or 2 tablespoons of salad dressing. · Limit sweets and added sugars to 5 servings or less a week. A serving is 1 tablespoon jelly or jam, ½ cup sorbet, or 1 cup of lemonade. · Eat less than 2,300 milligrams (mg) of sodium a day. If you limit your sodium to 1,500 mg a day, you can lower your blood pressure even more. Tips for success · Start small. Do not try to make dramatic changes to your diet all at once. You might feel that you are missing out on your favorite foods and then be more likely to not follow the plan. Make small changes, and stick with them. Once those changes become habit, add a few more changes. · Try some of the following: ? Make it a goal to eat a fruit or vegetable at every meal and at snacks. This will make it easy to get the recommended amount of fruits and vegetables each day. ? Try yogurt topped with fruit and nuts for a snack or healthy dessert. ? Add lettuce, tomato, cucumber, and onion to sandwiches. ? Combine a ready-made pizza crust with low-fat mozzarella cheese and lots of vegetable toppings. Try using tomatoes, squash, spinach, broccoli, carrots, cauliflower, and onions. ? Have a variety of cut-up vegetables with a low-fat dip as an appetizer instead of chips and dip. ? Sprinkle sunflower seeds or chopped almonds over salads.  Or try adding chopped walnuts or almonds to cooked vegetables. ? Try some vegetarian meals using beans and peas. Add garbanzo or kidney beans to salads. Make burritos and tacos with mashed valdez beans or black beans. Where can you learn more? Go to http://carlos eduardo-turner.info/ Enter L182 in the search box to learn more about \"DASH Diet: Care Instructions. \" Current as of: December 16, 2019               Content Version: 12.5 © 7103-1754 [employer]. Care instructions adapted under license by [employer] (which disclaims liability or warranty for this information). If you have questions about a medical condition or this instruction, always ask your healthcare professional. [address] any warranty or liability for your use of this information.

## 2020-07-20 ENCOUNTER — HOSPITAL ENCOUNTER (OUTPATIENT)
Dept: BONE DENSITY | Age: 65
Discharge: HOME | End: 2020-07-20
Attending: FAMILY MEDICINE
Payer: MEDICARE

## 2020-07-20 DIAGNOSIS — M85.80 OSTEOPENIA, UNSPECIFIED LOCATION: ICD-10-CM

## 2020-07-20 DIAGNOSIS — Z78.0 POST-MENOPAUSAL: ICD-10-CM

## 2020-07-20 PROCEDURE — 77080 DXA BONE DENSITY AXIAL: CPT

## 2020-07-20 NOTE — PROGRESS NOTES
DEXA scan consistent with osteopenia, lower than normal bone density. Advise on RDA  1200 mg dietary calcium (4 servings of calcium rich food/day milk/yogurt/cheese) or OTC supplement and Vit D 800 IU daily. and weight bearing exercise, plan to recheck in 2 years. pls notify pt.

## 2020-07-21 NOTE — PROGRESS NOTES
Contacted patient and verified identity using name and date of birth (2- identifiers)  Spoke with patient and she verbalized understanding of negative pap smear.

## 2020-07-21 NOTE — PROGRESS NOTES
Contacted patient and verified identity using name and date of birth (2- identifiers)  Spoke with patient and she verbalized understanding of osteopenia and need for RDA 1200 mg dietary calcium (4 servings of calcium rich food/day milk/yogurt/cheese) or OTC supplement and Vit D 800 IU daily. Weight bearing exercise, plan to recheck in 2 years.

## 2020-07-29 ENCOUNTER — OFFICE VISIT (OUTPATIENT)
Dept: ORTHOPEDIC SURGERY | Age: 65
End: 2020-07-29

## 2020-07-29 VITALS
RESPIRATION RATE: 16 BRPM | WEIGHT: 176.2 LBS | DIASTOLIC BLOOD PRESSURE: 66 MMHG | OXYGEN SATURATION: 99 % | SYSTOLIC BLOOD PRESSURE: 137 MMHG | HEIGHT: 65 IN | HEART RATE: 64 BPM | TEMPERATURE: 99.6 F | BODY MASS INDEX: 29.36 KG/M2

## 2020-07-29 DIAGNOSIS — M76.61 ACHILLES TENDINITIS OF RIGHT LOWER EXTREMITY: Primary | ICD-10-CM

## 2020-07-29 NOTE — PROGRESS NOTES
AMBULATORY PROGRESS NOTE      Patient: Alba Garcia             MRN: [id number]     SSN: [id number] Body mass index is 29.32 kg/m². YOB: 1955     AGE: 77 y.o. EX: female    PCP: Tere Shelby MD       IMPRESSION //  DIAGNOSIS AND TREATMENT PLAN      DIAGNOSES  1. Achilles tendinitis of right lower extremity        No orders of the defined types were placed in this encounter. As such he is doing better, as relates to her Achilles tendon on the right side she had surgery. She still on walking longer function better just has a mild swelling but she is much improved compared to preop. Her right great toe was examined as well she had a nail plate that fallen off sounds like it got pulled off in her bed there is no bleeding she show me the nail plate the nail plate on her great toes growing inward it is about 5 to 6 mm growing inward has been growing inward along in my opinion. The fact that the nail plate pulled off without any bleeding, means that it had been  from the nail matrix. I suspect she may have a small component of fungal infection,. We need to watch this closely. I did explain to her that however should there be a fungal infection in the nail plate, that regrows then, options would be oral agents, or topical agents. But we still need to follow her liver function test prior to any placement of prior to placing her on any antifungal agent  //she understands this. PLAN:    1. Continue to monitor great toenail plate  2. Patient wished to continue her current treatment. 3. Continue gentle stretching    RTO- 2 months      HPI //  Benny Sejal Bai IS A 77 y.o. female who presents to my outpatient office for follow-up evaluation of: Achilles tendinitis of right lower extremity. The patient was last seen virtually by SAMARA Jennings, on 04/14/2020.  The patient was instructed to continue WB in supportive shoe using heel cup, continue ROM exercises, and use Tubigrip or compression hose for swelling. Date of Surgery: 02/14/2020    Since the last OV, Shayne Christopher reports that the surgery for her Achilles tendon has made a difference in her pain. She still occasionally experiences soreness and swelling, but overall has improved pain. Regarding her right great toe, she notes that last week her great toenail plate fell off as she was getting into bed. She reports no bruising or bleeding as a result. She has no pain associated with this. Visit Vitals  /66 (BP 1 Location: Left arm, BP Patient Position: Sitting)   Pulse 64   Temp 99.6 °F (37.6 °C) (Oral)   Resp 16   Ht 5' 5\" (1.651 m)   Wt 176 lb 3.2 oz (79.9 kg)   SpO2 99%   BMI 29.32 kg/m²       ANKLE/FOOT right    Psychiatry: Alert, oriented x 3 (name,place,time of day); speech normal in context and clarity, memory intact grossly, no involuntary movements - tremors, no dementia  Gait: normal  Tenderness: mild to deep palpation of Achilles none to the great toe right great toe:  Cutaneous: WNL  Joint Motion: WNL  Joint / Tendon Stability: No Ankle or Subtalar instability or joint laxity. No peroneal sublux ability or dislocation  Alignment: neutral Hindfoot, none Metatarsus Adductus Metatarsus. Neuro Motor/Sensory: NL/NL,  Vascular: NL foot/ankle pulses,   Lymphatics: No extremity lymphedema, No calf swelling, no tenderness to calf muscles. The right great toenail plate is regrowing inward is about 6 mL of right growing inward proximally there is no redness or edema no drainage no signs infection the toes nice and dry. The small area of residual nail plate her skin that is about a centimeter long is sticking upward, is actually the skin at the nail fold I encouraged her this to clip this off when she gets home. But there is no signs of any bacterial infection.           CHART REVIEW     Patient Active Problem List   Diagnosis Code    HTN (hypertension) I10    Prediabetes R73.03    Left knee pain M25.562    Gastroesophageal reflux disease K21.9    Pruritus ani L29.0    Severe obesity (BMI 35.0-39. 9) E66.01    Sleep difficulties G47.9    Osteopenia of necks of both femurs M85.851, M85.852    Essential hypertension I10    Achilles tendinitis of right lower extremity M76.61        Opal Bradford has been experiencing pain and discomfort confirmed as outlined in the pain assessment outlined below. Pain Assessment  7/29/2020   Location of Pain Foot   Location Modifiers Right   Severity of Pain 0   Quality of Pain (No Data)   Quality of Pain Comment tightness and soreness   Duration of Pain -   Frequency of Pain -   Aggravating Factors -   Aggravating Factors Comment -   Limiting Behavior -   Relieving Factors -   Relieving Factors Comment -   Result of Injury -        Opal Bradford  has a past medical history of Eosinophilic esophagitis, Gastric ulcer (04/2017), Gastritis (12/13/2017), GERD (gastroesophageal reflux disease), H/O colonoscopy (11/2013), Hypertension, Left knee pain, Morbid obesity ([address] Precordial pain, and Prediabetes. Patients is employed at:         Past Medical History:   Diagnosis Date    Eosinophilic esophagitis     started on flvent, dr. Pily Phillips Gastric ulcer 04/2017    dr Siddiqui Adjutant, avoid nsaids    Gastritis 12/13/2017    gastritis, cont PPI dr. Pily Phillips GERD (gastroesophageal reflux disease)     erosive esophagitis 11/13 EGD    H/O colonoscopy 11/2013    normal    Hypertension     Left knee pain     Morbid obesity ([address]     Precordial pain     Prediabetes      Past Surgical History:   Procedure Laterality Date    HX CATARACT REMOVAL Right 10/04/2016    HX COLONOSCOPY  11/06/2016    HX GYN      vaginal delivery x 2    HX ORTHOPAEDIC      foot surgery     Current Outpatient Medications   Medication Sig    amLODIPine (NORVASC) 5 mg tablet Take 1 Tab by mouth daily.     triamcinolone acetonide (KENALOG) 0.1 % ointment Apply  to affected area two (2) times a day. use thin layer    telmisartan (MICARDIS) 40 mg tablet Take 1 Tab by mouth daily.  diclofenac EC (VOLTAREN) 50 mg EC tablet as needed.  traZODone (DESYREL) 100 mg tablet Take 1 Tab by mouth nightly. (Patient taking differently: Take 100 mg by mouth as needed.)    cyclobenzaprine (FLEXERIL) 5 mg tablet TAKE ONE TABLET BY MOUTH NIGHTLY AS NEEDED FOR MUSCLE SPASM(S)    omeprazole (PRILOSEC) 20 mg capsule Take 1 Cap by mouth daily. (Patient taking differently: Take 20 mg by mouth as needed.)    omega-3 fatty acids-vitamin e (FISH OIL) 1,000 mg cap Take 1 Cap by mouth.  multivitamin (ONE A DAY) tablet Take 1 Tab by mouth daily. No current facility-administered medications for this visit. Allergies   Allergen Reactions    Pcn [Penicillins] Hives     Social History     Occupational History    Not on file   Tobacco Use    Smoking status: Never Smoker    Smokeless tobacco: Never Used   Substance and Sexual Activity    Alcohol use: No    Drug use: No    Sexual activity: Not on file     Family History   Problem Relation Age of Onset    Heart Failure Mother     Heart Disease Mother         mi 52's   [facility] Other        THE  FOR Eufemia Guillermo MD 7/29/2020 .       DIAGNOSTIC IMAGING  LAB DATA      Lab Results   Component Value Date/Time    Hemoglobin A1c 5.6 06/26/2020 10:15 AM    Hemoglobin A1c 6.1 (H) 11/08/2018 11:35 AM    Hemoglobin A1c 6.7 (H) 08/06/2018 08:35 AM    //   Lab Results   Component Value Date/Time    Glucose 103 (H) 06/26/2020 10:15 AM    Glucose (POC) 96 02/14/2020 08:45 AM        No results found for: AXJ1NLXD, MBF9NLFR      No results found for: VITD3, XQVID2, XQVID3, XQVID, VD3RIA, [id number]      REVIEW OF SYSTEMS : 7/29/2020  ALL BELOW ARE Negative except : SEE HPI     CONSTITUTIONAL: No weight loss  PSYCHOLOGICAL : No Feelings of anxiety, depression, agitation  EYES: No blurred vision and no eye discharge. NO eye pain, double vision  ENT: No nasal discharge. No ear pain. CARDIOVASCULAR: No chest pain and no diaphoresis. RESPIRATORY: No cough, no hemoptysis. GI: No vomiting, no diarrhea   : No urinary frequency and no dysuria. MUSCULOSKELETAL: see HPI  SKIN: No rashes. NEURO:  No dizziness,weakness, headaches// No visual changes or confusion, or seizures,   ENDOCRINE: No polyphagia and no polydipsia. HEMATOLOGY: No bleeding tendencies. DIAGNOSTIC IMAGING      Please see above section of this report. I have personally reviewed the results of the above study. The interpretation of this study is my professional opinion.       Aleksey Gabriel MD  7/29/2020  1:02 PM

## 2020-08-12 ENCOUNTER — VIRTUAL VISIT (OUTPATIENT)
Dept: FAMILY MEDICINE CLINIC | Age: 65
End: 2020-08-12

## 2020-08-12 DIAGNOSIS — R21 RASH: Primary | ICD-10-CM

## 2020-08-12 RX ORDER — PREDNISONE 20 MG/1
20 TABLET ORAL
Qty: 7 TAB | Refills: 0 | Status: SHIPPED | OUTPATIENT
Start: 2020-08-12 | End: 2021-01-14 | Stop reason: ALTCHOICE

## 2020-08-13 NOTE — PROGRESS NOTES
Kitty Sosa is a 77 y.o. female who was seen by synchronous (real-time) audio-video technology on 8/12/2020 for No chief complaint on file. Assessment & Plan:   Diagnoses and all orders for this visit:    Rash: Multiple scattered erythematous bump looks like acne or allergic dermatitis over left lower leg, left upper arm. No open skin or any discharge. She has taken antibiotic for a bee sting on with 3-1/2 weeks ago [facility]  Currently itchy spots. Taken hydrocortisone cream over-the-counter but did not help. Also has a wart over-the-counter after  sun treatment but not helping at all. For now we will give oral prednisone and if no improvement advised patient to make a follow-up appointment or call office. If any worsening of symptoms go to urgent care or ER.  -     predniSONE (DELTASONE) 20 mg tablet; Take 20 mg by mouth daily (with breakfast). , Normal, Disp-7 Tab,R-0    Patient understood and agree with above plan  712  Subjective:   Done visit with audio[employer] technology  Done visit through doxy  Concerned with the rash over left upper and lower extremity. She had been treated with antibiotic for bee sting 3 and half weeks ago [facility].  Still has a persistent erythematous itchy spots over the left upper and lower extremity. Some limitation in visualization on a virtual visit. No pain. No open skin or any discharge. No new detergent, soap or lotion. No fever cold cough or wheezing. No nausea vomiting or abdominal pain. No appetite or weight changes. Sleep is fair. Taken over-the-counter hydrocortisone cream and over-the-counter after sun treatment. No help. During the virtual visit did not appear in any acute distress    Prior to Admission medications    Medication Sig Start Date End Date Taking? Authorizing Provider   predniSONE (DELTASONE) 20 mg tablet Take 20 mg by mouth daily (with breakfast).  8/12/20  Yes Emmanuel Lopez MD   amLODIPine (NORVASC) 5 mg tablet Take 1 Tab by mouth daily. 6/9/20   Palma Santana MD   triamcinolone acetonide (KENALOG) 0.1 % ointment Apply  to affected area two (2) times a day. use thin layer 2/6/20   Palma Santana MD   telmisartan (MICARDIS) 40 mg tablet Take 1 Tab by mouth daily. 2/4/20   Palma Santana MD   diclofenac EC (VOLTAREN) 50 mg EC tablet as needed. 11/30/19   Provider, Historical   traZODone (DESYREL) 100 mg tablet Take 1 Tab by mouth nightly. Patient taking differently: Take 100 mg by mouth as needed. 11/15/19   Palma Santana MD   cyclobenzaprine (FLEXERIL) 5 mg tablet TAKE ONE TABLET BY MOUTH NIGHTLY AS NEEDED FOR MUSCLE SPASM(S) 12/11/18   Marco PETERSON NP   omeprazole (PRILOSEC) 20 mg capsule Take 1 Cap by mouth daily. Patient taking differently: Take 20 mg by mouth as needed. 11/13/18   Palma Santana MD   omega-3 fatty acids-vitamin e (FISH OIL) 1,000 mg cap Take 1 Cap by mouth. Provider, Historical   multivitamin (ONE A DAY) tablet Take 1 Tab by mouth daily. Provider, Historical         ROS: See HPI    Objective:   No flowsheet data found. General: alert, cooperative, no distress   Mental  status: normal mood, behavior, speech, dress, motor activity, and thought processes, able to follow commands   HENT: NCAT   Neck: no visualized mass   Resp: no respiratory distress   Neuro: no gross deficits   Skin:  Scattered erythematous bumps over the left upper and lower extremity. Itchy. Nontender and there are no open skin or discharge. Noted on virtual visit. And explained the patient   Psychiatric: normal affect, consistent with stated mood, no evidence of hallucinations     Additional exam findings: We discussed the expected course, resolution and complications of the diagnosis(es) in detail. Medication risks, benefits, costs, interactions, and alternatives were discussed as indicated.   I advised her to contact the office if her condition worsens, changes or fails to improve as anticipated. She expressed understanding with the diagnosis(es) and plan. Saad Ding, who was evaluated through a patient-initiated, synchronous (real-time) audio-video encounter, and/or her healthcare decision maker, is aware that it is a billable service, with coverage as determined by her insurance carrier. She provided verbal consent to proceed: Yes, and patient identification was verified. It was conducted pursuant to the emergency declaration under the [address] [place] [address] and the [employer] and Before [employer] Act. A caregiver was present when appropriate. Ability to conduct physical exam was limited. I was in the office. The patient was at home.       Lilly Carrero MD

## 2020-08-13 NOTE — TELEPHONE ENCOUNTER
This patient contacted office for the following prescriptions to be filled:    Last office visit: 8/12/2020  Follow up appointment: 1/14/2021  Medication requested :   Requested Prescriptions     Pending Prescriptions Disp Refills    telmisartan (MICARDIS) 40 mg tablet 90 Tab 1     Sig: Take 1 Tab by mouth daily.      PCP: tom  Mail order or Local pharmacy name  [facility] [phone]

## 2020-08-14 RX ORDER — TELMISARTAN 40 MG/1
40 TABLET ORAL DAILY
Qty: 90 TAB | Refills: 1 | Status: SHIPPED | OUTPATIENT
Start: 2020-08-14 | End: 2021-01-14

## 2020-10-13 ENCOUNTER — OFFICE VISIT (OUTPATIENT)
Dept: ORTHOPEDIC SURGERY | Age: 65
End: 2020-10-13
Payer: MEDICARE

## 2020-10-13 VITALS
SYSTOLIC BLOOD PRESSURE: 111 MMHG | BODY MASS INDEX: 29.49 KG/M2 | RESPIRATION RATE: 16 BRPM | OXYGEN SATURATION: 96 % | HEIGHT: 65 IN | WEIGHT: 177 LBS | TEMPERATURE: 97 F | HEART RATE: 84 BPM | DIASTOLIC BLOOD PRESSURE: 63 MMHG

## 2020-10-13 DIAGNOSIS — M76.61 ACHILLES TENDINITIS OF RIGHT LOWER EXTREMITY: Primary | ICD-10-CM

## 2020-10-13 PROCEDURE — G8752 SYS BP LESS 140: HCPCS | Performed by: ORTHOPAEDIC SURGERY

## 2020-10-13 PROCEDURE — G8427 DOCREV CUR MEDS BY ELIG CLIN: HCPCS | Performed by: ORTHOPAEDIC SURGERY

## 2020-10-13 PROCEDURE — G8417 CALC BMI ABV UP PARAM F/U: HCPCS | Performed by: ORTHOPAEDIC SURGERY

## 2020-10-13 PROCEDURE — G8399 PT W/DXA RESULTS DOCUMENT: HCPCS | Performed by: ORTHOPAEDIC SURGERY

## 2020-10-13 PROCEDURE — G8432 DEP SCR NOT DOC, RNG: HCPCS | Performed by: ORTHOPAEDIC SURGERY

## 2020-10-13 PROCEDURE — 1090F PRES/ABSN URINE INCON ASSESS: CPT | Performed by: ORTHOPAEDIC SURGERY

## 2020-10-13 PROCEDURE — G8754 DIAS BP LESS 90: HCPCS | Performed by: ORTHOPAEDIC SURGERY

## 2020-10-13 PROCEDURE — 1101F PT FALLS ASSESS-DOCD LE1/YR: CPT | Performed by: ORTHOPAEDIC SURGERY

## 2020-10-13 PROCEDURE — 99214 OFFICE O/P EST MOD 30 MIN: CPT | Performed by: ORTHOPAEDIC SURGERY

## 2020-10-13 PROCEDURE — G9899 SCRN MAM PERF RSLTS DOC: HCPCS | Performed by: ORTHOPAEDIC SURGERY

## 2020-10-13 PROCEDURE — G8536 NO DOC ELDER MAL SCRN: HCPCS | Performed by: ORTHOPAEDIC SURGERY

## 2020-10-13 PROCEDURE — 3017F COLORECTAL CA SCREEN DOC REV: CPT | Performed by: ORTHOPAEDIC SURGERY

## 2020-10-13 NOTE — PROGRESS NOTES
AMBULATORY PROGRESS NOTE      Patient: Ashly Sandhu             MRN: [id number]     SSN: [id number] Body mass index is 29.45 kg/m². YOB: 1955     AGE: 77 y.o. EX: female    PCP: Niharika Morfin MD       IMPRESSION //  DIAGNOSIS AND TREATMENT PLAN      DIAGNOSES  1. Achilles tendinitis of right lower extremity        No orders of the defined types were placed in this encounter. Is a history of Achilles tendon reconstruction, on this right side, in February 2020. She is doing quite well, up until about 2 to 3 weeks ago when she developed some right hindfoot pain. No pop no fever shakes chills night sweats, just worsening discomfort on the hindfoot. She describes wearing a platform style: Shoe which has helped her quite a bit. My plans listed as below. I see no signs of re rupture or signs of rupture. She has a excellent ankle foot progression angle tone, 45 degrees. Negative Gage sign negative void  //no depression to the Achilles. PLAN:    1. Instructed patient to go back into the cam walker boot with visco heel insert and continue taking OTC Ibuprofen as needed for pain. RTO- 3 weeks      HPI //  OBJECTIVE EXAMINATION      Ashly Sandhu IS A 77 y.o. female who presents to my outpatient office for follow-up evaluation of: Achilles tendinitis of right lower extremity and great toenail plate pain. At the last OV, I instructed the patient to continue to monitor great toenail plate and continue with gentle stretching for the Achilles. Since the last OV, Ashly Sandhu reports a recurrence of pain along her Achilles tendon when she started wearing her tennis shoes and walking. This was about two weeks when she started feeling this pain again. Prolonged walking worsens the pain. She has been wearing Heber shoes with a thick wedge which she uses in her home as bedroom shoes to walk up and down the stairs.  She states that the pain is getting to point as before the surgery. Sharp Excisional Debridement And Repair Right Achilles Tendon/augmentation/arthrex/nerve Block - Right   2/14/2020       Visit Vitals  /63 (BP 1 Location: Left arm, BP Patient Position: Sitting)   Pulse 84   Temp 97 °F (36.1 °C)   Resp 16   Ht 5' 5\" (1.651 m)   Wt 177 lb (80.3 kg)   SpO2 96%   BMI 29.45 kg/m²       ANKLE/FOOT right    Psychiatry: Alert, oriented x 3 (name,place,time of day); speech normal in context and clarity, memory intact grossly, no involuntary movements - tremors, no dementia  Gait: normal  Tenderness: very tender to deep palpation of Achilles none to the great toe right great toe:  Cutaneous: WNL  Joint Motion: WNL  Joint / Tendon Stability: No Ankle or Subtalar instability or joint laxity. No peroneal sublux ability or dislocation  Alignment: neutral Hindfoot, none Metatarsus Adductus Metatarsus. Neuro Motor/Sensory: NL/NL,  Vascular: NL foot/ankle pulses,   Lymphatics: No extremity lymphedema, No calf swelling, no tenderness to calf muscles. CHART REVIEW     Patient Active Problem List   Diagnosis Code    HTN (hypertension) I10    Prediabetes R73.03    Left knee pain M25.562    Gastroesophageal reflux disease K21.9    Pruritus ani L29.0    Severe obesity (BMI 35.0-39. 9) E66.01    Sleep difficulties G47.9    Osteopenia of necks of both femurs M85.851, M85.852    Essential hypertension I10    Achilles tendinitis of right lower extremity M76.61        Isaías Ramirez has been experiencing pain and discomfort confirmed as outlined in the pain assessment outlined below. Pain Assessment  10/13/2020   Location of Pain Ankle   Location Modifiers Right   Severity of Pain 3   Quality of Pain Throbbing; Other (Comment)   Quality of Pain Comment tingling and sore.    Duration of Pain Persistent   Frequency of Pain Constant   Aggravating Factors -   Aggravating Factors Comment -   Limiting Behavior -   Relieving Factors -   Relieving Factors Comment -   Result of Injury -        Leandra Newton  has a past medical history of Eosinophilic esophagitis, Gastric ulcer (04/2017), Gastritis (12/13/2017), GERD (gastroesophageal reflux disease), H/O colonoscopy (11/2013), Hypertension, Left knee pain, Morbid obesity ([address] Precordial pain, and Prediabetes. Patients is employed at:         Past Medical History:   Diagnosis Date    Eosinophilic esophagitis     started on flvent, dr. Levi Jenkins Gastric ulcer 04/2017    dr Munira Alvarez, avoid nsaids    Gastritis 12/13/2017    gastritis, cont PPI dr. Levi Jenkins GERD (gastroesophageal reflux disease)     erosive esophagitis 11/13 EGD    H/O colonoscopy 11/2013    normal    Hypertension     Left knee pain     Morbid obesity ([address]     Precordial pain     Prediabetes      Past Surgical History:   Procedure Laterality Date    HX CATARACT REMOVAL Right 10/04/2016    HX COLONOSCOPY  11/06/2016    HX GYN      vaginal delivery x 2    HX ORTHOPAEDIC      foot surgery     Current Outpatient Medications   Medication Sig    amLODIPine (NORVASC) 5 mg tablet Take 1 Tab by mouth daily.  telmisartan (MICARDIS) 40 mg tablet Take 1 Tab by mouth daily.  predniSONE (DELTASONE) 20 mg tablet Take 20 mg by mouth daily (with breakfast).  triamcinolone acetonide (KENALOG) 0.1 % ointment Apply  to affected area two (2) times a day. use thin layer    diclofenac EC (VOLTAREN) 50 mg EC tablet as needed.  traZODone (DESYREL) 100 mg tablet Take 1 Tab by mouth nightly. (Patient taking differently: Take 100 mg by mouth as needed.)    cyclobenzaprine (FLEXERIL) 5 mg tablet TAKE ONE TABLET BY MOUTH NIGHTLY AS NEEDED FOR MUSCLE SPASM(S)    omeprazole (PRILOSEC) 20 mg capsule Take 1 Cap by mouth daily. (Patient taking differently: Take 20 mg by mouth as needed.)    omega-3 fatty acids-vitamin e (FISH OIL) 1,000 mg cap Take 1 Cap by mouth.  multivitamin (ONE A DAY) tablet Take 1 Tab by mouth daily.      No current facility-administered medications for this visit. Allergies   Allergen Reactions    Pcn [Penicillins] Hives     Social History     Occupational History    Not on file   Tobacco Use    Smoking status: Never Smoker    Smokeless tobacco: Never Used   Substance and Sexual Activity    Alcohol use: No    Drug use: No    Sexual activity: Not on file     Family History   Problem Relation Age of Onset    Heart Failure Mother     Heart Disease Mother         mi 52's   [facility] Asthma Other        THE  FOR Tita Hong MD 10/13/2020 . DIAGNOSTIC IMAGING  LAB DATA      Lab Results   Component Value Date/Time    Hemoglobin A1c 5.6 06/26/2020 10:15 AM    Hemoglobin A1c 6.1 (H) 11/08/2018 11:35 AM    Hemoglobin A1c 6.7 (H) 08/06/2018 08:35 AM    //   Lab Results   Component Value Date/Time    Glucose 103 (H) 06/26/2020 10:15 AM    Glucose (POC) 96 02/14/2020 08:45 AM        No results found for: EUC1TCTG, KOZ7APSD      No results found for: VITD3, XQVID2, XQVID3, XQVID, VD3RIA, [id number]      REVIEW OF SYSTEMS : 10/13/2020  ALL BELOW ARE Negative except : SEE HPI     CONSTITUTIONAL: No weight loss  PSYCHOLOGICAL : No Feelings of anxiety, depression, agitation  EYES: No blurred vision and no eye discharge. NO eye pain, double vision  ENT: No nasal discharge. No ear pain. CARDIOVASCULAR: No chest pain and no diaphoresis. RESPIRATORY: No cough, no hemoptysis. GI: No vomiting, no diarrhea   : No urinary frequency and no dysuria. MUSCULOSKELETAL: see HPI  SKIN: No rashes. NEURO:  No dizziness,weakness, headaches// No visual changes or confusion, or seizures,   ENDOCRINE: No polyphagia and no polydipsia. HEMATOLOGY: No bleeding tendencies. DIAGNOSTIC IMAGING      Please see above section of this report. I have personally reviewed the results of the above study. The interpretation of this study is my professional opinion.       Hesham Valencia, MD  10/13/2020  1:02 PM

## 2020-10-21 ENCOUNTER — TELEPHONE (OUTPATIENT)
Dept: FAMILY MEDICINE CLINIC | Age: 65
End: 2020-10-21

## 2020-10-21 RX ORDER — AMITRIPTYLINE HYDROCHLORIDE 10 MG/1
10 TABLET, FILM COATED ORAL
Qty: 90 TAB | Refills: 0 | Status: CANCELLED | OUTPATIENT
Start: 2020-10-21

## 2020-10-21 NOTE — TELEPHONE ENCOUNTER
Pt called and stated she would like amitriptyline 10 mg tab to be called into her [facility] [place], [place]  [phone]

## 2020-10-21 NOTE — TELEPHONE ENCOUNTER
Patient last prescribed Amitriptyline 10/14/2019. Trial dose. Per Dr. Alexa Schultz note. Did not work.    Patient will need appointment to discuss

## 2020-10-27 NOTE — TELEPHONE ENCOUNTER
Spoke with patient and she is aware that she will need to schedule an appt to discuss medication. Patient declined to schedule right now. Patient states she will call back to schedule.

## 2020-11-04 ENCOUNTER — TELEPHONE (OUTPATIENT)
Dept: FAMILY MEDICINE CLINIC | Age: 65
End: 2020-11-04

## 2020-11-04 NOTE — TELEPHONE ENCOUNTER
Pt would like to go back on the amitriptyline (ELAVIL) 10 mg tablet    She states that this one works good for her. She states that the other Trazodone is the one that she didn't like because it made her feel like she was getting a acold. Please advise.

## 2020-11-05 RX ORDER — AMITRIPTYLINE HYDROCHLORIDE 25 MG/1
25 TABLET, FILM COATED ORAL
Qty: 90 TAB | Refills: 0 | Status: SHIPPED | OUTPATIENT
Start: 2020-11-05 | End: 2021-01-14 | Stop reason: SINTOL

## 2021-01-06 ENCOUNTER — HOSPITAL ENCOUNTER (OUTPATIENT)
Dept: LAB | Age: 66
Discharge: HOME | End: 2021-01-06
Payer: MEDICARE

## 2021-01-06 DIAGNOSIS — R73.03 PREDIABETES: ICD-10-CM

## 2021-01-06 LAB
ANION GAP SERPL CALC-SCNC: 7 MMOL/L (ref 3–18)
BUN SERPL-MCNC: 15 MG/DL (ref 7–18)
BUN/CREAT SERPL: 20 (ref 12–20)
CALCIUM SERPL-MCNC: 9.5 MG/DL (ref 8.5–10.1)
CHLORIDE SERPL-SCNC: 112 MMOL/L (ref 100–111)
CO2 SERPL-SCNC: 28 MMOL/L (ref 21–32)
CREAT SERPL-MCNC: 0.76 MG/DL (ref 0.6–1.3)
GLUCOSE SERPL-MCNC: 100 MG/DL (ref 74–99)
HBA1C MFR BLD: 5.6 % (ref 4.2–5.6)
POTASSIUM SERPL-SCNC: 4.4 MMOL/L (ref 3.5–5.5)
SODIUM SERPL-SCNC: 147 MMOL/L (ref 136–145)

## 2021-01-06 PROCEDURE — 80048 BASIC METABOLIC PNL TOTAL CA: CPT

## 2021-01-06 PROCEDURE — 83036 HEMOGLOBIN GLYCOSYLATED A1C: CPT

## 2021-01-06 PROCEDURE — 36415 COLL VENOUS BLD VENIPUNCTURE: CPT

## 2021-01-14 ENCOUNTER — TELEPHONE (OUTPATIENT)
Dept: FAMILY MEDICINE CLINIC | Age: 66
End: 2021-01-14

## 2021-01-14 ENCOUNTER — OFFICE VISIT (OUTPATIENT)
Dept: FAMILY MEDICINE CLINIC | Age: 66
End: 2021-01-14
Payer: MEDICARE

## 2021-01-14 VITALS
WEIGHT: 177.8 LBS | TEMPERATURE: 98 F | OXYGEN SATURATION: 96 % | RESPIRATION RATE: 16 BRPM | BODY MASS INDEX: 29.59 KG/M2 | SYSTOLIC BLOOD PRESSURE: 106 MMHG | DIASTOLIC BLOOD PRESSURE: 80 MMHG | HEART RATE: 63 BPM

## 2021-01-14 DIAGNOSIS — G47.9 SLEEP DIFFICULTIES: ICD-10-CM

## 2021-01-14 DIAGNOSIS — R73.03 PREDIABETES: ICD-10-CM

## 2021-01-14 DIAGNOSIS — K21.9 GASTROESOPHAGEAL REFLUX DISEASE, UNSPECIFIED WHETHER ESOPHAGITIS PRESENT: ICD-10-CM

## 2021-01-14 DIAGNOSIS — M85.851 OSTEOPENIA OF NECKS OF BOTH FEMURS: ICD-10-CM

## 2021-01-14 DIAGNOSIS — M85.852 OSTEOPENIA OF NECKS OF BOTH FEMURS: ICD-10-CM

## 2021-01-14 DIAGNOSIS — I10 ESSENTIAL HYPERTENSION: Primary | ICD-10-CM

## 2021-01-14 PROBLEM — E66.01 SEVERE OBESITY (BMI 35.0-39.9): Status: RESOLVED | Noted: 2018-06-26 | Resolved: 2021-01-14

## 2021-01-14 PROCEDURE — G9899 SCRN MAM PERF RSLTS DOC: HCPCS | Performed by: FAMILY MEDICINE

## 2021-01-14 PROCEDURE — G8752 SYS BP LESS 140: HCPCS | Performed by: FAMILY MEDICINE

## 2021-01-14 PROCEDURE — G8417 CALC BMI ABV UP PARAM F/U: HCPCS | Performed by: FAMILY MEDICINE

## 2021-01-14 PROCEDURE — G8754 DIAS BP LESS 90: HCPCS | Performed by: FAMILY MEDICINE

## 2021-01-14 PROCEDURE — G8399 PT W/DXA RESULTS DOCUMENT: HCPCS | Performed by: FAMILY MEDICINE

## 2021-01-14 PROCEDURE — 1101F PT FALLS ASSESS-DOCD LE1/YR: CPT | Performed by: FAMILY MEDICINE

## 2021-01-14 PROCEDURE — 99214 OFFICE O/P EST MOD 30 MIN: CPT | Performed by: FAMILY MEDICINE

## 2021-01-14 PROCEDURE — 3017F COLORECTAL CA SCREEN DOC REV: CPT | Performed by: FAMILY MEDICINE

## 2021-01-14 PROCEDURE — G8536 NO DOC ELDER MAL SCRN: HCPCS | Performed by: FAMILY MEDICINE

## 2021-01-14 PROCEDURE — 1090F PRES/ABSN URINE INCON ASSESS: CPT | Performed by: FAMILY MEDICINE

## 2021-01-14 PROCEDURE — G8427 DOCREV CUR MEDS BY ELIG CLIN: HCPCS | Performed by: FAMILY MEDICINE

## 2021-01-14 PROCEDURE — G0463 HOSPITAL OUTPT CLINIC VISIT: HCPCS | Performed by: FAMILY MEDICINE

## 2021-01-14 PROCEDURE — G8510 SCR DEP NEG, NO PLAN REQD: HCPCS | Performed by: FAMILY MEDICINE

## 2021-01-14 RX ORDER — AMITRIPTYLINE HYDROCHLORIDE 50 MG/1
50 TABLET, FILM COATED ORAL
Qty: 90 TAB | Refills: 0 | Status: SHIPPED | OUTPATIENT
Start: 2021-01-14 | End: 2021-12-15 | Stop reason: SDUPTHER

## 2021-01-14 NOTE — PATIENT INSTRUCTIONS
STOP MICARDIS 
CHECK BP DAILY AND MONITOR 
 
DASH Diet: Care Instructions Your Care Instructions The DASH diet is an eating plan that can help lower your blood pressure. DASH stands for Dietary Approaches to Stop Hypertension. Hypertension is high blood pressure. The DASH diet focuses on eating foods that are high in calcium, potassium, and magnesium. These nutrients can lower blood pressure. The foods that are highest in these nutrients are fruits, vegetables, low-fat dairy products, nuts, seeds, and legumes. But taking calcium, potassium, and magnesium supplements instead of eating foods that are high in those nutrients does not have the same effect. The DASH diet also includes whole grains, fish, and poultry. The DASH diet is one of several lifestyle changes your doctor may recommend to lower your high blood pressure. Your doctor may also want you to decrease the amount of sodium in your diet. Lowering sodium while following the DASH diet can lower blood pressure even further than just the DASH diet alone. Follow-up care is a key part of your treatment and safety. Be sure to make and go to all appointments, and call your doctor if you are having problems. It's also a good idea to know your test results and keep a list of the medicines you take. How can you care for yourself at home? Following the DASH diet · Eat 4 to 5 servings of fruit each day. A serving is 1 medium-sized piece of fruit, ½ cup chopped or canned fruit, 1/4 cup dried fruit, or 4 ounces (½ cup) of fruit juice. Choose fruit more often than fruit juice. · Eat 4 to 5 servings of vegetables each day. A serving is 1 cup of lettuce or raw leafy vegetables, ½ cup of chopped or cooked vegetables, or 4 ounces (½ cup) of vegetable juice. Choose vegetables more often than vegetable juice. · Get 2 to 3 servings of low-fat and fat-free dairy each day. A serving is 8 ounces of milk, 1 cup of yogurt, or 1 ½ ounces of cheese. · Eat 6 to 8 servings of grains each day. A serving is 1 slice of bread, 1 ounce of dry cereal, or ½ cup of cooked rice, pasta, or cooked cereal. Try to choose whole-grain products as much as possible. · Limit lean meat, poultry, and fish to 2 servings each day. A serving is 3 ounces, about the size of a deck of cards. · Eat 4 to 5 servings of nuts, seeds, and legumes (cooked dried beans, lentils, and split peas) each week. A serving is 1/3 cup of nuts, 2 tablespoons of seeds, or ½ cup of cooked beans or peas. · Limit fats and oils to 2 to 3 servings each day. A serving is 1 teaspoon of vegetable oil or 2 tablespoons of salad dressing. · Limit sweets and added sugars to 5 servings or less a week. A serving is 1 tablespoon jelly or jam, ½ cup sorbet, or 1 cup of lemonade. · Eat less than 2,300 milligrams (mg) of sodium a day. If you limit your sodium to 1,500 mg a day, you can lower your blood pressure even more. Tips for success · Start small. Do not try to make dramatic changes to your diet all at once. You might feel that you are missing out on your favorite foods and then be more likely to not follow the plan. Make small changes, and stick with them. Once those changes become habit, add a few more changes. · Try some of the following: ? Make it a goal to eat a fruit or vegetable at every meal and at snacks. This will make it easy to get the recommended amount of fruits and vegetables each day. ? Try yogurt topped with fruit and nuts for a snack or healthy dessert. ? Add lettuce, tomato, cucumber, and onion to sandwiches. ? Combine a ready-made pizza crust with low-fat mozzarella cheese and lots of vegetable toppings. Try using tomatoes, squash, spinach, broccoli, carrots, cauliflower, and onions. ? Have a variety of cut-up vegetables with a low-fat dip as an appetizer instead of chips and dip. ? Sprinkle sunflower seeds or chopped almonds over salads. Or try adding chopped walnuts or almonds to cooked vegetables. ? Try some vegetarian meals using beans and peas. Add garbanzo or kidney beans to salads. Make burritos and tacos with mashed valdez beans or black beans. Where can you learn more? Go to http://www.gray.com/ Enter H891 in the search box to learn more about \"DASH Diet: Care Instructions. \" Current as of: December 16, 2019               Content Version: 12.6 © 4890-2575 [employer]. Care instructions adapted under license by [employer] (which disclaims liability or warranty for this information). If you have questions about a medical condition or this instruction, always ask your healthcare professional. [address] any warranty or liability for your use of this information.

## 2021-01-14 NOTE — TELEPHONE ENCOUNTER
Patient is requesting an increase dose of amitriptyline (ELAVIL) 25 mg. States that this was discussed at appt. Please advise  Patient states the amitriptyline works better.  Maybe increase dose

## 2021-01-14 NOTE — PROGRESS NOTES
1. Have you been to the ER, urgent care clinic since your last visit? Hospitalized since your last visit? No    2. Have you seen or consulted any other health care providers outside of the [address] [place] [address] since your last visit? Include any pap smears or colon screening.  No    Chief Complaint   Patient presents with    Osteopenia    Blood sugar problem     prediabetes    Sleep Problem    Hypertension     wants to discuss if she can discontinue BP medications    GERD    Urinary Frequency     follow-up    Medication Evaluation     wants to discuss alternate med to elavil - med is too strong - has old Rx bottle at home that she would like to have represcribed - will call with medication name

## 2021-01-14 NOTE — TELEPHONE ENCOUNTER
Patient identified with 2 identifiers (name and ). Patient aware elavil has been increase to 50 mg and e scribed to her pharmacy.

## 2021-01-14 NOTE — PROGRESS NOTES
Adelia Steele, 77 y.o.,  female    SUBJECTIVE  Ff-up    HTN- taking norvasc and micardis. Reports bp readings at home are similar 100-110's and wondering if she can come off some med. She has been consistent with her diet. GERD- responding to prilosec, symptomatic when she is off    Insomnia- says elavil is not doing well her, she wants to go back to previous med trazodone. She is unsure and wants to call us back with exact name and dose. Osteopenia- stays active/ca/vit D. dexa 7/2020      ROS:  See HPI, all others negative        Patient Active Problem List   Diagnosis Code    HTN (hypertension) I10    Prediabetes R73.03    Left knee pain M25.562    Gastroesophageal reflux disease K21.9    Pruritus ani L29.0    Sleep difficulties G47.9    Osteopenia of necks of both femurs M85.851, M85.852    Essential hypertension I10    Achilles tendinitis of right lower extremity M76.61       Current Outpatient Medications   Medication Sig Dispense Refill    OTHER prevervince - memory supplement - 1 tab daily      amLODIPine (NORVASC) 5 mg tablet Take 1 Tab by mouth daily. 90 Tab 1    diclofenac EC (VOLTAREN) 50 mg EC tablet as needed.  cyclobenzaprine (FLEXERIL) 5 mg tablet TAKE ONE TABLET BY MOUTH NIGHTLY AS NEEDED FOR MUSCLE SPASM(S) 30 Tab 2    omeprazole (PRILOSEC) 20 mg capsule Take 1 Cap by mouth daily. (Patient taking differently: Take 20 mg by mouth as needed.) 90 Cap 3    omega-3 fatty acids-vitamin e (FISH OIL) 1,000 mg cap Take 1 Cap by mouth.  multivitamin (ONE A DAY) tablet Take 1 Tab by mouth daily.  triamcinolone acetonide (KENALOG) 0.1 % ointment Apply  to affected area two (2) times a day.  use thin layer 30 g 0       Allergies   Allergen Reactions    Pcn [Penicillins] Hives       Past Medical History:   Diagnosis Date    Eosinophilic esophagitis     started on flvent, dr. Kim Bradley Gastric ulcer 04/2017    dr Bj Mckoy, avoid nsaids    Gastritis 12/13/2017    gastritis, cont PPI dr. Sheri Lewis GERD (gastroesophageal reflux disease)     erosive esophagitis 11/13 EGD    H/O colonoscopy 11/2013    normal    Hypertension     Left knee pain     Morbid obesity ([address]     Precordial pain     Prediabetes        Social History     Socioeconomic History    Marital status:      Spouse name: Not on file    Number of children: Not on file    Years of education: Not on file    Highest education level: Not on file   Occupational History    Not on file   Social Needs    Financial resource strain: Not on file    Food insecurity     Worry: Not on file     Inability: Not on file    Transportation needs     Medical: Not on file     Non-medical: Not on file   Tobacco Use    Smoking status: Never Smoker    Smokeless tobacco: Never Used   Substance and Sexual Activity    Alcohol use: No    Drug use: No    Sexual activity: Not on file   Lifestyle    Physical activity     Days per week: Not on file     Minutes per session: Not on file    Stress: Not on file   Relationships    Social connections     Talks on phone: Not on file     Gets together: Not on file     Attends [employer] service: Not on file     Active member of club or organization: Not on file     Attends meetings of clubs or organizations: Not on file     Relationship status: Not on file    Intimate partner violence     Fear of current or ex partner: Not on file     Emotionally abused: Not on file     Physically abused: Not on file     Forced sexual activity: Not on file   Other Topics Concern    Not on file   Social History Narrative    Not on file       Family History   Problem Relation Age of Onset    Heart Failure Mother     Heart Disease Mother         mi 52's    Asthma Other          OBJECTIVE    Physical Exam:     Visit Vitals  /80 (BP 1 Location: Left arm, BP Patient Position: Sitting)   Pulse 63   Temp 98 °F (36.7 °C) (Oral)   Resp 16   Wt 177 lb 12.8 oz (80.6 kg)   SpO2 96%   BMI 29.59 kg/m² General: alert, well-appearing, AA, in no apparent distress or pain  Neck: supple, no adenopathy palpated  CVS: normal rate, regular rhythm, distinct S1 and S2  Lungs:clear to ausculation bilaterally, no crackles, wheezing or rhonchi noted  Abdomen: normoactive bowel sounds, soft, non-tender  Extremities: no edema, no cyanosis, MSK grossly normal  Skin: warm, no lesions, rashes noted  Psych:  mood and affect normal        ASSESSMENT/PLAN  Diagnoses and all orders for this visit:    1. Essential hypertension  Controlled  Agree to come off of micardis  Cont norvasc  BP log, cont DASH diet  Monitoring, ff-up in 4 weeks    2. Sleep difficulties  Will switch back to trazodone, she will call us to be sure of exact dose    3. Prediabetes  Tlcs, monitoring    4. Gastroesophageal reflux disease, unspecified whether esophagitis present  Stable  Cont prilosec    5. BMI 29.0-29.9,adult    6. Osteopenia of necks of both femurs  Cont ca/vit d/wt bearing exercises  DEXA update 7/2022      Follow-up and Dispositions    · Return in about 4 weeks (around 2/11/2021), or if symptoms worsen or fail to improve, for ff-up on acute concern, plan on Medicare wellness on next visit. Patient understands plan of care. Patient has provided input and agrees with goals.

## 2021-01-25 ENCOUNTER — TELEPHONE (OUTPATIENT)
Dept: FAMILY MEDICINE CLINIC | Age: 66
End: 2021-01-25

## 2021-01-25 NOTE — TELEPHONE ENCOUNTER
Patient identified with 2 identifiers (name and ). Spoke with patient and she is aware of Dr. Cecilia Howard recommendations of : Advise to check BP daily this week, if persistently >140/90, then would recommend to resume micardis 40 mg. Will she need refill if we end up resuming med?  Jose Doll

## 2021-01-25 NOTE — TELEPHONE ENCOUNTER
Pt states that her bp seems to be going up. She states that she checked it this morning and it was 144/ 82 . And would like to know if that is ok. Please advise.

## 2021-01-25 NOTE — TELEPHONE ENCOUNTER
Advise to check BP daily this week, if persistently >140/90, then would recommend to resume micardis 40 mg. Will she need refill if we end up resuming med? She has to inform us as well.

## 2021-02-12 ENCOUNTER — OFFICE VISIT (OUTPATIENT)
Dept: FAMILY MEDICINE CLINIC | Age: 66
End: 2021-02-12
Payer: MEDICARE

## 2021-02-12 VITALS
DIASTOLIC BLOOD PRESSURE: 80 MMHG | RESPIRATION RATE: 16 BRPM | HEIGHT: 65 IN | WEIGHT: 175 LBS | TEMPERATURE: 97.7 F | HEART RATE: 91 BPM | BODY MASS INDEX: 29.16 KG/M2 | SYSTOLIC BLOOD PRESSURE: 124 MMHG | OXYGEN SATURATION: 97 %

## 2021-02-12 DIAGNOSIS — G47.9 SLEEP DIFFICULTIES: ICD-10-CM

## 2021-02-12 DIAGNOSIS — M85.851 OSTEOPENIA OF NECKS OF BOTH FEMURS: ICD-10-CM

## 2021-02-12 DIAGNOSIS — M85.852 OSTEOPENIA OF NECKS OF BOTH FEMURS: ICD-10-CM

## 2021-02-12 DIAGNOSIS — R73.03 PREDIABETES: ICD-10-CM

## 2021-02-12 DIAGNOSIS — K21.9 GASTROESOPHAGEAL REFLUX DISEASE, UNSPECIFIED WHETHER ESOPHAGITIS PRESENT: ICD-10-CM

## 2021-02-12 DIAGNOSIS — Z71.89 ADVANCE CARE PLANNING: ICD-10-CM

## 2021-02-12 DIAGNOSIS — Z00.00 MEDICARE ANNUAL WELLNESS VISIT, SUBSEQUENT: Primary | ICD-10-CM

## 2021-02-12 DIAGNOSIS — I10 ESSENTIAL HYPERTENSION: ICD-10-CM

## 2021-02-12 PROCEDURE — G8419 CALC BMI OUT NRM PARAM NOF/U: HCPCS | Performed by: FAMILY MEDICINE

## 2021-02-12 PROCEDURE — G8427 DOCREV CUR MEDS BY ELIG CLIN: HCPCS | Performed by: FAMILY MEDICINE

## 2021-02-12 PROCEDURE — G0463 HOSPITAL OUTPT CLINIC VISIT: HCPCS | Performed by: FAMILY MEDICINE

## 2021-02-12 PROCEDURE — G9899 SCRN MAM PERF RSLTS DOC: HCPCS | Performed by: FAMILY MEDICINE

## 2021-02-12 PROCEDURE — 1101F PT FALLS ASSESS-DOCD LE1/YR: CPT | Performed by: FAMILY MEDICINE

## 2021-02-12 PROCEDURE — 99214 OFFICE O/P EST MOD 30 MIN: CPT | Performed by: FAMILY MEDICINE

## 2021-02-12 PROCEDURE — G8510 SCR DEP NEG, NO PLAN REQD: HCPCS | Performed by: FAMILY MEDICINE

## 2021-02-12 PROCEDURE — G0439 PPPS, SUBSEQ VISIT: HCPCS | Performed by: FAMILY MEDICINE

## 2021-02-12 PROCEDURE — 3017F COLORECTAL CA SCREEN DOC REV: CPT | Performed by: FAMILY MEDICINE

## 2021-02-12 PROCEDURE — G8754 DIAS BP LESS 90: HCPCS | Performed by: FAMILY MEDICINE

## 2021-02-12 PROCEDURE — G8752 SYS BP LESS 140: HCPCS | Performed by: FAMILY MEDICINE

## 2021-02-12 PROCEDURE — G8536 NO DOC ELDER MAL SCRN: HCPCS | Performed by: FAMILY MEDICINE

## 2021-02-12 PROCEDURE — G8399 PT W/DXA RESULTS DOCUMENT: HCPCS | Performed by: FAMILY MEDICINE

## 2021-02-12 NOTE — PATIENT INSTRUCTIONS
Medicare Wellness Visit, Female The best way to live healthy is to have a lifestyle where you eat a well-balanced diet, exercise regularly, limit alcohol use, and quit all forms of tobacco/nicotine, if applicable. Regular preventive services are another way to keep healthy. Preventive services (vaccines, screening tests, monitoring & exams) can help personalize your care plan, which helps you manage your own care. Screening tests can find health problems at the earliest stages, when they are easiest to treat. [facility] follows the current, evidence-based guidelines published by the [facility] Nishant Mckeon (RUSTSTF) when recommending preventive services for our patients. Because we follow these guidelines, sometimes recommendations change over time as research supports it. (For example, mammograms used to be recommended annually. Even though Medicare will still pay for an annual mammogram, the newer guidelines recommend a mammogram every two years for women of average risk). Of course, you and your doctor may decide to screen more often for some diseases, based on your risk and your co-morbidities (chronic disease you are already diagnosed with). Preventive services for you include: - Medicare offers their members a free annual wellness visit, which is time for you and your primary care provider to discuss and plan for your preventive service needs. Take advantage of this benefit every year! 
-All adults over the age of 72 should receive the recommended pneumonia vaccines. Current USPSTF guidelines recommend a series of two vaccines for the best pneumonia protection.  
-All adults should have a flu vaccine yearly and a tetanus vaccine every 10 years.  
-All adults age 48 and older should receive the shingles vaccines (series of two vaccines). -All adults age 38-68 who are overweight should have a diabetes screening test once every three years. -All adults born between [birth year] and 1967 should be screened once for Hepatitis C. 
-Other screening tests and preventive services for persons with diabetes include: an eye exam to screen for diabetic retinopathy, a kidney function test, a foot exam, and stricter control over your cholesterol.  
-Cardiovascular screening for adults with routine risk involves an electrocardiogram (ECG) at intervals determined by your doctor.  
-Colorectal cancer screenings should be done for adults age 54-65 with no increased risk factors for colorectal cancer. There are a number of acceptable methods of screening for this type of cancer. Each test has its own benefits and drawbacks. Discuss with your doctor what is most appropriate for you during your annual wellness visit. The different tests include: colonoscopy (considered the best screening method), a fecal occult blood test, a fecal DNA test, and sigmoidoscopy. 
 
-A bone mass density test is recommended when a woman turns 65 to screen for osteoporosis. This test is only recommended one time, as a screening. Some providers will use this same test as a disease monitoring tool if you already have osteoporosis. -Breast cancer screenings are recommended every other year for women of normal risk, age 54-69. 
-Cervical cancer screenings for women over age 72 are only recommended with certain risk factors. Here is a list of your current Health Maintenance items (your personalized list of preventive services) with a due date: 
Health Maintenance Due Topic Date Due  
 COVID-19 Vaccine (1 of 2) 07/07/1971  Glaucoma Screening   11/15/2019

## 2021-02-12 NOTE — ACP (ADVANCE CARE PLANNING)
Advance Care Planning     Advance Care Planning (ACP) Physician/NP/PA Conversation      Date of Conversation: 2/12/2021  Conducted with: Patient with Decision Making Capacity    Healthcare Decision Maker:     Primary Decision Maker: Ivan Kaur - [phone]  Click here to complete [place] including selection of the Healthcare Decision Maker Relationship (ie \"Primary\")  Today we documented Decision Maker(s) consistent with Legal Next of Kin hierarchy. Care Preferences:    Hospitalization: \"If your health worsens and it becomes clear that your chance of recovery is unlikely, what would be your preference regarding hospitalization? \"  The patient is unsure. Ventilation: \"If you were unable to breathe on your own and your chance of recovery was unlikely, what would be your preference about the use of a ventilator (breathing machine) if it was available to you? \"   The patient would NOT desire the use of a ventilator. Resuscitation: \"In the event your heart stopped as a result of an underlying serious health condition, would you want attempts to be made to restart your heart, or would you prefer a natural death? \"   No, do NOT attempt to resuscitate.     Conversation Outcomes / Follow-Up Plan:   ACP incomplete - refer to ACP Clinical Specialist      Length of Voluntary ACP Conversation in minutes:  16 minutes    uSkhjinder Calvillo MD

## 2021-02-12 NOTE — PROGRESS NOTES
1. Have you been to the ER, urgent care clinic since your last visit? Hospitalized since your last visit? No    2. Have you seen or consulted any other health care providers outside of the [address] [place] [address] since your last visit? Include any pap smears or colon screening. No    This is the Subsequent Medicare Annual Wellness Exam, performed 12 months or more after the Initial AWV or the last Subsequent AWV    I have reviewed the patient's medical history in detail and updated the computerized patient record. Depression Risk Factor Screening:     3 most recent PHQ Screens 2/12/2021   Little interest or pleasure in doing things Not at all   Feeling down, depressed, irritable, or hopeless Not at all   Total Score PHQ 2 0       Alcohol Risk Screen    Do you average more than 1 drink per night or more than 7 drinks a week:  No    On any one occasion in the past three months have you have had more than 3 drinks containing alcohol:  No        Functional Ability and Level of Safety:    Hearing: Hearing is good. Activities of Daily Living: The home contains: no safety equipment. Patient does total self care      Ambulation: with no difficulty     Fall Risk:  Fall Risk Assessment, last 12 mths 2/12/2021   Able to walk? Yes   Fall in past 12 months? 0   Do you feel unsteady?  0   Are you worried about falling 0      Abuse Screen:  Patient is not abused       Cognitive Screening    Has your family/caregiver stated any concerns about your memory: no         Assessment/Plan   Education and counseling provided:  Are appropriate based on today's review and evaluation  End-of-Life planning (with patient's consent)- discussed, provided form, referred to acp specialist  Pneumococcal Vaccine- 21 completed  Influenza Vaccine- annually  Screening Mammography- 6/2020  Colorectal cancer screening tests- 7/2020 update 5 years  Cardiovascular screening blood test- 6/2020  Bone mass measurement (DEXA) 7/2020  Screening for glaucoma- per pt dr. Paul Calderon [facility], will request records    Diagnoses and all orders for this visit:    1. Medicare annual wellness visit, subsequent        Health Maintenance Due     Health Maintenance Due   Topic Date Due    COVID-19 Vaccine (1 of 2) 07[birth year]1969    GLAUCOMA SCREENING Q2Y  11/15/2019       Patient Care Team   Patient Care Team:  Delbert Moreira MD as PCP - General (Family Medicine)  Delbert Moreira MD as PCP - [facility]  Ana Carrasco MD (Gastroenterology)  Amee Luis MD (Ophthalmology)  Bernardino Ramirez MD (Gastroenterology)  Estefany Addison MD as Physician (Orthopedic Surgery)  Ana Chamorro MD (Podiatry)    History     Patient Active Problem List   Diagnosis Code    HTN (hypertension) I10    Prediabetes R73.03    Left knee pain M25.562    Gastroesophageal reflux disease K21.9    Pruritus ani L29.0    Sleep difficulties G47.9    Osteopenia of necks of both femurs M85.851, M85.852    Essential hypertension I10    Achilles tendinitis of right lower extremity M76.61     Past Medical History:   Diagnosis Date    Eosinophilic esophagitis     started on flvent, dr. Jennifer Mascorro Gastric ulcer 04/2017    dr Jade Nix, avoid nsaids    Gastritis 12/13/2017    gastritis, cont PPI dr. Jennifer Mascorro GERD (gastroesophageal reflux disease)     erosive esophagitis 11/13 EGD    H/O colonoscopy 11/2013    normal    Hypertension     Left knee pain     Morbid obesity ([facility] [address]     Precordial pain     Prediabetes       Past Surgical History:   Procedure Laterality Date    HX CATARACT REMOVAL Right 10/04/2016    HX COLONOSCOPY  11/06/2016    HX GYN      vaginal delivery x 2    HX ORTHOPAEDIC      foot surgery     Current Outpatient Medications   Medication Sig Dispense Refill    OTHER prevervince - memory supplement - 1 tab daily      amitriptyline (ELAVIL) 50 mg tablet Take 1 Tab by mouth nightly.  90 Tab 0    amLODIPine (NORVASC) 5 mg tablet Take 1 Tab by mouth daily. 90 Tab 1    triamcinolone acetonide (KENALOG) 0.1 % ointment Apply  to affected area two (2) times a day. use thin layer 30 g 0    diclofenac EC (VOLTAREN) 50 mg EC tablet as needed.  cyclobenzaprine (FLEXERIL) 5 mg tablet TAKE ONE TABLET BY MOUTH NIGHTLY AS NEEDED FOR MUSCLE SPASM(S) 30 Tab 2    omeprazole (PRILOSEC) 20 mg capsule Take 1 Cap by mouth daily. (Patient taking differently: Take 20 mg by mouth as needed.) 90 Cap 3    omega-3 fatty acids-vitamin e (FISH OIL) 1,000 mg cap Take 1 Cap by mouth.  multivitamin (ONE A DAY) tablet Take 1 Tab by mouth daily. Allergies   Allergen Reactions    Pcn [Penicillins] Hives       Family History   Problem Relation Age of Onset    Heart Failure Mother     Heart Disease Mother         mi 52's    Asthma Other      Social History     Tobacco Use    Smoking status: Never Smoker    Smokeless tobacco: Never Used   Substance Use Topics    Alcohol use: No     Hailey [place], 77 y.o.,  female    SUBJECTIVE  Ff-up    HTN- taking norvasc and came off of micardis on last visit. BP Readings 1teens to 130's/70-80's. She continues to lose weight with improved diet. GERD- responding to prilosec, symptomatic when she is off    Insomnia-responding fairly well on trazodone    Osteopenia- stays active/ca/vit D. dexa 7/2020    ROS:  See HPI, all others negative        Patient Active Problem List   Diagnosis Code    HTN (hypertension) I10    Prediabetes R73.03    Left knee pain M25.562    Gastroesophageal reflux disease K21.9    Pruritus ani L29.0    Sleep difficulties G47.9    Osteopenia of necks of both femurs M85.851, M85.852    Essential hypertension I10    Achilles tendinitis of right lower extremity M76.61       Current Outpatient Medications   Medication Sig Dispense Refill    OTHER prevervince - memory supplement - 1 tab daily      amitriptyline (ELAVIL) 50 mg tablet Take 1 Tab by mouth nightly.  90 Tab 0    amLODIPine (NORVASC) 5 mg tablet Take 1 Tab by mouth daily. 90 Tab 1    diclofenac EC (VOLTAREN) 50 mg EC tablet as needed.  cyclobenzaprine (FLEXERIL) 5 mg tablet TAKE ONE TABLET BY MOUTH NIGHTLY AS NEEDED FOR MUSCLE SPASM(S) 30 Tab 2    omeprazole (PRILOSEC) 20 mg capsule Take 1 Cap by mouth daily. (Patient taking differently: Take 20 mg by mouth as needed.) 90 Cap 3    omega-3 fatty acids-vitamin e (FISH OIL) 1,000 mg cap Take 1 Cap by mouth.  multivitamin (ONE A DAY) tablet Take 1 Tab by mouth daily.  triamcinolone acetonide (KENALOG) 0.1 % ointment Apply  to affected area two (2) times a day.  use thin layer 30 g 0       Allergies   Allergen Reactions    Pcn [Penicillins] Hives       Past Medical History:   Diagnosis Date    Eosinophilic esophagitis     started on flvent, dr. Musa Mccann Gastric ulcer 04/2017    dr Fe Hurst, avoid nsaids    Gastritis 12/13/2017    gastritis, cont PPI dr. Musa Mccann GERD (gastroesophageal reflux disease)     erosive esophagitis 11/13 EGD    H/O colonoscopy 11/2013    normal    Hypertension     Left knee pain     Morbid obesity ([address]     Precordial pain     Prediabetes        Social History     Socioeconomic History    Marital status:      Spouse name: Not on file    Number of children: Not on file    Years of education: Not on file    Highest education level: Not on file   Occupational History    Not on file   Social Needs    Financial resource strain: Not on file    Food insecurity     Worry: Not on file     Inability: Not on file   [employer] needs     Medical: Not on file     Non-medical: Not on file   Tobacco Use    Smoking status: Never Smoker    Smokeless tobacco: Never Used   Substance and Sexual Activity    Alcohol use: No    Drug use: No    Sexual activity: Not on file   Lifestyle    Physical activity     Days per week: Not on file     Minutes per session: Not on file    Stress: Not on file   Relationships    Social connections     Talks on phone: Not on file     Gets together: Not on file     Attends Yarsani service: Not on file     Active member of club or organization: Not on file     Attends meetings of clubs or organizations: Not on file     Relationship status: Not on file    Intimate partner violence     Fear of current or ex partner: Not on file     Emotionally abused: Not on file     Physically abused: Not on file     Forced sexual activity: Not on file   Other Topics Concern    Not on file   Social History Narrative    Not on file       Family History   Problem Relation Age of Onset    Heart Failure Mother     Heart Disease Mother         mi 52's    Asthma Other          OBJECTIVE    Physical Exam:     Visit Vitals  /80 (BP 1 Location: Left upper arm, BP Patient Position: Sitting, BP Cuff Size: Adult)   Pulse 91   Temp 97.7 °F (36.5 °C) (Oral)   Resp 16   Ht 5' 5\" (1.651 m)   Wt 175 lb (79.4 kg)   SpO2 97%   BMI 29.12 kg/m²       General: alert, well-appearing, AA, in no apparent distress or pain  Neck: supple, no adenopathy palpated  Breasts: breasts appear normal, no suspicious masses, no skin or nipple changes or axillary nodes.   CVS: normal rate, regular rhythm, distinct S1 and S2  Lungs:clear to ausculation bilaterally, no crackles, wheezing or rhonchi noted  Abdomen: normoactive bowel sounds, soft, non-tender  Extremities: no edema, no cyanosis, MSK grossly normal  Skin: warm, no lesions, rashes noted  Psych:  mood and affect normal    Results for orders placed or performed during the hospital encounter of 01/06/21   HEMOGLOBIN A1C W/O EAG   Result Value Ref Range    Hemoglobin A1c 5.6 4.2 - 5.6 %   METABOLIC PANEL, BASIC   Result Value Ref Range    Sodium 147 (H) 136 - 145 mmol/L    Potassium 4.4 3.5 - 5.5 mmol/L    Chloride 112 (H) 100 - 111 mmol/L    CO2 28 21 - 32 mmol/L    Anion gap 7 3.0 - 18 mmol/L    Glucose 100 (H) 74 - 99 mg/dL    BUN 15 7.0 - 18 MG/DL    Creatinine 0.76 0.6 - 1.3 MG/DL    BUN/Creatinine ratio 20 12 - 20      GFR est AA >60 >60 ml/min/1.73m2    GFR est non-AA >60 >60 ml/min/1.73m2    Calcium 9.5 8.5 - 10.1 MG/DL         ASSESSMENT/PLAN  Diagnoses and all orders for this visit:    1. Essential hypertension  Controlled  Cont norvasc only  Now off micardis  BP log, cont DASH diet  Commended on weight los/dietary changes  Lipid panel/cmp prior to next visit    2. Sleep difficulties  Cont trazodone    3. Prediabetes  Tlcs, monitoring    4. Gastroesophageal reflux disease, unspecified whether esophagitis present  Stable  Cont prilosec    5. BMI 29.0-29.9,adult    6. Osteopenia of necks of both femurs  Cont ca/vit d/wt bearing exercises  DEXA update 7/2022      Follow-up and Dispositions    · Return in about 3 months (around 5/12/2021), or if symptoms worsen or fail to improve, for fasting labs a week prior to your next visit, routine chronic illness care. Patient understands plan of care. Patient has provided input and agrees with goals.

## 2021-02-15 ENCOUNTER — TELEPHONE (OUTPATIENT)
Dept: OTHER | Age: 66
End: 2021-02-15

## 2021-02-15 NOTE — TELEPHONE ENCOUNTER
ACP Specialist attempted to contact patient. The \"User Busy\" alert came on the phone. 2 attempts were made with the same result. ACP Specialist will call again later in the week.

## 2021-02-22 ENCOUNTER — TELEPHONE (OUTPATIENT)
Dept: OTHER | Age: 66
End: 2021-02-22

## 2021-03-03 NOTE — TELEPHONE ENCOUNTER
This patient contacted office for the following prescriptions to be filled:    Last office visit: 2/12/21  Follow up appointment 5/13/21  Medication requested :   Requested Prescriptions     Pending Prescriptions Disp Refills    amLODIPine (NORVASC) 5 mg tablet 90 Tab 1     Sig: Take 1 Tab by mouth daily.      PCP: tom  Mail order or Local pharmacy name [facility] [phone]

## 2021-03-04 RX ORDER — AMLODIPINE BESYLATE 5 MG/1
5 TABLET ORAL DAILY
Qty: 90 TAB | Refills: 1 | Status: SHIPPED | OUTPATIENT
Start: 2021-03-04 | End: 2021-09-20 | Stop reason: SDUPTHER

## 2021-04-09 NOTE — PROGRESS NOTES
1. Have you been to the ER, urgent care clinic since your last visit? Hospitalized since your last visit? No    2. Have you seen or consulted any other health care providers outside of the [facility] since your last visit? Include any pap smears or colon screening.  No Orders relayed in the [facility] on 4/5/2021.

## 2021-05-03 ENCOUNTER — HOSPITAL ENCOUNTER (OUTPATIENT)
Dept: LAB | Age: 66
Discharge: HOME | End: 2021-05-03
Payer: MEDICARE

## 2021-05-03 DIAGNOSIS — I10 ESSENTIAL HYPERTENSION: ICD-10-CM

## 2021-05-03 LAB
ALBUMIN SERPL-MCNC: 3.8 G/DL (ref 3.4–5)
ALBUMIN/GLOB SERPL: 1.2 {RATIO} (ref 0.8–1.7)
ALP SERPL-CCNC: 92 U/L (ref 45–117)
ALT SERPL-CCNC: 27 U/L (ref 13–56)
ANION GAP SERPL CALC-SCNC: 9 MMOL/L (ref 3–18)
AST SERPL-CCNC: 26 U/L (ref 10–38)
BILIRUB SERPL-MCNC: 0.8 MG/DL (ref 0.2–1)
BUN SERPL-MCNC: 16 MG/DL (ref 7–18)
BUN/CREAT SERPL: 25 (ref 12–20)
CALCIUM SERPL-MCNC: 9.3 MG/DL (ref 8.5–10.1)
CHLORIDE SERPL-SCNC: 109 MMOL/L (ref 100–111)
CHOLEST SERPL-MCNC: 181 MG/DL
CO2 SERPL-SCNC: 24 MMOL/L (ref 21–32)
CREAT SERPL-MCNC: 0.64 MG/DL (ref 0.6–1.3)
GLOBULIN SER CALC-MCNC: 3.1 G/DL (ref 2–4)
GLUCOSE SERPL-MCNC: 96 MG/DL (ref 74–99)
HDLC SERPL-MCNC: 93 MG/DL (ref 40–60)
HDLC SERPL: 1.9 {RATIO} (ref 0–5)
LDLC SERPL CALC-MCNC: 78.2 MG/DL (ref 0–100)
LIPID PROFILE,FLP: ABNORMAL
POTASSIUM SERPL-SCNC: 4 MMOL/L (ref 3.5–5.5)
PROT SERPL-MCNC: 6.9 G/DL (ref 6.4–8.2)
SODIUM SERPL-SCNC: 142 MMOL/L (ref 136–145)
TRIGL SERPL-MCNC: 49 MG/DL (ref ?–150)
VLDLC SERPL CALC-MCNC: 9.8 MG/DL

## 2021-05-03 PROCEDURE — 36415 COLL VENOUS BLD VENIPUNCTURE: CPT

## 2021-05-03 PROCEDURE — 80053 COMPREHEN METABOLIC PANEL: CPT

## 2021-05-03 PROCEDURE — 80061 LIPID PANEL: CPT

## 2021-05-13 ENCOUNTER — OFFICE VISIT (OUTPATIENT)
Dept: FAMILY MEDICINE CLINIC | Age: 66
End: 2021-05-13
Payer: MEDICARE

## 2021-05-13 VITALS
SYSTOLIC BLOOD PRESSURE: 114 MMHG | WEIGHT: 177.2 LBS | DIASTOLIC BLOOD PRESSURE: 68 MMHG | BODY MASS INDEX: 29.52 KG/M2 | HEART RATE: 77 BPM | TEMPERATURE: 97.1 F | HEIGHT: 65 IN | RESPIRATION RATE: 16 BRPM | OXYGEN SATURATION: 97 %

## 2021-05-13 DIAGNOSIS — M85.851 OSTEOPENIA OF NECKS OF BOTH FEMURS: ICD-10-CM

## 2021-05-13 DIAGNOSIS — M85.852 OSTEOPENIA OF NECKS OF BOTH FEMURS: ICD-10-CM

## 2021-05-13 DIAGNOSIS — K21.9 GASTROESOPHAGEAL REFLUX DISEASE, UNSPECIFIED WHETHER ESOPHAGITIS PRESENT: ICD-10-CM

## 2021-05-13 DIAGNOSIS — I10 ESSENTIAL HYPERTENSION: Primary | ICD-10-CM

## 2021-05-13 DIAGNOSIS — R73.03 PREDIABETES: ICD-10-CM

## 2021-05-13 DIAGNOSIS — G47.9 SLEEP DIFFICULTIES: ICD-10-CM

## 2021-05-13 DIAGNOSIS — L65.9 HAIR LOSS: ICD-10-CM

## 2021-05-13 PROCEDURE — G8754 DIAS BP LESS 90: HCPCS | Performed by: FAMILY MEDICINE

## 2021-05-13 PROCEDURE — G8752 SYS BP LESS 140: HCPCS | Performed by: FAMILY MEDICINE

## 2021-05-13 PROCEDURE — G9899 SCRN MAM PERF RSLTS DOC: HCPCS | Performed by: FAMILY MEDICINE

## 2021-05-13 PROCEDURE — G8427 DOCREV CUR MEDS BY ELIG CLIN: HCPCS | Performed by: FAMILY MEDICINE

## 2021-05-13 PROCEDURE — 1101F PT FALLS ASSESS-DOCD LE1/YR: CPT | Performed by: FAMILY MEDICINE

## 2021-05-13 PROCEDURE — 99214 OFFICE O/P EST MOD 30 MIN: CPT | Performed by: FAMILY MEDICINE

## 2021-05-13 PROCEDURE — G0463 HOSPITAL OUTPT CLINIC VISIT: HCPCS | Performed by: FAMILY MEDICINE

## 2021-05-13 PROCEDURE — G8536 NO DOC ELDER MAL SCRN: HCPCS | Performed by: FAMILY MEDICINE

## 2021-05-13 PROCEDURE — G8510 SCR DEP NEG, NO PLAN REQD: HCPCS | Performed by: FAMILY MEDICINE

## 2021-05-13 PROCEDURE — 1090F PRES/ABSN URINE INCON ASSESS: CPT | Performed by: FAMILY MEDICINE

## 2021-05-13 PROCEDURE — 3017F COLORECTAL CA SCREEN DOC REV: CPT | Performed by: FAMILY MEDICINE

## 2021-05-13 PROCEDURE — G8419 CALC BMI OUT NRM PARAM NOF/U: HCPCS | Performed by: FAMILY MEDICINE

## 2021-05-13 PROCEDURE — G8399 PT W/DXA RESULTS DOCUMENT: HCPCS | Performed by: FAMILY MEDICINE

## 2021-05-13 NOTE — PATIENT INSTRUCTIONS
DASH Diet: Care Instructions Your Care Instructions The DASH diet is an eating plan that can help lower your blood pressure. DASH stands for Dietary Approaches to Stop Hypertension. Hypertension is high blood pressure. The DASH diet focuses on eating foods that are high in calcium, potassium, and magnesium. These nutrients can lower blood pressure. The foods that are highest in these nutrients are fruits, vegetables, low-fat dairy products, nuts, seeds, and legumes. But taking calcium, potassium, and magnesium supplements instead of eating foods that are high in those nutrients does not have the same effect. The DASH diet also includes whole grains, fish, and poultry. The DASH diet is one of several lifestyle changes your doctor may recommend to lower your high blood pressure. Your doctor may also want you to decrease the amount of sodium in your diet. Lowering sodium while following the DASH diet can lower blood pressure even further than just the DASH diet alone. Follow-up care is a key part of your treatment and safety. Be sure to make and go to all appointments, and call your doctor if you are having problems. It's also a good idea to know your test results and keep a list of the medicines you take. How can you care for yourself at home? Following the DASH diet · Eat 4 to 5 servings of fruit each day. A serving is 1 medium-sized piece of fruit, ½ cup chopped or canned fruit, 1/4 cup dried fruit, or 4 ounces (½ cup) of fruit juice. Choose fruit more often than fruit juice. · Eat 4 to 5 servings of vegetables each day. A serving is 1 cup of lettuce or raw leafy vegetables, ½ cup of chopped or cooked vegetables, or 4 ounces (½ cup) of vegetable juice. Choose vegetables more often than vegetable juice. · Get 2 to 3 servings of low-fat and fat-free dairy each day. A serving is 8 ounces of milk, 1 cup of yogurt, or 1 ½ ounces of cheese. · Eat 6 to 8 servings of grains each day.  A serving is 1 slice of bread, 1 ounce of dry cereal, or ½ cup of cooked rice, pasta, or cooked cereal. Try to choose whole-grain products as much as possible. · Limit lean meat, poultry, and fish to 2 servings each day. A serving is 3 ounces, about the size of a deck of cards. · Eat 4 to 5 servings of nuts, seeds, and legumes (cooked dried beans, lentils, and split peas) each week. A serving is 1/3 cup of nuts, 2 tablespoons of seeds, or ½ cup of cooked beans or peas. · Limit fats and oils to 2 to 3 servings each day. A serving is 1 teaspoon of vegetable oil or 2 tablespoons of salad dressing. · Limit sweets and added sugars to 5 servings or less a week. A serving is 1 tablespoon jelly or jam, ½ cup sorbet, or 1 cup of lemonade. · Eat less than 2,300 milligrams (mg) of sodium a day. If you limit your sodium to 1,500 mg a day, you can lower your blood pressure even more. · Be aware that all of these are the suggested number of servings for people who eat 1,800 to 2,000 calories a day. Your recommended number of servings may be different if you need more or fewer calories. Tips for success · Start small. Do not try to make dramatic changes to your diet all at once. You might feel that you are missing out on your favorite foods and then be more likely to not follow the plan. Make small changes, and stick with them. Once those changes become habit, add a few more changes. · Try some of the following: ? Make it a goal to eat a fruit or vegetable at every meal and at snacks. This will make it easy to get the recommended amount of fruits and vegetables each day. ? Try yogurt topped with fruit and nuts for a snack or healthy dessert. ? Add lettuce, tomato, cucumber, and onion to sandwiches. ? Combine a ready-made pizza crust with low-fat mozzarella cheese and lots of vegetable toppings. Try using tomatoes, squash, spinach, broccoli, carrots, cauliflower, and onions. ?  Have a variety of cut-up vegetables with a low-fat dip as an appetizer instead of chips and dip. ? Sprinkle sunflower seeds or chopped almonds over salads. Or try adding chopped walnuts or almonds to cooked vegetables. ? Try some vegetarian meals using beans and peas. Add garbanzo or kidney beans to salads. Make burritos and tacos with mashed valdez beans or black beans. Where can you learn more? Go to http://www.gray.com/ Enter Y954 in the search box to learn more about \"DASH Diet: Care Instructions. \" Current as of: August 31, 2020               Content Version: 12.8 © 3706-2095 [employer]. Care instructions adapted under license by [employer] (which disclaims liability or warranty for this information). If you have questions about a medical condition or this instruction, always ask your healthcare professional. [address] any warranty or liability for your use of this information.

## 2021-05-13 NOTE — PROGRESS NOTES
Lorena Mccarty, 77 y.o.,  female    SUBJECTIVE  Ff-up    HTN- taking norvasc Readings 1teens to 130's/70-80's. She continues to follow healthy diet    GERD- responding to prilosec, symptomatic when she is off    Insomnia-responding fairly well on prn elavil    Osteopenia- stays active/ca/vit D. dexa 7/2020    ROS:  See HPI, all others negative        Patient Active Problem List   Diagnosis Code    HTN (hypertension) I10    Prediabetes R73.03    Left knee pain M25.562    Gastroesophageal reflux disease K21.9    Pruritus ani L29.0    Sleep difficulties G47.9    Osteopenia of necks of both femurs M85.851, M85.852    Essential hypertension I10    Achilles tendinitis of right lower extremity M76.61       Current Outpatient Medications   Medication Sig Dispense Refill    amLODIPine (NORVASC) 5 mg tablet Take 1 Tab by mouth daily. 90 Tab 1    OTHER prevervince - memory supplement - 1 tab daily      diclofenac EC (VOLTAREN) 50 mg EC tablet as needed.  cyclobenzaprine (FLEXERIL) 5 mg tablet TAKE ONE TABLET BY MOUTH NIGHTLY AS NEEDED FOR MUSCLE SPASM(S) 30 Tab 2    omeprazole (PRILOSEC) 20 mg capsule Take 1 Cap by mouth daily. (Patient taking differently: Take 20 mg by mouth as needed.) 90 Cap 3    omega-3 fatty acids-vitamin e (FISH OIL) 1,000 mg cap Take 1 Cap by mouth.  multivitamin (ONE A DAY) tablet Take 1 Tab by mouth daily.  amitriptyline (ELAVIL) 50 mg tablet Take 1 Tab by mouth nightly.  90 Tab 0       Allergies   Allergen Reactions    Pcn [Penicillins] Hives       Past Medical History:   Diagnosis Date    Eosinophilic esophagitis     started on flvent, dr. Martinez Morfin Gastric ulcer 04/2017    dr Blake Talbot, avoid nsaids    Gastritis 12/13/2017    gastritis, cont PPI dr. Martinez Morfin GERD (gastroesophageal reflux disease)     erosive esophagitis 11/13 EGD    H/O colonoscopy 11/2013    normal    Hypertension     Left knee pain     Morbid obesity ([address]     Precordial pain     Prediabetes        Social History     Socioeconomic History    Marital status:      Spouse name: Not on file    Number of children: Not on file    Years of education: Not on file    Highest education level: Not on file   Occupational History    Not on file   Social Needs    Financial resource strain: Not on file    Food insecurity     Worry: Not on file     Inability: Not on file    Transportation needs     Medical: Not on file     Non-medical: Not on file   Tobacco Use    Smoking status: Never Smoker    Smokeless tobacco: Never Used   Substance and Sexual Activity    Alcohol use: No    Drug use: No    Sexual activity: Not on file   Lifestyle    Physical activity     Days per week: Not on file     Minutes per session: Not on file    Stress: Not on file   Relationships    Social connections     Talks on phone: Not on file     Gets together: Not on file     Attends Hindu service: Not on file     Active member of club or organization: Not on file     Attends meetings of clubs or organizations: Not on file     Relationship status: Not on file    Intimate partner violence     Fear of current or ex partner: Not on file     Emotionally abused: Not on file     Physically abused: Not on file     Forced sexual activity: Not on file   Other Topics Concern    Not on file   Social History Narrative    Not on file       Family History   Problem Relation Age of Onset    Heart Failure Mother     Heart Disease Mother         mi 52's    Asthma Other          OBJECTIVE    Physical Exam:     Visit Vitals  /68 (BP 1 Location: Left upper arm, BP Patient Position: Sitting, BP Cuff Size: Adult)   Pulse 77   Temp 97.1 °F (36.2 °C) (Temporal)   Resp 16   Ht 5' 5\" (1.651 m)   Wt 177 lb 3.2 oz (80.4 kg)   SpO2 97%   BMI 29.49 kg/m²       General: alert, well-appearing, AA, in no apparent distress or pain  Neck: supple, no adenopathy palpated  CVS: normal rate, regular rhythm, distinct S1 and S2  Lungs:clear to ausculation bilaterally, no crackles, wheezing or rhonchi noted  Abdomen: normoactive bowel sounds, soft, non-tender  Extremities: no edema, no cyanosis, MSK grossly normal  Skin: warm, no lesions, rashes noted  Psych:  mood and affect normal    Results for orders placed or performed during the hospital encounter of 11/09/30   METABOLIC PANEL, COMPREHENSIVE   Result Value Ref Range    Sodium 142 136 - 145 mmol/L    Potassium 4.0 3.5 - 5.5 mmol/L    Chloride 109 100 - 111 mmol/L    CO2 24 21 - 32 mmol/L    Anion gap 9 3.0 - 18 mmol/L    Glucose 96 74 - 99 mg/dL    BUN 16 7.0 - 18 MG/DL    Creatinine 0.64 0.6 - 1.3 MG/DL    BUN/Creatinine ratio 25 (H) 12 - 20      GFR est AA >60 >60 ml/min/1.73m2    GFR est non-AA >60 >60 ml/min/1.73m2    Calcium 9.3 8.5 - 10.1 MG/DL    Bilirubin, total 0.8 0.2 - 1.0 MG/DL    ALT (SGPT) 27 13 - 56 U/L    AST (SGOT) 26 10 - 38 U/L    Alk. phosphatase 92 45 - 117 U/L    Protein, total 6.9 6.4 - 8.2 g/dL    Albumin 3.8 3.4 - 5.0 g/dL    Globulin 3.1 2.0 - 4.0 g/dL    A-G Ratio 1.2 0.8 - 1.7     LIPID PANEL   Result Value Ref Range    LIPID PROFILE          Cholesterol, total 181 <200 MG/DL    Triglyceride 49 <150 MG/DL    HDL Cholesterol 93 (H) 40 - 60 MG/DL    LDL, calculated 78.2 0 - 100 MG/DL    VLDL, calculated 9.8 MG/DL    CHOL/HDL Ratio 1.9 0 - 5.0           ASSESSMENT/PLAN  Diagnoses and all orders for this visit:    1. Essential hypertension  Controlled  Cont norvasc  BP log, cont DASH diet  Commended on weight los/dietary changes  cmp prior to next visit    2. Sleep difficulties  Cont prn elavil    3. Prediabetes  Now wnl, commended on lifestyle changes  Tlcs, monitoring  cmp prior to next visit    4. Gastroesophageal reflux disease, unspecified whether esophagitis present  Stable  Cont prn prilosec    5. BMI 29.0-29.9,adult    6. Osteopenia of necks of both femurs  Cont ca/vit d/wt bearing exercises  DEXA update 7/2022    7.  Hair loss  Referral to dermatology  TSH    Follow-up and Dispositions    · Return in about 6 months (around 11/13/2021), or if symptoms worsen or fail to improve, for routine chronic illness care, non-fasting labs prior to your next visit. Patient understands plan of care. Patient has provided input and agrees with goals.

## 2021-05-13 NOTE — PROGRESS NOTES
1. Have you been to the ER, urgent care clinic since your last visit? Hospitalized since your last visit? No    2. Have you seen or consulted any other health care providers outside of the [address] [place] [address] since your last visit? Include any pap smears or colon screening.  No    Chief Complaint   Patient presents with    Hypertension    Sleep Problem    Blood sugar problem    GERD    Osteopenia

## 2021-06-03 NOTE — PROGRESS NOTES
[address]  [address], [address]  [facility], [address]  Phone: [phone]  Fax: [phone]    Edilia England  : 1955  PCP: Angie Stallworth MD    PROGRESS NOTE    HISTORY OF PRESENT ILLNESS:  Chief Complaint   Patient presents with    Back Pain     Iris Padgett is a 77 y.o.  female with history of lumbar pain. I saw her last 2018. She had completed PT with minimal relief.  She was scheduled for bilateral L4-5 L5-S1 facet joint injections but she never completed this. She was reading the information and was scared of this. She had been taking diclofenac and flexeril with good relief. She takes this very PRN.  I referred her back to PT and she was to continue diclofenac and flexeril prn. Today, she states she ran out of the diclofenac and flexeril a while back and the OTC medications just are not working. She has low back pain and muscle spasms intermittently. Denies bladder/bowel dysfunction, saddle paresthesia, weakness, gait disturbance, or other neurological deficit. Pt at this time desires to continue with current care/proceed with medication evaluation.     LMRI 2017  IMPRESSION:  1. Mild disease at L4-L5 and L5-S1 as described. 2. Presumed cysts in the kidneys can be further followed up with CT or  ultrasound.        ASSESSMENT  77 y.o. female with back pain. Diagnoses and all orders for this visit:    1. Muscle spasm of back  -     cyclobenzaprine (FLEXERIL) 5 mg tablet; TAKE ONE TABLET BY MOUTH NIGHTLY AS NEEDED FOR MUSCLE SPASM(S)    2. Myofascial pain  -     cyclobenzaprine (FLEXERIL) 5 mg tablet; TAKE ONE TABLET BY MOUTH NIGHTLY AS NEEDED FOR MUSCLE SPASM(S)    Other orders  -     diclofenac EC (VOLTAREN) 50 mg EC tablet; Take 1 Tablet by mouth two (2) times daily as needed for Pain. IMPRESSION/PLAN    1) Pt was given information on back exercises.    2) resume flexeril, diclofenac PRN  3) start HEP  4) Ms. Tena Sargent has a reminder for a \"due or due soon\" health maintenance. I have asked that she contact her primary care provider, Camacho Moss MD, for follow-up on this health maintenance. 5) We have informed patient to notify us for immediate appointment if he has any worsening neurogical symptoms or if an emergency situation presents, then call 911  5) Pt will follow-up in 1 year for med fu. Risks and benefits of ongoing therapy have been reviewed with the patient.  is appropriate. No pain behaviors. Denies thoughts of harming self or others. Pt has a good risk to benefit ratio which allows the pt to function in a home environment without side effects. PAST MEDICAL HISTORY  Past Medical History:   Diagnosis Date    Eosinophilic esophagitis     started on flvent, dr. Cara Bradley Gastric ulcer 04/2017    dr Merari Maxwell, avoid nsaids    Gastritis 12/13/2017    gastritis, cont PPI dr. Cara Bradley GERD (gastroesophageal reflux disease)     erosive esophagitis 11/13 EGD    H/O colonoscopy 11/2013    normal    Hypertension     Left knee pain     Morbid obesity (HCC)     Precordial pain     Prediabetes         MEDICATIONS  Current Outpatient Medications   Medication Sig Dispense Refill    diclofenac EC (VOLTAREN) 50 mg EC tablet Take 1 Tablet by mouth two (2) times daily as needed for Pain. 60 Tablet 5    cyclobenzaprine (FLEXERIL) 5 mg tablet TAKE ONE TABLET BY MOUTH NIGHTLY AS NEEDED FOR MUSCLE SPASM(S) 30 Tablet 5    amLODIPine (NORVASC) 5 mg tablet Take 1 Tab by mouth daily. 90 Tab 1    amitriptyline (ELAVIL) 50 mg tablet Take 1 Tab by mouth nightly. 90 Tab 0    omeprazole (PRILOSEC) 20 mg capsule Take 1 Cap by mouth daily. (Patient taking differently: Take 20 mg by mouth as needed.) 90 Cap 3    omega-3 fatty acids-vitamin e (FISH OIL) 1,000 mg cap Take 1 Cap by mouth.  multivitamin (ONE A DAY) tablet Take 1 Tab by mouth daily.       OTHER prevervince - memory supplement - 1 tab daily ALLERGIES  Allergies   Allergen Reactions    Pcn [Penicillins] Hives       SOCIAL HISTORY    Social History     Socioeconomic History    Marital status:      Spouse name: Not on file    Number of children: Not on file    Years of education: Not on file    Highest education level: Not on file   Occupational History    Not on file   Tobacco Use    Smoking status: Never Smoker    Smokeless tobacco: Never Used   Substance and Sexual Activity    Alcohol use: No    Drug use: No    Sexual activity: Not on file   Other Topics Concern    Not on file   Social History Narrative    Not on file     Social Determinants of Health     Financial Resource Strain:     Difficulty of Paying Living Expenses:    Food Insecurity:     Worried About Running Out of Food in the Last Year:     Ran Out of Food in the Last Year:    Transportation Needs:     Lack of Transportation (Medical):      Lack of Transportation (Non-Medical):    Physical Activity:     Days of Exercise per Week:     Minutes of Exercise per Session:    Stress:     Feeling of Stress :    Social Connections:     Frequency of Communication with Friends and Family:     Frequency of Social Gatherings with Friends and Family:     Attends Sikhism Services:     Active Member of Clubs or Organizations:     Attends Club or Organization Meetings:     Marital Status:    Intimate Partner Violence:     Fear of Current or Ex-Partner:     Emotionally Abused:     Physically Abused:     Sexually Abused:        SUBJECTIVE      Pain Scale: 6/10    Pain Assessment  6/4/2021   Location of Pain Back;Buttocks   Location Modifiers Left;Right   Severity of Pain 6   Quality of Pain Aching;Burning   Quality of Pain Comment -   Duration of Pain -   Frequency of Pain Intermittent   Aggravating Factors Other (Comment)   Aggravating Factors Comment comes and goes, can't pin point what brings it on   Limiting Behavior -   Relieving Factors Heat   Relieving Factors Comment -   Result of Injury -       Accompanied by self. REVIEW OF SYSTEMS  ROS    Constitutional: Negative for fever, chills, or weight change. Respiratory: Negative for cough or shortness of breath. Cardiovascular: Negative for chest pain or palpitations. Gastrointestinal: Negative for acid reflux, change in bowel habits, or constipation. Genitourinary: Negative for incontinence, dysuria and flank pain. Musculoskeletal: Positive for back pain. Skin: Negative for rash. Neurological: Negative for headaches, dizziness, or numbness. Endo/Heme/Allergies: Negative . Psychiatric/Behavioral: Negative. PHYSICAL EXAMINATION  Visit Vitals  Pulse 97   Temp 97.1 °F (36.2 °C) (Tympanic)   Ht 5' 3.5\" (1.613 m)   Wt 177 lb 12.8 oz (80.6 kg)   SpO2 99%   BMI 31.00 kg/m²       Constitutional: Well developed,  well nourished,  awake, alert, and in no acute distress. Neurological:  Sensation to light touch is intact. Psychiatric: Affect and mood are appropriate. Integumentary: No rashes or abrasions noted on exposed areas,  warm, dry and intact. Cardiovascular/Peripheral Vascular:  No peripheral edema is noted. Lymphatic:  No evidence of lymphedema. No cervical lymphadenopathy. SPINE/MUSCULOSKELETAL EXAM      Lumbar spine:  No rash, ecchymosis, or gross obliquity. No fasciculations. No focal atrophy is noted. Range of motion is intact. Tenderness to palpation to low back. SI joints non-tender. Trochanters non tender. Musculoskeletal:  No pain with extension, axial loading, or forward flexion. No pain with internal or external rotation of her hips. MOTOR     Hip Flex  Quads Hamstrings Ankle DF EHL Ankle PF   Right +4/5 +4/5 +4/5 +4/5 +4/5 +4/5   Left +4/5 +4/5 +4/5 +4/5 +4/5 +4/5       Straight Leg raise - bilaterally. normal gait and station    Ambulation without assistive device. full weight bearing, non-antalgic gait.     Patricia Kenney NP

## 2021-06-04 ENCOUNTER — OFFICE VISIT (OUTPATIENT)
Dept: ORTHOPEDIC SURGERY | Age: 66
End: 2021-06-04
Payer: MEDICARE

## 2021-06-04 VITALS
BODY MASS INDEX: 30.35 KG/M2 | TEMPERATURE: 97.1 F | HEART RATE: 97 BPM | WEIGHT: 177.8 LBS | HEIGHT: 64 IN | OXYGEN SATURATION: 99 %

## 2021-06-04 DIAGNOSIS — M79.18 MYOFASCIAL PAIN: ICD-10-CM

## 2021-06-04 DIAGNOSIS — M62.830 MUSCLE SPASM OF BACK: ICD-10-CM

## 2021-06-04 PROCEDURE — 1101F PT FALLS ASSESS-DOCD LE1/YR: CPT | Performed by: NURSE PRACTITIONER

## 2021-06-04 PROCEDURE — G8427 DOCREV CUR MEDS BY ELIG CLIN: HCPCS | Performed by: NURSE PRACTITIONER

## 2021-06-04 PROCEDURE — G8756 NO BP MEASURE DOC: HCPCS | Performed by: NURSE PRACTITIONER

## 2021-06-04 PROCEDURE — 99213 OFFICE O/P EST LOW 20 MIN: CPT | Performed by: NURSE PRACTITIONER

## 2021-06-04 PROCEDURE — 3017F COLORECTAL CA SCREEN DOC REV: CPT | Performed by: NURSE PRACTITIONER

## 2021-06-04 PROCEDURE — G9899 SCRN MAM PERF RSLTS DOC: HCPCS | Performed by: NURSE PRACTITIONER

## 2021-06-04 PROCEDURE — G8417 CALC BMI ABV UP PARAM F/U: HCPCS | Performed by: NURSE PRACTITIONER

## 2021-06-04 PROCEDURE — G8536 NO DOC ELDER MAL SCRN: HCPCS | Performed by: NURSE PRACTITIONER

## 2021-06-04 PROCEDURE — G8432 DEP SCR NOT DOC, RNG: HCPCS | Performed by: NURSE PRACTITIONER

## 2021-06-04 PROCEDURE — 1090F PRES/ABSN URINE INCON ASSESS: CPT | Performed by: NURSE PRACTITIONER

## 2021-06-04 PROCEDURE — G8399 PT W/DXA RESULTS DOCUMENT: HCPCS | Performed by: NURSE PRACTITIONER

## 2021-06-04 RX ORDER — DICLOFENAC SODIUM 50 MG/1
50 TABLET, DELAYED RELEASE ORAL
Qty: 60 TABLET | Refills: 5 | Status: SHIPPED | OUTPATIENT
Start: 2021-06-04

## 2021-06-04 RX ORDER — CYCLOBENZAPRINE HCL 5 MG
TABLET ORAL
Qty: 30 TABLET | Refills: 5 | Status: SHIPPED | OUTPATIENT
Start: 2021-06-04 | End: 2021-09-20 | Stop reason: SDUPTHER

## 2021-06-04 NOTE — PATIENT INSTRUCTIONS
Therapeutic Ball: Back Exercises Introduction Here are some examples of typical exercises for your condition. Start each exercise slowly. Ease off the exercise if you start to have pain. Your doctor or physical therapist will tell you when you can start these exercises and which ones will work best for you. To prepare, make sure that your ball is the right size for you. When inflated and firm, it should allow you to sit with your hips and knees bent at about a 90-degree angle (like the letter L). How to do the exercises Seated position on ball 1. Use this exercise to get used to moving on the ball and to find your best sitting position. 2. Sit comfortably on the ball with your feet about hip-width apart. If you feel unsteady, rest your hands on the ball near your hips. 3. As you do this exercise, try to keep your shoulders and upper body relaxed and still. 4. Using your stomach and back muscles to move your pelvis, roll the ball forward. This will round your back. 5. Still using your stomach and back muscles, roll the ball back. You will arch your back. 6. Repeat this rounding-arching motion a few times. 7. Stop in between the two positions, where your back is not rounded or arched. This is called your neutral position. Pelvic rotation 1. Sit tall on the ball. 2. Slowly rotate your hips in a [place] pattern. Keep the movement focused at your hips. 3. Repeat, but [place] in the other direction. 4. Repeat 8 to 12 times. Postural sitting 1. Use this position to find a stable, relaxed posture on the ball. You can use this position as your starting point for other ball exercises. If you feel unsteady on the ball, start on a chair first. 
2. Sit on a ball or chair, with your feet planted straight in front of you. 3. Imagine that a string at the top of your head is pulling you straight up. Think of yourself as 2 inches taller than you are. 
4. Slightly tuck your chin.  
5. Keep your shoulders back and relaxed. Knee extension 1. Sit tall on the ball with your feet planted in front of you, hip-width apart. As you do this exercise, avoid slumping your shoulders and arching your back. 2. Rest your hands on the ball near your hip or a steady object next to you. (If you feel very stable on the ball, rest your hands in your lap or at your side.) 3. Slowly straighten one leg at the knee. Slowly lower it back down. Repeat with the other leg. 4. Repeat this exercise 8 to 12 times. Roll-ups 1. Lie on your back with your knees bent, feet resting on the floor. 2. Lay the ball on your thighs. Rest your hands up high on the ball. 3. Raising your head and shoulder blades, roll the ball up your thighs. Exhale as you roll up. 4. If this is hard on your neck, gently support your lower head and upper neck with one hand. Don't use that hand to pull your head up. 5. Repeat 8 to 12 times. Ball curls 1. Lie on your back with your ankles resting on the ball, knees straight. 2. Use your legs to roll the exercise ball toward you. Allow your knees to bend and move closer to your chest. 
3. Pause briefly, and then roll the ball to the starting position. Try to keep the ball rolling straight. You will feel the muscles in your lower belly working. 4. Repeat 8 to 12 times. Bridge with ball under legs 1. Lie on your back with your legs up, calves resting on the ball. For more challenge, rest your heels on the ball. 2. Look up at the ceiling, and keep your chin relaxed. You can place a small pillow under your head or neck for comfort. 3. With your arms by your side, press your hands onto the floor for stability. 4. Tighten your belly muscles by pulling in your belly button toward your spine. 5. Push your heels down toward the floor, squeeze your buttocks, and lift your hips off the floor until your shoulders, hips, and knees are all in a straight line. 6. Try to keep the ball steady.  Hold for about 6 seconds as you continue to breathe normally. 7. Slowly lower your hips back down to the floor. 8. Repeat 8 to 12 times. Ball curls with bridge 1. Start flat on your back with your ankles resting on the ball. 2. Look up at the ceiling, and keep your chin relaxed. You can place a small pillow under your head or neck for comfort. 3. With your arms by your side, press your hands onto the floor for stability. 4. Tighten your belly muscles by pulling in your belly button toward your spine. 5. Push your heels down toward the floor, squeeze your buttocks, and lift your hips off the floor until your shoulders, hips, and knees are all in a straight line. 6. While holding the bridge position, roll the ball toward you with your heels. Keep your hips as level as you can. 7. Pause briefly, and then roll the ball back out. Try to keep the ball rolling straight. You will feel the muscles in your lower belly working as you straighten your legs. 8. Lower your hips, and return to your starting position. 9. Repeat 8 to 12 times. 10. When you can keep your body and the ball steady throughout this exercise, you're ready for more challenge. Try keeping your hips raised while rolling the ball out, holding the bridge, and rolling back, a few times in a row. Praying danny 1. Kneel upright with the ball in front of you. 2. To start, clasp your hands together. Rest them on the ball in front of you. 3. As you do this exercise, keep your back and hips straight and tighten your belly and buttocks muscles. Keep your knees in place. 4. Press on the ball with your arms. Lean forward from the knees. This rolls the ball forward. You will bear most of your weight on your arms. 5. If your back starts to ache, you've gone too far. Pull back a bit. 6. Roll back to the start position. 7. Repeat 8 to 12 times. Walk-out plank on ball 1. Kneel over the ball. Place your hands on the floor in front of you.  
2. Walk your hands forward until your legs are straight on the ball. This is the plank position. 3. When in plank position, hold your body straight and tighten your belly and buttocks muscles. Keep your chin slightly tucked. 4. Roll as far forward as you can without losing your balance or letting your hips drop. You may stop with the ball under your thighs, or even under your knees or shins. 5. Hold a few seconds, then walk your hands back and return to the start position. 6. Repeat 8 to 12 times. Push-up with thighs on ball 1. Kneel over the ball. Place your hands on the floor in front of you. 2. Walk your hands forward until your legs are straight on the ball. This is the plank position. 3. When in plank position, hold your body straight and tighten your belly and buttocks muscles. Keep your chin slightly tucked. 4. Roll as far forward as you can without losing your balance or letting your hips drop. You may stop with the ball under your thighs, or even under your knees or shins. 5. Bend your elbows. Slowly lower your body toward the ground as far as you can without losing your balance. 6. If your wrists hurt, try moving your hands a little farther apart so they're not right under your shoulders. 7. Slowly straighten your arms. 8. Do 8 to 12 of these push-ups. Wall squat with ball 1. Stand facing away from a wall. Place your feet about shoulder-width apart. 2. Place the ball between your middle back and the wall. Move your feet out in front of you so they are about a foot in front of your hips. 3. Keep your arms at your sides, or put your hands on your hips. 4. Slowly squat down as if you are going to sit in a chair, rolling your back over the ball as you squat. The ball should move with you but stay pressed into the wall. 5. Be sure that your knees do not go in front of your toes as you squat. 6. Hold for 6 seconds. 7. Slowly rise to your standing position. 8. Repeat 8 to 12 times.    
Child's pose with ball  
1. Kneeling upright with your back straight, rest your hands on the ball in front of you. 2. Breathe out as you bend at the hips, and roll the ball forward. Lower your chest toward the ground, and drop your hips back toward your heels. 3. To stretch your upper back and shoulders, hold this position for 15 to 30 seconds. 4. Repeat 2 to 4 times. Follow-up care is a key part of your treatment and safety. Be sure to make and go to all appointments, and call your doctor if you are having problems. It's also a good idea to know your test results and keep a list of the medicines you take. Where can you learn more? Go to http://carlos eduardo-turner.info/ Enter W100 in the search box to learn more about \"Therapeutic Ball: Back Exercises. \" Current as of: November 16, 2020               Content Version: 12.8 © 2006-2021 [employer]. Care instructions adapted under license by [employer] (which disclaims liability or warranty for this information). If you have questions about a medical condition or this instruction, always ask your healthcare professional. [place] [address] any warranty or liability for your use of this information. Back Stretches: Exercises Introduction Here are some examples of exercises for stretching your back. Start each exercise slowly. Ease off the exercise if you start to have pain. Your doctor or physical therapist will tell you when you can start these exercises and which ones will work best for you. How to do the exercises Overhead stretch 1. Stand comfortably with your feet shoulder-width apart. 2. Looking straight ahead, raise both arms over your head and reach toward the ceiling. Do not allow your head to tilt back. 3. Hold for 15 to 30 seconds, then lower your arms to your sides. 4. Repeat 2 to 4 times. Side stretch 1. Stand comfortably with your feet shoulder-width apart.  
2. Raise one arm over your head, and then lean to the other side. 3. Slide your hand down your leg as you let the weight of your arm gently stretch your side muscles. Hold for 15 to 30 seconds. 4. Repeat 2 to 4 times on each side. Press-up 1. Lie on your stomach, supporting your body with your forearms. 2. Press your elbows down into the floor to raise your upper back. As you do this, relax your stomach muscles and allow your back to arch without using your back muscles. As your press up, do not let your hips or pelvis come off the floor. 3. Hold for 15 to 30 seconds, then relax. 4. Repeat 2 to 4 times. Relax and rest  
1. Lie on your back with a rolled towel under your neck and a pillow under your knees. Extend your arms comfortably to your sides. 2. Relax and breathe normally. 3. Remain in this position for about 10 minutes. 4. If you can, do this 2 or 3 times each day. Follow-up care is a key part of your treatment and safety. Be sure to make and go to all appointments, and call your doctor if you are having problems. It's also a good idea to know your test results and keep a list of the medicines you take. Where can you learn more? Go to http://www.gray.com/ Enter H548 in the search box to learn more about \"Back Stretches: Exercises. \" Current as of: November 16, 2020               Content Version: 12.8 © 2006-2021 [employer]. Care instructions adapted under license by [employer] (which disclaims liability or warranty for this information). If you have questions about a medical condition or this instruction, always ask your healthcare professional. [place] [address] any warranty or liability for your use of this information. Low Back Arthritis: Exercises Introduction Here are some examples of typical rehabilitation exercises for your condition. Start each exercise slowly. Ease off the exercise if you start to have pain.  
Your doctor or physical therapist will tell you when you can start these exercises and which ones will work best for you. When you are not being active, find a comfortable position for rest. Some people are comfortable on the floor or a medium-firm bed with a small pillow under their head and another under their knees. Some people prefer to lie on their side with a pillow between their knees. Don't stay in one position for too long. Take short walks (10 to 20 minutes) every 2 to 3 hours. Avoid slopes, hills, and stairs until you feel better. Walk only distances you can manage without pain, especially leg pain. How to do the exercises Pelvic tilt 1. Lie on your back with your knees bent. 2. \"Brace\" your stomachtighten your muscles by pulling in and imagining your belly button moving toward your spine. 3. Press your lower back into the floor. You should feel your hips and pelvis rock back. 4. Hold for 6 seconds while breathing smoothly. 5. Relax and allow your pelvis and hips to rock forward. 6. Repeat 8 to 12 times. Back stretches 1. Get down on your hands and knees on the floor. 2. Relax your head and allow it to droop. Round your back up toward the ceiling until you feel a nice stretch in your upper, middle, and lower back. Hold this stretch for as long as it feels comfortable, or about 15 to 30 seconds. 3. Return to the starting position with a flat back while you are on your hands and knees. 4. Let your back sway by pressing your stomach toward the floor. Lift your buttocks toward the ceiling. 5. Hold this position for 15 to 30 seconds. 6. Repeat 2 to 4 times. Follow-up care is a key part of your treatment and safety. Be sure to make and go to all appointments, and call your doctor if you are having problems. It's also a good idea to know your test results and keep a list of the medicines you take. Where can you learn more? Go to http://www.gray.com/ Enter V597 in the search box to learn more about \"Low Back Arthritis: Exercises. \" Current as of: November 16, 2020               Content Version: 12.8 © 2006-2021 [employer]. Care instructions adapted under license by [email or web address] (which disclaims liability or warranty for this information). If you have questions about a medical condition or this instruction, always ask your healthcare professional. [address] any warranty or liability for your use of this information. Low Back Pain: Exercises Introduction Here are some examples of exercises for you to try. The exercises may be suggested for a condition or for rehabilitation. Start each exercise slowly. Ease off the exercises if you start to have pain. You will be told when to start these exercises and which ones will work best for you. How to do the exercises Press-up 1. Lie on your stomach, supporting your body with your forearms. 2. Press your elbows down into the floor to raise your upper back. As you do this, relax your stomach muscles and allow your back to arch without using your back muscles. As your press up, do not let your hips or pelvis come off the floor. 3. Hold for 15 to 30 seconds, then relax. 4. Repeat 2 to 4 times. Alternate arm and leg (bird dog) exercise Do this exercise slowly. Try to keep your body straight at all times, and do not let one hip drop lower than the other. 1. Start on the floor, on your hands and knees. 2. Tighten your belly muscles. 3. Raise one leg off the floor, and hold it straight out behind you. Be careful not to let your hip drop down, because that will twist your trunk. 4. Hold for about 6 seconds, then lower your leg and switch to the other leg. 5. Repeat 8 to 12 times on each leg. 6. Over time, work up to holding for 10 to 30 seconds each time.  
7. If you feel stable and secure with your leg raised, try raising the opposite arm straight out in front of you at the same time. Knee-to-chest exercise 1. Lie on your back with your knees bent and your feet flat on the floor. 2. Bring one knee to your chest, keeping the other foot flat on the floor (or keeping the other leg straight, whichever feels better on your lower back). 3. Keep your lower back pressed to the floor. Hold for at least 15 to 30 seconds. 4. Relax, and lower the knee to the starting position. 5. Repeat with the other leg. Repeat 2 to 4 times with each leg. 6. To get more stretch, put your other leg flat on the floor while pulling your knee to your chest.   
Curl-ups 1. Lie on the floor on your back with your knees bent at a 90-degree angle. Your feet should be flat on the floor, about 12 inches from your buttocks. 2. Cross your arms over your chest. If this bothers your neck, try putting your hands behind your neck (not your head), with your elbows spread apart. 3. Slowly tighten your belly muscles and raise your shoulder blades off the floor. 4. Keep your head in line with your body, and do not press your chin to your chest. 
5. Hold this position for 1 or 2 seconds, then slowly lower yourself back down to the floor. 6. Repeat 8 to 12 times. Pelvic tilt exercise 1. Lie on your back with your knees bent. 2. \"Brace\" your stomach. This means to tighten your muscles by pulling in and imagining your belly button moving toward your spine. You should feel like your back is pressing to the floor and your hips and pelvis are rocking back. 3. Hold for about 6 seconds while you breathe smoothly. 4. Repeat 8 to 12 times. Heel dig bridging 1. Lie on your back with both knees bent and your ankles bent so that only your heels are digging into the floor. Your knees should be bent about 90 degrees. 2. Then push your heels into the floor, squeeze your buttocks, and lift your hips off the floor until your shoulders, hips, and knees are all in a straight line.  
3. Hold for about 6 seconds as you continue to breathe normally, and then slowly lower your hips back down to the floor and rest for up to 10 seconds. 4. Do 8 to 12 repetitions. Hamstring stretch in doorway 1. Lie on your back in a doorway, with one leg through the open door. 2. Slide your leg up the wall to straighten your knee. You should feel a gentle stretch down the back of your leg. 3. Hold the stretch for at least 15 to 30 seconds. Do not arch your back, point your toes, or bend either knee. Keep one heel touching the floor and the other heel touching the wall. 4. Repeat with your other leg. 5. Do 2 to 4 times for each leg. Hip flexor stretch 1. Kneel on the floor with one knee bent and one leg behind you. Place your forward knee over your foot. Keep your other knee touching the floor. 2. Slowly push your hips forward until you feel a stretch in the upper thigh of your rear leg. 3. Hold the stretch for at least 15 to 30 seconds. Repeat with your other leg. 4. Do 2 to 4 times on each side. Wall sit 1. Stand with your back 10 to 12 inches away from a wall. 2. Lean into the wall until your back is flat against it. 3. Slowly slide down until your knees are slightly bent, pressing your lower back into the wall. 4. Hold for about 6 seconds, then slide back up the wall. 5. Repeat 8 to 12 times. Follow-up care is a key part of your treatment and safety. Be sure to make and go to all appointments, and call your doctor if you are having problems. It's also a good idea to know your test results and keep a list of the medicines you take. Where can you learn more? Go to http://www.gray.com/ Enter A544 in the search box to learn more about \"Low Back Pain: Exercises. \" Current as of: November 16, 2020               Content Version: 12.8 © 8856-2957 [employer]wise[employer] Incorporated.   
Care instructions adapted under license by [employer] (which disclaims liability or warranty for this information). If you have questions about a medical condition or this instruction, always ask your healthcare professional. [address] any warranty or liability for your use of this information. Sciatica: Exercises Introduction Here are some examples of typical rehabilitation exercises for your condition. Start each exercise slowly. Ease off the exercise if you start to have pain. Your doctor or physical therapist will tell you when you can start these exercises and which ones will work best for you. When you are not being active, find a comfortable position for rest. Some people are comfortable on the floor or a medium-firm bed with a small pillow under their head and another under their knees. Some people prefer to lie on their side with a pillow between their knees. Don't stay in one position for too long. Take short walks (10 to 20 minutes) every 2 to 3 hours. Avoid slopes, hills, and stairs until you feel better. Walk only distances you can manage without pain, especially leg pain. How to do the exercises Back stretches 1. Get down on your hands and knees on the floor. 2. Relax your head and allow it to droop. Round your back up toward the ceiling until you feel a nice stretch in your upper, middle, and lower back. Hold this stretch for as long as it feels comfortable, or about 15 to 30 seconds. 3. Return to the starting position with a flat back while you are on your hands and knees. 4. Let your back sway by pressing your stomach toward the floor. Lift your buttocks toward the ceiling. 5. Hold this position for 15 to 30 seconds. 6. Repeat 2 to 4 times. Follow-up care is a key part of your treatment and safety. Be sure to make and go to all appointments, and call your doctor if you are having problems. It's also a good idea to know your test results and keep a list of the medicines you take. Where can you learn more?  
Go to http://www.gray.com/ Enter Y227 in the search box to learn more about \"Sciatica: Exercises. \" Current as of: November 16, 2020               Content Version: 12.8 © 2006-2021 Healthwise, Incorporated. Care instructions adapted under license by [employer] (which disclaims liability or warranty for this information). If you have questions about a medical condition or this instruction, always ask your healthcare professional. [place] [address] any warranty or liability for your use of this information.

## 2021-06-09 ENCOUNTER — TRANSCRIBE ORDER (OUTPATIENT)
Dept: SCHEDULING | Age: 66
End: 2021-06-09

## 2021-06-09 DIAGNOSIS — Z12.31 VISIT FOR SCREENING MAMMOGRAM: Primary | ICD-10-CM

## 2021-06-15 ENCOUNTER — DOCUMENTATION ONLY (OUTPATIENT)
Dept: FAMILY MEDICINE CLINIC | Age: 66
End: 2021-06-15

## 2021-07-08 ENCOUNTER — HOSPITAL ENCOUNTER (OUTPATIENT)
Dept: MAMMOGRAPHY | Age: 66
Discharge: HOME | End: 2021-07-08
Attending: FAMILY MEDICINE
Payer: MEDICARE

## 2021-07-08 DIAGNOSIS — Z12.31 VISIT FOR SCREENING MAMMOGRAM: ICD-10-CM

## 2021-07-08 PROCEDURE — 77063 BREAST TOMOSYNTHESIS BI: CPT

## 2021-07-12 ENCOUNTER — VIRTUAL VISIT (OUTPATIENT)
Dept: FAMILY MEDICINE CLINIC | Age: 66
End: 2021-07-12
Payer: MEDICARE

## 2021-07-12 DIAGNOSIS — G89.29 CHRONIC MIDLINE LOW BACK PAIN WITHOUT SCIATICA: ICD-10-CM

## 2021-07-12 DIAGNOSIS — M54.50 CHRONIC MIDLINE LOW BACK PAIN WITHOUT SCIATICA: ICD-10-CM

## 2021-07-12 DIAGNOSIS — M79.18 MYOFASCIAL PAIN: Primary | ICD-10-CM

## 2021-07-12 PROCEDURE — 99441 PR PHYS/QHP TELEPHONE EVALUATION 5-10 MIN: CPT | Performed by: FAMILY MEDICINE

## 2021-07-12 NOTE — PROGRESS NOTES
1. Have you been to the ER, urgent care clinic since your last visit? Hospitalized since your last visit? No    2. Have you seen or consulted any other health care providers outside of the [address] [place] [address] since your last visit? Include any pap smears or colon screening.  Saw Enid PASCUAL spine specialist

## 2021-07-12 NOTE — PROGRESS NOTES
Jeff John is a 74 y.o. female, evaluated via audio-only technology on 2021 for Other (paper work for [employer] to pay someone to do her yard work due to patients back pain issuses and age/patient spouse is . )  . Assessment & Plan:   Diagnoses and all orders for this visit:    1. Myofascial pain  Form completed    2. Chronic midline low back pain without sciatica  Fair control  On prn nsaids/muscle relaxants  Core strengthening/back stretches      Keep appt in nov or sooner prn    Patient/guardian understands plan of care. Patient has provided input and agrees with goals. Future labs to be discussed on next visit. 12  Subjective:   Documentation for back pain    Pt with h/o chronic back pain, with intermittent flares. Worse with bending/movement. Has known h/o myofascial pain, lumbar MRI 2016 with multilevel arthritis. She is requesting [facility] forms completed for assistance with home upkeep. She is able to ambulated but difficulty with exertion. Non radiating. Prn flexeril/ nsaids effective    Prior to Admission medications    Medication Sig Start Date End Date Taking? Authorizing Provider   diclofenac EC (VOLTAREN) 50 mg EC tablet Take 1 Tablet by mouth two (2) times daily as needed for Pain. 21  Yes Stefany Mays NP   cyclobenzaprine (FLEXERIL) 5 mg tablet TAKE ONE TABLET BY MOUTH NIGHTLY AS NEEDED FOR MUSCLE SPASM(S) 21  Yes Stefany Mays NP   amLODIPine (NORVASC) 5 mg tablet Take 1 Tab by mouth daily. 3/4/21  Yes David Ruano MD   amitriptyline (ELAVIL) 50 mg tablet Take 1 Tab by mouth nightly. 21  Yes David Ruano MD   omeprazole (PRILOSEC) 20 mg capsule Take 1 Cap by mouth daily. Patient taking differently: Take 20 mg by mouth as needed. 18  Yes David Ruano MD   omega-3 fatty acids-vitamin e (FISH OIL) 1,000 mg cap Take 1 Cap by mouth. Yes Provider, Historical   multivitamin (ONE A DAY) tablet Take 1 Tab by mouth daily.    Yes Provider, Historical     Patient Active Problem List    Diagnosis Date Noted    Achilles tendinitis of right lower extremity 02/14/2020    Sleep difficulties 11/13/2018    Osteopenia of necks of both femurs 11/13/2018    Essential hypertension 11/13/2018    Pruritus ani 02/20/2018    Gastroesophageal reflux disease 07/18/2016    Left knee pain     Prediabetes 12/11/2014    HTN (hypertension) 09/21/2011     Current Outpatient Medications   Medication Sig Dispense Refill    diclofenac EC (VOLTAREN) 50 mg EC tablet Take 1 Tablet by mouth two (2) times daily as needed for Pain. 60 Tablet 5    cyclobenzaprine (FLEXERIL) 5 mg tablet TAKE ONE TABLET BY MOUTH NIGHTLY AS NEEDED FOR MUSCLE SPASM(S) 30 Tablet 5    amLODIPine (NORVASC) 5 mg tablet Take 1 Tab by mouth daily. 90 Tab 1    amitriptyline (ELAVIL) 50 mg tablet Take 1 Tab by mouth nightly. 90 Tab 0    omeprazole (PRILOSEC) 20 mg capsule Take 1 Cap by mouth daily. (Patient taking differently: Take 20 mg by mouth as needed.) 90 Cap 3    omega-3 fatty acids-vitamin e (FISH OIL) 1,000 mg cap Take 1 Cap by mouth.  multivitamin (ONE A DAY) tablet Take 1 Tab by mouth daily.        Allergies   Allergen Reactions    Pcn [Penicillins] Hives     Past Medical History:   Diagnosis Date    Eosinophilic esophagitis     started on flvent, dr. Gordo Cavazos Gastric ulcer 04/2017    dr Nishant Flores, avoid nsaids    Gastritis 12/13/2017    gastritis, cont PPI dr. Gordo Cavazos GERD (gastroesophageal reflux disease)     erosive esophagitis 11/13 EGD    H/O colonoscopy 11/2013    normal    Hypertension     Left knee pain     Morbid obesity ([address]     Precordial pain     Prediabetes      Past Surgical History:   Procedure Laterality Date    HX CATARACT REMOVAL Right 10/04/2016    HX COLONOSCOPY  11/06/2016    HX GYN      vaginal delivery x 2    HX ORTHOPAEDIC      foot surgery     Family History   Problem Relation Age of Onset    Heart Failure Mother     Heart Disease Mother mi 52's    Asthma Other      Social History     Tobacco Use    Smoking status: Never Smoker    Smokeless tobacco: Never Used   Substance Use Topics    Alcohol use: No       ROS    No flowsheet data found. Piotr Dc, who was evaluated through a patient-initiated, synchronous (real-time) audio only encounter, and/or her healthcare decision maker, is aware that it is a billable service, with coverage as determined by her insurance carrier. She provided verbal consent to proceed: Yes. She has not had a related appointment within my department in the past 7 days or scheduled within the next 24 hours.       Total Time: minutes: 5-10 minutes    Morris Kelly MD

## 2021-07-12 NOTE — PATIENT INSTRUCTIONS

## 2021-09-20 DIAGNOSIS — M62.830 MUSCLE SPASM OF BACK: ICD-10-CM

## 2021-09-20 DIAGNOSIS — M79.18 MYOFASCIAL PAIN: ICD-10-CM

## 2021-09-20 RX ORDER — AMLODIPINE BESYLATE 5 MG/1
5 TABLET ORAL DAILY
Qty: 90 TABLET | Refills: 1 | Status: SHIPPED | OUTPATIENT
Start: 2021-09-20 | End: 2022-03-14 | Stop reason: SDUPTHER

## 2021-09-20 RX ORDER — CYCLOBENZAPRINE HCL 5 MG
TABLET ORAL
Qty: 30 TABLET | Refills: 5 | Status: SHIPPED | OUTPATIENT
Start: 2021-09-20

## 2021-09-20 NOTE — TELEPHONE ENCOUNTER
This patient contacted office for the following prescriptions to be filled:    Medication requested :   Requested Prescriptions     Pending Prescriptions Disp Refills    amLODIPine (NORVASC) 5 mg tablet 90 Tablet 1     Sig: Take 1 Tablet by mouth daily.     cyclobenzaprine (FLEXERIL) 5 mg tablet 30 Tablet 5     Sig: TAKE ONE TABLET BY MOUTH NIGHTLY AS NEEDED FOR MUSCLE SPASM(S)       PCP: [address] [place] [address] or Print: [facility]  Mail order or Local pharmacy [address] [place] [address]     Scheduled appointment if not seen by current providers in office: LOV 7/12/2021 f/u 11/12/2021

## 2021-10-27 ENCOUNTER — HOSPITAL ENCOUNTER (OUTPATIENT)
Dept: LAB | Age: 66
Discharge: HOME | End: 2021-10-27
Payer: MEDICARE

## 2021-10-27 DIAGNOSIS — L65.9 HAIR LOSS: ICD-10-CM

## 2021-10-27 DIAGNOSIS — I10 ESSENTIAL HYPERTENSION: ICD-10-CM

## 2021-10-27 LAB
ALBUMIN SERPL-MCNC: 3.9 G/DL (ref 3.4–5)
ALBUMIN/GLOB SERPL: 1.2 {RATIO} (ref 0.8–1.7)
ALP SERPL-CCNC: 89 U/L (ref 45–117)
ALT SERPL-CCNC: 32 U/L (ref 13–56)
ANION GAP SERPL CALC-SCNC: 4 MMOL/L (ref 3–18)
AST SERPL-CCNC: 26 U/L (ref 10–38)
BILIRUB SERPL-MCNC: 0.7 MG/DL (ref 0.2–1)
BUN SERPL-MCNC: 13 MG/DL (ref 7–18)
BUN/CREAT SERPL: 17 (ref 12–20)
CALCIUM SERPL-MCNC: 9.6 MG/DL (ref 8.5–10.1)
CHLORIDE SERPL-SCNC: 107 MMOL/L (ref 100–111)
CO2 SERPL-SCNC: 31 MMOL/L (ref 21–32)
CREAT SERPL-MCNC: 0.75 MG/DL (ref 0.6–1.3)
GLOBULIN SER CALC-MCNC: 3.3 G/DL (ref 2–4)
GLUCOSE SERPL-MCNC: 103 MG/DL (ref 74–99)
POTASSIUM SERPL-SCNC: 3.9 MMOL/L (ref 3.5–5.5)
PROT SERPL-MCNC: 7.2 G/DL (ref 6.4–8.2)
SODIUM SERPL-SCNC: 142 MMOL/L (ref 136–145)
TSH SERPL DL<=0.05 MIU/L-ACNC: 0.85 UIU/ML (ref 0.36–3.74)

## 2021-10-27 PROCEDURE — 80053 COMPREHEN METABOLIC PANEL: CPT

## 2021-10-27 PROCEDURE — 84443 ASSAY THYROID STIM HORMONE: CPT

## 2021-10-27 PROCEDURE — 36415 COLL VENOUS BLD VENIPUNCTURE: CPT

## 2021-12-15 ENCOUNTER — VIRTUAL VISIT (OUTPATIENT)
Dept: FAMILY MEDICINE CLINIC | Age: 66
End: 2021-12-15
Payer: MEDICARE

## 2021-12-15 DIAGNOSIS — G47.9 SLEEP DIFFICULTIES: ICD-10-CM

## 2021-12-15 DIAGNOSIS — M85.851 OSTEOPENIA OF NECKS OF BOTH FEMURS: ICD-10-CM

## 2021-12-15 DIAGNOSIS — I10 ESSENTIAL HYPERTENSION: Primary | ICD-10-CM

## 2021-12-15 DIAGNOSIS — M85.852 OSTEOPENIA OF NECKS OF BOTH FEMURS: ICD-10-CM

## 2021-12-15 DIAGNOSIS — R73.03 PREDIABETES: ICD-10-CM

## 2021-12-15 DIAGNOSIS — K21.9 GASTROESOPHAGEAL REFLUX DISEASE, UNSPECIFIED WHETHER ESOPHAGITIS PRESENT: ICD-10-CM

## 2021-12-15 PROCEDURE — G8399 PT W/DXA RESULTS DOCUMENT: HCPCS | Performed by: FAMILY MEDICINE

## 2021-12-15 PROCEDURE — G8756 NO BP MEASURE DOC: HCPCS | Performed by: FAMILY MEDICINE

## 2021-12-15 PROCEDURE — G8427 DOCREV CUR MEDS BY ELIG CLIN: HCPCS | Performed by: FAMILY MEDICINE

## 2021-12-15 PROCEDURE — 3017F COLORECTAL CA SCREEN DOC REV: CPT | Performed by: FAMILY MEDICINE

## 2021-12-15 PROCEDURE — 1090F PRES/ABSN URINE INCON ASSESS: CPT | Performed by: FAMILY MEDICINE

## 2021-12-15 PROCEDURE — 99214 OFFICE O/P EST MOD 30 MIN: CPT | Performed by: FAMILY MEDICINE

## 2021-12-15 PROCEDURE — G8510 SCR DEP NEG, NO PLAN REQD: HCPCS | Performed by: FAMILY MEDICINE

## 2021-12-15 PROCEDURE — 1101F PT FALLS ASSESS-DOCD LE1/YR: CPT | Performed by: FAMILY MEDICINE

## 2021-12-15 PROCEDURE — G0463 HOSPITAL OUTPT CLINIC VISIT: HCPCS | Performed by: FAMILY MEDICINE

## 2021-12-15 PROCEDURE — G9899 SCRN MAM PERF RSLTS DOC: HCPCS | Performed by: FAMILY MEDICINE

## 2021-12-15 RX ORDER — AMITRIPTYLINE HYDROCHLORIDE 50 MG/1
50 TABLET, FILM COATED ORAL
Qty: 90 TABLET | Refills: 1 | Status: SHIPPED
Start: 2021-12-15 | End: 2022-09-08 | Stop reason: DRUGHIGH

## 2021-12-15 NOTE — PROGRESS NOTES
Hailey Coates is a 74 y.o. female who was seen by synchronous (real-time) audio-video technology on 12/15/2021. Consent: Hialey Coates, who was seen by synchronous (real-time) audio-video technology, and/or her healthcare decision maker, is aware that this patient-initiated, Telehealth encounter on 12/15/2021 is a billable service, with coverage as determined by her insurance carrier. She is aware that she may receive a bill and has provided verbal consent to proceed: Yes. 712  Subjective:   Hailey Coates is a 74 y.o. female who was seen for Hypertension, Sleep Problem, GERD, Blood sugar problem, Osteopenia, and Alopecia    Ff-up  No new concerns, requesting refill on elavil    HTN- taking norvasc Readings 1teens to 130's/70-80's. She continues to follow healthy diet    GERD- responding to prilosec, symptomatic when she is off    Insomnia-responding fairly well on prn elavil    Osteopenia- stays active/ca/vit D. dexa 7/2020  Prior to Admission medications    Medication Sig Start Date End Date Taking? Authorizing Provider   amitriptyline (ELAVIL) 50 mg tablet Take 1 Tablet by mouth nightly. 12/15/21  Yes Rosangela Marin MD   amLODIPine (NORVASC) 5 mg tablet Take 1 Tablet by mouth daily. 9/20/21  Yes Rosangela Marin MD   cyclobenzaprine (FLEXERIL) 5 mg tablet TAKE ONE TABLET BY MOUTH NIGHTLY AS NEEDED FOR MUSCLE SPASM(S) 9/20/21  Yes Sasha Chacon MD   diclofenac EC (VOLTAREN) 50 mg EC tablet Take 1 Tablet by mouth two (2) times daily as needed for Pain. 6/4/21  Yes Stefany Mays NP   omeprazole (PRILOSEC) 20 mg capsule Take 1 Cap by mouth daily. Patient taking differently: Take 20 mg by mouth as needed. 11/13/18  Yes Rosangela Marin MD   omega-3 fatty acids-vitamin e (FISH OIL) 1,000 mg cap Take 1 Cap by mouth. Yes Provider, Historical   multivitamin (ONE A DAY) tablet Take 1 Tab by mouth daily.    Yes Provider, Historical   amitriptyline (ELAVIL) 50 mg tablet Take 1 Tab by mouth nightly. 1/14/21 12/15/21  Cathi Brooke MD     Allergies   Allergen Reactions    Pcn [Penicillins] Hives       Patient Active Problem List    Diagnosis Date Noted    Achilles tendinitis of right lower extremity 02/14/2020    Sleep difficulties 11/13/2018    Osteopenia of necks of both femurs 11/13/2018    Essential hypertension 11/13/2018    Pruritus ani 02/20/2018    Gastroesophageal reflux disease 07/18/2016    Left knee pain     Prediabetes 12/11/2014     Current Outpatient Medications   Medication Sig Dispense Refill    amitriptyline (ELAVIL) 50 mg tablet Take 1 Tablet by mouth nightly. 90 Tablet 1    amLODIPine (NORVASC) 5 mg tablet Take 1 Tablet by mouth daily. 90 Tablet 1    cyclobenzaprine (FLEXERIL) 5 mg tablet TAKE ONE TABLET BY MOUTH NIGHTLY AS NEEDED FOR MUSCLE SPASM(S) 30 Tablet 5    diclofenac EC (VOLTAREN) 50 mg EC tablet Take 1 Tablet by mouth two (2) times daily as needed for Pain. 60 Tablet 5    omeprazole (PRILOSEC) 20 mg capsule Take 1 Cap by mouth daily. (Patient taking differently: Take 20 mg by mouth as needed.) 90 Cap 3    omega-3 fatty acids-vitamin e (FISH OIL) 1,000 mg cap Take 1 Cap by mouth.  multivitamin (ONE A DAY) tablet Take 1 Tab by mouth daily.        Allergies   Allergen Reactions    Pcn [Penicillins] Hives     Past Medical History:   Diagnosis Date    Eosinophilic esophagitis     started on flvent, dr. Musa Mccann Gastric ulcer 04/2017    dr Fe Hurst, avoid nsaids    Gastritis 12/13/2017    gastritis, cont PPI dr. Musa Mccann GERD (gastroesophageal reflux disease)     erosive esophagitis 11/13 EGD    H/O colonoscopy 11/2013    normal    Hypertension     Left knee pain     Morbid obesity ([address]     Precordial pain     Prediabetes      Past Surgical History:   Procedure Laterality Date    HX CATARACT REMOVAL Right 10/04/2016    HX COLONOSCOPY  11/06/2016    HX GYN      vaginal delivery x 2    HX ORTHOPAEDIC      foot surgery     Family History Problem Relation Age of Onset    Heart Failure Mother     Heart Disease Mother         mi 52's    Asthma Other      Social History     Tobacco Use    Smoking status: Never Smoker    Smokeless tobacco: Never Used   Substance Use Topics    Alcohol use: No       ROS      Objective: There were no vitals taken for this visit. General: alert, cooperative, no distress   Mental  status: normal mood, behavior, speech, dress, motor activity, and thought processes, able to follow commands   HENT: NCAT   Neck: no visualized mass   Resp: no respiratory distress   Neuro: no gross deficits   Skin: no discoloration or lesions of concern on visible areas   Psychiatric: normal affect, consistent with stated mood, no evidence of hallucinations     Additional exam findings: We discussed the expected course, resolution and complications of the diagnosis(es) in detail. Medication risks, benefits, costs, interactions, and alternatives were discussed as indicated. I advised her to contact the office if her condition worsens, changes or fails to improve as anticipated. She expressed understanding with the diagnosis(es) and plan. Tiffany Arredondo is a 74 y.o. female who was evaluated by a video visit encounter for concerns as above. Patient identification was verified prior to start of the visit. A caregiver was present when appropriate. Due to this being a TeleHealth encounter (During BYJ-09 public health emergency), evaluation of the following organ systems was limited: Vitals/Constitutional/EENT/Resp/CV/GI//MS/Neuro/Skin/Heme-Lymph-Imm. Pursuant to the emergency declaration under the [facility], [facility] waiver authority and the [employer] Act, this Virtual  Visit was conducted, with patient's (and/or legal guardian's) consent, to reduce the patient's risk of exposure to COVID-19 and provide necessary medical care.      Services were provided through a video synchronous discussion virtually to substitute for in-person clinic visit. Patient and provider were located at their individual homes. Assessment & Plan:   1. Essential hypertension  Controlled  Cont norvasc  BP log, cont DASH diet  Commended on weight los/dietary changes  Lipid panel/cmp prior to next visit     2. Sleep difficulties  Cont elavil     3. Prediabetes  Tlcs, monitoring   A1c/cmp  4. Gastroesophageal reflux disease, unspecified whether esophagitis present  Stable  Cont prilosec       5.  Osteopenia of necks of both femurs  Cont ca/vit d/wt bearing exercises  DEXA update 7/2022        Ff-up in 3 months, plan on [facility] then  Myrna De La Cruz MD

## 2021-12-15 NOTE — PROGRESS NOTES
1. Have you been to the ER, urgent care clinic since your last visit? Hospitalized since your last visit? No    2. Have you seen or consulted any other health care providers outside of the [address] [place] [address] since your last visit? Include any pap smears or colon screening.  No

## 2021-12-15 NOTE — PATIENT INSTRUCTIONS
DASH Diet: Care Instructions  Your Care Instructions     The DASH diet is an eating plan that can help lower your blood pressure. DASH stands for Dietary Approaches to Stop Hypertension. Hypertension is high blood pressure. The DASH diet focuses on eating foods that are high in calcium, potassium, and magnesium. These nutrients can lower blood pressure. The foods that are highest in these nutrients are fruits, vegetables, low-fat dairy products, nuts, seeds, and legumes. But taking calcium, potassium, and magnesium supplements instead of eating foods that are high in those nutrients does not have the same effect. The DASH diet also includes whole grains, fish, and poultry. The DASH diet is one of several lifestyle changes your doctor may recommend to lower your high blood pressure. Your doctor may also want you to decrease the amount of sodium in your diet. Lowering sodium while following the DASH diet can lower blood pressure even further than just the DASH diet alone. Follow-up care is a key part of your treatment and safety. Be sure to make and go to all appointments, and call your doctor if you are having problems. It's also a good idea to know your test results and keep a list of the medicines you take. How can you care for yourself at home? Following the DASH diet  · Eat 4 to 5 servings of fruit each day. A serving is 1 medium-sized piece of fruit, ½ cup chopped or canned fruit, 1/4 cup dried fruit, or 4 ounces (½ cup) of fruit juice. Choose fruit more often than fruit juice. · Eat 4 to 5 servings of vegetables each day. A serving is 1 cup of lettuce or raw leafy vegetables, ½ cup of chopped or cooked vegetables, or 4 ounces (½ cup) of vegetable juice. Choose vegetables more often than vegetable juice. · Get 2 to 3 servings of low-fat and fat-free dairy each day. A serving is 8 ounces of milk, 1 cup of yogurt, or 1 ½ ounces of cheese. · Eat 6 to 8 servings of grains each day.  A serving is 1 slice of bread, 1 ounce of dry cereal, or ½ cup of cooked rice, pasta, or cooked cereal. Try to choose whole-grain products as much as possible. · Limit lean meat, poultry, and fish to 2 servings each day. A serving is 3 ounces, about the size of a deck of cards. · Eat 4 to 5 servings of nuts, seeds, and legumes (cooked dried beans, lentils, and split peas) each week. A serving is 1/3 cup of nuts, 2 tablespoons of seeds, or ½ cup of cooked beans or peas. · Limit fats and oils to 2 to 3 servings each day. A serving is 1 teaspoon of vegetable oil or 2 tablespoons of salad dressing. · Limit sweets and added sugars to 5 servings or less a week. A serving is 1 tablespoon jelly or jam, ½ cup sorbet, or 1 cup of lemonade. · Eat less than 2,300 milligrams (mg) of sodium a day. If you limit your sodium to 1,500 mg a day, you can lower your blood pressure even more. · Be aware that all of these are the suggested number of servings for people who eat 1,800 to 2,000 calories a day. Your recommended number of servings may be different if you need more or fewer calories. Tips for success  · Start small. Do not try to make dramatic changes to your diet all at once. You might feel that you are missing out on your favorite foods and then be more likely to not follow the plan. Make small changes, and stick with them. Once those changes become habit, add a few more changes. · Try some of the following:  ? Make it a goal to eat a fruit or vegetable at every meal and at snacks. This will make it easy to get the recommended amount of fruits and vegetables each day. ? Try yogurt topped with fruit and nuts for a snack or healthy dessert. ? Add lettuce, tomato, cucumber, and onion to sandwiches. ? Combine a ready-made pizza crust with low-fat mozzarella cheese and lots of vegetable toppings. Try using tomatoes, squash, spinach, broccoli, carrots, cauliflower, and onions. ?  Have a variety of cut-up vegetables with a low-fat dip as an appetizer instead of chips and dip. ? Sprinkle sunflower seeds or chopped almonds over salads. Or try adding chopped walnuts or almonds to cooked vegetables. ? Try some vegetarian meals using beans and peas. Add garbanzo or kidney beans to salads. Make burritos and tacos with mashed valdez beans or black beans. Where can you learn more? Go to http://www.donohue.com/  Enter H967 in the search box to learn more about \"DASH Diet: Care Instructions. \"  Current as of: April 29, 2021               Content Version: 13.0  © 4098-9189 [employer]. Care instructions adapted under license by [employer] (which disclaims liability or warranty for this information). If you have questions about a medical condition or this instruction, always ask your healthcare professional. [address] any warranty or liability for your use of this information.

## 2022-03-14 NOTE — TELEPHONE ENCOUNTER
This patient contacted office for the following prescriptions to be filled:    Last office visit: 12/15/2021  Follow up appointment: 6/29/2022  Medication requested :   Requested Prescriptions     Pending Prescriptions Disp Refills    amLODIPine (NORVASC) 5 mg tablet 90 Tablet 1     Sig: Take 1 Tablet by mouth daily.      PCP: [facility] [place]  Mail order or Local pharmacy name [id number]

## 2022-03-15 RX ORDER — AMLODIPINE BESYLATE 5 MG/1
5 TABLET ORAL DAILY
Qty: 90 TABLET | Refills: 1 | Status: SHIPPED | OUTPATIENT
Start: 2022-03-15 | End: 2022-06-08 | Stop reason: SDUPTHER

## 2022-03-18 PROBLEM — G47.9 SLEEP DIFFICULTIES: Status: ACTIVE | Noted: 2018-11-13

## 2022-03-19 PROBLEM — M76.61 ACHILLES TENDINITIS OF RIGHT LOWER EXTREMITY: Status: ACTIVE | Noted: 2020-02-14

## 2022-03-19 PROBLEM — I10 ESSENTIAL HYPERTENSION: Status: ACTIVE | Noted: 2018-11-13

## 2022-03-19 PROBLEM — M85.852 OSTEOPENIA OF NECKS OF BOTH FEMURS: Status: ACTIVE | Noted: 2018-11-13

## 2022-03-19 PROBLEM — M85.851 OSTEOPENIA OF NECKS OF BOTH FEMURS: Status: ACTIVE | Noted: 2018-11-13

## 2022-03-20 PROBLEM — L29.0 PRURITUS ANI: Status: ACTIVE | Noted: 2018-02-20

## 2022-05-23 ENCOUNTER — TELEPHONE (OUTPATIENT)
Dept: FAMILY MEDICINE CLINIC | Age: 67
End: 2022-05-23

## 2022-05-23 NOTE — TELEPHONE ENCOUNTER
----- Message from Jeanie Wolf sent at 5/19/2022 11:59 AM EDT -----  Subject: Appointment Request    Reason for Call: Routine ED Follow Up Visit    QUESTIONS  Type of Appointment? Established Patient  Reason for appointment request? No appointments available during search  Additional Information for Provider? [facility] was   diagnosed with Pneumonia and then called her back and said it wasn't   pneumonia and was given antibiotics. Patient still can't seem to fight the   congestion or cough. Would like to schedule an appt with Dr. Chris Bonilla.  ---------------------------------------------------------------------------  --------------  [facility] INFO  What is the best way for the office to contact you? OK to leave message on   voicemail  Preferred Call Back Phone Number? [phone]  ---------------------------------------------------------------------------  --------------  SCRIPT ANSWERS  Relationship to Patient? Self  (Patient requests to see provider urgently. )? No  Do you have any questions for your primary care provider that need to be   answered prior to your appointment? No  Have you been diagnosed with, awaiting test results for, or told that you   are suspected of having COVID-19 (Coronavirus)? (If patient has tested   negative or was tested as a requirement for work, school, or travel and   not based on symptoms, answer no)? No  Within the past 10 days have you developed any of the following symptoms   (answer no if symptoms have been present longer than 10 days or began   more than 10 days ago)? Fever or Chills, Cough, Shortness of breath or   difficulty breathing, Loss of taste or smell, Sore throat, Nasal   congestion, Sneezing or runny nose, Fatigue or generalized body aches   (answer no if pain is specific to a body part e.g. back pain), Diarrhea,   Headache?  Yes

## 2022-05-26 ENCOUNTER — HOSPITAL ENCOUNTER (EMERGENCY)
Age: 67
Discharge: HOME | End: 2022-05-26
Attending: EMERGENCY MEDICINE
Payer: MEDICARE

## 2022-05-26 ENCOUNTER — APPOINTMENT (OUTPATIENT)
Dept: GENERAL RADIOLOGY | Age: 67
End: 2022-05-26
Attending: PHYSICIAN ASSISTANT
Payer: MEDICARE

## 2022-05-26 ENCOUNTER — APPOINTMENT (OUTPATIENT)
Dept: CT IMAGING | Age: 67
End: 2022-05-26
Attending: EMERGENCY MEDICINE
Payer: MEDICARE

## 2022-05-26 VITALS
DIASTOLIC BLOOD PRESSURE: 70 MMHG | RESPIRATION RATE: 20 BRPM | HEIGHT: 64 IN | WEIGHT: 172 LBS | OXYGEN SATURATION: 99 % | HEART RATE: 97 BPM | SYSTOLIC BLOOD PRESSURE: 131 MMHG | TEMPERATURE: 99.1 F | BODY MASS INDEX: 29.37 KG/M2

## 2022-05-26 DIAGNOSIS — J20.9 ACUTE BRONCHITIS, UNSPECIFIED ORGANISM: Primary | ICD-10-CM

## 2022-05-26 LAB
ALBUMIN SERPL-MCNC: 3.4 G/DL (ref 3.4–5)
ALBUMIN/GLOB SERPL: 0.9 {RATIO} (ref 0.8–1.7)
ALP SERPL-CCNC: 77 U/L (ref 45–117)
ALT SERPL-CCNC: 38 U/L (ref 13–56)
ANION GAP SERPL CALC-SCNC: 7 MMOL/L (ref 3–18)
AST SERPL-CCNC: 22 U/L (ref 10–38)
ATRIAL RATE: 79 BPM
BASOPHILS # BLD: 0 K/UL (ref 0–0.1)
BASOPHILS NFR BLD: 0 % (ref 0–2)
BILIRUB SERPL-MCNC: 1 MG/DL (ref 0.2–1)
BNP SERPL-MCNC: 31 PG/ML (ref 0–900)
BUN SERPL-MCNC: 23 MG/DL (ref 7–18)
BUN/CREAT SERPL: 29 (ref 12–20)
CALCIUM SERPL-MCNC: 8.5 MG/DL (ref 8.5–10.1)
CALCULATED P AXIS, ECG09: 49 DEGREES
CALCULATED R AXIS, ECG10: 12 DEGREES
CALCULATED T AXIS, ECG11: 44 DEGREES
CHLORIDE SERPL-SCNC: 104 MMOL/L (ref 100–111)
CO2 SERPL-SCNC: 28 MMOL/L (ref 21–32)
CREAT SERPL-MCNC: 0.8 MG/DL (ref 0.6–1.3)
DIAGNOSIS, 93000: NORMAL
DIFFERENTIAL METHOD BLD: ABNORMAL
EOSINOPHIL # BLD: 0.1 K/UL (ref 0–0.4)
EOSINOPHIL NFR BLD: 3 % (ref 0–5)
ERYTHROCYTE [DISTWIDTH] IN BLOOD BY AUTOMATED COUNT: 12.7 % (ref 11.6–14.5)
GLOBULIN SER CALC-MCNC: 3.6 G/DL (ref 2–4)
GLUCOSE SERPL-MCNC: 103 MG/DL (ref 74–99)
HCT VFR BLD AUTO: 36.3 % (ref 35–45)
HGB BLD-MCNC: 11.9 G/DL (ref 12–16)
IMM GRANULOCYTES # BLD AUTO: 0 K/UL (ref 0–0.04)
IMM GRANULOCYTES NFR BLD AUTO: 1 % (ref 0–0.5)
LYMPHOCYTES # BLD: 1.7 K/UL (ref 0.9–3.6)
LYMPHOCYTES NFR BLD: 33 % (ref 21–52)
MCH RBC QN AUTO: 30.1 PG (ref 24–34)
MCHC RBC AUTO-ENTMCNC: 32.8 G/DL (ref 31–37)
MCV RBC AUTO: 91.9 FL (ref 78–100)
MONOCYTES # BLD: 0.5 K/UL (ref 0.05–1.2)
MONOCYTES NFR BLD: 9 % (ref 3–10)
NEUTS SEG # BLD: 2.9 K/UL (ref 1.8–8)
NEUTS SEG NFR BLD: 55 % (ref 40–73)
NRBC # BLD: 0 K/UL (ref 0–0.01)
NRBC BLD-RTO: 0 PER 100 WBC
P-R INTERVAL, ECG05: 122 MS
PLATELET # BLD AUTO: 299 K/UL (ref 135–420)
PMV BLD AUTO: 9.6 FL (ref 9.2–11.8)
POTASSIUM SERPL-SCNC: 3.3 MMOL/L (ref 3.5–5.5)
PROT SERPL-MCNC: 7 G/DL (ref 6.4–8.2)
Q-T INTERVAL, ECG07: 374 MS
QRS DURATION, ECG06: 70 MS
QTC CALCULATION (BEZET), ECG08: 428 MS
RBC # BLD AUTO: 3.95 M/UL (ref 4.2–5.3)
SARS-COV-2, COV2: NORMAL
SARS-COV-2, NAA: NOT DETECTED
SODIUM SERPL-SCNC: 139 MMOL/L (ref 136–145)
TROPONIN-HIGH SENSITIVITY: 6 NG/L (ref 0–54)
VENTRICULAR RATE, ECG03: 79 BPM
WBC # BLD AUTO: 5.3 K/UL (ref 4.6–13.2)

## 2022-05-26 PROCEDURE — 80053 COMPREHEN METABOLIC PANEL: CPT

## 2022-05-26 PROCEDURE — 71275 CT ANGIOGRAPHY CHEST: CPT

## 2022-05-26 PROCEDURE — 71046 X-RAY EXAM CHEST 2 VIEWS: CPT

## 2022-05-26 PROCEDURE — 74011000636 HC RX REV CODE- 636: Performed by: EMERGENCY MEDICINE

## 2022-05-26 PROCEDURE — 83880 ASSAY OF NATRIURETIC PEPTIDE: CPT

## 2022-05-26 PROCEDURE — 94640 AIRWAY INHALATION TREATMENT: CPT

## 2022-05-26 PROCEDURE — 93005 ELECTROCARDIOGRAM TRACING: CPT

## 2022-05-26 PROCEDURE — 85025 COMPLETE CBC W/AUTO DIFF WBC: CPT

## 2022-05-26 PROCEDURE — 99285 EMERGENCY DEPT VISIT HI MDM: CPT

## 2022-05-26 PROCEDURE — 74011000250 HC RX REV CODE- 250: Performed by: EMERGENCY MEDICINE

## 2022-05-26 PROCEDURE — U0003 INFECTIOUS AGENT DETECTION BY NUCLEIC ACID (DNA OR RNA); SEVERE ACUTE RESPIRATORY SYNDROME CORONAVIRUS 2 (SARS-COV-2) (CORONAVIRUS DISEASE [COVID-19]), AMPLIFIED PROBE TECHNIQUE, MAKING USE OF HIGH THROUGHPUT TECHNOLOGIES AS DESCRIBED BY CMS-2020-01-R: HCPCS

## 2022-05-26 PROCEDURE — 84484 ASSAY OF TROPONIN QUANT: CPT

## 2022-05-26 RX ORDER — GLUCOSAMINE/CHONDR SU A SOD 750-600 MG
TABLET ORAL
COMMUNITY

## 2022-05-26 RX ORDER — ALBUTEROL SULFATE 2.5 MG/.5ML
2.5 SOLUTION RESPIRATORY (INHALATION)
Status: COMPLETED | OUTPATIENT
Start: 2022-05-26 | End: 2022-05-26

## 2022-05-26 RX ORDER — CEFUROXIME AXETIL 250 MG/1
250 TABLET ORAL 2 TIMES DAILY
COMMUNITY
End: 2022-06-08

## 2022-05-26 RX ORDER — BENZONATATE 100 MG/1
100 CAPSULE ORAL
COMMUNITY
End: 2022-06-08

## 2022-05-26 RX ORDER — PREDNISONE 50 MG/1
50 TABLET ORAL DAILY
Qty: 5 TABLET | Refills: 0 | Status: SHIPPED | OUTPATIENT
Start: 2022-05-26 | End: 2022-05-31

## 2022-05-26 RX ORDER — ALBUTEROL SULFATE 90 UG/1
2 AEROSOL, METERED RESPIRATORY (INHALATION)
Qty: 1 EACH | Refills: 0 | Status: SHIPPED | OUTPATIENT
Start: 2022-05-26

## 2022-05-26 RX ORDER — GUAIFENESIN, CODEINE PHOSPHATE AND PSEUDOEPHEDRINE HYDROCHLORIDE 100; 10; 30 MG/5ML; MG/5ML; MG/5ML
10 LIQUID ORAL
COMMUNITY
End: 2022-06-08

## 2022-05-26 RX ORDER — METHYLPREDNISOLONE 4 MG/1
TABLET ORAL
COMMUNITY
End: 2022-06-08

## 2022-05-26 RX ADMIN — IOPAMIDOL 80 ML: 755 INJECTION, SOLUTION INTRAVENOUS at 14:26

## 2022-05-26 RX ADMIN — ALBUTEROL SULFATE 2.5 MG: 2.5 SOLUTION RESPIRATORY (INHALATION) at 15:01

## 2022-05-26 NOTE — ED NOTES
Chest pain, persistent cough, orthopnea x 2 weeks. Has been seen twice at [facility], prescribed abx which did not help with symptoms. I performed a brief evaluation, including history and physical, of the patient here in triage and I have determined that pt will need further treatment and evaluation from the main side ER physician. I have placed initial orders to help in expediting patients care.      May 26, 2022 at 12:41 PM - SAMARA Hernández

## 2022-05-26 NOTE — ED TRIAGE NOTES
Patient c/o cough and chest congestion x 2 weeks. She states being dx with pneumonia during visit to [facility] at that time. She states f/u with [facility] four days later and was advised that she did not have pneumonia. She states having a continuing congested cough that worsens with laying flat and especially at night.

## 2022-05-26 NOTE — ED PROVIDER NOTES
EMERGENCY DEPARTMENT HISTORY AND PHYSICAL EXAM    1:22 PM patient seen at this time in room 8      Date: 5/26/2022  Patient Name: Anabela Dietrich    History of Presenting Illness     Chief Complaint   Patient presents with    Chest Congestion    Cough         History Provided By: patient    Additional History (Context): Anabela Dietrich is a 74 y.o. female presents with *only history is hypertension, has about 7 days of persistent cough worse with lying flat, some chest pain from all the coughing. At the time my exam pain level is 0 and nonpleuritic pain she says is particularly worse when she lies on her left side in bed the cough becomes very persistent. She does see a doctor regularly. [facility] and had several days of cefuroxime. Also given methylprednisolone    PCP: Simone Moreno MD    Chief Complaint:   Duration:    Timing:    Location:   Quality:   Severity:   Modifying Factors:   Associated Symptoms:       Current Outpatient Medications   Medication Sig Dispense Refill    pseudoephedrine-codeine-gg (guaiFENesin DAC) 30- mg/5 mL solution Take 10 mL by mouth every four (4) hours as needed for Cough.  benzonatate (TESSALON) 100 mg capsule Take 100 mg by mouth three (3) times daily as needed for Cough.  cefUROXime (CEFTIN) 250 mg tablet Take 250 mg by mouth two (2) times a day.  methylPREDNISolone (MEDROL) 4 mg tab Take  by mouth daily (with breakfast). Take as directed per instructions on package      Biotin 2,500 mcg cap Take  by mouth.  predniSONE (DELTASONE) 50 mg tablet Take 1 Tablet by mouth daily for 5 days. 5 Tablet 0    albuterol (PROVENTIL HFA, VENTOLIN HFA, PROAIR HFA) 90 mcg/actuation inhaler Take 2 Puffs by inhalation every four (4) hours as needed for Wheezing. 1 Each 0    amLODIPine (NORVASC) 5 mg tablet Take 1 Tablet by mouth daily. 90 Tablet 1    amitriptyline (ELAVIL) 50 mg tablet Take 1 Tablet by mouth nightly.  90 Tablet 1    cyclobenzaprine (FLEXERIL) 5 mg tablet TAKE ONE TABLET BY MOUTH NIGHTLY AS NEEDED FOR MUSCLE SPASM(S) 30 Tablet 5    diclofenac EC (VOLTAREN) 50 mg EC tablet Take 1 Tablet by mouth two (2) times daily as needed for Pain. 60 Tablet 5    omeprazole (PRILOSEC) 20 mg capsule Take 1 Cap by mouth daily. (Patient taking differently: Take 20 mg by mouth as needed.) 90 Cap 3    omega-3 fatty acids-vitamin e (FISH OIL) 1,000 mg cap Take 1 Cap by mouth.  multivitamin (ONE A DAY) tablet Take 1 Tab by mouth daily. Past History     Past Medical History:  Past Medical History:   Diagnosis Date    Eosinophilic esophagitis     started on flvent, dr. Anupama Davis Gastric ulcer 04/2017    dr Sorin Howell, avoid nsaids    Gastritis 12/13/2017    gastritis, cont PPI dr. Anupama Davis GERD (gastroesophageal reflux disease)     erosive esophagitis 11/13 EGD    H/O colonoscopy 11/2013    normal    Hypertension     Left knee pain     Morbid obesity ([address]     Precordial pain     Prediabetes        Past Surgical History:  Past Surgical History:   Procedure Laterality Date    HX CATARACT REMOVAL Right 10/04/2016    HX COLONOSCOPY  11/06/2016    HX GYN      vaginal delivery x 2    HX ORTHOPAEDIC      foot surgery       Family History:  Family History   Problem Relation Age of Onset    Heart Failure Mother     Heart Disease Mother         mi 52's    Asthma Other        Social History:  Social History     Tobacco Use    Smoking status: Never Smoker    Smokeless tobacco: Never Used   Substance Use Topics    Alcohol use: No    Drug use: No       Allergies: Allergies   Allergen Reactions    Pcn [Penicillins] Hives         Review of Systems     Review of Systems   Constitutional: Negative for diaphoresis and fever. HENT: Positive for congestion. Negative for sore throat. Eyes: Negative for pain and itching. Respiratory: Positive for cough and shortness of breath. Cardiovascular: Positive for chest pain.  Negative for palpitations. Gastrointestinal: Negative for abdominal pain and diarrhea. Endocrine: Negative for polydipsia and polyuria. Genitourinary: Negative for dysuria and hematuria. Musculoskeletal: Negative for arthralgias and myalgias. Skin: Negative for rash and wound. Neurological: Negative for seizures and syncope. Hematological: Does not bruise/bleed easily. Psychiatric/Behavioral: Negative for agitation and hallucinations. Physical Exam       Patient Vitals for the past 12 hrs:   Temp Pulse Resp BP SpO2   05/26/22 1248 99.1 °F (37.3 °C) 97 20 131/70 99 %       IPVITALS  Patient Vitals for the past 24 hrs:   BP Temp Pulse Resp SpO2 Height Weight   05/26/22 1248 131/70 99.1 °F (37.3 °C) 97 20 99 % 5' 3.5\" (1.613 m) 78 kg (172 lb)       Physical Exam  Vitals and nursing note reviewed. Constitutional:       General: She is in acute distress (Mild, coughs during exam). Appearance: She is well-developed. HENT:      Head: Normocephalic and atraumatic. Eyes:      General: No scleral icterus. Conjunctiva/sclera: Conjunctivae normal.   Neck:      Vascular: No JVD. Cardiovascular:      Rate and Rhythm: Normal rate and regular rhythm. Heart sounds: Normal heart sounds. Comments: 4 intact extremity pulses  Pulmonary:      Effort: Pulmonary effort is normal.      Breath sounds: Normal breath sounds. No wheezing, rhonchi or rales. Comments: Coughs frequently during exam  Abdominal:      Palpations: Abdomen is soft. There is no mass. Tenderness: There is no abdominal tenderness. Musculoskeletal:         General: Normal range of motion. Cervical back: Normal range of motion and neck supple. Right lower leg: No edema. Left lower leg: No edema. Lymphadenopathy:      Cervical: No cervical adenopathy. Skin:     General: Skin is warm and dry. Neurological:      Mental Status: She is alert.            Diagnostic Study Results   Labs -  Recent Results (from the past 24 hour(s))   SARS-COV-2    Collection Time: 05/26/22 12:55 PM   Result Value Ref Range    SARS-CoV-2 by PCR Please find results under separate order     CBC WITH AUTOMATED DIFF    Collection Time: 05/26/22  1:00 PM   Result Value Ref Range    WBC 5.3 4.6 - 13.2 K/uL    RBC 3.95 (L) 4.20 - 5.30 M/uL    HGB 11.9 (L) 12.0 - 16.0 g/dL    HCT 36.3 35.0 - 45.0 %    MCV 91.9 78.0 - 100.0 FL    MCH 30.1 24.0 - 34.0 PG    MCHC 32.8 31.0 - 37.0 g/dL    RDW 12.7 11.6 - 14.5 %    PLATELET 241 037 - 390 K/uL    MPV 9.6 9.2 - 11.8 FL    NRBC 0.0 0  WBC    ABSOLUTE NRBC 0.00 0.00 - 0.01 K/uL    NEUTROPHILS 55 40 - 73 %    LYMPHOCYTES 33 21 - 52 %    MONOCYTES 9 3 - 10 %    EOSINOPHILS 3 0 - 5 %    BASOPHILS 0 0 - 2 %    IMMATURE GRANULOCYTES 1 (H) 0.0 - 0.5 %    ABS. NEUTROPHILS 2.9 1.8 - 8.0 K/UL    ABS. LYMPHOCYTES 1.7 0.9 - 3.6 K/UL    ABS. MONOCYTES 0.5 0.05 - 1.2 K/UL    ABS. EOSINOPHILS 0.1 0.0 - 0.4 K/UL    ABS. BASOPHILS 0.0 0.0 - 0.1 K/UL    ABS. IMM. GRANS. 0.0 0.00 - 0.04 K/UL    DF AUTOMATED     METABOLIC PANEL, COMPREHENSIVE    Collection Time: 05/26/22  1:00 PM   Result Value Ref Range    Sodium 139 136 - 145 mmol/L    Potassium 3.3 (L) 3.5 - 5.5 mmol/L    Chloride 104 100 - 111 mmol/L    CO2 28 21 - 32 mmol/L    Anion gap 7 3.0 - 18 mmol/L    Glucose 103 (H) 74 - 99 mg/dL    BUN 23 (H) 7.0 - 18 MG/DL    Creatinine 0.80 0.6 - 1.3 MG/DL    BUN/Creatinine ratio 29 (H) 12 - 20      GFR est AA >60 >60 ml/min/1.73m2    GFR est non-AA >60 >60 ml/min/1.73m2    Calcium 8.5 8.5 - 10.1 MG/DL    Bilirubin, total 1.0 0.2 - 1.0 MG/DL    ALT (SGPT) 38 13 - 56 U/L    AST (SGOT) 22 10 - 38 U/L    Alk.  phosphatase 77 45 - 117 U/L    Protein, total 7.0 6.4 - 8.2 g/dL    Albumin 3.4 3.4 - 5.0 g/dL    Globulin 3.6 2.0 - 4.0 g/dL    A-G Ratio 0.9 0.8 - 1.7     TROPONIN-HIGH SENSITIVITY    Collection Time: 05/26/22  1:00 PM   Result Value Ref Range    Troponin-High Sensitivity 6 0 - 54 ng/L   NT-PRO BNP Collection Time: 05/26/22  1:00 PM   Result Value Ref Range    NT pro-BNP 31 0 - 900 PG/ML   EKG, 12 LEAD, INITIAL    Collection Time: 05/26/22  1:00 PM   Result Value Ref Range    Ventricular Rate 79 BPM    Atrial Rate 79 BPM    P-R Interval 122 ms    QRS Duration 70 ms    Q-T Interval 374 ms    QTC Calculation (Bezet) 428 ms    Calculated P Axis 49 degrees    Calculated R Axis 12 degrees    Calculated T Axis 44 degrees    Diagnosis       Normal sinus rhythm  Normal ECG  When compared with ECG of 08-JAN-2020 13:37,  No significant change was found         Radiologic Studies -   CTA CHEST W OR W WO CONT   Final Result   No evidence of pulmonary embolus, aortic aneurysm, or dissection. No acute   findings. Nonspecific mild bilateral hilar adenopathy. Right renal cyst.      XR CHEST PA LAT   Final Result      No acute cardiopulmonary disease. XR CHEST PA LAT    Result Date: 5/26/2022  EXAM: XR CHEST PA LAT HISTORY: chest pain, mucous. COMPARISON: 1/8/2020 FINDINGS: DEVICES:None. LUNGS: Clear. No pleural fluid. MEDIASTINUM: Unremarkable BONES/SOFT TISSUES: Unremarkable for age     No acute cardiopulmonary disease. CTA CHEST W OR W WO CONT    Result Date: 5/26/2022  CTA CHEST PULMONARY EMBOLISM PROTOCOL  INDICATION: Chest pain, cough, orthopnea. TECHNIQUE: Thin collimation axial images obtained through the level of the pulmonary arteries with additional imaging through the chest following the uneventful administration of intravenous contrast.  Images reconstructed into MIP coronal and sagittal projections for complete evaluation of the tortuous and overlapping pulmonary vascular structures and to reduce patient radiation dose. All CT scans are performed using dose optimization techniques as appropriate to the performed exam including the following: Automated exposure control, adjustment of mA and/or kV according to patient size, and use of iterative reconstructive technique.  COMPARISON: No prior CTA chest. CT abdomen and pelvis without contrast 12/9/2014 FINDINGS: No filling defects are appreciated within the main, left, right, lobar or visualized segmental pulmonary arteries to suggest embolism. The thoracic aorta is not aneurysmal.  No evidence for dissection. No pericardial effusion. No pleural effusion. Mild bilateral hilar adenopathy measuring up to 1 cm. Subcentimeter mediastinal lymph nodes. No axillary adenopathy. 1 cm nodular opacity in the upper outer quadrant of left breast is grossly stable since 2014. No acute bone finding. No lung infiltrate or mass. 6 cm water density partially imaged right upper pole renal cyst.     No evidence of pulmonary embolus, aortic aneurysm, or dissection. No acute findings. Nonspecific mild bilateral hilar adenopathy. Right renal cyst.      Medications ordered:   Medications   iopamidoL (ISOVUE-370) 76 % injection 80 mL (80 mL IntraVENous Given 5/26/22 1426)   albuterol CONCENTRATE 2.5mg/0.5 mL neb soln (2.5 mg Nebulization Given 5/26/22 1501)         Medical Decision Making   Initial Medical Decision Making and DDx:  Twelve-lead EKG sinus rhythm at 79 no acute ischemic process    Considerations bronchitis, viral versus allergic, pneumonia. Will get CT scan, less likelihood of atypical pulmonary edema empyema PE, ACS, CHF, renal failure    ED Course: Progress Notes, Reevaluation, and Consults:  ED Course as of 05/26/22 1600   Thu May 26, 2022   1457 Updated the patient, negative CT no pneumonia pneumothorax PE unexpected findings. No evidence of cardiac disease ACS or CHF. We will treat for nonspecific bronchitis likely either viral or allergic. Try albuterol. Thorough discussion about cough suppressants and steroids going forward. [CB]      ED Course User Index  [CB] Tariq Talyor MD     4:00 PM Pt reevaluated at this time. Discussed results and findings, as well as, diagnosis and plan for discharge. Follow up with doctors/services listed.   Return to the emergency department for alarming symptoms. Pt verbalizes understanding and agreement with plan. All questions addressed. Patient feeling better after albuterol and cough has subsided, reiterated instructions and prescription for prednisone and albuterol inhaler and follow-up with primary care. I am the first provider for this patient. I reviewed the vital signs, available nursing notes, past medical history, past surgical history, family history and social history. Patient Vitals for the past 12 hrs:   Temp Pulse Resp BP SpO2   05/26/22 1248 99.1 °F (37.3 °C) 97 20 131/70 99 %       Vital Signs-Reviewed the patient's vital signs. Pulse Oximetry Analysis, Cardiac Monitor, 12 lead ekg:      Interpreted by the EP. Records Reviewed: Nursing notes reviewed (Time of Review: 1:22 PM)    Procedures:   Critical Care Time:   Aspirin: (was aspirin given for stroke?)    Diagnosis     Clinical Impression:   1. Acute bronchitis, unspecified organism        Disposition: Discharged      Follow-up Information     Follow up With Specialties Details Why Contact Info    Stephanie Pires MD Family Medicine In 3 days  [address] [address]  [address] [facility]  [phone]             Patient's Medications   Start Taking    ALBUTEROL (PROVENTIL HFA, VENTOLIN HFA, PROAIR HFA) 90 MCG/ACTUATION INHALER    Take 2 Puffs by inhalation every four (4) hours as needed for Wheezing. PREDNISONE (DELTASONE) 50 MG TABLET    Take 1 Tablet by mouth daily for 5 days. Continue Taking    AMITRIPTYLINE (ELAVIL) 50 MG TABLET    Take 1 Tablet by mouth nightly. AMLODIPINE (NORVASC) 5 MG TABLET    Take 1 Tablet by mouth daily. BENZONATATE (TESSALON) 100 MG CAPSULE    Take 100 mg by mouth three (3) times daily as needed for Cough. BIOTIN 2,500 MCG CAP    Take  by mouth. CEFUROXIME (CEFTIN) 250 MG TABLET    Take 250 mg by mouth two (2) times a day.     CYCLOBENZAPRINE (FLEXERIL) 5 MG TABLET    TAKE ONE TABLET BY MOUTH NIGHTLY AS NEEDED FOR MUSCLE SPASM(S)    DICLOFENAC EC (VOLTAREN) 50 MG EC TABLET    Take 1 Tablet by mouth two (2) times daily as needed for Pain. METHYLPREDNISOLONE (MEDROL) 4 MG TAB    Take  by mouth daily (with breakfast). Take as directed per instructions on package    MULTIVITAMIN (ONE A DAY) TABLET    Take 1 Tab by mouth daily. OMEGA-3 FATTY ACIDS-VITAMIN E (FISH OIL) 1,000 MG CAP    Take 1 Cap by mouth. OMEPRAZOLE (PRILOSEC) 20 MG CAPSULE    Take 1 Cap by mouth daily. PSEUDOEPHEDRINE-CODEINE-GG (GUAIFENESIN DAC) 30- MG/5 ML SOLUTION    Take 10 mL by mouth every four (4) hours as needed for Cough.    These Medications have changed    No medications on file   Stop Taking    No medications on file     _______________________________    Notes:    Winston Strauss MD using Dragon dictation      _______________________________

## 2022-06-02 ENCOUNTER — TELEPHONE (OUTPATIENT)
Dept: FAMILY MEDICINE CLINIC | Age: 67
End: 2022-06-02

## 2022-06-02 NOTE — TELEPHONE ENCOUNTER
Pt coming in on 6/8/2022 for a f/u as well as a f/u for pneumonia and she would like to know if Dr Tim Trejo would put in an order for a chest Xray. Please advise.

## 2022-06-04 ENCOUNTER — HOSPITAL ENCOUNTER (OUTPATIENT)
Dept: LAB | Age: 67
Discharge: HOME | End: 2022-06-04
Payer: MEDICARE

## 2022-06-04 DIAGNOSIS — R73.03 PREDIABETES: ICD-10-CM

## 2022-06-04 DIAGNOSIS — I10 ESSENTIAL HYPERTENSION: ICD-10-CM

## 2022-06-04 LAB
ALBUMIN SERPL-MCNC: 3.4 G/DL (ref 3.4–5)
ALBUMIN/GLOB SERPL: 1.2 {RATIO} (ref 0.8–1.7)
ALP SERPL-CCNC: 70 U/L (ref 45–117)
ALT SERPL-CCNC: 37 U/L (ref 13–56)
ANION GAP SERPL CALC-SCNC: 3 MMOL/L (ref 3–18)
AST SERPL-CCNC: 27 U/L (ref 10–38)
BILIRUB SERPL-MCNC: 0.7 MG/DL (ref 0.2–1)
BUN SERPL-MCNC: 17 MG/DL (ref 7–18)
BUN/CREAT SERPL: 24 (ref 12–20)
CALCIUM SERPL-MCNC: 8.7 MG/DL (ref 8.5–10.1)
CHLORIDE SERPL-SCNC: 107 MMOL/L (ref 100–111)
CHOLEST SERPL-MCNC: 162 MG/DL
CO2 SERPL-SCNC: 30 MMOL/L (ref 21–32)
CREAT SERPL-MCNC: 0.7 MG/DL (ref 0.6–1.3)
EST. AVERAGE GLUCOSE BLD GHB EST-MCNC: 123 MG/DL
GLOBULIN SER CALC-MCNC: 2.8 G/DL (ref 2–4)
GLUCOSE SERPL-MCNC: 97 MG/DL (ref 74–99)
HBA1C MFR BLD: 5.9 % (ref 4.2–5.6)
HDLC SERPL-MCNC: 91 MG/DL (ref 40–60)
HDLC SERPL: 1.8 {RATIO} (ref 0–5)
LDLC SERPL CALC-MCNC: 53 MG/DL (ref 0–100)
LIPID PROFILE,FLP: ABNORMAL
POTASSIUM SERPL-SCNC: 4.1 MMOL/L (ref 3.5–5.5)
PROT SERPL-MCNC: 6.2 G/DL (ref 6.4–8.2)
SODIUM SERPL-SCNC: 140 MMOL/L (ref 136–145)
TRIGL SERPL-MCNC: 90 MG/DL (ref ?–150)
VLDLC SERPL CALC-MCNC: 18 MG/DL

## 2022-06-04 PROCEDURE — 80061 LIPID PANEL: CPT

## 2022-06-04 PROCEDURE — 36415 COLL VENOUS BLD VENIPUNCTURE: CPT

## 2022-06-04 PROCEDURE — 83036 HEMOGLOBIN GLYCOSYLATED A1C: CPT

## 2022-06-04 PROCEDURE — 80053 COMPREHEN METABOLIC PANEL: CPT

## 2022-06-08 ENCOUNTER — OFFICE VISIT (OUTPATIENT)
Dept: FAMILY MEDICINE CLINIC | Age: 67
End: 2022-06-08
Payer: MEDICARE

## 2022-06-08 VITALS
DIASTOLIC BLOOD PRESSURE: 84 MMHG | RESPIRATION RATE: 16 BRPM | HEART RATE: 82 BPM | WEIGHT: 178 LBS | TEMPERATURE: 98 F | HEIGHT: 64 IN | SYSTOLIC BLOOD PRESSURE: 130 MMHG | BODY MASS INDEX: 30.39 KG/M2 | OXYGEN SATURATION: 98 %

## 2022-06-08 DIAGNOSIS — G47.9 SLEEP DIFFICULTIES: ICD-10-CM

## 2022-06-08 DIAGNOSIS — R59.0 HILAR ADENOPATHY: ICD-10-CM

## 2022-06-08 DIAGNOSIS — M85.852 OSTEOPENIA OF NECKS OF BOTH FEMURS: ICD-10-CM

## 2022-06-08 DIAGNOSIS — I10 ESSENTIAL HYPERTENSION: ICD-10-CM

## 2022-06-08 DIAGNOSIS — Z00.00 MEDICARE ANNUAL WELLNESS VISIT, SUBSEQUENT: Primary | ICD-10-CM

## 2022-06-08 DIAGNOSIS — Z78.0 POST-MENOPAUSAL: ICD-10-CM

## 2022-06-08 DIAGNOSIS — R73.03 PREDIABETES: ICD-10-CM

## 2022-06-08 DIAGNOSIS — M85.851 OSTEOPENIA OF NECKS OF BOTH FEMURS: ICD-10-CM

## 2022-06-08 DIAGNOSIS — Z12.31 BREAST CANCER SCREENING BY MAMMOGRAM: ICD-10-CM

## 2022-06-08 DIAGNOSIS — K21.9 GASTROESOPHAGEAL REFLUX DISEASE, UNSPECIFIED WHETHER ESOPHAGITIS PRESENT: ICD-10-CM

## 2022-06-08 PROCEDURE — G8510 SCR DEP NEG, NO PLAN REQD: HCPCS | Performed by: FAMILY MEDICINE

## 2022-06-08 PROCEDURE — 1090F PRES/ABSN URINE INCON ASSESS: CPT | Performed by: FAMILY MEDICINE

## 2022-06-08 PROCEDURE — 3017F COLORECTAL CA SCREEN DOC REV: CPT | Performed by: FAMILY MEDICINE

## 2022-06-08 PROCEDURE — G8752 SYS BP LESS 140: HCPCS | Performed by: FAMILY MEDICINE

## 2022-06-08 PROCEDURE — G0439 PPPS, SUBSEQ VISIT: HCPCS | Performed by: FAMILY MEDICINE

## 2022-06-08 PROCEDURE — 1101F PT FALLS ASSESS-DOCD LE1/YR: CPT | Performed by: FAMILY MEDICINE

## 2022-06-08 PROCEDURE — G8536 NO DOC ELDER MAL SCRN: HCPCS | Performed by: FAMILY MEDICINE

## 2022-06-08 PROCEDURE — 1123F ACP DISCUSS/DSCN MKR DOCD: CPT | Performed by: FAMILY MEDICINE

## 2022-06-08 PROCEDURE — G9899 SCRN MAM PERF RSLTS DOC: HCPCS | Performed by: FAMILY MEDICINE

## 2022-06-08 PROCEDURE — G0463 HOSPITAL OUTPT CLINIC VISIT: HCPCS | Performed by: FAMILY MEDICINE

## 2022-06-08 PROCEDURE — 99214 OFFICE O/P EST MOD 30 MIN: CPT | Performed by: FAMILY MEDICINE

## 2022-06-08 PROCEDURE — G8754 DIAS BP LESS 90: HCPCS | Performed by: FAMILY MEDICINE

## 2022-06-08 PROCEDURE — G8399 PT W/DXA RESULTS DOCUMENT: HCPCS | Performed by: FAMILY MEDICINE

## 2022-06-08 PROCEDURE — G8417 CALC BMI ABV UP PARAM F/U: HCPCS | Performed by: FAMILY MEDICINE

## 2022-06-08 PROCEDURE — G8427 DOCREV CUR MEDS BY ELIG CLIN: HCPCS | Performed by: FAMILY MEDICINE

## 2022-06-08 RX ORDER — AMLODIPINE BESYLATE 5 MG/1
5 TABLET ORAL DAILY
Qty: 90 TABLET | Refills: 1 | Status: SHIPPED | OUTPATIENT
Start: 2022-06-08 | End: 2022-09-08 | Stop reason: SDUPTHER

## 2022-06-08 NOTE — PROGRESS NOTES
1. Have you been to the ER, urgent care clinic since your last visit? Hospitalized since your last visit?05/25/2022  Acute bronchitis    2. Have you seen or consulted any other health care providers outside of the [address] [place] [address] since your last visit? Include any pap smears or colon screening.  No

## 2022-06-08 NOTE — PROGRESS NOTES
1. Have you been to the ER, urgent care clinic since your last visit? Hospitalized since your last visit? No    2. Have you seen or consulted any other health care providers outside of the [address] [place] [address] since your last visit? Include any pap smears or colon screening. No    This is the Subsequent Medicare Annual Wellness Exam, performed 12 months or more after the Initial AWV or the last Subsequent AWV    I have reviewed the patient's medical history in detail and updated the computerized patient record. Depression Risk Factor Screening:     3 most recent PHQ Screens 6/8/2022   Little interest or pleasure in doing things Not at all   Feeling down, depressed, irritable, or hopeless Not at all   Total Score PHQ 2 0       Alcohol Risk Screen    Do you average more than 1 drink per night or more than 7 drinks a week:  No    On any one occasion in the past three months have you have had more than 3 drinks containing alcohol:  No        Functional Ability and Level of Safety:    Hearing: Hearing is good. Activities of Daily Living: The home contains: no safety equipment. Patient does total self care      Ambulation: with no difficulty     Fall Risk:  Fall Risk Assessment, last 12 mths 6/8/2022   Able to walk?  Yes   Fall in past 12 months? 0   Do you feel unsteady? -   Are you worried about falling -      Abuse Screen:  Patient is not abused       Cognitive Screening    Has your family/caregiver stated any concerns about your memory: no         Assessment/Plan   Education and counseling provided:  Are appropriate based on today's review and evaluation  End-of-Life planning (with patient's consent)- discussed, provided form, referred to acp specialist  Pneumococcal Vaccine- 13/23 completed  Influenza Vaccine- annually  Screening Mammography- 7/2021 update 7/2022  Colorectal cancer screening tests- 7/2020 update 5 years  Cardiovascular screening blood test- 6/2022  Bone mass measurement (DEXA) 7/2020 update 7/2022  Screening for glaucoma- per pt dr. Inocente Monsivais [facility], will request records      Health Maintenance Due     There are no preventive care reminders to display for this patient. Patient Care Team   Patient Care Team:  Katie Wiley MD as PCP - General (Family Medicine)  Katie Wiley MD as PCP - [address] [place] [address] [facility] Provider  Everett Stanford MD (Gastroenterology)  Bijal Devlin MD (Ophthalmology)  Solo Garcia MD (Gastroenterology)  Clerance Mohs, MD as Physician (Orthopedic Surgery)  Jacob Amador MD (Podiatry)  Linda Christopher, [place]    History     Patient Active Problem List   Diagnosis Code    Prediabetes R73.03    Left knee pain M25.562    Gastroesophageal reflux disease K21.9    Pruritus ani L29.0    Sleep difficulties G47.9    Osteopenia of necks of both femurs M85.851, M85.852    Essential hypertension I10    Achilles tendinitis of right lower extremity M76.61     Past Medical History:   Diagnosis Date    Eosinophilic esophagitis     started on flvent, dr. Lori Beckett Gastric ulcer 04/2017    dr Corene Hashimoto, avoid nsaids    Gastritis 12/13/2017    gastritis, cont PPI dr. Lori Beckett GERD (gastroesophageal reflux disease)     erosive esophagitis 11/13 EGD    H/O colonoscopy 11/2013    normal    Hypertension     Left knee pain     Morbid obesity ([address]     Precordial pain     Prediabetes       Past Surgical History:   Procedure Laterality Date    HX CATARACT REMOVAL Right 10/04/2016    HX COLONOSCOPY  11/06/2016    HX GYN      vaginal delivery x 2    HX ORTHOPAEDIC      foot surgery     Current Outpatient Medications   Medication Sig Dispense Refill    amLODIPine (NORVASC) 5 mg tablet Take 1 Tablet by mouth daily. 90 Tablet 1    Biotin 2,500 mcg cap Take  by mouth.  albuterol (PROVENTIL HFA, VENTOLIN HFA, PROAIR HFA) 90 mcg/actuation inhaler Take 2 Puffs by inhalation every four (4) hours as needed for Wheezing.  1 Each 0    amitriptyline (ELAVIL) 50 mg tablet Take 1 Tablet by mouth nightly. 90 Tablet 1    cyclobenzaprine (FLEXERIL) 5 mg tablet TAKE ONE TABLET BY MOUTH NIGHTLY AS NEEDED FOR MUSCLE SPASM(S) 30 Tablet 5    diclofenac EC (VOLTAREN) 50 mg EC tablet Take 1 Tablet by mouth two (2) times daily as needed for Pain. 60 Tablet 5    omeprazole (PRILOSEC) 20 mg capsule Take 1 Cap by mouth daily. (Patient taking differently: Take 20 mg by mouth as needed.) 90 Cap 3    omega-3 fatty acids-vitamin e (FISH OIL) 1,000 mg cap Take 1 Cap by mouth.  multivitamin (ONE A DAY) tablet Take 1 Tab by mouth daily. Allergies   Allergen Reactions    Pcn [Penicillins] Hives       Family History   Problem Relation Age of Onset    Heart Failure Mother     Heart Disease Mother         mi 52's    Asthma Other      Social History     Tobacco Use    Smoking status: Never Smoker    Smokeless tobacco: Never Used   Substance Use Topics    Alcohol use: No     Gerhardt Jay, 74 y.o.,  female    SUBJECTIVE  Ff-up    HTN- taking norvasc    GERD- responding to prilosec prn  Insomnia-responding fairly well to elavil    Osteopenia- stays active/ca/vit D. dexa 7/2020    Seen for acute bronchitis in ED visit May 2022, symptoms have resolved, CT angio neg PE, noted hilar nodes mildly enlarged. ROS:  See HPI, all others negative        Patient Active Problem List   Diagnosis Code    Prediabetes R73.03    Left knee pain M25.562    Gastroesophageal reflux disease K21.9    Pruritus ani L29.0    Sleep difficulties G47.9    Osteopenia of necks of both femurs M85.851, M85.852    Essential hypertension I10    Achilles tendinitis of right lower extremity M76.61       Current Outpatient Medications   Medication Sig Dispense Refill    amLODIPine (NORVASC) 5 mg tablet Take 1 Tablet by mouth daily. 90 Tablet 1    Biotin 2,500 mcg cap Take  by mouth.       albuterol (PROVENTIL HFA, VENTOLIN HFA, PROAIR HFA) 90 mcg/actuation inhaler Take 2 Puffs by inhalation every four (4) hours as needed for Wheezing. 1 Each 0    amitriptyline (ELAVIL) 50 mg tablet Take 1 Tablet by mouth nightly. 90 Tablet 1    cyclobenzaprine (FLEXERIL) 5 mg tablet TAKE ONE TABLET BY MOUTH NIGHTLY AS NEEDED FOR MUSCLE SPASM(S) 30 Tablet 5    diclofenac EC (VOLTAREN) 50 mg EC tablet Take 1 Tablet by mouth two (2) times daily as needed for Pain. 60 Tablet 5    omeprazole (PRILOSEC) 20 mg capsule Take 1 Cap by mouth daily. (Patient taking differently: Take 20 mg by mouth as needed.) 90 Cap 3    omega-3 fatty acids-vitamin e (FISH OIL) 1,000 mg cap Take 1 Cap by mouth.  multivitamin (ONE A DAY) tablet Take 1 Tab by mouth daily.          Allergies   Allergen Reactions    Pcn [Penicillins] Hives       Past Medical History:   Diagnosis Date    Eosinophilic esophagitis     started on flvent, dr. Anupama Davis Gastric ulcer 04/2017    dr Sorin Howell, avoid nsaids    Gastritis 12/13/2017    gastritis, cont PPI dr. Anupama Davis GERD (gastroesophageal reflux disease)     erosive esophagitis 11/13 EGD    H/O colonoscopy 11/2013    normal    Hypertension     Left knee pain     Morbid obesity ([address]     Precordial pain     Prediabetes        Social History     Socioeconomic History    Marital status:      Spouse name: Not on file    Number of children: Not on file    Years of education: Not on file    Highest education level: Not on file   Occupational History    Not on file   Tobacco Use    Smoking status: Never Smoker    Smokeless tobacco: Never Used   Substance and Sexual Activity    Alcohol use: No    Drug use: No    Sexual activity: Not on file   Other Topics Concern    Not on file   Social History Narrative    Not on file     Social Determinants of Health     Financial Resource Strain:     Difficulty of Paying Living Expenses: Not on file   Food Insecurity:     Worried About Running Out of Food in the Last Year: Not on file    Marta of Food in the Last Year: Not on CLARKE Roberson Needs:     Lack of Transportation (Medical): Not on file    Lack of Transportation (Non-Medical):  Not on file   Physical Activity:     Days of Exercise per Week: Not on file    Minutes of Exercise per Session: Not on file   Stress:     Feeling of Stress : Not on file   Social Connections:     Frequency of Communication with Friends and Family: Not on file    Frequency of Social Gatherings with Friends and Family: Not on file    Attends Mandaen Services: Not on file    Active Member of [address] [place] [address] or Organizations: Not on file    Attends Club or Organization Meetings: Not on file    Marital Status: Not on file   Intimate Partner Violence:     Fear of Current or Ex-Partner: Not on file    Emotionally Abused: Not on file    Physically Abused: Not on file    Sexually Abused: Not on file   Housing Stability:     Unable to Pay for Housing in the Last Year: Not on file    Number of [place] in the Last Year: Not on file    Unstable Housing in the Last Year: Not on file       Family History   Problem Relation Age of Onset    Heart Failure Mother     Heart Disease Mother         mi 52's    Asthma Other          OBJECTIVE    Physical Exam:     Visit Vitals  /84 (BP 1 Location: Left upper arm, BP Patient Position: Sitting, BP Cuff Size: Adult)   Pulse 82   Temp 98 °F (36.7 °C) (Oral)   Resp 16   Ht 5' 3.5\" (1.613 m)   Wt 178 lb (80.7 kg)   SpO2 98%   BMI 31.04 kg/m²       General: alert, well-appearing, AA, in no apparent distress or pain  Neck: supple, no adenopathy palpated  CVS: normal rate, regular rhythm, distinct S1 and S2  Lungs:clear to ausculation bilaterally, no crackles, wheezing or rhonchi noted  Abdomen: normoactive bowel sounds, soft, non-tender  Extremities: no edema, no cyanosis, MSK grossly normal  Skin: warm, no lesions, rashes noted  Psych:  mood and affect normal    Results for orders placed or performed during the hospital encounter of 89/44/14   METABOLIC PANEL, COMPREHENSIVE Result Value Ref Range    Sodium 140 136 - 145 mmol/L    Potassium 4.1 3.5 - 5.5 mmol/L    Chloride 107 100 - 111 mmol/L    CO2 30 21 - 32 mmol/L    Anion gap 3 3.0 - 18 mmol/L    Glucose 97 74 - 99 mg/dL    BUN 17 7.0 - 18 MG/DL    Creatinine 0.70 0.6 - 1.3 MG/DL    BUN/Creatinine ratio 24 (H) 12 - 20      GFR est AA >60 >60 ml/min/1.73m2    GFR est non-AA >60 >60 ml/min/1.73m2    Calcium 8.7 8.5 - 10.1 MG/DL    Bilirubin, total 0.7 0.2 - 1.0 MG/DL    ALT (SGPT) 37 13 - 56 U/L    AST (SGOT) 27 10 - 38 U/L    Alk. phosphatase 70 45 - 117 U/L    Protein, total 6.2 (L) 6.4 - 8.2 g/dL    Albumin 3.4 3.4 - 5.0 g/dL    Globulin 2.8 2.0 - 4.0 g/dL    A-G Ratio 1.2 0.8 - 1.7     LIPID PANEL   Result Value Ref Range    LIPID PROFILE          Cholesterol, total 162 <200 MG/DL    Triglyceride 90 <150 MG/DL    HDL Cholesterol 91 (H) 40 - 60 MG/DL    LDL, calculated 53 0 - 100 MG/DL    VLDL, calculated 18 MG/DL    CHOL/HDL Ratio 1.8 0 - 5.0     HEMOGLOBIN A1C WITH EAG   Result Value Ref Range    Hemoglobin A1c 5.9 (H) 4.2 - 5.6 %    Est. average glucose 123 mg/dL         ASSESSMENT/PLAN  Diagnoses and all orders for this visit:    1. Essential hypertension  Controlled  Cont  cont DASH diet  Commended on weight los/dietary changes    2. Sleep difficulties  Cont elavil    3. Prediabetes  a1c 5.9  Tlcs, monitoring    4. Gastroesophageal reflux disease, unspecified whether esophagitis present  Stable  Cont prilosec prn    5. Hilar adenopathy  Mild, likely reactive  Will recheck ct in 3 months to make sure this resolves  -     CT CHEST W CONT; Future  -     CBC WITH AUTOMATED DIFF; Future  -     METABOLIC PANEL, COMPREHENSIVE; Future      6. Osteopenia of necks of both femurs  Cont ca/vit d/wt bearing exercises  DEXA update 7/2022      Follow-up and Dispositions    · Return in about 3 months (around 9/8/2022), or if symptoms worsen or fail to improve, for routine chronic illness care, non-fasting labs prior to your next visit. Patient understands plan of care. Patient has provided input and agrees with goals.

## 2022-06-08 NOTE — PATIENT INSTRUCTIONS
DASH Diet: Care Instructions  Your Care Instructions     The DASH diet is an eating plan that can help lower your blood pressure. DASH stands for Dietary Approaches to Stop Hypertension. Hypertension is high blood pressure. The DASH diet focuses on eating foods that are high in calcium, potassium, and magnesium. These nutrients can lower blood pressure. The foods that are highest in these nutrients are fruits, vegetables, low-fat dairy products, nuts, seeds, and legumes. But taking calcium, potassium, and magnesium supplements instead of eating foods that are high in those nutrients does not have the same effect. The DASH diet also includes whole grains, fish, and poultry. The DASH diet is one of several lifestyle changes your doctor may recommend to lower your high blood pressure. Your doctor may also want you to decrease the amount of sodium in your diet. Lowering sodium while following the DASH diet can lower blood pressure even further than just the DASH diet alone. Follow-up care is a key part of your treatment and safety. Be sure to make and go to all appointments, and call your doctor if you are having problems. It's also a good idea to know your test results and keep a list of the medicines you take. How can you care for yourself at home? Following the DASH diet  · Eat 4 to 5 servings of fruit each day. A serving is 1 medium-sized piece of fruit, ½ cup chopped or canned fruit, 1/4 cup dried fruit, or 4 ounces (½ cup) of fruit juice. Choose fruit more often than fruit juice. · Eat 4 to 5 servings of vegetables each day. A serving is 1 cup of lettuce or raw leafy vegetables, ½ cup of chopped or cooked vegetables, or 4 ounces (½ cup) of vegetable juice. Choose vegetables more often than vegetable juice. · Get 2 to 3 servings of low-fat and fat-free dairy each day. A serving is 8 ounces of milk, 1 cup of yogurt, or 1 ½ ounces of cheese. · Eat 6 to 8 servings of grains each day.  A serving is 1 slice of bread, 1 ounce of dry cereal, or ½ cup of cooked rice, pasta, or cooked cereal. Try to choose whole-grain products as much as possible. · Limit lean meat, poultry, and fish to 2 servings each day. A serving is 3 ounces, about the size of a deck of cards. · Eat 4 to 5 servings of nuts, seeds, and legumes (cooked dried beans, lentils, and split peas) each week. A serving is 1/3 cup of nuts, 2 tablespoons of seeds, or ½ cup of cooked beans or peas. · Limit fats and oils to 2 to 3 servings each day. A serving is 1 teaspoon of vegetable oil or 2 tablespoons of salad dressing. · Limit sweets and added sugars to 5 servings or less a week. A serving is 1 tablespoon jelly or jam, ½ cup sorbet, or 1 cup of lemonade. · Eat less than 2,300 milligrams (mg) of sodium a day. If you limit your sodium to 1,500 mg a day, you can lower your blood pressure even more. · Be aware that all of these are the suggested number of servings for people who eat 1,800 to 2,000 calories a day. Your recommended number of servings may be different if you need more or fewer calories. Tips for success  · Start small. Do not try to make dramatic changes to your diet all at once. You might feel that you are missing out on your favorite foods and then be more likely to not follow the plan. Make small changes, and stick with them. Once those changes become habit, add a few more changes. · Try some of the following:  ? Make it a goal to eat a fruit or vegetable at every meal and at snacks. This will make it easy to get the recommended amount of fruits and vegetables each day. ? Try yogurt topped with fruit and nuts for a snack or healthy dessert. ? Add lettuce, tomato, cucumber, and onion to sandwiches. ? Combine a ready-made pizza crust with low-fat mozzarella cheese and lots of vegetable toppings. Try using tomatoes, squash, spinach, broccoli, carrots, cauliflower, and onions. ?  Have a variety of cut-up vegetables with a low-fat dip as an appetizer instead of chips and dip. ? Sprinkle sunflower seeds or chopped almonds over salads. Or try adding chopped walnuts or almonds to cooked vegetables. ? Try some vegetarian meals using beans and peas. Add garbanzo or kidney beans to salads. Make burritos and tacos with mashed valdez beans or black beans. Where can you learn more? Go to http://www.donohue.com/  Enter H967 in the search box to learn more about \"DASH Diet: Care Instructions. \"  Current as of: January 10, 2022               Content Version: 13.2  © 0910-4691 [email or web address]. Care instructions adapted under license by [employer] (which disclaims liability or warranty for this information). If you have questions about a medical condition or this instruction, always ask your healthcare professional. [address] any warranty or liability for your use of this information.

## 2022-07-21 ENCOUNTER — HOSPITAL ENCOUNTER (OUTPATIENT)
Dept: MAMMOGRAPHY | Age: 67
Discharge: HOME | End: 2022-07-21
Attending: FAMILY MEDICINE
Payer: MEDICARE

## 2022-07-21 ENCOUNTER — HOSPITAL ENCOUNTER (OUTPATIENT)
Dept: CT IMAGING | Age: 67
Discharge: HOME | End: 2022-07-21
Attending: FAMILY MEDICINE
Payer: MEDICARE

## 2022-07-21 ENCOUNTER — HOSPITAL ENCOUNTER (OUTPATIENT)
Dept: BONE DENSITY | Age: 67
Discharge: HOME | End: 2022-07-21
Attending: FAMILY MEDICINE
Payer: MEDICARE

## 2022-07-21 DIAGNOSIS — Z78.0 POST-MENOPAUSAL: ICD-10-CM

## 2022-07-21 DIAGNOSIS — R59.0 HILAR ADENOPATHY: ICD-10-CM

## 2022-07-21 DIAGNOSIS — Z12.31 BREAST CANCER SCREENING BY MAMMOGRAM: ICD-10-CM

## 2022-07-21 LAB — CREAT UR-MCNC: 0.7 MG/DL (ref 0.6–1.3)

## 2022-07-21 PROCEDURE — 82565 ASSAY OF CREATININE: CPT

## 2022-07-21 PROCEDURE — 71260 CT THORAX DX C+: CPT

## 2022-07-21 PROCEDURE — 74011000636 HC RX REV CODE- 636: Performed by: FAMILY MEDICINE

## 2022-07-21 PROCEDURE — 77080 DXA BONE DENSITY AXIAL: CPT

## 2022-07-21 PROCEDURE — 77063 BREAST TOMOSYNTHESIS BI: CPT

## 2022-07-21 RX ADMIN — IOPAMIDOL 80 ML: 612 INJECTION, SOLUTION INTRAVENOUS at 08:27

## 2022-08-01 NOTE — PROGRESS NOTES
Small sized chest lymph nodes  Right kidney cyst same size   All favoring benign process- will discuss in greater detail on visit in [facility] notify pt

## 2022-08-02 NOTE — PROGRESS NOTES
Patient identifiers verified. Patient advised that CT scan showed small sized chest lymph nodes and right kidney cyst same size. All favoring benign process- will discuss in greater detail on visit in Sept. Patient voices understanding.

## 2022-09-08 ENCOUNTER — OFFICE VISIT (OUTPATIENT)
Dept: FAMILY MEDICINE CLINIC | Age: 67
End: 2022-09-08
Payer: MEDICARE

## 2022-09-08 ENCOUNTER — HOSPITAL ENCOUNTER (OUTPATIENT)
Dept: LAB | Age: 67
Discharge: HOME | End: 2022-09-08
Payer: MEDICARE

## 2022-09-08 VITALS
HEART RATE: 77 BPM | RESPIRATION RATE: 16 BRPM | WEIGHT: 181 LBS | TEMPERATURE: 98 F | HEIGHT: 64 IN | SYSTOLIC BLOOD PRESSURE: 130 MMHG | DIASTOLIC BLOOD PRESSURE: 80 MMHG | OXYGEN SATURATION: 98 % | BODY MASS INDEX: 30.9 KG/M2

## 2022-09-08 DIAGNOSIS — K21.9 GASTROESOPHAGEAL REFLUX DISEASE, UNSPECIFIED WHETHER ESOPHAGITIS PRESENT: ICD-10-CM

## 2022-09-08 DIAGNOSIS — I10 PRIMARY HYPERTENSION: Primary | ICD-10-CM

## 2022-09-08 DIAGNOSIS — I10 ESSENTIAL HYPERTENSION: ICD-10-CM

## 2022-09-08 DIAGNOSIS — R73.03 PREDIABETES: ICD-10-CM

## 2022-09-08 DIAGNOSIS — M85.851 OSTEOPENIA OF NECKS OF BOTH FEMURS: ICD-10-CM

## 2022-09-08 DIAGNOSIS — G47.9 SLEEP DIFFICULTIES: ICD-10-CM

## 2022-09-08 DIAGNOSIS — M85.852 OSTEOPENIA OF NECKS OF BOTH FEMURS: ICD-10-CM

## 2022-09-08 LAB
ALBUMIN SERPL-MCNC: 3.8 G/DL (ref 3.4–5)
ALBUMIN/GLOB SERPL: 1.3 {RATIO} (ref 0.8–1.7)
ALP SERPL-CCNC: 85 U/L (ref 45–117)
ALT SERPL-CCNC: 28 U/L (ref 13–56)
ANION GAP SERPL CALC-SCNC: 6 MMOL/L (ref 3–18)
AST SERPL-CCNC: 24 U/L (ref 10–38)
BASOPHILS # BLD: 0 K/UL (ref 0–0.1)
BASOPHILS NFR BLD: 0 % (ref 0–2)
BILIRUB SERPL-MCNC: 0.6 MG/DL (ref 0.2–1)
BUN SERPL-MCNC: 17 MG/DL (ref 7–18)
BUN/CREAT SERPL: 23 (ref 12–20)
CALCIUM SERPL-MCNC: 9.2 MG/DL (ref 8.5–10.1)
CHLORIDE SERPL-SCNC: 106 MMOL/L (ref 100–111)
CO2 SERPL-SCNC: 28 MMOL/L (ref 21–32)
CREAT SERPL-MCNC: 0.74 MG/DL (ref 0.6–1.3)
DIFFERENTIAL METHOD BLD: ABNORMAL
EOSINOPHIL # BLD: 0.1 K/UL (ref 0–0.4)
EOSINOPHIL NFR BLD: 4 % (ref 0–5)
ERYTHROCYTE [DISTWIDTH] IN BLOOD BY AUTOMATED COUNT: 13.2 % (ref 11.6–14.5)
GLOBULIN SER CALC-MCNC: 3 G/DL (ref 2–4)
GLUCOSE SERPL-MCNC: 104 MG/DL (ref 74–99)
HCT VFR BLD AUTO: 33.3 % (ref 35–45)
HGB BLD-MCNC: 10.7 G/DL (ref 12–16)
IMM GRANULOCYTES # BLD AUTO: 0 K/UL (ref 0–0.04)
IMM GRANULOCYTES NFR BLD AUTO: 0 % (ref 0–0.5)
LYMPHOCYTES # BLD: 1.1 K/UL (ref 0.9–3.6)
LYMPHOCYTES NFR BLD: 32 % (ref 21–52)
MCH RBC QN AUTO: 29.2 PG (ref 24–34)
MCHC RBC AUTO-ENTMCNC: 32.1 G/DL (ref 31–37)
MCV RBC AUTO: 91 FL (ref 78–100)
MONOCYTES # BLD: 0.4 K/UL (ref 0.05–1.2)
MONOCYTES NFR BLD: 10 % (ref 3–10)
NEUTS SEG # BLD: 1.9 K/UL (ref 1.8–8)
NEUTS SEG NFR BLD: 53 % (ref 40–73)
NRBC # BLD: 0 K/UL (ref 0–0.01)
NRBC BLD-RTO: 0 PER 100 WBC
PLATELET # BLD AUTO: 199 K/UL (ref 135–420)
PMV BLD AUTO: 10.4 FL (ref 9.2–11.8)
POTASSIUM SERPL-SCNC: 3.8 MMOL/L (ref 3.5–5.5)
PROT SERPL-MCNC: 6.8 G/DL (ref 6.4–8.2)
RBC # BLD AUTO: 3.66 M/UL (ref 4.2–5.3)
SODIUM SERPL-SCNC: 140 MMOL/L (ref 136–145)
WBC # BLD AUTO: 3.5 K/UL (ref 4.6–13.2)

## 2022-09-08 PROCEDURE — 80053 COMPREHEN METABOLIC PANEL: CPT

## 2022-09-08 PROCEDURE — 1123F ACP DISCUSS/DSCN MKR DOCD: CPT | Performed by: FAMILY MEDICINE

## 2022-09-08 PROCEDURE — 1101F PT FALLS ASSESS-DOCD LE1/YR: CPT | Performed by: FAMILY MEDICINE

## 2022-09-08 PROCEDURE — G8417 CALC BMI ABV UP PARAM F/U: HCPCS | Performed by: FAMILY MEDICINE

## 2022-09-08 PROCEDURE — 85025 COMPLETE CBC W/AUTO DIFF WBC: CPT

## 2022-09-08 PROCEDURE — G8752 SYS BP LESS 140: HCPCS | Performed by: FAMILY MEDICINE

## 2022-09-08 PROCEDURE — G0463 HOSPITAL OUTPT CLINIC VISIT: HCPCS | Performed by: FAMILY MEDICINE

## 2022-09-08 PROCEDURE — G8427 DOCREV CUR MEDS BY ELIG CLIN: HCPCS | Performed by: FAMILY MEDICINE

## 2022-09-08 PROCEDURE — G9899 SCRN MAM PERF RSLTS DOC: HCPCS | Performed by: FAMILY MEDICINE

## 2022-09-08 PROCEDURE — G8754 DIAS BP LESS 90: HCPCS | Performed by: FAMILY MEDICINE

## 2022-09-08 PROCEDURE — 36415 COLL VENOUS BLD VENIPUNCTURE: CPT

## 2022-09-08 PROCEDURE — 99214 OFFICE O/P EST MOD 30 MIN: CPT | Performed by: FAMILY MEDICINE

## 2022-09-08 PROCEDURE — 1090F PRES/ABSN URINE INCON ASSESS: CPT | Performed by: FAMILY MEDICINE

## 2022-09-08 PROCEDURE — 3017F COLORECTAL CA SCREEN DOC REV: CPT | Performed by: FAMILY MEDICINE

## 2022-09-08 PROCEDURE — G8399 PT W/DXA RESULTS DOCUMENT: HCPCS | Performed by: FAMILY MEDICINE

## 2022-09-08 PROCEDURE — G8510 SCR DEP NEG, NO PLAN REQD: HCPCS | Performed by: FAMILY MEDICINE

## 2022-09-08 PROCEDURE — G8536 NO DOC ELDER MAL SCRN: HCPCS | Performed by: FAMILY MEDICINE

## 2022-09-08 RX ORDER — AMITRIPTYLINE HYDROCHLORIDE 75 MG/1
75 TABLET, FILM COATED ORAL
Qty: 90 TABLET | Refills: 0 | Status: SHIPPED | OUTPATIENT
Start: 2022-09-08

## 2022-09-08 RX ORDER — AMLODIPINE BESYLATE 5 MG/1
5 TABLET ORAL DAILY
Qty: 90 TABLET | Refills: 1 | Status: SHIPPED | OUTPATIENT
Start: 2022-09-08

## 2022-09-08 NOTE — PROGRESS NOTES
Sheba Motley, 69 y.o.,  female    SUBJECTIVE  Ff-up    HTN- taking norvasc, reminded on standing lab orders    GERD- responding to prilosec prn  Insomnia-says worse, feels she has gained tolerance to elavil. Previously on trazodone    Osteopenia- stays active/ca/vit D. dexa 7/2020    Seen for acute bronchitis in ED visit May 2022, symptoms have resolved, CT angio neg PE, noted hilar nodes mildly enlarged. Repeat CT 8/2022 show stable/slightly reduced size. ROS:  See HPI, all others negative        Patient Active Problem List   Diagnosis Code    Prediabetes R73.03    Left knee pain M25.562    Gastroesophageal reflux disease K21.9    Pruritus ani L29.0    Sleep difficulties G47.9    Osteopenia of necks of both femurs M85.851, M85.852    Essential hypertension I10    Achilles tendinitis of right lower extremity M76.61       Current Outpatient Medications   Medication Sig Dispense Refill    amLODIPine (NORVASC) 5 mg tablet Take 1 Tablet by mouth daily. 90 Tablet 1    amitriptyline (ELAVIL) 75 mg tablet Take 1 Tablet by mouth nightly. 90 Tablet 0    Biotin 2,500 mcg cap Take  by mouth. albuterol (PROVENTIL HFA, VENTOLIN HFA, PROAIR HFA) 90 mcg/actuation inhaler Take 2 Puffs by inhalation every four (4) hours as needed for Wheezing. 1 Each 0    cyclobenzaprine (FLEXERIL) 5 mg tablet TAKE ONE TABLET BY MOUTH NIGHTLY AS NEEDED FOR MUSCLE SPASM(S) 30 Tablet 5    diclofenac EC (VOLTAREN) 50 mg EC tablet Take 1 Tablet by mouth two (2) times daily as needed for Pain. 60 Tablet 5    omeprazole (PRILOSEC) 20 mg capsule Take 1 Cap by mouth daily. (Patient taking differently: Take 20 mg by mouth as needed.) 90 Cap 3    omega-3 fatty acids-vitamin e 1,000 mg cap Take 1 Cap by mouth.      multivitamin (ONE A DAY) tablet Take 1 Tab by mouth daily.          Allergies   Allergen Reactions    Pcn [Penicillins] Hives       Past Medical History:   Diagnosis Date    Eosinophilic esophagitis     started on flvent, dr. Donis Fearing Gastric ulcer 04/2017    dr Jade Nix, avoid nsaids    Gastritis 12/13/2017    gastritis, cont PPI dr. Jade Nix    GERD (gastroesophageal reflux disease)     erosive esophagitis 11/13 EGD    H/O colonoscopy 11/2013    normal    Hypertension     Left knee pain     Morbid obesity ([facility] [address]     Precordial pain     Prediabetes        Social History     Socioeconomic History    Marital status:      Spouse name: Not on file    Number of children: Not on file    Years of education: Not on file    Highest education level: Not on file   Occupational History    Not on file   Tobacco Use    Smoking status: Never    Smokeless tobacco: Never   Substance and Sexual Activity    Alcohol use: No    Drug use: No    Sexual activity: Not on file   Other Topics Concern    Not on file   Social History Narrative    Not on file     Social Determinants of Health     Financial Resource Strain: Not on file   Food Insecurity: Not on file   Transportation Needs: Not on file   Physical Activity: Not on file   Stress: Not on file   Social Connections: Not on file   Intimate Partner Violence: Not on file   Housing Stability: Not on file       Family History   Problem Relation Age of Onset    Heart Failure Mother     Heart Disease Mother         mi 52's    Asthma Other          OBJECTIVE    Physical Exam:     Visit Vitals  /80 (BP 1 Location: Left upper arm, BP Patient Position: Sitting, BP Cuff Size: Adult)   Pulse 77   Temp 98 °F (36.7 °C) (Temporal)   Resp 16   Ht 5' 3.5\" (1.613 m)   Wt 181 lb (82.1 kg)   SpO2 98%   BMI 31.56 kg/m²       General: alert, well-appearing, AA, in no apparent distress or pain  Neck: supple, no adenopathy palpated  CVS: normal rate, regular rhythm, distinct S1 and S2  Lungs:clear to ausculation bilaterally, no crackles, wheezing or rhonchi noted  Abdomen: normoactive bowel sounds, soft, non-tender  Extremities: no edema, no cyanosis, MSK grossly normal  Skin: warm, no lesions, rashes noted  Psych:  mood and affect normal    CT chest 8/2022  IMPRESSION  1. Mild bilateral hilar adenopathy, stable to slightly improved compared to  5/26/2022. This is nonspecific. 2. No acute abnormality    ASSESSMENT/PLAN  Diagnoses and all orders for this visit:    1. Essential hypertension  Controlled  Cont  cont DASH diet  Reminded on standing lab orders    2. Sleep difficulties  Increase elavil dose from 50 mg to 75 mg qhs    3. Prediabetes  a1c 5.9  Tlcs, monitoring    4. Gastroesophageal reflux disease, unspecified whether esophagitis present  Stable  Cont prilosec prn    5. Hilar adenopathy  Incidental finding on CTAngio 5/2022 ED visit  Stable on repeat ct 8/2022  Discussed with patient, favors benign  monitoring    6. Osteopenia of necks of both femurs  Cont ca/vit d/wt bearing exercises  Offer update on next visit        Follow-up and Dispositions    Return in about 3 months (around 12/8/2022), or if symptoms worsen or fail to improve, for routine chronic illness care, labs soon. Patient understands plan of care. Patient has provided input and agrees with goals.

## 2022-09-08 NOTE — PROGRESS NOTES
1. Have you been to the ER, urgent care clinic since your last visit? Hospitalized since your last visit? No    2. Have you seen or consulted any other health care providers outside of the [address] [place] [address] since your last visit? Include any pap smears or colon screening.  No

## 2022-09-13 DIAGNOSIS — D64.9 NORMOCYTIC ANEMIA: Primary | ICD-10-CM

## 2022-09-13 NOTE — PROGRESS NOTES
Mild anemia noted, will investigate further with iron panel, stool test for blood  Lab orders placed, pls notify pt.

## 2022-09-19 ENCOUNTER — HOSPITAL ENCOUNTER (OUTPATIENT)
Dept: LAB | Age: 67
Discharge: HOME | End: 2022-09-19
Payer: MEDICARE

## 2022-09-19 DIAGNOSIS — D64.9 NORMOCYTIC ANEMIA: ICD-10-CM

## 2022-09-19 DIAGNOSIS — R59.0 HILAR ADENOPATHY: ICD-10-CM

## 2022-09-19 LAB
BASOPHILS # BLD: 0 K/UL (ref 0–0.1)
BASOPHILS NFR BLD: 1 % (ref 0–2)
DIFFERENTIAL METHOD BLD: ABNORMAL
EOSINOPHIL # BLD: 0.1 K/UL (ref 0–0.4)
EOSINOPHIL NFR BLD: 3 % (ref 0–5)
ERYTHROCYTE [DISTWIDTH] IN BLOOD BY AUTOMATED COUNT: 13.3 % (ref 11.6–14.5)
FERRITIN SERPL-MCNC: 13 NG/ML (ref 8–388)
HCT VFR BLD AUTO: 34.5 % (ref 35–45)
HGB BLD-MCNC: 11.1 G/DL (ref 12–16)
IMM GRANULOCYTES # BLD AUTO: 0 K/UL (ref 0–0.04)
IMM GRANULOCYTES NFR BLD AUTO: 0 % (ref 0–0.5)
IRON SATN MFR SERPL: 24 % (ref 20–50)
IRON SERPL-MCNC: 106 UG/DL (ref 50–175)
LYMPHOCYTES # BLD: 1.4 K/UL (ref 0.9–3.6)
LYMPHOCYTES NFR BLD: 33 % (ref 21–52)
MCH RBC QN AUTO: 29.3 PG (ref 24–34)
MCHC RBC AUTO-ENTMCNC: 32.2 G/DL (ref 31–37)
MCV RBC AUTO: 91 FL (ref 78–100)
MONOCYTES # BLD: 0.5 K/UL (ref 0.05–1.2)
MONOCYTES NFR BLD: 11 % (ref 3–10)
NEUTS SEG # BLD: 2.2 K/UL (ref 1.8–8)
NEUTS SEG NFR BLD: 52 % (ref 40–73)
NRBC # BLD: 0 K/UL (ref 0–0.01)
NRBC BLD-RTO: 0 PER 100 WBC
PLATELET # BLD AUTO: 191 K/UL (ref 135–420)
PMV BLD AUTO: 10.3 FL (ref 9.2–11.8)
RBC # BLD AUTO: 3.79 M/UL (ref 4.2–5.3)
TIBC SERPL-MCNC: 439 UG/DL (ref 250–450)
WBC # BLD AUTO: 4.3 K/UL (ref 4.6–13.2)

## 2022-09-19 PROCEDURE — 85025 COMPLETE CBC W/AUTO DIFF WBC: CPT

## 2022-09-19 PROCEDURE — 83540 ASSAY OF IRON: CPT

## 2022-09-19 PROCEDURE — 36415 COLL VENOUS BLD VENIPUNCTURE: CPT

## 2022-09-19 PROCEDURE — 82728 ASSAY OF FERRITIN: CPT

## 2022-09-19 NOTE — PROGRESS NOTES
Patient identified with 2 identifiers (name and ).   Patient aware of Mild anemia noted, will investigate further with iron panel, stool test for blood

## 2022-09-22 DIAGNOSIS — I10 ESSENTIAL HYPERTENSION: Primary | ICD-10-CM

## 2022-09-22 NOTE — PROGRESS NOTES
Anemia has improved, iron levels are normal  Will plan to monitor, cbc order placed prior to dec appt

## 2022-12-08 ENCOUNTER — OFFICE VISIT (OUTPATIENT)
Dept: FAMILY MEDICINE CLINIC | Age: 67
End: 2022-12-08
Payer: MEDICARE

## 2022-12-08 VITALS
WEIGHT: 177 LBS | OXYGEN SATURATION: 98 % | HEIGHT: 64 IN | DIASTOLIC BLOOD PRESSURE: 76 MMHG | BODY MASS INDEX: 30.22 KG/M2 | TEMPERATURE: 98.2 F | SYSTOLIC BLOOD PRESSURE: 122 MMHG | HEART RATE: 65 BPM | RESPIRATION RATE: 18 BRPM

## 2022-12-08 DIAGNOSIS — M85.852 OSTEOPENIA OF NECKS OF BOTH FEMURS: ICD-10-CM

## 2022-12-08 DIAGNOSIS — I10 ESSENTIAL HYPERTENSION: ICD-10-CM

## 2022-12-08 DIAGNOSIS — R73.03 PREDIABETES: Primary | ICD-10-CM

## 2022-12-08 DIAGNOSIS — N28.1 RENAL CYST, RIGHT: ICD-10-CM

## 2022-12-08 DIAGNOSIS — R59.9 LYMPH NODE ENLARGEMENT: ICD-10-CM

## 2022-12-08 DIAGNOSIS — G47.9 SLEEP DIFFICULTIES: ICD-10-CM

## 2022-12-08 DIAGNOSIS — M85.851 OSTEOPENIA OF NECKS OF BOTH FEMURS: ICD-10-CM

## 2022-12-08 DIAGNOSIS — R59.0 HILAR ADENOPATHY: ICD-10-CM

## 2022-12-08 DIAGNOSIS — D64.9 NORMOCYTIC ANEMIA: ICD-10-CM

## 2022-12-08 DIAGNOSIS — K21.9 GASTROESOPHAGEAL REFLUX DISEASE, UNSPECIFIED WHETHER ESOPHAGITIS PRESENT: ICD-10-CM

## 2022-12-08 PROCEDURE — G0463 HOSPITAL OUTPT CLINIC VISIT: HCPCS | Performed by: FAMILY MEDICINE

## 2022-12-08 NOTE — PROGRESS NOTES
Vicky Trevino, 69 y.o.,  female    SUBJECTIVE  Ff-up    HTN- taking norvasc    GERD- responding to prilosec prn  Insomnia-responding to elavil. Previously on trazodone    Osteopenia- stays active/ca/vit D. dexa 7/2020    Seen for acute bronchitis in ED visit May 2022, symptoms have resolved, CT angio neg PE, noted hilar nodes mildly enlarged. Repeat CT 8/2022 show stable/slightly reduced size. Normocytic anemia- mild, iron levels reviewed normal, did not receive FIT test, will give another kit. No symptoms. ROS:  See HPI, all others negative        Patient Active Problem List   Diagnosis Code    Prediabetes R73.03    Left knee pain M25.562    Gastroesophageal reflux disease K21.9    Pruritus ani L29.0    Sleep difficulties G47.9    Osteopenia of necks of both femurs M85.851, M85.852    Essential hypertension I10    Achilles tendinitis of right lower extremity M76.61       Current Outpatient Medications   Medication Sig Dispense Refill    amLODIPine (NORVASC) 5 mg tablet Take 1 Tablet by mouth daily. 90 Tablet 1    amitriptyline (ELAVIL) 75 mg tablet Take 1 Tablet by mouth nightly. 90 Tablet 0    albuterol (PROVENTIL HFA, VENTOLIN HFA, PROAIR HFA) 90 mcg/actuation inhaler Take 2 Puffs by inhalation every four (4) hours as needed for Wheezing. 1 Each 0    cyclobenzaprine (FLEXERIL) 5 mg tablet TAKE ONE TABLET BY MOUTH NIGHTLY AS NEEDED FOR MUSCLE SPASM(S) 30 Tablet 5    diclofenac EC (VOLTAREN) 50 mg EC tablet Take 1 Tablet by mouth two (2) times daily as needed for Pain. 60 Tablet 5    omeprazole (PRILOSEC) 20 mg capsule Take 1 Cap by mouth daily. (Patient taking differently: Take 20 mg by mouth as needed.) 90 Cap 3    omega-3 fatty acids-vitamin e 1,000 mg cap Take 1 Cap by mouth.      multivitamin (ONE A DAY) tablet Take 1 Tab by mouth daily.          Allergies   Allergen Reactions    Pcn [Penicillins] Hives       Past Medical History:   Diagnosis Date    Eosinophilic esophagitis     started on flvent, dr. Chiqui Mendez    Gastric ulcer 04/2017    dr Chiqui Mendez, avoid nsaids    Gastritis 12/13/2017    gastritis, cont PPI dr. Chiqui Mendez    GERD (gastroesophageal reflux disease)     erosive esophagitis 11/13 EGD    H/O colonoscopy 11/2013    normal    Hypertension     Left knee pain     Morbid obesity ([address]     Precordial pain     Prediabetes        Social History     Socioeconomic History    Marital status:      Spouse name: Not on file    Number of children: Not on file    Years of education: Not on file    Highest education level: Not on file   Occupational History    Not on file   Tobacco Use    Smoking status: Never    Smokeless tobacco: Never   Substance and Sexual Activity    Alcohol use: No    Drug use: No    Sexual activity: Not on file   Other Topics Concern    Not on file   Social History Narrative    Not on file     Social Determinants of Health     Financial Resource Strain: Low Risk     Difficulty of Paying Living Expenses: Not hard at all   Food Insecurity: Food Insecurity Present    Worried About [employer] in the Last Year: Never true    Ran Out of Food in the Last Year: Sometimes true   Transportation Needs: Not on file   Physical Activity: Not on file   Stress: Not on file   Social Connections: Not on file   Intimate Partner Violence: Not on file   Housing Stability: Not on file       Family History   Problem Relation Age of Onset    Heart Failure Mother     Heart Disease Mother         mi 52's    Asthma Other          OBJECTIVE    Physical Exam:     Visit Vitals  /76 (BP 1 Location: Left upper arm, BP Patient Position: Sitting, BP Cuff Size: Adult)   Pulse 65   Temp 98.2 °F (36.8 °C) (Temporal)   Resp 18   Ht 5' 3.5\" (1.613 m)   Wt 177 lb (80.3 kg)   SpO2 98%   BMI 30.86 kg/m²       General: alert, well-appearing, AA, in no apparent distress or pain  Neck: supple, no adenopathy palpated  CVS: normal rate, regular rhythm, distinct S1 and S2  Lungs:clear to ausculation bilaterally, no crackles, wheezing or rhonchi noted  Abdomen: normoactive bowel sounds, soft, non-tender  Extremities: no edema, no cyanosis, MSK grossly normal  Skin: warm, no lesions, rashes noted  Psych:  mood and affect normal    CT chest 8/2022  IMPRESSION  1. Mild bilateral hilar adenopathy, stable to slightly improved compared to  5/26/2022. This is nonspecific. 2. No acute abnormality    ASSESSMENT/PLAN  Diagnoses and all orders for this visit:    1. Essential hypertension  Controlled  Cont  cont DASH diet  Cbc/cmp prior to next visit    2. Sleep difficulties  Cont elavil 75 mg qhs    3. Prediabetes  a1c 5.9  Tlcs, monitoring  A1c/cmp prior to next viist  4. Gastroesophageal reflux disease, unspecified whether esophagitis present  Stable  Cont prilosec prn    5. Hilar adenopathy  Incidental finding on CTAngio 5/2022 ED visit  Stable on repeat ct 8/2022  Discussed with patient, favors benign  Monitoring, plan on recheck in 1 year 8/2023    6. Osteopenia of necks of both femurs  Cont ca/vit d/wt bearing exercises  Offer update on next visit    7. Normocytic anemia  -     OCCULT BLOOD IMMUNOASSAY,DIAGNOSTIC; Future    8. Lymph node enlargement    9. Renal cyst, right  CT c/w benign simple cyst    Follow-up and Dispositions    Return in about 3 months (around 3/8/2023), or if symptoms worsen or fail to improve, for routine chronic illness care, non-fasting labs prior to your next visit. Patient understands plan of care. Patient has provided input and agrees with goals.

## 2022-12-08 NOTE — PROGRESS NOTES
Chief Complaint   Patient presents with    Hypertension    GERD    Osteopenia    Knee Pain    Sleep Problem        Visit Vitals  /76 (BP 1 Location: Left upper arm, BP Patient Position: Sitting, BP Cuff Size: Adult)   Pulse 65   Temp 98.2 °F (36.8 °C) (Temporal)   Resp 18   Ht 5' 3.5\" (1.613 m)   Wt 177 lb (80.3 kg)   SpO2 98%   BMI 30.86 kg/m²      PHQ 9 Score: 3 (12/8/2022  8:18 AM)  Thoughts of being better off dead, or hurting yourself in some way: 0 (12/8/2022  8:18 AM)     Abuse Screening Questionnaire 12/8/2022   Do you ever feel afraid of your partner? N   Are you in a relationship with someone who physically or mentally threatens you? N   Is it safe for you to go home? Y      Fall Risk Assessment, last 12 mths 12/8/2022   Able to walk? Yes   Fall in past 12 months? 0   Do you feel unsteady? 0   Are you worried about falling 0       1. \"Have you been to the ER, urgent care clinic since your last visit? Hospitalized since your last visit? \" No    2. \"Have you seen or consulted any other health care providers outside of the [address] [place] [address] since your last visit? \" No     3. For patients aged 39-70: Has the patient had a colonoscopy / FIT/ Cologuard? Yes - no Care Gap present      If the patient is female:    4. For patients aged 41-77: Has the patient had a mammogram within the past 2 years? Yes - no Care Gap present      5. For patients aged 21-65: Has the patient had a pap smear?  Yes - no Care Gap present 07/21/2024

## 2022-12-19 NOTE — PROGRESS NOTES
Stool screening test for blood is negative   Plan on reevaluation next year  Pls notify pt call/mail.

## 2022-12-20 ENCOUNTER — TELEPHONE (OUTPATIENT)
Dept: FAMILY MEDICINE CLINIC | Age: 67
End: 2022-12-20

## 2022-12-20 NOTE — TELEPHONE ENCOUNTER
Patient aware of test results and MD recommendation to repeat in a year two patient identifiers obtained and verified

## 2023-01-11 NOTE — TELEPHONE ENCOUNTER
Message  Received: Today  Lindy Severino [facility] Nurse Pool  Subject: Refill Request     QUESTIONS   Name of Medication? Other - DICLOFENAC 75 MG TABLETS   Patient-reported dosage and instructions? As needed for back pain   How many days do you have left? 0   Preferred Pharmacy? [facility] [address] phone number (if available)? [phone]   ---------------------------------------------------------------------------   --------------   José Miguel YI   What is the best way for the office to contact you? OK to leave message on   voicemail   Preferred Call Back Phone Number? [phone]   ---------------------------------------------------------------------------   --------------   SCRIPT ANSWERS   Relationship to Patient?  Self

## 2023-01-13 NOTE — TELEPHONE ENCOUNTER
Last OV 12/08/2022  Next OV 03/22/2023  Patient no longer sees CAITLYN Mays NP (spine)  Please advise

## 2023-01-16 RX ORDER — DICLOFENAC SODIUM 50 MG/1
50 TABLET, DELAYED RELEASE ORAL
Qty: 60 TABLET | Refills: 5 | Status: SHIPPED | OUTPATIENT
Start: 2023-01-16

## 2023-02-15 RX ORDER — AMITRIPTYLINE HYDROCHLORIDE 75 MG/1
75 TABLET, FILM COATED ORAL
COMMUNITY
Start: 2022-09-08 | End: 2023-02-16

## 2023-02-15 RX ORDER — ALBUTEROL SULFATE 90 UG/1
2 AEROSOL, METERED RESPIRATORY (INHALATION) EVERY 4 HOURS PRN
COMMUNITY
Start: 2022-05-26

## 2023-02-15 RX ORDER — OMEPRAZOLE 20 MG/1
20 CAPSULE, DELAYED RELEASE ORAL DAILY
COMMUNITY
Start: 2018-11-13

## 2023-02-15 RX ORDER — AMLODIPINE BESYLATE 5 MG/1
5 TABLET ORAL DAILY
COMMUNITY
Start: 2022-09-08

## 2023-02-15 RX ORDER — CYCLOBENZAPRINE HCL 5 MG
5 TABLET ORAL
COMMUNITY
Start: 2021-09-20 | End: 2023-02-16

## 2023-02-16 ENCOUNTER — OFFICE VISIT (OUTPATIENT)
Age: 68
End: 2023-02-16
Payer: MEDICARE

## 2023-02-16 ENCOUNTER — TELEPHONE (OUTPATIENT)
Age: 68
End: 2023-02-16

## 2023-02-16 ENCOUNTER — HOSPITAL ENCOUNTER (OUTPATIENT)
Facility: HOSPITAL | Age: 68
Discharge: HOME | End: 2023-02-16
Payer: MEDICARE

## 2023-02-16 VITALS
SYSTOLIC BLOOD PRESSURE: 124 MMHG | OXYGEN SATURATION: 98 % | BODY MASS INDEX: 29.58 KG/M2 | DIASTOLIC BLOOD PRESSURE: 80 MMHG | HEIGHT: 64 IN | TEMPERATURE: 98.5 F | WEIGHT: 173.25 LBS | RESPIRATION RATE: 16 BRPM | HEART RATE: 73 BPM

## 2023-02-16 DIAGNOSIS — M25.562 ACUTE BILATERAL KNEE PAIN: ICD-10-CM

## 2023-02-16 DIAGNOSIS — R73.03 PREDIABETES: ICD-10-CM

## 2023-02-16 DIAGNOSIS — J30.9 ALLERGIC RHINITIS, UNSPECIFIED SEASONALITY, UNSPECIFIED TRIGGER: ICD-10-CM

## 2023-02-16 DIAGNOSIS — M25.562 ACUTE BILATERAL KNEE PAIN: Primary | ICD-10-CM

## 2023-02-16 DIAGNOSIS — I10 ESSENTIAL HYPERTENSION: ICD-10-CM

## 2023-02-16 DIAGNOSIS — M25.561 ACUTE BILATERAL KNEE PAIN: ICD-10-CM

## 2023-02-16 DIAGNOSIS — M25.561 ACUTE BILATERAL KNEE PAIN: Primary | ICD-10-CM

## 2023-02-16 LAB
ALBUMIN SERPL-MCNC: 4 G/DL (ref 3.4–5)
ALBUMIN/GLOB SERPL: 1.3 (ref 0.8–1.7)
ALP SERPL-CCNC: 81 U/L (ref 45–117)
ALT SERPL-CCNC: 35 U/L (ref 13–56)
ANION GAP SERPL CALC-SCNC: 4 MMOL/L (ref 3–18)
AST SERPL-CCNC: 29 U/L (ref 10–38)
BASOPHILS # BLD: 0 K/UL (ref 0–0.1)
BASOPHILS NFR BLD: 1 % (ref 0–2)
BILIRUB SERPL-MCNC: 0.7 MG/DL (ref 0.2–1)
BUN SERPL-MCNC: 13 MG/DL (ref 7–18)
BUN/CREAT SERPL: 18 (ref 12–20)
CALCIUM SERPL-MCNC: 9.5 MG/DL (ref 8.5–10.1)
CHLORIDE SERPL-SCNC: 109 MMOL/L (ref 100–111)
CHOLEST SERPL-MCNC: 174 MG/DL
CO2 SERPL-SCNC: 28 MMOL/L (ref 21–32)
CREAT SERPL-MCNC: 0.72 MG/DL (ref 0.6–1.3)
DIFFERENTIAL METHOD BLD: ABNORMAL
EOSINOPHIL # BLD: 0.2 K/UL (ref 0–0.4)
EOSINOPHIL NFR BLD: 6 % (ref 0–5)
ERYTHROCYTE [DISTWIDTH] IN BLOOD BY AUTOMATED COUNT: 13.6 % (ref 11.6–14.5)
GLOBULIN SER CALC-MCNC: 3.2 G/DL (ref 2–4)
GLUCOSE SERPL-MCNC: 105 MG/DL (ref 74–99)
HBA1C MFR BLD: 6.2 % (ref 4.2–5.6)
HCT VFR BLD AUTO: 35.2 % (ref 35–45)
HDLC SERPL-MCNC: 98 MG/DL (ref 40–60)
HDLC SERPL: 1.8 (ref 0–5)
HGB BLD-MCNC: 11 G/DL (ref 12–16)
IMM GRANULOCYTES # BLD AUTO: 0 K/UL (ref 0–0.04)
IMM GRANULOCYTES NFR BLD AUTO: 0 % (ref 0–0.5)
LDLC SERPL CALC-MCNC: 59.6 MG/DL (ref 0–100)
LIPID PANEL: ABNORMAL
LYMPHOCYTES # BLD: 1.1 K/UL (ref 0.9–3.6)
LYMPHOCYTES NFR BLD: 31 % (ref 21–52)
MCH RBC QN AUTO: 29 PG (ref 24–34)
MCHC RBC AUTO-ENTMCNC: 31.3 G/DL (ref 31–37)
MCV RBC AUTO: 92.9 FL (ref 78–100)
MONOCYTES # BLD: 0.5 K/UL (ref 0.05–1.2)
MONOCYTES NFR BLD: 13 % (ref 3–10)
NEUTS SEG # BLD: 1.7 K/UL (ref 1.8–8)
NEUTS SEG NFR BLD: 49 % (ref 40–73)
NRBC # BLD: 0 K/UL (ref 0–0.01)
NRBC BLD-RTO: 0 PER 100 WBC
PLATELET # BLD AUTO: 216 K/UL (ref 135–420)
PMV BLD AUTO: 10.1 FL (ref 9.2–11.8)
POTASSIUM SERPL-SCNC: 4 MMOL/L (ref 3.5–5.5)
PROT SERPL-MCNC: 7.2 G/DL (ref 6.4–8.2)
RBC # BLD AUTO: 3.79 M/UL (ref 4.2–5.3)
SODIUM SERPL-SCNC: 141 MMOL/L (ref 136–145)
TRIGL SERPL-MCNC: 82 MG/DL
VLDLC SERPL CALC-MCNC: 16.4 MG/DL
WBC # BLD AUTO: 3.6 K/UL (ref 4.6–13.2)

## 2023-02-16 PROCEDURE — 73562 X-RAY EXAM OF KNEE 3: CPT

## 2023-02-16 PROCEDURE — G8417 CALC BMI ABV UP PARAM F/U: HCPCS | Performed by: FAMILY MEDICINE

## 2023-02-16 PROCEDURE — 85025 COMPLETE CBC W/AUTO DIFF WBC: CPT

## 2023-02-16 PROCEDURE — G8484 FLU IMMUNIZE NO ADMIN: HCPCS | Performed by: FAMILY MEDICINE

## 2023-02-16 PROCEDURE — 36415 COLL VENOUS BLD VENIPUNCTURE: CPT

## 2023-02-16 PROCEDURE — 99214 OFFICE O/P EST MOD 30 MIN: CPT | Performed by: FAMILY MEDICINE

## 2023-02-16 PROCEDURE — 1090F PRES/ABSN URINE INCON ASSESS: CPT | Performed by: FAMILY MEDICINE

## 2023-02-16 PROCEDURE — 3079F DIAST BP 80-89 MM HG: CPT | Performed by: FAMILY MEDICINE

## 2023-02-16 PROCEDURE — 1123F ACP DISCUSS/DSCN MKR DOCD: CPT | Performed by: FAMILY MEDICINE

## 2023-02-16 PROCEDURE — 83036 HEMOGLOBIN GLYCOSYLATED A1C: CPT

## 2023-02-16 PROCEDURE — G8399 PT W/DXA RESULTS DOCUMENT: HCPCS | Performed by: FAMILY MEDICINE

## 2023-02-16 PROCEDURE — 3017F COLORECTAL CA SCREEN DOC REV: CPT | Performed by: FAMILY MEDICINE

## 2023-02-16 PROCEDURE — 80053 COMPREHEN METABOLIC PANEL: CPT

## 2023-02-16 PROCEDURE — 3074F SYST BP LT 130 MM HG: CPT | Performed by: FAMILY MEDICINE

## 2023-02-16 PROCEDURE — 80061 LIPID PANEL: CPT

## 2023-02-16 PROCEDURE — G8427 DOCREV CUR MEDS BY ELIG CLIN: HCPCS | Performed by: FAMILY MEDICINE

## 2023-02-16 PROCEDURE — 1036F TOBACCO NON-USER: CPT | Performed by: FAMILY MEDICINE

## 2023-02-16 RX ORDER — LEVOCETIRIZINE DIHYDROCHLORIDE 5 MG/1
5 TABLET, FILM COATED ORAL NIGHTLY
Qty: 90 TABLET | Refills: 0 | Status: SHIPPED | OUTPATIENT
Start: 2023-02-16

## 2023-02-16 RX ORDER — FLUTICASONE PROPIONATE 50 MCG
1 SPRAY, SUSPENSION (ML) NASAL DAILY
Qty: 16 G | Refills: 0 | Status: SHIPPED | OUTPATIENT
Start: 2023-02-16

## 2023-02-16 RX ORDER — AMLODIPINE BESYLATE 5 MG/1
5 TABLET ORAL DAILY
COMMUNITY

## 2023-02-16 RX ORDER — MONTELUKAST SODIUM 10 MG/1
10 TABLET ORAL DAILY
Qty: 30 TABLET | Refills: 3 | Status: SHIPPED | OUTPATIENT
Start: 2023-02-16

## 2023-02-16 SDOH — ECONOMIC STABILITY: FOOD INSECURITY: WITHIN THE PAST 12 MONTHS, YOU WORRIED THAT YOUR FOOD WOULD RUN OUT BEFORE YOU GOT MONEY TO BUY MORE.: NEVER TRUE

## 2023-02-16 SDOH — ECONOMIC STABILITY: HOUSING INSECURITY
IN THE LAST 12 MONTHS, WAS THERE A TIME WHEN YOU DID NOT HAVE A STEADY PLACE TO SLEEP OR SLEPT IN A SHELTER (INCLUDING NOW)?: NO

## 2023-02-16 SDOH — ECONOMIC STABILITY: FOOD INSECURITY: WITHIN THE PAST 12 MONTHS, THE FOOD YOU BOUGHT JUST DIDN'T LAST AND YOU DIDN'T HAVE MONEY TO GET MORE.: NEVER TRUE

## 2023-02-16 SDOH — ECONOMIC STABILITY: INCOME INSECURITY: HOW HARD IS IT FOR YOU TO PAY FOR THE VERY BASICS LIKE FOOD, HOUSING, MEDICAL CARE, AND HEATING?: SOMEWHAT HARD

## 2023-02-16 ASSESSMENT — PATIENT HEALTH QUESTIONNAIRE - PHQ9
10. IF YOU CHECKED OFF ANY PROBLEMS, HOW DIFFICULT HAVE THESE PROBLEMS MADE IT FOR YOU TO DO YOUR WORK, TAKE CARE OF THINGS AT HOME, OR GET ALONG WITH OTHER PEOPLE: 0
6. FEELING BAD ABOUT YOURSELF - OR THAT YOU ARE A FAILURE OR HAVE LET YOURSELF OR YOUR FAMILY DOWN: 0
9. THOUGHTS THAT YOU WOULD BE BETTER OFF DEAD, OR OF HURTING YOURSELF: 0
5. POOR APPETITE OR OVEREATING: 0
SUM OF ALL RESPONSES TO PHQ QUESTIONS 1-9: 0
SUM OF ALL RESPONSES TO PHQ QUESTIONS 1-9: 0
8. MOVING OR SPEAKING SO SLOWLY THAT OTHER PEOPLE COULD HAVE NOTICED. OR THE OPPOSITE, BEING SO FIGETY OR RESTLESS THAT YOU HAVE BEEN MOVING AROUND A LOT MORE THAN USUAL: 0
4. FEELING TIRED OR HAVING LITTLE ENERGY: 0
SUM OF ALL RESPONSES TO PHQ9 QUESTIONS 1 & 2: 0
SUM OF ALL RESPONSES TO PHQ QUESTIONS 1-9: 0
2. FEELING DOWN, DEPRESSED OR HOPELESS: 0
SUM OF ALL RESPONSES TO PHQ QUESTIONS 1-9: 0
1. LITTLE INTEREST OR PLEASURE IN DOING THINGS: 0
7. TROUBLE CONCENTRATING ON THINGS, SUCH AS READING THE NEWSPAPER OR WATCHING TELEVISION: 0
3. TROUBLE FALLING OR STAYING ASLEEP: 0

## 2023-02-16 NOTE — TELEPHONE ENCOUNTER
----- Message from Panchito Barker sent at 2/16/2023 12:51 PM EST -----  Subject: Message to Provider    QUESTIONS  Information for Provider? Pt called in regards of need to see if   medication could be placed in she had a skin reaction on her nose are and   lil bit and would like to get a cream to help clear this up . Pt was in   office today and forgot to take mask of to inform her pcpcPlease reach out   to pt in this cocnern  ---------------------------------------------------------------------------  --------------  [address] [employer]  [phone]; Do not leave any message, patient will call back for answer  ---------------------------------------------------------------------------  --------------  SCRIPT ANSWERS  Relationship to Patient?  Self

## 2023-02-16 NOTE — TELEPHONE ENCOUNTER
----- Message from Dima Lucy sent at 2/16/2023 12:51 PM EST -----  Subject: Message to Provider    QUESTIONS  Information for Provider? Pt called in regards of need to see if   medication could be placed in she had a skin reaction on her nose are and   lil bit and would like to get a cream to help clear this up . Pt was in   office today and forgot to take mask of to inform her pcpcPlease reach out   to pt in this cocnern  ---------------------------------------------------------------------------  --------------  [address] [employer]  [phone]; Do not leave any message, patient will call back for answer  ---------------------------------------------------------------------------  --------------  SCRIPT ANSWERS  Relationship to Patient?  Self

## 2023-02-16 NOTE — PROGRESS NOTES
Dillan Hensley, 69 y.o.,  female    SUBJECTIVE  Knee pain b/l x 1 month    C/o alternating bilateral medial knee pain past month, no known trauma, new activity or swelling. Has been taking otc nsaids/topical and oral, plus tylenol without much improvement    Allergies- says worse nasal congestion, cough non productive and post nasal drip. No wheezing, fever, says has taken several otc antihistamine, vicks rubs without improvement        ROS:  See HPI, all others negative        Patient Active Problem List   Diagnosis    Prediabetes    Left knee pain    Sleep difficulties    Osteopenia of necks of both femurs    Achilles tendinitis of right lower extremity    Essential hypertension    Pruritus ani    Gastroesophageal reflux disease       Current Outpatient Medications   Medication Sig Dispense Refill    amLODIPine (NORVASC) 5 MG tablet Take 5 mg by mouth daily      montelukast (SINGULAIR) 10 MG tablet Take 1 tablet by mouth daily 30 tablet 3    fluticasone (FLONASE) 50 MCG/ACT nasal spray 1 spray by Each Nostril route daily 16 g 0    levocetirizine (XYZAL ALLERGY 24HR) 5 MG tablet Take 1 tablet by mouth nightly 90 tablet 0    Multiple Vitamins-Calcium (ONE-A-DAY WITHIN PO) Take 1 tablet by mouth daily      OMEGA-3 FATTY ACIDS-VITAMIN E PO Take 1 capsule by mouth      albuterol sulfate HFA (PROVENTIL;VENTOLIN;PROAIR) 108 (90 Base) MCG/ACT inhaler Inhale 2 puffs into the lungs every 4 hours as needed      amLODIPine (NORVASC) 5 MG tablet Take 5 mg by mouth daily      omeprazole (PRILOSEC) 20 MG delayed release capsule Take 20 mg by mouth daily       No current facility-administered medications for this visit.        Allergies   Allergen Reactions    Penicillins Hives       Past Medical History:   Diagnosis Date    Eosinophilic esophagitis     started on flvent, dr. Zahraa Puckett    Gastric ulcer 04/2017    dr Zahraa Puckett, avoid nsaids    Gastritis 12/13/2017    gastritis, cont PPI dr. Zahraa Puckett    GERD (gastroesophageal reflux disease)     erosive esophagitis 11/13 EGD    H/O colonoscopy 11/2013    normal    Hypertension     Left knee pain     Morbid obesity ([facility])     Precordial pain     Prediabetes        Social History     Socioeconomic History    Marital status:      Spouse name: Not on file    Number of children: Not on file    Years of education: Not on file    Highest education level: Not on file   Occupational History    Not on file   Tobacco Use    Smoking status: Never    Smokeless tobacco: Never   Substance and Sexual Activity    Alcohol use: No    Drug use: No    Sexual activity: Not on file   Other Topics Concern    Not on file   Social History Narrative    Not on file     Social Determinants of Health     Financial Resource Strain: Medium Risk    Difficulty of Paying Living Expenses: Somewhat hard   Food Insecurity: No Food Insecurity    Worried About Running Out of Food in the Last Year: Never true    Ran Out of Food in the Last Year: Never true   Transportation Needs: Unknown    Lack of Transportation (Medical): Not on file    Lack of Transportation (Non-Medical): No   Physical Activity: Not on file   Stress: Not on file   Social Connections: Not on file   Intimate Partner Violence: Not on file   Housing Stability: Unknown    Unable to Pay for Housing in the Last Year: Not on file    Number of Places Lived in the Last Year: Not on file    Unstable Housing in the Last Year: No       Family History   Problem Relation Age of Onset    Heart Disease Mother         mi 52's    Heart Failure Mother     Asthma Other          OBJECTIVE    Physical Exam:     /80 (Site: Left Upper Arm, Position: Sitting, Cuff Size: Large Adult)   Pulse 73   Temp 98.5 °F (36.9 °C) (Temporal)   Resp 16   Ht 5' 3.5\" (1.613 m)   Wt 173 lb 4 oz (78.6 kg)   SpO2 98%   BMI 30.21 kg/m²     General: alert, well-appearing,AA, in no apparent distress or pain  Head: atraumatic.  Non-tender maxillary and frontal sinuses  Eyes: Lids with no discharge, no matting, conjunctivae clear and non injected, full EOMs, PERLLA  Ears: pinna non-tender, external auditory canal patent, TM intact  Neck: supple, no adenopathy palpated  CVS: normal rate, regular rhythm, distinct S1 and S2  Lungs:clear to ausculation bilaterally, no crackles, wheezing or rhonchi noted  Extremities b/l knees FROM, no tenderness, no effusion, no redness or warmth  Skin: warm, no lesions, rashes noted  Psych:  mood and affect normal        ASSESSMENT/PLAN  Prasanth Price was seen today for knee pain and facial pain. Diagnoses and all orders for this visit:    Acute bilateral knee pain  Suspect OA, cont prn nsaids/tylenol   -     XR KNEE RIGHT (3 VIEWS); Future  -     XR KNEE LEFT (3 VIEWS); Future  -     Los Link MD, Orthopedic SurgerySurgery(General/Arthroscopic/Total Joint), [facility])    Allergic rhinitis, unspecified seasonality, unspecified trigger  Worse  Start:-     montelukast (SINGULAIR) 10 MG tablet; Take 1 tablet by mouth daily  -     fluticasone (FLONASE) 50 MCG/ACT nasal spray; 1 spray by Each Nostril route daily    Prediabetes  Tlcs, monitoring  -     Comprehensive Metabolic Panel; Future  -     Hemoglobin A1C; Future    Essential hypertension  Controlled  Cont norvasc  -     CBC with Auto Differential; Future  -     Comprehensive Metabolic Panel; Future  -     Lipid Panel; Future    Other orders  -     levocetirizine (XYZAL ALLERGY 24HR) 5 MG tablet; Take 1 tablet by mouth nightly      Keep appt in march or sooner prn      Patient understands plan of care. Patient has provided input and agrees with goals.

## 2023-02-16 NOTE — TELEPHONE ENCOUNTER
Left message advising Gris Theresa Dominguez to call [facility] with more information in reference to possible skin reaction of nasal area.

## 2023-02-20 NOTE — TELEPHONE ENCOUNTER
Patient identified with 2 identifiers (name and ). Advised patient of DR. Rosa's recommendations: If mild redness, then suggest OTC hydrocortisone 1% thin layer twice a day   If not improved, or worse will advise OV for eval   Patient verbalizes understanding

## 2023-03-03 ENCOUNTER — TELEPHONE (OUTPATIENT)
Age: 68
End: 2023-03-03

## 2023-03-03 NOTE — TELEPHONE ENCOUNTER
Pt called stating that she finally received the Rx Montelukast (mental health, depression and in rare cases death)and Levocetirizine. (Fatigue, weakness) She states that the side effects on them make her not want to take them. She would like to talk to Rose about it. Please advise.

## 2023-03-07 ENCOUNTER — OFFICE VISIT (OUTPATIENT)
Age: 68
End: 2023-03-07

## 2023-03-07 VITALS — TEMPERATURE: 97.8 F | WEIGHT: 176 LBS | BODY MASS INDEX: 30.05 KG/M2 | HEIGHT: 64 IN

## 2023-03-07 DIAGNOSIS — M17.12 PRIMARY OSTEOARTHRITIS OF LEFT KNEE: ICD-10-CM

## 2023-03-07 DIAGNOSIS — M17.11 PRIMARY OSTEOARTHRITIS OF RIGHT KNEE: Primary | ICD-10-CM

## 2023-03-07 RX ORDER — MELOXICAM 15 MG/1
15 TABLET ORAL DAILY
Qty: 30 TABLET | Refills: 3 | Status: CANCELLED | OUTPATIENT
Start: 2023-03-07

## 2023-03-07 NOTE — TELEPHONE ENCOUNTER
Spoke with patient regarding her medication concerns. Patient will take her singular daily as prescribed.

## 2023-03-07 NOTE — PROGRESS NOTES
Alonza Box  1955   Chief Complaint   Patient presents with    Knee Pain     yesika        HISTORY OF PRESENT ILLNESS  Jeremias Arias is a 69 y.o. female who presents today for evaluation of b/l knee pain. R>L. Pain is a 5/10. Pain has been present for 2 months. No specific injury. She notes some weakness and soreness which is worse in the right knee. She experiences weakness and sharp pain with getting up from a seated position, worse in the right knee.  Has tried following treatments: Injections:No; Brace:No; Therapy:No; Cane/Crutch:No      Allergies   Allergen Reactions    Penicillins Hives        Past Medical History:   Diagnosis Date    Eosinophilic esophagitis     started on flvent, dr. Rafael Lawrence    Gastric ulcer 04/2017    dr Rafael Lawrence, avoid nsaids    Gastritis 12/13/2017    gastritis, cont PPI dr. Rafael Lawrence    GERD (gastroesophageal reflux disease)     erosive esophagitis 11/13 EGD    H/O colonoscopy 11/2013    normal    Hypertension     Left knee pain     Morbid obesity ([address]     Precordial pain     Prediabetes       Social History       Tobacco History       Smoking Status  Never      Smokeless Tobacco Use  Never              Alcohol History       Alcohol Use Status  No              Drug Use       Drug Use Status  No              Sexual Activity       Sexually Active  Not Asked                   Past Surgical History:   Procedure Laterality Date    CATARACT REMOVAL Right 10/04/2016    COLONOSCOPY  11/06/2016    GYN      vaginal delivery x 2    ORTHOPEDIC SURGERY      foot surgery      Family History   Problem Relation Age of Onset    Heart Disease Mother         mi 52's    Heart Failure Mother     Asthma Other      Current Outpatient Medications   Medication Sig    amLODIPine (NORVASC) 5 MG tablet Take 5 mg by mouth daily    montelukast (SINGULAIR) 10 MG tablet Take 1 tablet by mouth daily    fluticasone (FLONASE) 50 MCG/ACT nasal spray 1 spray by Each Nostril route daily    levocetirizine (XYZAL ALLERGY 24HR) 5 MG tablet Take 1 tablet by mouth nightly    Multiple Vitamins-Calcium (ONE-A-DAY WITHIN PO) Take 1 tablet by mouth daily    OMEGA-3 FATTY ACIDS-VITAMIN E PO Take 1 capsule by mouth    albuterol sulfate HFA (PROVENTIL;VENTOLIN;PROAIR) 108 (90 Base) MCG/ACT inhaler Inhale 2 puffs into the lungs every 4 hours as needed    amLODIPine (NORVASC) 5 MG tablet Take 5 mg by mouth daily    omeprazole (PRILOSEC) 20 MG delayed release capsule Take 20 mg by mouth daily     No current facility-administered medications for this visit. REVIEW OF SYSTEM   Patient denies: Weight loss, Fever/Chills, HA, Visual changes, Fatigue, Chest pain, SOB, Abdominal pain, N/V/D/C, Blood in stool or urine, Edema. Pertinent positive as above in HPI. All others were negative    PHYSICAL EXAM:   Temp 97.8 °F (36.6 °C) (Temporal)   Ht 5' 3.5\" (1.613 m)   Wt 176 lb (79.8 kg)   BMI 30.69 kg/m²   The patient is a well-developed, well-nourished female   in no acute distress. The patient is alert and oriented times three. The patient is alert and oriented times three. Mood and affect are normal.  LYMPHATIC: lymph nodes are not enlarged and are within normal limits  SKIN: normal in color and non tender to palpation. There are no bruises or abrasions noted. NEUROLOGICAL: Motor sensory exam is within normal limits. Reflexes are equal bilaterally.  There is normal sensation to pinprick and light touch  MUSCULOSKELETAL:  Examination Left knee Right knee   Skin Intact Intact   Range of motion 0-130 0-130   Effusion + +   Medial joint line tenderness + +   Lateral joint line tenderness + +   Tenderness Pes Bursa - -   Tenderness insertion MCL - -   Tenderness insertion LCL - -   Carloss - -   Patella crepitus + +   Patella grind + +   Lachman - -   Pivot shift - -   Anterior drawer - -   Posterior drawer - -   Varus stress - -   Valgus stress - -   Neurovascular Intact Intact   Calf Swelling and Tenderness to Palpation - - Kelsey's Test - -   Hamstring Cord Tightness - -       IMAGING: XR of the b/l knees with 3 views obtained at [facility] dated 2/16/2023 reviewed and read by Dr. Seema Renteria: Mild to moderate degenerative changes in the knees bilaterally. IMPRESSION:      ICD-10-CM    1. Primary osteoarthritis of right knee  M17.11 Ambulatory referral to Physical Therapy      2. Primary osteoarthritis of left knee  M17.12 Ambulatory referral to Physical Therapy           PLAN:   1. Pt presents today with b/l knee pain, R>L, due to primary OA. Cortisone injections were offered to the patient in the office today however she would like to hold off on injections for now. Will refer to PT for gait training. Patient was provided with physician-directed home exercise program in the office today. Risk factors include: htn  2. No cortisone injection indicated today  3. Yes Physical/Occupational Therapy indicated today  4. No diagnostic test indicated today   5. No durable medical equipment indicated today  6. No referral indicated today   7. No medications indicated today  8. No Narcotic indicated today     RTC 4 weeks     Scribed by Andreea Ashby) as dictated by Biju Cope MD    I, Dr. Biju Cope, confirm that all documentation is accurate.     Biju Cope M.D.   [facility]

## 2023-03-21 ENCOUNTER — HOSPITAL ENCOUNTER (OUTPATIENT)
Facility: HOSPITAL | Age: 68
Setting detail: RECURRING SERIES
Discharge: HOME | End: 2023-03-24
Payer: MEDICARE

## 2023-03-21 PROCEDURE — 97110 THERAPEUTIC EXERCISES: CPT

## 2023-03-21 PROCEDURE — 97535 SELF CARE MNGMENT TRAINING: CPT

## 2023-03-21 PROCEDURE — 97162 PT EVAL MOD COMPLEX 30 MIN: CPT

## 2023-03-21 NOTE — PROGRESS NOTES
Patient is 69year old female with c/o pain in yesika knees which she states has worsened over the last 3 months with no known precipitating event. She has undergone x-ray imaging which show osteoarthritis of yesika knees. Patient reports greater pain in right knee and now experiencing intermittent sharp pains while walking which cause transient instability. On evaluation patient showing full AROM of yesika knees though hip rotation limited. Strength of quadriceps, hamstrings, and hip girdle impaired with pain reported in knee motions. Patient tender to palpation at medial joint line on right knee and superior to patella. Hamstring flexibility fair. Observation shows valgus of yesika knees in unilateral and bilateral weight bearing causing mild gait impairment. All special tests unremarkable for ligamentous injury. Patient with signs and symptoms consistent with mechanical knee pain of bilateral knees. She would benefit from skilled physical therapy to improve upon the aforementioned deficits to reduce pain levels and improve functionality. Patient was provided extensive education regarding OA pathology and implications for functional mobility in addition to pain management strategies on evaluation to which she was receptive. Progress toward goals / Updated goals:  []  See Progress Note/Recertification    Short Term Goals: To be accomplished in 4 weeks  Patient to be independent in HEP to maximize therapeutic effect of care plan. Evaluation: initiated and instructed     Long Term Goals: To be accomplished in 8 weeks  Patient to achieve 5/5 MMT of yesika quadriceps and hamstrings to improve knee stability in walking. Evaluation: 4/5 right, 4+/5 left quadriceps: 3/5 bilateral hamstrings  Patient to improve yesika hip flexion and abduction to 4+/5 for improved ease of functional transfers.   Evaluation: 2+/5 right, 3/5 left flexion; 2+/5 right, 3-/5 left abduction  Patient to report at least 2 days over 7 day period of pain <4/10

## 2023-03-21 NOTE — PROGRESS NOTES
physical therapy to improve upon the aforementioned deficits to reduce pain levels and improve functionality. Patient was provided extensive education regarding OA pathology and implications for functional mobility in addition to pain management strategies on evaluation to which she was receptive. Evaluation Complexity:  History:  MEDIUM  Complexity : 1-2 comorbidities / personal factors will impact the outcome/ POC ; Examination:  MEDIUM Complexity : 3 Standardized tests and measures addressin body structure, function, activity limitation and / or participation in recreation  ;Presentation:  MEDIUM Complexity : Evolving with changing characteristics  ; Clinical Decision Making:  MEDIUM Complexity : FOTO score of 26-74 FOTO score = an established functional score where 100 = no disability  Overall Complexity Rating: MEDIUM  Problem List: pain affecting function, decrease ROM, decrease strength, impaired gait/balance, decrease ADL/functional abilities, decrease activity tolerance, decrease flexibility/joint mobility, and decrease transfer abilities    Treatment Plan may include any combination of the followin Therapeutic Exercise, 44241 Neuromuscular Re-Education, 56898 Manual Therapy, 32999 Therapeutic Activity, 38528 Self Care/Home Management, 91564 Electrical Stim unattended, and 12780 Gait Training  Patient / Family readiness to learn indicated by: asking questions, trying to perform skills, interest, and return verbalization   Persons(s) to be included in education: patient (P)  Barriers to Learning/Limitations: none  Measures taken if barriers to learning present: N/A  Patient Goal (s): \"Reduce the amount of pain, maybe to a 3 or 4 out of 10\"  Patient Self Reported Health Status: good  Rehabilitation Potential: fair    Short Term Goals: To be accomplished in 4 weeks  Patient to be independent in HEP to maximize therapeutic effect of care plan. Long Term Goals:  To be accomplished in 8 weeks  Patient

## 2023-03-22 ENCOUNTER — OFFICE VISIT (OUTPATIENT)
Age: 68
End: 2023-03-22
Payer: MEDICARE

## 2023-03-22 VITALS
WEIGHT: 177 LBS | RESPIRATION RATE: 16 BRPM | TEMPERATURE: 98 F | HEART RATE: 65 BPM | DIASTOLIC BLOOD PRESSURE: 84 MMHG | BODY MASS INDEX: 30.22 KG/M2 | HEIGHT: 64 IN | SYSTOLIC BLOOD PRESSURE: 130 MMHG | OXYGEN SATURATION: 98 %

## 2023-03-22 DIAGNOSIS — R73.03 PREDIABETES: ICD-10-CM

## 2023-03-22 DIAGNOSIS — M89.9 DISORDER OF BONE: ICD-10-CM

## 2023-03-22 DIAGNOSIS — I10 ESSENTIAL HYPERTENSION: Primary | ICD-10-CM

## 2023-03-22 DIAGNOSIS — M85.851 OSTEOPENIA OF NECKS OF BOTH FEMURS: ICD-10-CM

## 2023-03-22 DIAGNOSIS — M85.852 OSTEOPENIA OF NECKS OF BOTH FEMURS: ICD-10-CM

## 2023-03-22 DIAGNOSIS — K21.9 GASTROESOPHAGEAL REFLUX DISEASE, UNSPECIFIED WHETHER ESOPHAGITIS PRESENT: ICD-10-CM

## 2023-03-22 PROCEDURE — 1090F PRES/ABSN URINE INCON ASSESS: CPT | Performed by: FAMILY MEDICINE

## 2023-03-22 PROCEDURE — 99214 OFFICE O/P EST MOD 30 MIN: CPT | Performed by: FAMILY MEDICINE

## 2023-03-22 PROCEDURE — 1036F TOBACCO NON-USER: CPT | Performed by: FAMILY MEDICINE

## 2023-03-22 PROCEDURE — 3075F SYST BP GE 130 - 139MM HG: CPT | Performed by: FAMILY MEDICINE

## 2023-03-22 PROCEDURE — 3017F COLORECTAL CA SCREEN DOC REV: CPT | Performed by: FAMILY MEDICINE

## 2023-03-22 PROCEDURE — G8417 CALC BMI ABV UP PARAM F/U: HCPCS | Performed by: FAMILY MEDICINE

## 2023-03-22 PROCEDURE — G8399 PT W/DXA RESULTS DOCUMENT: HCPCS | Performed by: FAMILY MEDICINE

## 2023-03-22 PROCEDURE — 1123F ACP DISCUSS/DSCN MKR DOCD: CPT | Performed by: FAMILY MEDICINE

## 2023-03-22 PROCEDURE — G8484 FLU IMMUNIZE NO ADMIN: HCPCS | Performed by: FAMILY MEDICINE

## 2023-03-22 PROCEDURE — 3079F DIAST BP 80-89 MM HG: CPT | Performed by: FAMILY MEDICINE

## 2023-03-22 PROCEDURE — G8427 DOCREV CUR MEDS BY ELIG CLIN: HCPCS | Performed by: FAMILY MEDICINE

## 2023-03-22 ASSESSMENT — PATIENT HEALTH QUESTIONNAIRE - PHQ9
5. POOR APPETITE OR OVEREATING: 0
6. FEELING BAD ABOUT YOURSELF - OR THAT YOU ARE A FAILURE OR HAVE LET YOURSELF OR YOUR FAMILY DOWN: 0
1. LITTLE INTEREST OR PLEASURE IN DOING THINGS: 0
SUM OF ALL RESPONSES TO PHQ QUESTIONS 1-9: 0
9. THOUGHTS THAT YOU WOULD BE BETTER OFF DEAD, OR OF HURTING YOURSELF: 0
SUM OF ALL RESPONSES TO PHQ QUESTIONS 1-9: 0
3. TROUBLE FALLING OR STAYING ASLEEP: 0
SUM OF ALL RESPONSES TO PHQ9 QUESTIONS 1 & 2: 0
8. MOVING OR SPEAKING SO SLOWLY THAT OTHER PEOPLE COULD HAVE NOTICED. OR THE OPPOSITE, BEING SO FIGETY OR RESTLESS THAT YOU HAVE BEEN MOVING AROUND A LOT MORE THAN USUAL: 0
4. FEELING TIRED OR HAVING LITTLE ENERGY: 0
DEPRESSION UNABLE TO ASSESS: FUNCTIONAL CAPACITY MOTIVATION LIMITS ACCURACY
10. IF YOU CHECKED OFF ANY PROBLEMS, HOW DIFFICULT HAVE THESE PROBLEMS MADE IT FOR YOU TO DO YOUR WORK, TAKE CARE OF THINGS AT HOME, OR GET ALONG WITH OTHER PEOPLE: 0
7. TROUBLE CONCENTRATING ON THINGS, SUCH AS READING THE NEWSPAPER OR WATCHING TELEVISION: 0
2. FEELING DOWN, DEPRESSED OR HOPELESS: 0

## 2023-03-22 NOTE — PROGRESS NOTES
Chief Complaint   Patient presents with    Results     Review of results     Allergic Rhinitis     Anemia    Diabetes    Gastroesophageal Reflux    Hypertension    Insomnia    Other     Hx of osteopenia and Pre-DM    Knee Pain     Under the care of PT           1. \"Have you been to the ER, urgent care clinic since your last visit? Hospitalized since your last visit? \" No    2. \"Have you seen or consulted any other health care providers outside of the [address] [place] [address] since your last visit? \" No     3. For patients aged 39-70: Has the patient had a colonoscopy / FIT/ Cologuard? Yes - no Care Gap present FIT Test due 12/09/2022 and colonoscopy due 07/10/2025       If the patient is female:    4. For patients aged 41-77: Has the patient had a mammogram within the past 2 years? Yes - no Care Gap present      5. For patients aged 21-65: Has the patient had a pap smear?  NA - based on age or sex
Prediabetes        Social History     Socioeconomic History    Marital status:      Spouse name: Not on file    Number of children: Not on file    Years of education: Not on file    Highest education level: Not on file   Occupational History    Not on file   Tobacco Use    Smoking status: Never    Smokeless tobacco: Never   Substance and Sexual Activity    Alcohol use: No    Drug use: No    Sexual activity: Not on file   Other Topics Concern    Not on file   Social History Narrative    Not on file     Social Determinants of Health     Financial Resource Strain: Low Risk     Difficulty of Paying Living Expenses: Not hard at all   Food Insecurity: Food Insecurity Present    Worried About [address] in the Last Year: Never true    Ran Out of Food in the Last Year: Sometimes true   Transportation Needs: Not on file   Physical Activity: Not on file   Stress: Not on file   Social Connections: Not on file   Intimate Partner Violence: Not on file   Housing Stability: Not on file       Family History   Problem Relation Age of Onset    Heart Failure Mother     Heart Disease Mother         mi 52's    Asthma Other          OBJECTIVE    Physical Exam:   /84 (Site: Right Upper Arm, Position: Sitting, Cuff Size: Large Adult)   Pulse 65   Temp 98 °F (36.7 °C) (Temporal)   Resp 16   Ht 5' 3.5\" (1.613 m)   Wt 177 lb (80.3 kg)   SpO2 98%   BMI 30.86 kg/m²         General: alert, well-appearing, AA, in no apparent distress or pain  Neck: supple, no adenopathy palpated  CVS: normal rate, regular rhythm, distinct S1 and S2  Lungs:clear to ausculation bilaterally, no crackles, wheezing or rhonchi noted  Abdomen: normoactive bowel sounds, soft, non-tender  Extremities: no edema, no cyanosis, MSK grossly normal  Skin: warm, no lesions, rashes noted  Psych:  mood and affect normal    CMP:   Lab Results   Component Value Date/Time     02/16/2023 10:03 AM    K 4.0 02/16/2023 10:03 AM     02/16/2023

## 2023-03-22 NOTE — PATIENT INSTRUCTIONS

## 2023-03-27 NOTE — TELEPHONE ENCOUNTER
----- Message from Faustino Tam sent at 3/27/2023  3:03 PM EDT -----  Subject: Refill Request    QUESTIONS  Name of Medication? amLODIPine (NORVASC) 5 MG tablet  Patient-reported dosage and instructions? 5mg once a day   How many days do you have left? 30  Preferred Pharmacy? [facility] phone number (if available)? [phone]  Additional Information for Provider? please call pt when rx script is   ready,   ---------------------------------------------------------------------------  --------------,  Name of Medication? Other - triancinolone aceto ointment   Patient-reported dosage and instructions? as needed  How many days do you have left? 0  Preferred Pharmacy? [facility] phone number (if available)? [phone]  Additional Information for Provider? pt is completely out   ---------------------------------------------------------------------------  --------------  [address]  What is the best way for the office to contact you? OK to leave message on   voicemail  Preferred Call Back Phone Number? [phone]  ---------------------------------------------------------------------------  --------------  SCRIPT ANSWERS  Relationship to Patient?  Self

## 2023-03-28 ENCOUNTER — APPOINTMENT (OUTPATIENT)
Facility: HOSPITAL | Age: 68
End: 2023-03-28
Payer: MEDICARE

## 2023-03-28 RX ORDER — AMLODIPINE BESYLATE 5 MG/1
5 TABLET ORAL DAILY
Qty: 90 TABLET | Refills: 1 | Status: SHIPPED | OUTPATIENT
Start: 2023-03-28

## 2023-03-29 NOTE — TELEPHONE ENCOUNTER
This patient contacted office for the following prescriptions to be filled:    Medication requested :  triamcinolone acetonide (KENALOG) 0.1 % ointment  QTY 30 GM    PCP: MERISSA Hernandez [address] or Print: [facility]  Mail order or Local pharmacy [address]    Scheduled appointment if not seen by current providers in office: LOV 3/22/2023 FU 6/29/2023

## 2023-03-31 ENCOUNTER — APPOINTMENT (OUTPATIENT)
Facility: HOSPITAL | Age: 68
End: 2023-03-31
Payer: MEDICARE

## 2023-05-02 ENCOUNTER — OFFICE VISIT (OUTPATIENT)
Age: 68
End: 2023-05-02
Payer: MEDICARE

## 2023-05-02 VITALS — WEIGHT: 177 LBS | HEIGHT: 64 IN | BODY MASS INDEX: 30.22 KG/M2

## 2023-05-02 DIAGNOSIS — M17.12 PRIMARY OSTEOARTHRITIS OF LEFT KNEE: ICD-10-CM

## 2023-05-02 DIAGNOSIS — M17.11 PRIMARY OSTEOARTHRITIS OF RIGHT KNEE: Primary | ICD-10-CM

## 2023-05-02 PROCEDURE — 99214 OFFICE O/P EST MOD 30 MIN: CPT | Performed by: ORTHOPAEDIC SURGERY

## 2023-05-02 PROCEDURE — 20611 DRAIN/INJ JOINT/BURSA W/US: CPT | Performed by: ORTHOPAEDIC SURGERY

## 2023-05-02 PROCEDURE — 1036F TOBACCO NON-USER: CPT | Performed by: ORTHOPAEDIC SURGERY

## 2023-05-02 PROCEDURE — 1123F ACP DISCUSS/DSCN MKR DOCD: CPT | Performed by: ORTHOPAEDIC SURGERY

## 2023-05-02 PROCEDURE — G8427 DOCREV CUR MEDS BY ELIG CLIN: HCPCS | Performed by: ORTHOPAEDIC SURGERY

## 2023-05-02 PROCEDURE — 3017F COLORECTAL CA SCREEN DOC REV: CPT | Performed by: ORTHOPAEDIC SURGERY

## 2023-05-02 PROCEDURE — 1090F PRES/ABSN URINE INCON ASSESS: CPT | Performed by: ORTHOPAEDIC SURGERY

## 2023-05-02 PROCEDURE — G8417 CALC BMI ABV UP PARAM F/U: HCPCS | Performed by: ORTHOPAEDIC SURGERY

## 2023-05-02 PROCEDURE — G8399 PT W/DXA RESULTS DOCUMENT: HCPCS | Performed by: ORTHOPAEDIC SURGERY

## 2023-05-02 RX ORDER — TRIAMCINOLONE ACETONIDE 40 MG/ML
40 INJECTION, SUSPENSION INTRA-ARTICULAR; INTRAMUSCULAR ONCE
Status: COMPLETED | OUTPATIENT
Start: 2023-05-02 | End: 2023-05-02

## 2023-05-02 RX ADMIN — TRIAMCINOLONE ACETONIDE 40 MG: 40 INJECTION, SUSPENSION INTRA-ARTICULAR; INTRAMUSCULAR at 08:26

## 2023-05-02 NOTE — PROGRESS NOTES
Intact Intact   Calf Swelling and Tenderness to Palpation - -   Kelsey's Test - -   Hamstring Cord Tightness - -       PROCEDURE: bilateral knee Injection with Ultrasound Guidance    Indication:  Bilateral knee pain/swelling    After sterile prep, 4mL lidocaine with 1mL 40mg Kenalog were injected into the bilateral knee intraarticular under ultrasound guidance. Ultrasound images captured and scanned into patient's chart. [facility]  OFFICE PROCEDURE PROGRESS NOTE        Chart reviewed for the following:  Eva Seo MD, have reviewed the History, Physical and updated the Allergic reactions for [address] performed immediately prior to start of procedure:  Eva Seo MD have performed the following reviews on Nimco Uriostegui prior to the start of the procedure:            * Patient was identified by name and date of birth   * Agreement on procedure being performed was verified  * Risks and Benefits explained to the patient  * Procedure site verified and marked as necessary  * Patient was positioned for comfort  * Consent was signed and verified     Time: 8:24 AM    Date of procedure: 5/2/2023    Procedure performed by:  Joceline Junior MD    Provider assisted by: (see medication administration)    How tolerated by patient: tolerated the procedure well with no complications    Comments: none      IMAGING: XR of the b/l knees with 3 views obtained at [facility] dated 2/16/2023 reviewed and read by Dr. Rita Sol: Mild to moderate degenerative changes in the knees bilaterally. IMPRESSION:      ICD-10-CM    1. Primary osteoarthritis of right knee  M17.11       2. Primary osteoarthritis of left knee  M17.12            PLAN:   1. Pt presents today with bilateral knee pain due to primary OA and I would like to try injecting both knees with cortisone. Risk factors include: htn  2. Yes cortisone injection indicated today B/L KNEE US  3.

## 2023-05-06 DIAGNOSIS — J30.9 ALLERGIC RHINITIS, UNSPECIFIED SEASONALITY, UNSPECIFIED TRIGGER: ICD-10-CM

## 2023-05-08 RX ORDER — MONTELUKAST SODIUM 10 MG/1
10 TABLET ORAL DAILY
Qty: 90 TABLET | Refills: 3 | Status: SHIPPED | OUTPATIENT
Start: 2023-05-08

## 2023-05-09 ENCOUNTER — TELEPHONE (OUTPATIENT)
Age: 68
End: 2023-05-09

## 2023-05-10 ENCOUNTER — OFFICE VISIT (OUTPATIENT)
Age: 68
End: 2023-05-10
Payer: MEDICARE

## 2023-05-10 VITALS — BODY MASS INDEX: 30.22 KG/M2 | WEIGHT: 177 LBS | HEIGHT: 64 IN

## 2023-05-10 DIAGNOSIS — M17.12 PRIMARY OSTEOARTHRITIS OF LEFT KNEE: Primary | ICD-10-CM

## 2023-05-10 DIAGNOSIS — M17.11 PRIMARY OSTEOARTHRITIS OF RIGHT KNEE: ICD-10-CM

## 2023-05-10 PROCEDURE — 3017F COLORECTAL CA SCREEN DOC REV: CPT | Performed by: PHYSICIAN ASSISTANT

## 2023-05-10 PROCEDURE — 99213 OFFICE O/P EST LOW 20 MIN: CPT | Performed by: PHYSICIAN ASSISTANT

## 2023-05-10 PROCEDURE — 1036F TOBACCO NON-USER: CPT | Performed by: PHYSICIAN ASSISTANT

## 2023-05-10 PROCEDURE — G8417 CALC BMI ABV UP PARAM F/U: HCPCS | Performed by: PHYSICIAN ASSISTANT

## 2023-05-10 PROCEDURE — 1123F ACP DISCUSS/DSCN MKR DOCD: CPT | Performed by: PHYSICIAN ASSISTANT

## 2023-05-10 PROCEDURE — G8427 DOCREV CUR MEDS BY ELIG CLIN: HCPCS | Performed by: PHYSICIAN ASSISTANT

## 2023-05-10 PROCEDURE — 1090F PRES/ABSN URINE INCON ASSESS: CPT | Performed by: PHYSICIAN ASSISTANT

## 2023-05-10 PROCEDURE — G8399 PT W/DXA RESULTS DOCUMENT: HCPCS | Performed by: PHYSICIAN ASSISTANT

## 2023-05-10 NOTE — PROGRESS NOTES
Brenda Garcia  1955   Chief Complaint   Patient presents with    Knee Pain     Rt         HISTORY OF PRESENT ILLNESS  Brenda Garcia is a 69 y.o. female who presents today for reevaluation of bilateral knee. Pain is a 3/10. She notes that the cortisone injection in her right knee did not help at all. She is having persistent pain. Pain has been present for almost 4 months. No specific injury. She notes some weakness and soreness which is worse in the right knee. She experiences weakness and sharp pain with getting up from a seated position, worse in the right knee. Was referred to PT for gait training at last OV. She only went to 1 PT session which did not provide relief so she stopped going. Patient denies any fever, chills, chest pain, shortness of breath or calf pain. The remainder of the review of systems is negative. There are no new illness or injuries to report since last seen in the office. No changes in medications, allergies, social or family history. PHYSICAL EXAM:   Ht 5' 3.5\" (1.613 m)   Wt 177 lb (80.3 kg)   BMI 30.86 kg/m²   The patient is a well-developed, well-nourished female   in no acute distress. The patient is alert and oriented times three. The patient is alert and oriented times three. Mood and affect are normal.  LYMPHATIC: lymph nodes are not enlarged and are within normal limits  SKIN: normal in color and non tender to palpation. There are no bruises or abrasions noted. NEUROLOGICAL: Motor sensory exam is within normal limits. Reflexes are equal bilaterally.  There is normal sensation to pinprick and light touch  MUSCULOSKELETAL:  Examination Left knee Right knee   Skin Intact Intact   Range of motion 0-130 0-130   Effusion + +   Medial joint line tenderness + +   Lateral joint line tenderness + +   Tenderness Pes Bursa - -   Tenderness insertion MCL - -   Tenderness insertion LCL - -   Carloss - -   Patella crepitus + +   Patella grind + +   Lachman - -   Pivot

## 2023-05-11 ENCOUNTER — HOSPITAL ENCOUNTER (OUTPATIENT)
Facility: HOSPITAL | Age: 68
Discharge: HOME | End: 2023-05-11
Payer: MEDICARE

## 2023-05-11 DIAGNOSIS — M17.11 PRIMARY OSTEOARTHRITIS OF RIGHT KNEE: ICD-10-CM

## 2023-05-11 PROCEDURE — 73721 MRI JNT OF LWR EXTRE W/O DYE: CPT

## 2023-05-15 ENCOUNTER — TELEPHONE (OUTPATIENT)
Age: 68
End: 2023-05-15

## 2023-05-23 ENCOUNTER — OFFICE VISIT (OUTPATIENT)
Age: 68
End: 2023-05-23
Payer: MEDICARE

## 2023-05-23 VITALS — BODY MASS INDEX: 30.22 KG/M2 | WEIGHT: 177 LBS | HEIGHT: 64 IN

## 2023-05-23 DIAGNOSIS — M17.11 PRIMARY OSTEOARTHRITIS OF RIGHT KNEE: Primary | ICD-10-CM

## 2023-05-23 DIAGNOSIS — S83.281A TEAR OF LATERAL MENISCUS OF RIGHT KNEE, CURRENT, UNSPECIFIED TEAR TYPE, INITIAL ENCOUNTER: ICD-10-CM

## 2023-05-23 PROCEDURE — G8427 DOCREV CUR MEDS BY ELIG CLIN: HCPCS | Performed by: ORTHOPAEDIC SURGERY

## 2023-05-23 PROCEDURE — G8399 PT W/DXA RESULTS DOCUMENT: HCPCS | Performed by: ORTHOPAEDIC SURGERY

## 2023-05-23 PROCEDURE — G8417 CALC BMI ABV UP PARAM F/U: HCPCS | Performed by: ORTHOPAEDIC SURGERY

## 2023-05-23 PROCEDURE — 1123F ACP DISCUSS/DSCN MKR DOCD: CPT | Performed by: ORTHOPAEDIC SURGERY

## 2023-05-23 PROCEDURE — 1090F PRES/ABSN URINE INCON ASSESS: CPT | Performed by: ORTHOPAEDIC SURGERY

## 2023-05-23 PROCEDURE — 99214 OFFICE O/P EST MOD 30 MIN: CPT | Performed by: ORTHOPAEDIC SURGERY

## 2023-05-23 PROCEDURE — 1036F TOBACCO NON-USER: CPT | Performed by: ORTHOPAEDIC SURGERY

## 2023-05-23 PROCEDURE — 3017F COLORECTAL CA SCREEN DOC REV: CPT | Performed by: ORTHOPAEDIC SURGERY

## 2023-05-23 NOTE — PROGRESS NOTES
Migue Smith  1955   Chief Complaint   Patient presents with    Results     Mri rt knee         HISTORY OF PRESENT ILLNESS  Miuge Smith is a 69 y.o. female who presents today for reevaluation of right knee and MRI review. Pain is a 3/10. She notes that the cortisone injection in her right knee at [address] on 5/02/2023 did not help at all. She is having persistent pain. Pain has been present for almost 4 months. No specific injury. She notes some weakness and soreness which is worse in the right knee. She experiences weakness and sharp pain with getting up from a seated position, worse in the right knee. Has been previously referred to PT for gait training. She only went to 1 PT session which did not provide relief so she stopped going. Her pain is mainly located in the medial side of the knee. An order for euflexxa authorization was placed at last OV with KATERINE Hanson on 5/10/2023. Patient denies any fever, chills, chest pain, shortness of breath or calf pain. The remainder of the review of systems is negative. There are no new illness or injuries to report since last seen in the office. No changes in medications, allergies, social or family history. PHYSICAL EXAM:   Ht 5' 3.5\" (1.613 m)   Wt 177 lb (80.3 kg)   BMI 30.86 kg/m²   The patient is a well-developed, well-nourished female   in no acute distress. The patient is alert and oriented times three. The patient is alert and oriented times three. Mood and affect are normal.  LYMPHATIC: lymph nodes are not enlarged and are within normal limits  SKIN: normal in color and non tender to palpation. There are no bruises or abrasions noted. NEUROLOGICAL: Motor sensory exam is within normal limits. Reflexes are equal bilaterally.  There is normal sensation to pinprick and light touch  MUSCULOSKELETAL:  Examination Left knee Right knee   Skin Intact Intact   Range of motion 0-130 0-130   Effusion + +   Medial joint line tenderness + +   Lateral joint

## 2023-05-31 ENCOUNTER — OFFICE VISIT (OUTPATIENT)
Age: 68
End: 2023-05-31
Payer: MEDICARE

## 2023-05-31 VITALS — HEIGHT: 64 IN | BODY MASS INDEX: 30.22 KG/M2 | WEIGHT: 177 LBS

## 2023-05-31 DIAGNOSIS — M17.11 PRIMARY OSTEOARTHRITIS OF RIGHT KNEE: Primary | ICD-10-CM

## 2023-05-31 DIAGNOSIS — M17.12 PRIMARY OSTEOARTHRITIS OF LEFT KNEE: ICD-10-CM

## 2023-05-31 PROCEDURE — 20611 DRAIN/INJ JOINT/BURSA W/US: CPT | Performed by: PHYSICIAN ASSISTANT

## 2023-05-31 RX ORDER — HYALURONATE SODIUM 10 MG/ML
20 SYRINGE (ML) INTRAARTICULAR ONCE
Status: COMPLETED | OUTPATIENT
Start: 2023-05-31 | End: 2023-05-31

## 2023-05-31 RX ADMIN — Medication 20 MG: at 15:49

## 2023-05-31 NOTE — PROGRESS NOTES
Patient: Janneth Baker                MRN: [id number]       SSN: [id number]  YOB: 1955        AGE: 69 y.o. SEX: female  Body mass index is 30.86 kg/m². PCP: Kevin Mejia MD  05/31/23    Chief Complaint   Patient presents with    Injections     Euflexxa #1 bilat knee       HISTORY:  Janneth Baker is a 69 y.o. female who is seen for reevaluation of Bilateral knee here for 1st injection of euflexxa    PROCEDURE: Bilateral  knee Injection with Ultrasound Guidance    Indication:Bilateralknee pain/swelling    After sterile prep, 2 cc of euflexxa were injected into the Bilateral Knee. Intra-articular Ultrasound images captured using [employer] Loop Ultrasound machine using a frequency of 10 MHz with a linear transducer and scanned into patient's chart. OFFICE PROCEDURE PROGRESS NOTE        Chart reviewed for the following:   Sakina NASCIMENTO PA-C, have reviewed the History, Physical and updated the Allergic reactions for [address] performed immediately prior to start of procedure:   Sakina NASCIMENTO PA-C, have performed the following reviews on Janneth Baker prior to the start of the procedure:            * Patient was identified by name and date of birth   * Agreement on procedure being performed was verified  * Risks and Benefits explained to the patient  * Procedure site verified and marked as necessary  * Patient was positioned for comfort  * Consent was signed and verified     Time: 3:48 PM       Date of procedure: 5/31/2023    Procedure performed by:  KATERINE Bush    Provider assisted by: None     How tolerated by patient: tolerated the procedure well with no complications    Comments: none    IMPRESSION:     ICD-10-CM    1. Primary osteoarthritis of right knee  M17.11 AMB POC US DRAIN/INJECT LARGE JOINT/BURSA     sodium hyaluronate (EUFLEXXA, HYALGAN) injection 20 mg      2.  Primary osteoarthritis of left knee  M17.12 AMB POC US

## 2023-06-07 ENCOUNTER — OFFICE VISIT (OUTPATIENT)
Age: 68
End: 2023-06-07
Payer: MEDICARE

## 2023-06-07 VITALS — HEIGHT: 64 IN | BODY MASS INDEX: 30.86 KG/M2

## 2023-06-07 DIAGNOSIS — M17.11 PRIMARY OSTEOARTHRITIS OF RIGHT KNEE: Primary | ICD-10-CM

## 2023-06-07 DIAGNOSIS — M17.12 PRIMARY OSTEOARTHRITIS OF LEFT KNEE: ICD-10-CM

## 2023-06-07 PROCEDURE — 20611 DRAIN/INJ JOINT/BURSA W/US: CPT | Performed by: PHYSICIAN ASSISTANT

## 2023-06-07 RX ORDER — HYALURONATE SODIUM 10 MG/ML
20 SYRINGE (ML) INTRAARTICULAR ONCE
Status: COMPLETED | OUTPATIENT
Start: 2023-06-07 | End: 2023-06-07

## 2023-06-07 RX ADMIN — Medication 20 MG: at 16:22

## 2023-06-07 NOTE — PROGRESS NOTES
Patient: Danae Cason                MRN: [id number]       SSN: [id number]  YOB: 1955        AGE: 69 y.o. SEX: female  Body mass index is 30.86 kg/m². PCP: Joseph Salgado MD  06/07/23    Chief Complaint   Patient presents with    Knee Pain     bilat       HISTORY:  Danae Cason is a 69 y.o. female who is seen for reevaluation of Bilateral knee here for 2nd injection of euflexxa    PROCEDURE: Bilateral  knee Injection with Ultrasound Guidance    Indication:Bilateralknee pain/swelling    After sterile prep, 2 cc of euflexxa were injected into the Bilateral Knee. Intra-articular Ultrasound images captured using [address] Ultrasound machine using a frequency of 10 MHz with a linear transducer and scanned into patient's chart. OFFICE PROCEDURE PROGRESS NOTE        Chart reviewed for the following:   Jesus NASCIMENTO PA-C, have reviewed the History, Physical and updated the Allergic reactions for [address] performed immediately prior to start of procedure:   Jesus NASCIMENTO PA-C, have performed the following reviews on Danae Cason prior to the start of the procedure:            * Patient was identified by name and date of birth   * Agreement on procedure being performed was verified  * Risks and Benefits explained to the patient  * Procedure site verified and marked as necessary  * Patient was positioned for comfort  * Consent was signed and verified     Time: 4:21 PM       Date of procedure: 6/7/2023    Procedure performed by:  KATERINE Prieto    Provider assisted by: None     How tolerated by patient: tolerated the procedure well with no complications    Comments: none    IMPRESSION:     ICD-10-CM    1. Primary osteoarthritis of right knee  M17.11 AMB POC US DRAIN/INJECT LARGE JOINT/BURSA     sodium hyaluronate (EUFLEXXA, HYALGAN) injection 20 mg      2.  Primary osteoarthritis of left knee  M17.12 AMB POC US DRAIN/INJECT LARGE

## 2023-06-19 ENCOUNTER — HOSPITAL ENCOUNTER (OUTPATIENT)
Facility: HOSPITAL | Age: 68
Discharge: HOME | End: 2023-06-22
Payer: MEDICARE

## 2023-06-19 LAB
25(OH)D3 SERPL-MCNC: 39.7 NG/ML (ref 30–100)
ALBUMIN SERPL-MCNC: 3.8 G/DL (ref 3.4–5)
ALBUMIN/GLOB SERPL: 1.3 (ref 0.8–1.7)
ALP SERPL-CCNC: 75 U/L (ref 45–117)
ALT SERPL-CCNC: 34 U/L (ref 13–56)
ANION GAP SERPL CALC-SCNC: 3 MMOL/L (ref 3–18)
AST SERPL-CCNC: 28 U/L (ref 10–38)
BASOPHILS # BLD: 0 K/UL (ref 0–0.1)
BASOPHILS NFR BLD: 1 % (ref 0–2)
BILIRUB SERPL-MCNC: 0.8 MG/DL (ref 0.2–1)
BUN SERPL-MCNC: 11 MG/DL (ref 7–18)
BUN/CREAT SERPL: 15 (ref 12–20)
CALCIUM SERPL-MCNC: 9.3 MG/DL (ref 8.5–10.1)
CHLORIDE SERPL-SCNC: 110 MMOL/L (ref 100–111)
CHOLEST SERPL-MCNC: 185 MG/DL
CO2 SERPL-SCNC: 29 MMOL/L (ref 21–32)
CREAT SERPL-MCNC: 0.72 MG/DL (ref 0.6–1.3)
DIFFERENTIAL METHOD BLD: ABNORMAL
EOSINOPHIL # BLD: 0.1 K/UL (ref 0–0.4)
EOSINOPHIL NFR BLD: 2 % (ref 0–5)
ERYTHROCYTE [DISTWIDTH] IN BLOOD BY AUTOMATED COUNT: 14 % (ref 11.6–14.5)
EST. AVERAGE GLUCOSE BLD GHB EST-MCNC: 126 MG/DL
GLOBULIN SER CALC-MCNC: 3 G/DL (ref 2–4)
GLUCOSE SERPL-MCNC: 97 MG/DL (ref 74–99)
HBA1C MFR BLD: 6 % (ref 4.2–5.6)
HCT VFR BLD AUTO: 37.2 % (ref 35–45)
HDLC SERPL-MCNC: 107 MG/DL (ref 40–60)
HDLC SERPL: 1.7 (ref 0–5)
HGB BLD-MCNC: 11.6 G/DL (ref 12–16)
IMM GRANULOCYTES # BLD AUTO: 0 K/UL (ref 0–0.04)
IMM GRANULOCYTES NFR BLD AUTO: 0 % (ref 0–0.5)
LDLC SERPL CALC-MCNC: 61.2 MG/DL (ref 0–100)
LIPID PANEL: ABNORMAL
LYMPHOCYTES # BLD: 1.3 K/UL (ref 0.9–3.6)
LYMPHOCYTES NFR BLD: 33 % (ref 21–52)
MCH RBC QN AUTO: 29.6 PG (ref 24–34)
MCHC RBC AUTO-ENTMCNC: 31.2 G/DL (ref 31–37)
MCV RBC AUTO: 94.9 FL (ref 78–100)
MONOCYTES # BLD: 0.4 K/UL (ref 0.05–1.2)
MONOCYTES NFR BLD: 11 % (ref 3–10)
NEUTS SEG # BLD: 2.2 K/UL (ref 1.8–8)
NEUTS SEG NFR BLD: 54 % (ref 40–73)
NRBC # BLD: 0 K/UL (ref 0–0.01)
NRBC BLD-RTO: 0 PER 100 WBC
PLATELET # BLD AUTO: 188 K/UL (ref 135–420)
PMV BLD AUTO: 10.5 FL (ref 9.2–11.8)
POTASSIUM SERPL-SCNC: 4.1 MMOL/L (ref 3.5–5.5)
PROT SERPL-MCNC: 6.8 G/DL (ref 6.4–8.2)
RBC # BLD AUTO: 3.92 M/UL (ref 4.2–5.3)
SODIUM SERPL-SCNC: 142 MMOL/L (ref 136–145)
TRIGL SERPL-MCNC: 84 MG/DL
VLDLC SERPL CALC-MCNC: 16.8 MG/DL
WBC # BLD AUTO: 4.1 K/UL (ref 4.6–13.2)

## 2023-06-19 PROCEDURE — 83036 HEMOGLOBIN GLYCOSYLATED A1C: CPT

## 2023-06-19 PROCEDURE — 80053 COMPREHEN METABOLIC PANEL: CPT

## 2023-06-19 PROCEDURE — 82306 VITAMIN D 25 HYDROXY: CPT

## 2023-06-19 PROCEDURE — 80061 LIPID PANEL: CPT

## 2023-06-19 PROCEDURE — 85025 COMPLETE CBC W/AUTO DIFF WBC: CPT

## 2023-06-19 PROCEDURE — 36415 COLL VENOUS BLD VENIPUNCTURE: CPT

## 2023-06-29 ENCOUNTER — OFFICE VISIT (OUTPATIENT)
Age: 68
End: 2023-06-29
Payer: MEDICARE

## 2023-06-29 VITALS
SYSTOLIC BLOOD PRESSURE: 144 MMHG | TEMPERATURE: 96.9 F | BODY MASS INDEX: 29.57 KG/M2 | DIASTOLIC BLOOD PRESSURE: 70 MMHG | HEIGHT: 64 IN | RESPIRATION RATE: 16 BRPM | OXYGEN SATURATION: 100 % | WEIGHT: 173.2 LBS | HEART RATE: 52 BPM

## 2023-06-29 DIAGNOSIS — Z00.00 MEDICARE ANNUAL WELLNESS VISIT, SUBSEQUENT: Primary | ICD-10-CM

## 2023-06-29 DIAGNOSIS — R59.0 HILAR ADENOPATHY: ICD-10-CM

## 2023-06-29 DIAGNOSIS — K21.9 GASTROESOPHAGEAL REFLUX DISEASE, UNSPECIFIED WHETHER ESOPHAGITIS PRESENT: ICD-10-CM

## 2023-06-29 DIAGNOSIS — Z12.31 BREAST CANCER SCREENING BY MAMMOGRAM: ICD-10-CM

## 2023-06-29 DIAGNOSIS — J30.9 ALLERGIC RHINITIS, UNSPECIFIED SEASONALITY, UNSPECIFIED TRIGGER: ICD-10-CM

## 2023-06-29 DIAGNOSIS — I10 ESSENTIAL HYPERTENSION: ICD-10-CM

## 2023-06-29 DIAGNOSIS — M85.851 OSTEOPENIA OF NECKS OF BOTH FEMURS: ICD-10-CM

## 2023-06-29 DIAGNOSIS — M85.852 OSTEOPENIA OF NECKS OF BOTH FEMURS: ICD-10-CM

## 2023-06-29 DIAGNOSIS — R73.03 PREDIABETES: ICD-10-CM

## 2023-06-29 PROCEDURE — G8399 PT W/DXA RESULTS DOCUMENT: HCPCS | Performed by: FAMILY MEDICINE

## 2023-06-29 PROCEDURE — 99214 OFFICE O/P EST MOD 30 MIN: CPT | Performed by: FAMILY MEDICINE

## 2023-06-29 PROCEDURE — G8427 DOCREV CUR MEDS BY ELIG CLIN: HCPCS | Performed by: FAMILY MEDICINE

## 2023-06-29 PROCEDURE — G8417 CALC BMI ABV UP PARAM F/U: HCPCS | Performed by: FAMILY MEDICINE

## 2023-06-29 PROCEDURE — 1090F PRES/ABSN URINE INCON ASSESS: CPT | Performed by: FAMILY MEDICINE

## 2023-06-29 PROCEDURE — 1123F ACP DISCUSS/DSCN MKR DOCD: CPT | Performed by: FAMILY MEDICINE

## 2023-06-29 PROCEDURE — G0439 PPPS, SUBSEQ VISIT: HCPCS | Performed by: FAMILY MEDICINE

## 2023-06-29 PROCEDURE — 1036F TOBACCO NON-USER: CPT | Performed by: FAMILY MEDICINE

## 2023-06-29 PROCEDURE — 3017F COLORECTAL CA SCREEN DOC REV: CPT | Performed by: FAMILY MEDICINE

## 2023-06-29 PROCEDURE — 3077F SYST BP >= 140 MM HG: CPT | Performed by: FAMILY MEDICINE

## 2023-06-29 PROCEDURE — 3078F DIAST BP <80 MM HG: CPT | Performed by: FAMILY MEDICINE

## 2023-06-29 RX ORDER — MONTELUKAST SODIUM 10 MG/1
10 TABLET ORAL DAILY
Qty: 90 TABLET | Refills: 3 | Status: SHIPPED | OUTPATIENT
Start: 2023-06-29

## 2023-06-29 SDOH — ECONOMIC STABILITY: INCOME INSECURITY: HOW HARD IS IT FOR YOU TO PAY FOR THE VERY BASICS LIKE FOOD, HOUSING, MEDICAL CARE, AND HEATING?: NOT HARD AT ALL

## 2023-06-29 SDOH — ECONOMIC STABILITY: FOOD INSECURITY: WITHIN THE PAST 12 MONTHS, THE FOOD YOU BOUGHT JUST DIDN'T LAST AND YOU DIDN'T HAVE MONEY TO GET MORE.: NEVER TRUE

## 2023-06-29 SDOH — ECONOMIC STABILITY: FOOD INSECURITY: WITHIN THE PAST 12 MONTHS, YOU WORRIED THAT YOUR FOOD WOULD RUN OUT BEFORE YOU GOT MONEY TO BUY MORE.: NEVER TRUE

## 2023-06-29 ASSESSMENT — PATIENT HEALTH QUESTIONNAIRE - PHQ9
SUM OF ALL RESPONSES TO PHQ QUESTIONS 1-9: 0
SUM OF ALL RESPONSES TO PHQ9 QUESTIONS 1 & 2: 0
SUM OF ALL RESPONSES TO PHQ QUESTIONS 1-9: 0
2. FEELING DOWN, DEPRESSED OR HOPELESS: 0
1. LITTLE INTEREST OR PLEASURE IN DOING THINGS: 0

## 2023-06-29 ASSESSMENT — LIFESTYLE VARIABLES
HOW OFTEN DO YOU HAVE A DRINK CONTAINING ALCOHOL: NEVER
HOW MANY STANDARD DRINKS CONTAINING ALCOHOL DO YOU HAVE ON A TYPICAL DAY: PATIENT DOES NOT DRINK

## 2023-07-11 NOTE — TELEPHONE ENCOUNTER
This patient contacted office for the following prescriptions to be filled:    Medication requested : diclofenac (VOLTAREN) 50 MG EC tablet        Qty 180   PCP: [address] or Print: [facility]   Mail order or Local pharmacy  [address]     Scheduled appointment if not seen by current providers in office: LOV 6/29/2023 RODRIGO 9/28/2023

## 2023-07-13 ENCOUNTER — TELEPHONE (OUTPATIENT)
Age: 68
End: 2023-07-13

## 2023-07-13 NOTE — TELEPHONE ENCOUNTER
Patient would like to know if she can use a brace for her knees to help with the pain states that the shots are not really working and that she is still in pain.         [phone]

## 2023-07-14 NOTE — TELEPHONE ENCOUNTER
I spoke with patient , I let her know that Lisa Rollins is out and will return Monday and will ask about the brace then , Also relayed I will ask about the knee brace. She said she brought some pain patches that she will try.

## 2023-07-21 ENCOUNTER — TELEPHONE (OUTPATIENT)
Age: 68
End: 2023-07-21

## 2023-07-21 NOTE — TELEPHONE ENCOUNTER
Patient called back and I relayed KATERINE Arcos's message and told her that our office did call her back but she wasn't aware and says she didn't receive a call but will check her phone again.

## 2023-07-21 NOTE — TELEPHONE ENCOUNTER
Patient is requesting a call back with recommendations for her knee swelling. She's been using the lidocaine patches we recommended but they aren't helping as much as she had hoped. She tried a jacuzzi that seemed to help a bit and says she'll be trying that again today. Please contact patient to discuss what more she can do for the swelling, [phone].

## 2023-08-01 DIAGNOSIS — J30.9 ALLERGIC RHINITIS, UNSPECIFIED SEASONALITY, UNSPECIFIED TRIGGER: ICD-10-CM

## 2023-08-01 RX ORDER — MONTELUKAST SODIUM 10 MG/1
10 TABLET ORAL DAILY
Qty: 90 TABLET | Refills: 3 | Status: SHIPPED | OUTPATIENT
Start: 2023-08-01

## 2023-08-02 ENCOUNTER — HOSPITAL ENCOUNTER (OUTPATIENT)
Facility: HOSPITAL | Age: 68
Discharge: HOME | End: 2023-08-05
Attending: FAMILY MEDICINE
Payer: MEDICARE

## 2023-08-02 DIAGNOSIS — R59.0 HILAR ADENOPATHY: ICD-10-CM

## 2023-08-02 DIAGNOSIS — Z12.31 BREAST CANCER SCREENING BY MAMMOGRAM: ICD-10-CM

## 2023-08-02 PROCEDURE — 71260 CT THORAX DX C+: CPT

## 2023-08-02 PROCEDURE — 6360000004 HC RX CONTRAST MEDICATION: Performed by: FAMILY MEDICINE

## 2023-08-02 PROCEDURE — 77063 BREAST TOMOSYNTHESIS BI: CPT

## 2023-08-02 PROCEDURE — 82565 ASSAY OF CREATININE: CPT

## 2023-08-02 RX ADMIN — IOPAMIDOL 80 ML: 612 INJECTION, SOLUTION INTRAVENOUS at 08:20

## 2023-08-03 LAB — CREAT UR-MCNC: 0.7 MG/DL (ref 0.6–1.3)

## 2023-08-09 DIAGNOSIS — N28.1 CYST OF RIGHT KIDNEY: Primary | ICD-10-CM

## 2023-09-11 ENCOUNTER — TELEPHONE (OUTPATIENT)
Age: 68
End: 2023-09-11

## 2023-09-11 NOTE — TELEPHONE ENCOUNTER
Pt states that she has an upcoming appt on 9/28/2023 an she would like to have a pap on that date. She states that she heard a lady her age had cancer that went undetected because she was not getting paps. Please advise.

## 2023-09-28 RX ORDER — AMLODIPINE BESYLATE 5 MG/1
5 TABLET ORAL DAILY
Qty: 90 TABLET | Refills: 1 | Status: SHIPPED | OUTPATIENT
Start: 2023-09-28

## 2023-09-28 NOTE — TELEPHONE ENCOUNTER
----- Message from Rc Osborn sent at 9/28/2023  9:11 AM EDT -----  Subject: Refill Request    QUESTIONS  Name of Medication? amLODIPine (NORVASC) 5 MG tablet  Patient-reported dosage and instructions? 5 mg 1 tablet   How many days do you have left? 15  Preferred Pharmacy? [facility] phone number (if available)? [phone]  ---------------------------------------------------------------------------  --------------  Jennifer DELANEY  What is the best way for the office to contact you? OK to leave message on   voicemail  Preferred Call Back Phone Number? [phone]  ---------------------------------------------------------------------------  --------------  SCRIPT ANSWERS  Relationship to Patient?  Self

## 2023-10-03 ENCOUNTER — HOSPITAL ENCOUNTER (OUTPATIENT)
Facility: HOSPITAL | Age: 68
Setting detail: SPECIMEN
Discharge: HOME | End: 2023-10-06
Payer: MEDICARE

## 2023-10-03 ENCOUNTER — OFFICE VISIT (OUTPATIENT)
Age: 68
End: 2023-10-03
Payer: MEDICARE

## 2023-10-03 VITALS
HEART RATE: 57 BPM | HEIGHT: 64 IN | TEMPERATURE: 97.7 F | BODY MASS INDEX: 30.05 KG/M2 | SYSTOLIC BLOOD PRESSURE: 118 MMHG | WEIGHT: 176 LBS | OXYGEN SATURATION: 99 % | DIASTOLIC BLOOD PRESSURE: 86 MMHG | RESPIRATION RATE: 16 BRPM

## 2023-10-03 DIAGNOSIS — Z23 NEEDS FLU SHOT: ICD-10-CM

## 2023-10-03 DIAGNOSIS — K21.9 GASTROESOPHAGEAL REFLUX DISEASE, UNSPECIFIED WHETHER ESOPHAGITIS PRESENT: ICD-10-CM

## 2023-10-03 DIAGNOSIS — M85.852 OSTEOPENIA OF NECKS OF BOTH FEMURS: ICD-10-CM

## 2023-10-03 DIAGNOSIS — M85.851 OSTEOPENIA OF NECKS OF BOTH FEMURS: ICD-10-CM

## 2023-10-03 DIAGNOSIS — I10 ESSENTIAL HYPERTENSION: Primary | ICD-10-CM

## 2023-10-03 DIAGNOSIS — Z12.4 CERVICAL CANCER SCREENING: ICD-10-CM

## 2023-10-03 DIAGNOSIS — R73.03 PREDIABETES: ICD-10-CM

## 2023-10-03 PROCEDURE — G8399 PT W/DXA RESULTS DOCUMENT: HCPCS | Performed by: FAMILY MEDICINE

## 2023-10-03 PROCEDURE — G8484 FLU IMMUNIZE NO ADMIN: HCPCS | Performed by: FAMILY MEDICINE

## 2023-10-03 PROCEDURE — 1123F ACP DISCUSS/DSCN MKR DOCD: CPT | Performed by: FAMILY MEDICINE

## 2023-10-03 PROCEDURE — 90694 VACC AIIV4 NO PRSRV 0.5ML IM: CPT | Performed by: FAMILY MEDICINE

## 2023-10-03 PROCEDURE — PBSHW INFLUENZA, FLUAD, (AGE 65 Y+), IM, PF, 0.5 ML: Performed by: FAMILY MEDICINE

## 2023-10-03 PROCEDURE — G8417 CALC BMI ABV UP PARAM F/U: HCPCS | Performed by: FAMILY MEDICINE

## 2023-10-03 PROCEDURE — 3079F DIAST BP 80-89 MM HG: CPT | Performed by: FAMILY MEDICINE

## 2023-10-03 PROCEDURE — 3017F COLORECTAL CA SCREEN DOC REV: CPT | Performed by: FAMILY MEDICINE

## 2023-10-03 PROCEDURE — 99214 OFFICE O/P EST MOD 30 MIN: CPT | Performed by: FAMILY MEDICINE

## 2023-10-03 PROCEDURE — 1090F PRES/ABSN URINE INCON ASSESS: CPT | Performed by: FAMILY MEDICINE

## 2023-10-03 PROCEDURE — G8427 DOCREV CUR MEDS BY ELIG CLIN: HCPCS | Performed by: FAMILY MEDICINE

## 2023-10-03 PROCEDURE — 3074F SYST BP LT 130 MM HG: CPT | Performed by: FAMILY MEDICINE

## 2023-10-03 PROCEDURE — 1036F TOBACCO NON-USER: CPT | Performed by: FAMILY MEDICINE

## 2023-10-03 PROCEDURE — 88142 CYTOPATH C/V THIN LAYER: CPT

## 2023-10-03 RX ORDER — LIDOCAINE 5 %
ADHESIVE PATCH, MEDICATED TOPICAL
COMMUNITY
Start: 2023-08-24

## 2023-10-03 NOTE — PROGRESS NOTES
1. \"Have you been to the ER, urgent care clinic since your last visit? Hospitalized since your last visit? \" No    2. \"Have you seen or consulted any other health care providers outside of the [address] [place] [address] since your last visit? \" No     3. For patients aged 43-73: Has the patient had a colonoscopy / FIT/ Cologuard? Yes - no Care Gap present  12/09/2022 FIT Due 12/09/2023    If the patient is female:    4. For patients aged 43-66: Has the patient had a mammogram within the past 2 years? Yes - no Care Gap present  08/02/2023      5. For patients aged 21-65: Has the patient had a pap smear? No  Due today    Fluad 0.5 ml given IM in left deltoid. Lot # [id number], exp date 06/30/2024. Patient tolerated injection well. No adverse reaction noted.
regular rhythm, distinct S1 and S2  Lungs:clear to ausculation bilaterally, no crackles, wheezing or rhonchi noted  Abdomen: normoactive bowel sounds, soft, non-tender  :  Normal appearing external genitalia, vagina and cervix. No discharge, no CMT or adnexal masses palpated  Extremities: no edema, no cyanosis, MSK grossly normal  Skin: warm, no lesions, rashes noted  Psych:  mood and affect normal    CMP:   Lab Results   Component Value Date/Time     06/19/2023 12:18 PM    K 4.1 06/19/2023 12:18 PM     06/19/2023 12:18 PM    CO2 29 06/19/2023 12:18 PM    BUN 11 06/19/2023 12:18 PM    CREATININE 0.7 08/02/2023 08:01 AM    CREATININE 0.72 06/19/2023 12:18 PM    GLUCOSE 97 06/19/2023 12:18 PM    CALCIUM 9.3 06/19/2023 12:18 PM    PROT 6.8 06/19/2023 12:18 PM    LABALBU 3.8 06/19/2023 12:18 PM    BILITOT 0.8 06/19/2023 12:18 PM    AST 28 06/19/2023 12:18 PM    ALT 34 06/19/2023 12:18 PM        CBC:   Lab Results   Component Value Date/Time    WBC 4.1 06/19/2023 12:18 PM    RBC 3.92 06/19/2023 12:18 PM    HGB 11.6 06/19/2023 12:18 PM    HCT 37.2 06/19/2023 12:18 PM    MCV 94.9 06/19/2023 12:18 PM    MCH 29.6 06/19/2023 12:18 PM    MCHC 31.2 06/19/2023 12:18 PM    RDW 14.0 06/19/2023 12:18 PM     06/19/2023 12:18 PM    MPV 10.5 06/19/2023 12:18 PM        Lipids   Lab Results   Component Value Date/Time    CHOL 185 06/19/2023 12:18 PM    TRIG 84 06/19/2023 12:18 PM     06/19/2023 12:18 PM    LDLCALC 61.2 06/19/2023 12:18 PM    LABVLDL 16.8 06/19/2023 12:18 PM    CHOLHDLRATIO 1.7 06/19/2023 12:18 PM         Imaging results last 24 hrs :No results found. Imaging results impression onlyNo results found.    No orders to display       A1c:   Hemoglobin A1C   Date Value Ref Range Status   06/19/2023 6.0 (H) 4.2 - 5.6 % Final     Comment:     (NOTE)  HbA1C Interpretive Ranges  <5.7              Normal  5.7 - 6.4         Consider Prediabetes  >6.5              Consider Diabetes     02/16/2023 6.2

## 2023-11-07 ENCOUNTER — TELEPHONE (OUTPATIENT)
Age: 68
End: 2023-11-07

## 2023-11-07 NOTE — TELEPHONE ENCOUNTER
Pt bought an OTC QUNOL Gummies with Tumeric and monika. She would like to make sure that this is something that she can take before she takes it. Please advise.

## 2023-11-09 NOTE — TELEPHONE ENCOUNTER
Patient aware of Dr. Drew Kumar recommendations: Herbal supplements are generally not advised, due to lack of studies on possible interaction to other meds or how they are metabolized. Patient verbalizes understanding.

## 2024-02-02 ENCOUNTER — HOSPITAL ENCOUNTER (OUTPATIENT)
Facility: HOSPITAL | Age: 69
End: 2024-02-02
Payer: MEDICARE

## 2024-02-02 DIAGNOSIS — I10 ESSENTIAL HYPERTENSION: ICD-10-CM

## 2024-02-02 DIAGNOSIS — R73.03 PREDIABETES: ICD-10-CM

## 2024-02-02 LAB
ALBUMIN SERPL-MCNC: 3.6 G/DL (ref 3.4–5)
ALBUMIN/GLOB SERPL: 1.3 (ref 0.8–1.7)
ALP SERPL-CCNC: 75 U/L (ref 45–117)
ALT SERPL-CCNC: 27 U/L (ref 13–56)
ANION GAP SERPL CALC-SCNC: 5 MMOL/L (ref 3–18)
AST SERPL-CCNC: 23 U/L (ref 10–38)
BASOPHILS # BLD: 0 K/UL (ref 0–0.1)
BASOPHILS NFR BLD: 1 % (ref 0–2)
BILIRUB SERPL-MCNC: 0.7 MG/DL (ref 0.2–1)
BUN SERPL-MCNC: 17 MG/DL (ref 7–18)
BUN/CREAT SERPL: 23 (ref 12–20)
CALCIUM SERPL-MCNC: 8.9 MG/DL (ref 8.5–10.1)
CHLORIDE SERPL-SCNC: 109 MMOL/L (ref 100–111)
CO2 SERPL-SCNC: 26 MMOL/L (ref 21–32)
CREAT SERPL-MCNC: 0.73 MG/DL (ref 0.6–1.3)
DIFFERENTIAL METHOD BLD: ABNORMAL
EOSINOPHIL # BLD: 0.2 K/UL (ref 0–0.4)
EOSINOPHIL NFR BLD: 4 % (ref 0–5)
ERYTHROCYTE [DISTWIDTH] IN BLOOD BY AUTOMATED COUNT: 13.2 % (ref 11.6–14.5)
EST. AVERAGE GLUCOSE BLD GHB EST-MCNC: 114 MG/DL
GLOBULIN SER CALC-MCNC: 2.7 G/DL (ref 2–4)
GLUCOSE SERPL-MCNC: 102 MG/DL (ref 74–99)
HBA1C MFR BLD: 5.6 % (ref 4.2–5.6)
HCT VFR BLD AUTO: 33 % (ref 35–45)
HGB BLD-MCNC: 10.3 G/DL (ref 12–16)
IMM GRANULOCYTES # BLD AUTO: 0 K/UL (ref 0–0.04)
IMM GRANULOCYTES NFR BLD AUTO: 0 % (ref 0–0.5)
LYMPHOCYTES # BLD: 1.6 K/UL (ref 0.9–3.6)
LYMPHOCYTES NFR BLD: 38 % (ref 21–52)
MCH RBC QN AUTO: 29.7 PG (ref 24–34)
MCHC RBC AUTO-ENTMCNC: 31.2 G/DL (ref 31–37)
MCV RBC AUTO: 95.1 FL (ref 78–100)
MONOCYTES # BLD: 0.5 K/UL (ref 0.05–1.2)
MONOCYTES NFR BLD: 11 % (ref 3–10)
NEUTS SEG # BLD: 2 K/UL (ref 1.8–8)
NEUTS SEG NFR BLD: 47 % (ref 40–73)
NRBC # BLD: 0 K/UL (ref 0–0.01)
NRBC BLD-RTO: 0 PER 100 WBC
PLATELET # BLD AUTO: 181 K/UL (ref 135–420)
PMV BLD AUTO: 10.6 FL (ref 9.2–11.8)
POTASSIUM SERPL-SCNC: 3.9 MMOL/L (ref 3.5–5.5)
PROT SERPL-MCNC: 6.3 G/DL (ref 6.4–8.2)
RBC # BLD AUTO: 3.47 M/UL (ref 4.2–5.3)
SODIUM SERPL-SCNC: 140 MMOL/L (ref 136–145)
WBC # BLD AUTO: 4.3 K/UL (ref 4.6–13.2)

## 2024-02-02 PROCEDURE — 85025 COMPLETE CBC W/AUTO DIFF WBC: CPT

## 2024-02-02 PROCEDURE — 80053 COMPREHEN METABOLIC PANEL: CPT

## 2024-02-02 PROCEDURE — 36415 COLL VENOUS BLD VENIPUNCTURE: CPT

## 2024-02-02 PROCEDURE — 83036 HEMOGLOBIN GLYCOSYLATED A1C: CPT

## 2024-02-02 NOTE — PROGRESS NOTES
\"Have you been to the ER, urgent care clinic since your last visit?  Hospitalized since your last visit?\"    NO    “Have you seen or consulted any other health care providers outside of [facility] since your last visit?”    NO    “Have you had a colorectal cancer screening such as a colonoscopy/FIT/Cologuard?    Last FIT 12/09/2022 Due now

## 2024-02-05 ENCOUNTER — HOSPITAL ENCOUNTER (OUTPATIENT)
Facility: HOSPITAL | Age: 69
Discharge: HOME | End: 2024-02-08
Payer: MEDICARE

## 2024-02-05 ENCOUNTER — OFFICE VISIT (OUTPATIENT)
Age: 69
End: 2024-02-05
Payer: MEDICARE

## 2024-02-05 ENCOUNTER — TELEPHONE (OUTPATIENT)
Age: 69
End: 2024-02-05

## 2024-02-05 VITALS
SYSTOLIC BLOOD PRESSURE: 118 MMHG | TEMPERATURE: 98 F | WEIGHT: 180.4 LBS | HEART RATE: 76 BPM | RESPIRATION RATE: 18 BRPM | DIASTOLIC BLOOD PRESSURE: 78 MMHG | BODY MASS INDEX: 30.8 KG/M2 | OXYGEN SATURATION: 99 % | HEIGHT: 64 IN

## 2024-02-05 DIAGNOSIS — Z12.11 COLON CANCER SCREENING: ICD-10-CM

## 2024-02-05 DIAGNOSIS — R73.03 PREDIABETES: ICD-10-CM

## 2024-02-05 DIAGNOSIS — D50.9 IRON DEFICIENCY ANEMIA, UNSPECIFIED IRON DEFICIENCY ANEMIA TYPE: Primary | ICD-10-CM

## 2024-02-05 DIAGNOSIS — D72.819 LEUKOPENIA, UNSPECIFIED TYPE: ICD-10-CM

## 2024-02-05 DIAGNOSIS — K21.9 GASTROESOPHAGEAL REFLUX DISEASE, UNSPECIFIED WHETHER ESOPHAGITIS PRESENT: ICD-10-CM

## 2024-02-05 DIAGNOSIS — D64.9 NORMOCYTIC ANEMIA: ICD-10-CM

## 2024-02-05 DIAGNOSIS — I10 ESSENTIAL HYPERTENSION: Primary | ICD-10-CM

## 2024-02-05 DIAGNOSIS — M67.40 GANGLION CYST: ICD-10-CM

## 2024-02-05 LAB
BASOPHILS # BLD: 0 K/UL (ref 0–0.1)
BASOPHILS NFR BLD: 1 % (ref 0–2)
DIFFERENTIAL METHOD BLD: ABNORMAL
EOSINOPHIL # BLD: 0.2 K/UL (ref 0–0.4)
EOSINOPHIL NFR BLD: 5 % (ref 0–5)
ERYTHROCYTE [DISTWIDTH] IN BLOOD BY AUTOMATED COUNT: 13.3 % (ref 11.6–14.5)
FERRITIN SERPL-MCNC: 11 NG/ML (ref 8–388)
FOLATE SERPL-MCNC: >20 NG/ML (ref 3.1–17.5)
HCT VFR BLD AUTO: 30.7 % (ref 35–45)
HGB BLD-MCNC: 9.7 G/DL (ref 12–16)
IMM GRANULOCYTES # BLD AUTO: 0 K/UL (ref 0–0.04)
IMM GRANULOCYTES NFR BLD AUTO: 0 % (ref 0–0.5)
IRON SATN MFR SERPL: 14 % (ref 20–50)
IRON SERPL-MCNC: 56 UG/DL (ref 50–175)
LYMPHOCYTES # BLD: 1.4 K/UL (ref 0.9–3.6)
LYMPHOCYTES NFR BLD: 38 % (ref 21–52)
MCH RBC QN AUTO: 29.8 PG (ref 24–34)
MCHC RBC AUTO-ENTMCNC: 31.6 G/DL (ref 31–37)
MCV RBC AUTO: 94.5 FL (ref 78–100)
MONOCYTES # BLD: 0.4 K/UL (ref 0.05–1.2)
MONOCYTES NFR BLD: 9 % (ref 3–10)
NEUTS SEG # BLD: 1.7 K/UL (ref 1.8–8)
NEUTS SEG NFR BLD: 47 % (ref 40–73)
NRBC # BLD: 0 K/UL (ref 0–0.01)
NRBC BLD-RTO: 0 PER 100 WBC
PLATELET # BLD AUTO: 184 K/UL (ref 135–420)
PMV BLD AUTO: 10.7 FL (ref 9.2–11.8)
RBC # BLD AUTO: 3.25 M/UL (ref 4.2–5.3)
TIBC SERPL-MCNC: 400 UG/DL (ref 250–450)
VIT B12 SERPL-MCNC: 745 PG/ML (ref 211–911)
WBC # BLD AUTO: 3.7 K/UL (ref 4.6–13.2)

## 2024-02-05 PROCEDURE — 3078F DIAST BP <80 MM HG: CPT | Performed by: FAMILY MEDICINE

## 2024-02-05 PROCEDURE — 1036F TOBACCO NON-USER: CPT | Performed by: FAMILY MEDICINE

## 2024-02-05 PROCEDURE — G8399 PT W/DXA RESULTS DOCUMENT: HCPCS | Performed by: FAMILY MEDICINE

## 2024-02-05 PROCEDURE — G8427 DOCREV CUR MEDS BY ELIG CLIN: HCPCS | Performed by: FAMILY MEDICINE

## 2024-02-05 PROCEDURE — 1090F PRES/ABSN URINE INCON ASSESS: CPT | Performed by: FAMILY MEDICINE

## 2024-02-05 PROCEDURE — 83550 IRON BINDING TEST: CPT

## 2024-02-05 PROCEDURE — 85025 COMPLETE CBC W/AUTO DIFF WBC: CPT

## 2024-02-05 PROCEDURE — 36415 COLL VENOUS BLD VENIPUNCTURE: CPT

## 2024-02-05 PROCEDURE — G8484 FLU IMMUNIZE NO ADMIN: HCPCS | Performed by: FAMILY MEDICINE

## 2024-02-05 PROCEDURE — 1123F ACP DISCUSS/DSCN MKR DOCD: CPT | Performed by: FAMILY MEDICINE

## 2024-02-05 PROCEDURE — 99214 OFFICE O/P EST MOD 30 MIN: CPT | Performed by: FAMILY MEDICINE

## 2024-02-05 PROCEDURE — 3074F SYST BP LT 130 MM HG: CPT | Performed by: FAMILY MEDICINE

## 2024-02-05 PROCEDURE — 83540 ASSAY OF IRON: CPT

## 2024-02-05 PROCEDURE — 3017F COLORECTAL CA SCREEN DOC REV: CPT | Performed by: FAMILY MEDICINE

## 2024-02-05 PROCEDURE — G8417 CALC BMI ABV UP PARAM F/U: HCPCS | Performed by: FAMILY MEDICINE

## 2024-02-05 PROCEDURE — 82746 ASSAY OF FOLIC ACID SERUM: CPT

## 2024-02-05 PROCEDURE — 82607 VITAMIN B-12: CPT

## 2024-02-05 PROCEDURE — 82728 ASSAY OF FERRITIN: CPT

## 2024-02-05 RX ORDER — FERROUS SULFATE 325(65) MG
325 TABLET ORAL
Qty: 90 TABLET | Refills: 0 | Status: SHIPPED | OUTPATIENT
Start: 2024-02-05

## 2024-02-05 ASSESSMENT — PATIENT HEALTH QUESTIONNAIRE - PHQ9
SUM OF ALL RESPONSES TO PHQ9 QUESTIONS 1 & 2: 0
SUM OF ALL RESPONSES TO PHQ QUESTIONS 1-9: 0
2. FEELING DOWN, DEPRESSED OR HOPELESS: 0
SUM OF ALL RESPONSES TO PHQ QUESTIONS 1-9: 0
1. LITTLE INTEREST OR PLEASURE IN DOING THINGS: 0

## 2024-02-05 NOTE — TELEPHONE ENCOUNTER
Pt states that Dr Rosa had asked her about her lips moving while she was in the office and pt called back to let her know that she was not moving her lips on purpose but that she get a feeling of vibration in her lips and she thinks that she needs to drink more water. Please advise. If needed.

## 2024-02-05 NOTE — PROGRESS NOTES
\"Have you been to the ER, urgent care clinic since your last visit?  Hospitalized since your last visit?\"    NO    “Have you seen or consulted any other health care providers outside of [facility] since your last visit?”    NO    “Have you had a colorectal cancer screening such as a colonoscopy/FIT/Cologuard?    Last FIT 12/09/2022 Due now

## 2024-02-05 NOTE — PROGRESS NOTES
\"Have you been to the ER, urgent care clinic since your last visit?  Hospitalized since your last visit?\"    NO    “Have you seen or consulted any other health care providers outside of [facility] since your last visit?”    NO    “Have you had a colorectal cancer screening such as a colonoscopy/FIT/Cologuard?    Last FIT 12/09/2022 Due now    The patient reports she is interested in blood testing for Alzheimer's.

## 2024-02-05 NOTE — PROGRESS NOTES
Theresa Dominguez, 68 y.o.,  female    SUBJECTIVE  Ff-up    HTN- taking norvasc    GERD- responding to prilosec prn, noted anemia and low WBC, no abdominal pain, melena, dysphagia or weight loss.     Prediabetes    Osteopenia- stays active/ca/vit D. dexa 7/2022    Seen for acute bronchitis in ED visit May 2022, symptoms have resolved, CT angio neg PE, noted hilar nodes mildly enlarged. Repeat CT 8/2022 show stable/slightly reduced size. Reviewed CT 6/2023 resolved hilar adenopathy, no further checks    ROS:  See HPI, all others negative        Patient Active Problem List   Diagnosis Code    Prediabetes R73.03    Left knee pain M25.562    Gastroesophageal reflux disease K21.9    Pruritus ani L29.0    Sleep difficulties G47.9    Osteopenia of necks of both femurs M85.851, M85.852    Essential hypertension I10    Achilles tendinitis of right lower extremity M76.61         Current Outpatient Medications:     LIDODERM 5 %, , Disp: , Rfl:     amLODIPine (NORVASC) 5 MG tablet, Take 1 tablet by mouth daily, Disp: 90 tablet, Rfl: 1    montelukast (SINGULAIR) 10 MG tablet, Take 1 tablet by mouth daily, Disp: 90 tablet, Rfl: 3    diclofenac (VOLTAREN) 50 MG EC tablet, Take 1 tablet by mouth in the morning and at bedtime, Disp: 60 tablet, Rfl: 5    triamcinolone (KENALOG) 0.1 % ointment, Apply topically 2 times daily Apply topically 2 times daily., Disp: 45 g, Rfl: 1    Multiple Vitamins-Calcium (ONE-A-DAY WITHIN PO), Take 1 tablet by mouth daily, Disp: , Rfl:     OMEGA-3 FATTY ACIDS-VITAMIN E PO, Take 1 capsule by mouth, Disp: , Rfl:     albuterol sulfate HFA (PROVENTIL;VENTOLIN;PROAIR) 108 (90 Base) MCG/ACT inhaler, Inhale 2 puffs into the lungs every 4 hours as needed, Disp: , Rfl:     omeprazole (PRILOSEC) 20 MG delayed release capsule, Take 1 capsule by mouth daily, Disp: , Rfl:       Allergies   Allergen Reactions    Pcn [Penicillins] Hives       Past Medical History:   Diagnosis Date    Eosinophilic esophagitis

## 2024-02-07 NOTE — TELEPHONE ENCOUNTER
Patient identified with 2 identifiers (name and ).  Patient aware of Dr. Rosa advise and recommendations as follow:  Keep hydrated, anemia can present with unusual cravings, perhaps she is also experiencing. Lets monitor  Patient verbalizes understanding

## 2024-04-15 RX ORDER — AMLODIPINE BESYLATE 5 MG/1
5 TABLET ORAL DAILY
Qty: 90 TABLET | Refills: 1 | Status: SHIPPED | OUTPATIENT
Start: 2024-04-15

## 2024-04-15 NOTE — TELEPHONE ENCOUNTER
This patient contacted office for the following prescriptions to be filled:    Medication requested : amLODIPine (NORVASC) 5 MG tablet QTY 90 Refill 1  PCP: Moe  Pharmacy or Print: [facility]   Mail order or Local pharmacy [address] [facility]    Scheduled appointment if not seen by current providers in office:  LOV 2/5/2024 RODRIGO 7/1/2024

## 2024-04-23 ENCOUNTER — TELEPHONE (OUTPATIENT)
Age: 69
End: 2024-04-23

## 2024-04-23 NOTE — TELEPHONE ENCOUNTER
----- Message from Ne Bragg-SHAKILA Michaud sent at 4/12/2024  9:35 AM EDT -----  Subject: Message to Provider    QUESTIONS  Information for Provider? Needs refill on amLODIPine (NORVASC) 5 MG tablet   to [facility] [phone]. 90 day supply .   ---------------------------------------------------------------------------  --------------  CALL BACK INFO  [phone]; Do not leave any message, patient will call back for answer  ---------------------------------------------------------------------------  --------------  SCRIPT ANSWERS  Relationship to Patient? Self

## 2024-04-23 NOTE — TELEPHONE ENCOUNTER
----- Message from Ne Bragg-SHAKILA Michaud sent at 4/12/2024  9:35 AM EDT -----  Subject: Message to Provider    QUESTIONS  Information for Provider? Needs refill on amLODIPine (NORVASC) 5 MG tablet   to [facility] [phone]. 90 day supply .   ---------------------------------------------------------------------------  --------------  CALL BACK INFO  [phone]; Do not leave any message, patient will call back for answer  ---------------------------------------------------------------------------  --------------  SCRIPT ANSWERS  Relationship to Patient? Self

## 2024-06-10 ENCOUNTER — TELEPHONE (OUTPATIENT)
Facility: CLINIC | Age: 69
End: 2024-06-10

## 2024-06-10 NOTE — TELEPHONE ENCOUNTER
Pt needs to see someone for her right wrist. She states that it has been 2 mo now and just getting worse. Please advise. Pt is aware that Dr Rosa and Nellie are not in the office today and this will be addressed tomorrow.

## 2024-06-20 ENCOUNTER — TELEPHONE (OUTPATIENT)
Facility: CLINIC | Age: 69
End: 2024-06-20

## 2024-06-20 RX ORDER — FLUTICASONE PROPIONATE 50 MCG
2 SPRAY, SUSPENSION (ML) NASAL DAILY
Qty: 16 G | Refills: 0 | Status: SHIPPED | OUTPATIENT
Start: 2024-06-20

## 2024-06-20 RX ORDER — BENZONATATE 100 MG/1
100 CAPSULE ORAL 3 TIMES DAILY PRN
Qty: 30 CAPSULE | Refills: 0 | Status: SHIPPED | OUTPATIENT
Start: 2024-06-20 | End: 2024-06-30

## 2024-06-20 NOTE — TELEPHONE ENCOUNTER
Pt states that she has COVID and is on Paxlovid and she states that she states that it is not helping with head congestion.and coughing  Is there anything that can get called in ? Please advise. [facility]

## 2024-07-02 ENCOUNTER — OFFICE VISIT (OUTPATIENT)
Age: 69
End: 2024-07-02

## 2024-07-02 DIAGNOSIS — M20.30 ACQUIRED VARUS DEFORMITY OF TOE: Primary | ICD-10-CM

## 2024-07-02 DIAGNOSIS — M79.671 RIGHT FOOT PAIN: ICD-10-CM

## 2024-07-02 NOTE — PROGRESS NOTES
AMBULATORY PROGRESS NOTE      Patient: Theresa Dominguez             MRN: [id number]     SSN: [id number] There is no height or weight on file to calculate BMI.  YOB: 1955     AGE: 68 y.o.       EX: female    PCP: Imelda Rosa MD       IMPRESSION //  DIAGNOSIS AND TREATMENT PLAN      Theresa Dominguez has a diagnosis of:      Theresa Dominguez has some varus alignment of the right #3 toe which right #3 toes going starting triangle under the #2 toe when she is standing.  Nonsurgical options consist of digital toe sleeve to help for comfort but does not change alignment but held for comfort with a digital toe sleeve and a #2 toe.  Surgical treatment options would consist of a proximal phalangeal osteotomy at the base of the third metatarsal to improve the coronal alignment of her third toe, and to prevent a #3 toe from going under the #2 toe.  I talked about this option as well.  The recovery period, is to about 6 to 8 weeks for the osteotomy to heal and she will be wearing a hard soled shoe postop and this is usually can be done through M[facility] techniques.    DIAGNOSES    1. Right foot pain          PLAN:    1. Recommended silicone digital toe sleeve for right 3rd toe from local pharmacy       RTO PRN    Orders Placed This Encounter    [[id number]] Foot Min 3V        Patient Instructions    Silicone digital toe sleeve for right 3rd toe from local pharmacy       Please follow up with your PCP for any health maintenance as recommended.         Theresa Dominguez  expresses understanding of the diagnosis, treatment plan, and all of their proposed questions were answered to their satisfaction. Patient education has been provided re the diagnoses.         HPI //  OBJECTIVE EXAMINATION      Theresa Dominguez IS A 68 y.o. female who is a/an  new patient, presenting to my outpatient office for evaluation of  the following chief complaint(s):     Chief Complaint   Patient presents with    Foot Pain     right

## 2024-07-08 ENCOUNTER — OFFICE VISIT (OUTPATIENT)
Age: 69
End: 2024-07-08
Payer: MEDICARE

## 2024-07-08 VITALS — BODY MASS INDEX: 31.71 KG/M2 | WEIGHT: 179 LBS | HEIGHT: 63 IN

## 2024-07-08 DIAGNOSIS — M65.4 DE QUERVAIN'S TENOSYNOVITIS, RIGHT: Primary | ICD-10-CM

## 2024-07-08 DIAGNOSIS — M25.531 RIGHT WRIST PAIN: ICD-10-CM

## 2024-07-08 PROCEDURE — 1090F PRES/ABSN URINE INCON ASSESS: CPT | Performed by: ORTHOPAEDIC SURGERY

## 2024-07-08 PROCEDURE — G8417 CALC BMI ABV UP PARAM F/U: HCPCS | Performed by: ORTHOPAEDIC SURGERY

## 2024-07-08 PROCEDURE — 73110 X-RAY EXAM OF WRIST: CPT | Performed by: ORTHOPAEDIC SURGERY

## 2024-07-08 PROCEDURE — 20550 NJX 1 TENDON SHEATH/LIGAMENT: CPT | Performed by: ORTHOPAEDIC SURGERY

## 2024-07-08 PROCEDURE — 99213 OFFICE O/P EST LOW 20 MIN: CPT | Performed by: ORTHOPAEDIC SURGERY

## 2024-07-08 PROCEDURE — 1036F TOBACCO NON-USER: CPT | Performed by: ORTHOPAEDIC SURGERY

## 2024-07-08 PROCEDURE — 1123F ACP DISCUSS/DSCN MKR DOCD: CPT | Performed by: ORTHOPAEDIC SURGERY

## 2024-07-08 PROCEDURE — G8399 PT W/DXA RESULTS DOCUMENT: HCPCS | Performed by: ORTHOPAEDIC SURGERY

## 2024-07-08 PROCEDURE — G8427 DOCREV CUR MEDS BY ELIG CLIN: HCPCS | Performed by: ORTHOPAEDIC SURGERY

## 2024-07-08 PROCEDURE — 3017F COLORECTAL CA SCREEN DOC REV: CPT | Performed by: ORTHOPAEDIC SURGERY

## 2024-07-08 RX ORDER — LIDOCAINE HYDROCHLORIDE 10 MG/ML
0.5 INJECTION, SOLUTION INFILTRATION; PERINEURAL ONCE
Status: COMPLETED | OUTPATIENT
Start: 2024-07-08 | End: 2024-07-08

## 2024-07-08 RX ADMIN — LIDOCAINE HYDROCHLORIDE 0.5 ML: 10 INJECTION, SOLUTION INFILTRATION; PERINEURAL at 10:08

## 2024-07-08 NOTE — PROGRESS NOTES
Theresa Dominguez is a 69 y.o. female right handed retiree.  Worker's Compensation and legal considerations: none    Chief Complaint   Patient presents with    Wrist Pain     Right wrist pain     Pain Score:   6    HPI: Patient presents today with complaints of right wrist pain localized to the thumb aspect that radiates up the arm.    Date of onset: Approximately March 2024  Injury: No  Prior Treatment:  No    ROS: Review of Systems - General ROS: negative except HPI    Past Medical History:   Diagnosis Date    Eosinophilic esophagitis     started on flvent, dr. blair    Gastric ulcer 04/2017    dr blair, avoid nsaids    Gastritis 12/13/2017    gastritis, cont PPI dr. blair    GERD (gastroesophageal reflux disease)     erosive esophagitis 11/13 EGD    H/O colonoscopy 11/2013    normal    Hypertension     Left knee pain     Morbid obesity (HCC)     Precordial pain     Prediabetes        Past Surgical History:   Procedure Laterality Date    CATARACT REMOVAL Right 10/04/2016    COLONOSCOPY  11/06/2016    GYN      vaginal delivery x 2    ORTHOPEDIC SURGERY      foot surgery        Current Outpatient Medications   Medication Sig Dispense Refill    fluticasone (FLONASE) 50 MCG/ACT nasal spray 2 sprays by Each Nostril route daily 16 g 0    amLODIPine (NORVASC) 5 MG tablet Take 1 tablet by mouth daily 90 tablet 1    ferrous sulfate (IRON 325) 325 (65 Fe) MG tablet Take 1 tablet by mouth daily (with breakfast) 90 tablet 0    LIDODERM 5 %       montelukast (SINGULAIR) 10 MG tablet Take 1 tablet by mouth daily 90 tablet 3    diclofenac (VOLTAREN) 50 MG EC tablet Take 1 tablet by mouth in the morning and at bedtime 60 tablet 5    triamcinolone (KENALOG) 0.1 % ointment Apply topically 2 times daily Apply topically 2 times daily. 45 g 1    Multiple Vitamins-Calcium (ONE-A-DAY WITHIN PO) Take 1 tablet by mouth daily      OMEGA-3 FATTY ACIDS-VITAMIN E PO Take 1 capsule by mouth      albuterol sulfate HFA

## 2024-08-03 ENCOUNTER — HOSPITAL ENCOUNTER (OUTPATIENT)
Facility: HOSPITAL | Age: 69
End: 2024-08-03
Attending: FAMILY MEDICINE
Payer: MEDICARE

## 2024-08-03 VITALS — BODY MASS INDEX: 31.36 KG/M2 | WEIGHT: 177 LBS | HEIGHT: 63 IN

## 2024-08-03 DIAGNOSIS — Z12.31 VISIT FOR SCREENING MAMMOGRAM: ICD-10-CM

## 2024-08-03 PROCEDURE — 77063 BREAST TOMOSYNTHESIS BI: CPT

## 2024-08-13 NOTE — TELEPHONE ENCOUNTER
This patient contacted office for the following prescriptions to be filled:    Medication requested :   Diclofenac Sodium 50 mg  Lidocaine patch 5%  PCP: Moe  Pharmacy or Print: [facility] [place]  Mail order or Local pharmacy [address] [facility]    Scheduled appointment if not seen by current providers in office: lov 2/5/2024 fu 9/23/2024

## 2024-08-15 RX ORDER — LIDOCAINE 5 %
1 ADHESIVE PATCH, MEDICATED TOPICAL DAILY
Qty: 30 PATCH | Refills: 1 | Status: SHIPPED | OUTPATIENT
Start: 2024-08-15

## 2024-08-15 NOTE — TELEPHONE ENCOUNTER
Pt calling in reference to refills for RX Diclofenac Sodium 50 mg and   Lidocaine patch 5% that were requested on 8/13/2024 and need to go to [facility]. please advise. Thank you

## 2024-09-23 ENCOUNTER — OFFICE VISIT (OUTPATIENT)
Facility: CLINIC | Age: 69
End: 2024-09-23
Payer: MEDICARE

## 2024-09-23 VITALS
DIASTOLIC BLOOD PRESSURE: 84 MMHG | OXYGEN SATURATION: 97 % | HEIGHT: 64 IN | HEART RATE: 84 BPM | BODY MASS INDEX: 31.55 KG/M2 | WEIGHT: 184.8 LBS | TEMPERATURE: 97.3 F | SYSTOLIC BLOOD PRESSURE: 130 MMHG | RESPIRATION RATE: 16 BRPM

## 2024-09-23 DIAGNOSIS — I10 ESSENTIAL HYPERTENSION: ICD-10-CM

## 2024-09-23 DIAGNOSIS — R73.03 PREDIABETES: ICD-10-CM

## 2024-09-23 DIAGNOSIS — D72.819 LEUKOPENIA, UNSPECIFIED TYPE: ICD-10-CM

## 2024-09-23 DIAGNOSIS — Z00.00 MEDICARE ANNUAL WELLNESS VISIT, SUBSEQUENT: Primary | ICD-10-CM

## 2024-09-23 DIAGNOSIS — D64.9 ANEMIA, UNSPECIFIED TYPE: ICD-10-CM

## 2024-09-23 DIAGNOSIS — K21.9 GASTROESOPHAGEAL REFLUX DISEASE, UNSPECIFIED WHETHER ESOPHAGITIS PRESENT: ICD-10-CM

## 2024-09-23 DIAGNOSIS — Z78.0 POST-MENOPAUSAL: ICD-10-CM

## 2024-09-23 DIAGNOSIS — R41.3 MEMORY DIFFICULTY: ICD-10-CM

## 2024-09-23 PROCEDURE — G8427 DOCREV CUR MEDS BY ELIG CLIN: HCPCS | Performed by: FAMILY MEDICINE

## 2024-09-23 PROCEDURE — 3017F COLORECTAL CA SCREEN DOC REV: CPT | Performed by: FAMILY MEDICINE

## 2024-09-23 PROCEDURE — G8417 CALC BMI ABV UP PARAM F/U: HCPCS | Performed by: FAMILY MEDICINE

## 2024-09-23 PROCEDURE — G0439 PPPS, SUBSEQ VISIT: HCPCS | Performed by: FAMILY MEDICINE

## 2024-09-23 PROCEDURE — 1090F PRES/ABSN URINE INCON ASSESS: CPT | Performed by: FAMILY MEDICINE

## 2024-09-23 PROCEDURE — 3075F SYST BP GE 130 - 139MM HG: CPT | Performed by: FAMILY MEDICINE

## 2024-09-23 PROCEDURE — 99214 OFFICE O/P EST MOD 30 MIN: CPT | Performed by: FAMILY MEDICINE

## 2024-09-23 PROCEDURE — 1036F TOBACCO NON-USER: CPT | Performed by: FAMILY MEDICINE

## 2024-09-23 PROCEDURE — G8399 PT W/DXA RESULTS DOCUMENT: HCPCS | Performed by: FAMILY MEDICINE

## 2024-09-23 PROCEDURE — 3079F DIAST BP 80-89 MM HG: CPT | Performed by: FAMILY MEDICINE

## 2024-09-23 PROCEDURE — 1123F ACP DISCUSS/DSCN MKR DOCD: CPT | Performed by: FAMILY MEDICINE

## 2024-09-23 SDOH — ECONOMIC STABILITY: FOOD INSECURITY: WITHIN THE PAST 12 MONTHS, YOU WORRIED THAT YOUR FOOD WOULD RUN OUT BEFORE YOU GOT MONEY TO BUY MORE.: NEVER TRUE

## 2024-09-23 SDOH — ECONOMIC STABILITY: FOOD INSECURITY: WITHIN THE PAST 12 MONTHS, THE FOOD YOU BOUGHT JUST DIDN'T LAST AND YOU DIDN'T HAVE MONEY TO GET MORE.: NEVER TRUE

## 2024-09-23 SDOH — ECONOMIC STABILITY: INCOME INSECURITY: HOW HARD IS IT FOR YOU TO PAY FOR THE VERY BASICS LIKE FOOD, HOUSING, MEDICAL CARE, AND HEATING?: NOT HARD AT ALL

## 2024-09-23 ASSESSMENT — LIFESTYLE VARIABLES
HOW MANY STANDARD DRINKS CONTAINING ALCOHOL DO YOU HAVE ON A TYPICAL DAY: PATIENT DOES NOT DRINK
HOW OFTEN DO YOU HAVE A DRINK CONTAINING ALCOHOL: NEVER

## 2024-09-23 ASSESSMENT — PATIENT HEALTH QUESTIONNAIRE - PHQ9
SUM OF ALL RESPONSES TO PHQ9 QUESTIONS 1 & 2: 0
SUM OF ALL RESPONSES TO PHQ QUESTIONS 1-9: 0
1. LITTLE INTEREST OR PLEASURE IN DOING THINGS: NOT AT ALL
SUM OF ALL RESPONSES TO PHQ QUESTIONS 1-9: 0
SUM OF ALL RESPONSES TO PHQ QUESTIONS 1-9: 0
2. FEELING DOWN, DEPRESSED OR HOPELESS: NOT AT ALL
SUM OF ALL RESPONSES TO PHQ QUESTIONS 1-9: 0

## 2024-10-14 RX ORDER — AMLODIPINE BESYLATE 5 MG/1
5 TABLET ORAL DAILY
Qty: 90 TABLET | Refills: 1 | Status: SHIPPED | OUTPATIENT
Start: 2024-10-14

## 2024-10-14 NOTE — TELEPHONE ENCOUNTER
This patient contacted the office for the following prescriptions to be refilled:    Medication requested :   Requested Prescriptions     Pending Prescriptions Disp Refills    amLODIPine (NORVASC) 5 MG tablet 90 tablet 1     Sig: Take 1 tablet by mouth daily        Last Refilled: 4/15/2024    Last Office Visit: 9/23/2024    Next Office Visit: 11/6/2024    Last Labs: ordered 9/23/2024

## 2024-10-14 NOTE — TELEPHONE ENCOUNTER
This patient contacted office for the following prescriptions to be filled:    Medication requested :   amLODIPine (NORVASC) 5 MG tablet QTY 90 Refill 1  PCP: Moe  Pharmacy or Print: [facility]  Mail order or Local pharmacy [address] [facility]     Scheduled appointment if not seen by current providers in office: LOV 9/23/2024 RODRIGO 11/6/2024

## 2024-11-06 ENCOUNTER — HOSPITAL ENCOUNTER (OUTPATIENT)
Facility: HOSPITAL | Age: 69
Discharge: HOME | End: 2024-11-09
Payer: MEDICARE

## 2024-11-06 ENCOUNTER — OFFICE VISIT (OUTPATIENT)
Facility: CLINIC | Age: 69
End: 2024-11-06

## 2024-11-06 VITALS
DIASTOLIC BLOOD PRESSURE: 80 MMHG | HEIGHT: 63 IN | HEART RATE: 60 BPM | WEIGHT: 182 LBS | TEMPERATURE: 97.7 F | BODY MASS INDEX: 32.25 KG/M2 | RESPIRATION RATE: 16 BRPM | OXYGEN SATURATION: 98 % | SYSTOLIC BLOOD PRESSURE: 138 MMHG

## 2024-11-06 DIAGNOSIS — I10 ESSENTIAL HYPERTENSION: ICD-10-CM

## 2024-11-06 DIAGNOSIS — M85.851 OSTEOPENIA OF NECKS OF BOTH FEMURS: ICD-10-CM

## 2024-11-06 DIAGNOSIS — Z12.11 COLON CANCER SCREENING: ICD-10-CM

## 2024-11-06 DIAGNOSIS — K21.9 GASTROESOPHAGEAL REFLUX DISEASE, UNSPECIFIED WHETHER ESOPHAGITIS PRESENT: ICD-10-CM

## 2024-11-06 DIAGNOSIS — R73.03 PREDIABETES: ICD-10-CM

## 2024-11-06 DIAGNOSIS — M85.852 OSTEOPENIA OF NECKS OF BOTH FEMURS: ICD-10-CM

## 2024-11-06 DIAGNOSIS — D64.9 ANEMIA, UNSPECIFIED TYPE: ICD-10-CM

## 2024-11-06 DIAGNOSIS — N64.4 BREAST PAIN, RIGHT: Primary | ICD-10-CM

## 2024-11-06 PROBLEM — L29.0 PRURITUS ANI: Status: RESOLVED | Noted: 2018-02-20 | Resolved: 2024-11-06

## 2024-11-06 PROBLEM — G47.9 SLEEP DIFFICULTIES: Status: RESOLVED | Noted: 2018-11-13 | Resolved: 2024-11-06

## 2024-11-06 PROBLEM — M76.61 ACHILLES TENDINITIS OF RIGHT LOWER EXTREMITY: Status: RESOLVED | Noted: 2020-02-14 | Resolved: 2024-11-06

## 2024-11-06 LAB
ANION GAP SERPL CALC-SCNC: 5 MMOL/L (ref 3–18)
BASOPHILS # BLD: 0 K/UL (ref 0–0.1)
BASOPHILS NFR BLD: 1 % (ref 0–2)
BUN SERPL-MCNC: 17 MG/DL (ref 7–18)
BUN/CREAT SERPL: 17 (ref 12–20)
CALCIUM SERPL-MCNC: 10 MG/DL (ref 8.5–10.1)
CHLORIDE SERPL-SCNC: 107 MMOL/L (ref 100–111)
CO2 SERPL-SCNC: 28 MMOL/L (ref 21–32)
CREAT SERPL-MCNC: 1.01 MG/DL (ref 0.6–1.3)
DIFFERENTIAL METHOD BLD: ABNORMAL
EOSINOPHIL # BLD: 0.1 K/UL (ref 0–0.4)
EOSINOPHIL NFR BLD: 2 % (ref 0–5)
ERYTHROCYTE [DISTWIDTH] IN BLOOD BY AUTOMATED COUNT: 12.5 % (ref 11.6–14.5)
EST. AVERAGE GLUCOSE BLD GHB EST-MCNC: 108 MG/DL
FOLATE SERPL-MCNC: >20 NG/ML (ref 3.1–17.5)
GLUCOSE SERPL-MCNC: 153 MG/DL (ref 74–99)
HBA1C MFR BLD: 5.4 % (ref 4.2–5.6)
HCT VFR BLD AUTO: 39.1 % (ref 35–45)
HGB BLD-MCNC: 12.8 G/DL (ref 12–16)
IMM GRANULOCYTES # BLD AUTO: 0 K/UL (ref 0–0.04)
IMM GRANULOCYTES NFR BLD AUTO: 0 % (ref 0–0.5)
LYMPHOCYTES # BLD: 1.8 K/UL (ref 0.9–3.6)
LYMPHOCYTES NFR BLD: 34 % (ref 21–52)
MCH RBC QN AUTO: 31.9 PG (ref 24–34)
MCHC RBC AUTO-ENTMCNC: 32.7 G/DL (ref 31–37)
MCV RBC AUTO: 97.5 FL (ref 78–100)
MONOCYTES # BLD: 0.4 K/UL (ref 0.05–1.2)
MONOCYTES NFR BLD: 8 % (ref 3–10)
NEUTS SEG # BLD: 2.9 K/UL (ref 1.8–8)
NEUTS SEG NFR BLD: 55 % (ref 40–73)
NRBC # BLD: 0 K/UL (ref 0–0.01)
NRBC BLD-RTO: 0 PER 100 WBC
PLATELET # BLD AUTO: 228 K/UL (ref 135–420)
PMV BLD AUTO: 10.5 FL (ref 9.2–11.8)
POTASSIUM SERPL-SCNC: 3.4 MMOL/L (ref 3.5–5.5)
RBC # BLD AUTO: 4.01 M/UL (ref 4.2–5.3)
SODIUM SERPL-SCNC: 140 MMOL/L (ref 136–145)
TSH SERPL DL<=0.05 MIU/L-ACNC: 0.7 UIU/ML (ref 0.36–3.74)
VIT B12 SERPL-MCNC: 1053 PG/ML (ref 211–911)
WBC # BLD AUTO: 5.2 K/UL (ref 4.6–13.2)

## 2024-11-06 PROCEDURE — 83036 HEMOGLOBIN GLYCOSYLATED A1C: CPT

## 2024-11-06 PROCEDURE — 80048 BASIC METABOLIC PNL TOTAL CA: CPT

## 2024-11-06 PROCEDURE — 82607 VITAMIN B-12: CPT

## 2024-11-06 PROCEDURE — 82746 ASSAY OF FOLIC ACID SERUM: CPT

## 2024-11-06 PROCEDURE — 85025 COMPLETE CBC W/AUTO DIFF WBC: CPT

## 2024-11-06 PROCEDURE — 36415 COLL VENOUS BLD VENIPUNCTURE: CPT

## 2024-11-06 PROCEDURE — 82274 ASSAY TEST FOR BLOOD FECAL: CPT

## 2024-11-06 PROCEDURE — 84443 ASSAY THYROID STIM HORMONE: CPT

## 2024-11-06 NOTE — PROGRESS NOTES
\"Have you been to the ER, urgent care clinic since your last visit?  Hospitalized since your last visit?\"    NO    “Have you seen or consulted any other health care providers outside our system since your last visit?”    NO           
        OBJECTIVE    Physical Exam:   /80 (Site: Right Upper Arm, Position: Sitting, Cuff Size: Large Adult)   Pulse 60   Temp 97.7 °F (36.5 °C) (Temporal)   Resp 16   Ht 1.6 m (5' 3\")   Wt 82.6 kg (182 lb)   SpO2 98%   BMI 32.24 kg/m²         General: alert, well-appearing, AA, in no apparent distress or pain  Neck: supple, no adenopathy palpated  Breasts: breasts appear normal, no suspicious masses, no skin or nipple changes or axillary nodes.  CVS: normal rate, regular rhythm, distinct S1 and S2  Lungs:clear to ausculation bilaterally, no crackles, wheezing or rhonchi noted  Abdomen: normoactive bowel sounds, soft, non-tender  Skin: warm, no lesions, rashes noted  Psych:  mood and affect normal    CMP:   Lab Results   Component Value Date/Time     02/02/2024 03:01 PM    K 3.9 02/02/2024 03:01 PM     02/02/2024 03:01 PM    CO2 26 02/02/2024 03:01 PM    BUN 17 02/02/2024 03:01 PM    CREATININE 0.73 02/02/2024 03:01 PM    GLUCOSE 102 02/02/2024 03:01 PM    CALCIUM 8.9 02/02/2024 03:01 PM    BILITOT 0.7 02/02/2024 03:01 PM    AST 23 02/02/2024 03:01 PM    ALT 27 02/02/2024 03:01 PM        CBC:   Lab Results   Component Value Date/Time    WBC 3.7 02/05/2024 01:25 PM    RBC 3.25 02/05/2024 01:25 PM    HGB 9.7 02/05/2024 01:25 PM    HCT 30.7 02/05/2024 01:25 PM    MCV 94.5 02/05/2024 01:25 PM    MCH 29.8 02/05/2024 01:25 PM    MCHC 31.6 02/05/2024 01:25 PM    RDW 13.3 02/05/2024 01:25 PM     02/05/2024 01:25 PM    MPV 10.7 02/05/2024 01:25 PM        Lipids   Lab Results   Component Value Date/Time    CHOL 185 06/19/2023 12:18 PM    TRIG 84 06/19/2023 12:18 PM     06/19/2023 12:18 PM    CHOLHDLRATIO 1.7 06/19/2023 12:18 PM         Imaging results last 24 hrs :No results found.    Imaging results impression onlyNo results found.   US BREAST LIMITED RIGHT    (Results Pending)   KENNETH DIGITAL DIAGNOSTIC W OR WO CAD RIGHT    (Results Pending)       A1c:   Hemoglobin A1C   Date Value Ref

## 2024-11-07 DIAGNOSIS — E87.6 HYPOKALEMIA: Primary | ICD-10-CM

## 2024-11-08 LAB — HEMOCCULT STL QL IA: NEGATIVE

## 2024-11-18 ENCOUNTER — HOSPITAL ENCOUNTER (OUTPATIENT)
Facility: HOSPITAL | Age: 69
Discharge: HOME | End: 2024-11-21
Payer: MEDICARE

## 2024-11-18 DIAGNOSIS — R73.03 PREDIABETES: ICD-10-CM

## 2024-11-18 DIAGNOSIS — I10 ESSENTIAL HYPERTENSION: Primary | ICD-10-CM

## 2024-11-18 LAB
ANION GAP SERPL CALC-SCNC: 4 MMOL/L (ref 3–18)
BUN SERPL-MCNC: 12 MG/DL (ref 7–18)
BUN/CREAT SERPL: 15 (ref 12–20)
CALCIUM SERPL-MCNC: 9.6 MG/DL (ref 8.5–10.1)
CHLORIDE SERPL-SCNC: 108 MMOL/L (ref 100–111)
CO2 SERPL-SCNC: 29 MMOL/L (ref 21–32)
CREAT SERPL-MCNC: 0.79 MG/DL (ref 0.6–1.3)
GLUCOSE SERPL-MCNC: 105 MG/DL (ref 74–99)
POTASSIUM SERPL-SCNC: 4.2 MMOL/L (ref 3.5–5.5)
SODIUM SERPL-SCNC: 141 MMOL/L (ref 136–145)

## 2024-11-18 PROCEDURE — 80048 BASIC METABOLIC PNL TOTAL CA: CPT

## 2024-11-18 PROCEDURE — 36415 COLL VENOUS BLD VENIPUNCTURE: CPT

## 2024-12-03 ENCOUNTER — HOSPITAL ENCOUNTER (EMERGENCY)
Facility: HOSPITAL | Age: 69
Discharge: HOME | End: 2024-12-03
Attending: EMERGENCY MEDICINE
Payer: MEDICARE

## 2024-12-03 VITALS
DIASTOLIC BLOOD PRESSURE: 63 MMHG | HEART RATE: 74 BPM | BODY MASS INDEX: 31.89 KG/M2 | TEMPERATURE: 97.8 F | WEIGHT: 180 LBS | SYSTOLIC BLOOD PRESSURE: 155 MMHG | RESPIRATION RATE: 18 BRPM | HEIGHT: 63 IN | OXYGEN SATURATION: 97 %

## 2024-12-03 DIAGNOSIS — T65.91XA ACCIDENTAL INGESTION OF SUBSTANCE, INITIAL ENCOUNTER: Primary | ICD-10-CM

## 2024-12-03 PROCEDURE — 99282 EMERGENCY DEPT VISIT SF MDM: CPT

## 2024-12-03 ASSESSMENT — PAIN - FUNCTIONAL ASSESSMENT: PAIN_FUNCTIONAL_ASSESSMENT: NONE - DENIES PAIN

## 2024-12-04 NOTE — ED PROVIDER NOTES
EMERGENCY DEPARTMENT HISTORY AND PHYSICAL EXAM      Date: 12/3/2024  Patient Name: Theresa Dominguez    History of Presenting Illness     Chief Complaint   Patient presents with    Ingestion       History (Context): Theresa Dominguez is a 69 y.o. female with past medical history of hypertension, GERD, prediabetes presents to the ED today with chief complaint of accidental ingestion of fertilizer.  At 5:50 PM today she ate a bite of food with fingers that had touched an object with fertilizer that her neighborhood company sprayed earlier today.  She had touched the fertilizer approximately 8 minutes before eating the food.  She states that as soon as she ate the bites she could taste a foul taste and she tried to spit up the food but swallowed a bite.  She then went to the bathroom and use Listerine mouthwash and swallowed a little bit on purpose in order to \"get the fertilizer\".  After that she drink water.  Since then she has been still able to taste the fertilizer and feels some irritation in her throat, which prompted her to come in.  Patient tried to call the company to find out exactly what the fertilizer was, but could not get in touch.    She denies chest pain, shortness of breath, vision changes, abdominal pain, nausea, vomiting, lower extremity edema, rash.      PCP: Imelda Rosa MD    No current facility-administered medications for this encounter.     Current Outpatient Medications   Medication Sig Dispense Refill    amLODIPine (NORVASC) 5 MG tablet Take 1 tablet by mouth daily 90 tablet 1    diclofenac (VOLTAREN) 50 MG EC tablet Take 1 tablet by mouth in the morning and at bedtime 60 tablet 1    LIDODERM 5 % Place 1 patch onto the skin daily 30 patch 1    fluticasone (FLONASE) 50 MCG/ACT nasal spray 2 sprays by Each Nostril route daily (Patient not taking: Reported on 9/23/2024) 16 g 0    ferrous sulfate (IRON 325) 325 (65 Fe) MG tablet Take 1 tablet by mouth daily (with breakfast) 90 tablet 0

## 2024-12-04 NOTE — ED TRIAGE NOTES
Pt had a fertilizer and weed killer sprayed on her yard. She wiped some off with her hand they ate a couple bites of sandwich and it tasted bad. She did swallow. She used mouthwash after, drank a little of it and then 2 bottles of water. Pt denies any Sx.

## 2024-12-06 ENCOUNTER — HOSPITAL ENCOUNTER (OUTPATIENT)
Facility: HOSPITAL | Age: 69
Discharge: HOME | End: 2024-12-09
Attending: FAMILY MEDICINE
Payer: MEDICARE

## 2024-12-06 DIAGNOSIS — N64.4 BREAST PAIN, RIGHT: ICD-10-CM

## 2024-12-06 PROCEDURE — G0279 TOMOSYNTHESIS, MAMMO: HCPCS

## 2024-12-06 PROCEDURE — 76642 ULTRASOUND BREAST LIMITED: CPT

## 2024-12-19 ENCOUNTER — HOSPITAL ENCOUNTER (OUTPATIENT)
Facility: HOSPITAL | Age: 69
Discharge: HOME | End: 2024-12-22
Attending: FAMILY MEDICINE
Payer: MEDICARE

## 2024-12-19 DIAGNOSIS — Z78.0 POST-MENOPAUSAL: ICD-10-CM

## 2024-12-19 PROCEDURE — 77080 DXA BONE DENSITY AXIAL: CPT

## 2025-02-01 ENCOUNTER — HOSPITAL ENCOUNTER (OUTPATIENT)
Facility: HOSPITAL | Age: 70
Discharge: HOME | End: 2025-02-04
Payer: MEDICARE

## 2025-02-01 DIAGNOSIS — R73.03 PREDIABETES: ICD-10-CM

## 2025-02-01 DIAGNOSIS — I10 ESSENTIAL HYPERTENSION: ICD-10-CM

## 2025-02-01 LAB
ALBUMIN SERPL-MCNC: 4.2 G/DL (ref 3.4–5)
ALBUMIN/GLOB SERPL: 1.3 (ref 0.8–1.7)
ALP SERPL-CCNC: 79 U/L (ref 45–117)
ALT SERPL-CCNC: 32 U/L (ref 13–56)
ANION GAP SERPL CALC-SCNC: 1 MMOL/L (ref 3–18)
AST SERPL-CCNC: 24 U/L (ref 10–38)
BILIRUB SERPL-MCNC: 1.2 MG/DL (ref 0.2–1)
BUN SERPL-MCNC: 15 MG/DL (ref 7–18)
BUN/CREAT SERPL: 21 (ref 12–20)
CALCIUM SERPL-MCNC: 9.5 MG/DL (ref 8.5–10.1)
CHLORIDE SERPL-SCNC: 105 MMOL/L (ref 100–111)
CHOLEST SERPL-MCNC: 198 MG/DL
CO2 SERPL-SCNC: 33 MMOL/L (ref 21–32)
CREAT SERPL-MCNC: 0.71 MG/DL (ref 0.6–1.3)
EST. AVERAGE GLUCOSE BLD GHB EST-MCNC: 128 MG/DL
GLOBULIN SER CALC-MCNC: 3.3 G/DL (ref 2–4)
GLUCOSE SERPL-MCNC: 118 MG/DL (ref 74–99)
HBA1C MFR BLD: 6.1 % (ref 4.2–5.6)
HDLC SERPL-MCNC: 100 MG/DL (ref 40–60)
HDLC SERPL: 2 (ref 0–5)
LDLC SERPL CALC-MCNC: 78.2 MG/DL (ref 0–100)
LIPID PANEL: ABNORMAL
POTASSIUM SERPL-SCNC: 4.1 MMOL/L (ref 3.5–5.5)
PROT SERPL-MCNC: 7.5 G/DL (ref 6.4–8.2)
SODIUM SERPL-SCNC: 139 MMOL/L (ref 136–145)
TRIGL SERPL-MCNC: 99 MG/DL
VLDLC SERPL CALC-MCNC: 19.8 MG/DL

## 2025-02-01 PROCEDURE — 80053 COMPREHEN METABOLIC PANEL: CPT

## 2025-02-01 PROCEDURE — 36415 COLL VENOUS BLD VENIPUNCTURE: CPT

## 2025-02-01 PROCEDURE — 80061 LIPID PANEL: CPT

## 2025-02-01 PROCEDURE — 83036 HEMOGLOBIN GLYCOSYLATED A1C: CPT

## 2025-02-06 ENCOUNTER — OFFICE VISIT (OUTPATIENT)
Facility: CLINIC | Age: 70
End: 2025-02-06

## 2025-02-06 VITALS
TEMPERATURE: 97.8 F | HEIGHT: 63 IN | BODY MASS INDEX: 32.82 KG/M2 | WEIGHT: 185.2 LBS | OXYGEN SATURATION: 98 % | SYSTOLIC BLOOD PRESSURE: 130 MMHG | HEART RATE: 75 BPM | RESPIRATION RATE: 16 BRPM | DIASTOLIC BLOOD PRESSURE: 70 MMHG

## 2025-02-06 DIAGNOSIS — M85.852 OSTEOPENIA OF NECKS OF BOTH FEMURS: ICD-10-CM

## 2025-02-06 DIAGNOSIS — R73.03 PREDIABETES: ICD-10-CM

## 2025-02-06 DIAGNOSIS — K21.9 GASTROESOPHAGEAL REFLUX DISEASE, UNSPECIFIED WHETHER ESOPHAGITIS PRESENT: ICD-10-CM

## 2025-02-06 DIAGNOSIS — I10 ESSENTIAL HYPERTENSION: Primary | ICD-10-CM

## 2025-02-06 DIAGNOSIS — M85.851 OSTEOPENIA OF NECKS OF BOTH FEMURS: ICD-10-CM

## 2025-02-06 SDOH — ECONOMIC STABILITY: FOOD INSECURITY: WITHIN THE PAST 12 MONTHS, YOU WORRIED THAT YOUR FOOD WOULD RUN OUT BEFORE YOU GOT MONEY TO BUY MORE.: NEVER TRUE

## 2025-02-06 SDOH — ECONOMIC STABILITY: FOOD INSECURITY: WITHIN THE PAST 12 MONTHS, THE FOOD YOU BOUGHT JUST DIDN'T LAST AND YOU DIDN'T HAVE MONEY TO GET MORE.: NEVER TRUE

## 2025-02-06 ASSESSMENT — PATIENT HEALTH QUESTIONNAIRE - PHQ9
SUM OF ALL RESPONSES TO PHQ QUESTIONS 1-9: 0
SUM OF ALL RESPONSES TO PHQ9 QUESTIONS 1 & 2: 0
1. LITTLE INTEREST OR PLEASURE IN DOING THINGS: NOT AT ALL
SUM OF ALL RESPONSES TO PHQ QUESTIONS 1-9: 0
2. FEELING DOWN, DEPRESSED OR HOPELESS: NOT AT ALL

## 2025-02-06 NOTE — PROGRESS NOTES
\"Have you been to the ER, urgent care clinic since your last visit?  Hospitalized since your last visit?\"    YES - When: approximately 2 months ago.  Where and Why: [facility] for ingestion .    “Have you seen or consulted any other health care providers outside our system since your last visit?”    NO

## 2025-02-06 NOTE — PROGRESS NOTES
Theresa Dominguez, 69 y.o.,  female    SUBJECTIVE  Ff-up    HTN- taking norvasc    GERD- responding to prilosec prn    Prediabetes- admits to increase in bagel consumption    Osteopenia- stays active/ca/vit D. 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, status monitoring    ROS:  See HPI, all others negative        Patient Active Problem List   Diagnosis Code    Prediabetes R73.03    Left knee pain M25.562    Gastroesophageal reflux disease K21.9    Pruritus ani L29.0    Sleep difficulties G47.9    Osteopenia of necks of both femurs M85.851, M85.852    Essential hypertension I10    Achilles tendinitis of right lower extremity M76.61         Current Outpatient Medications:     amLODIPine (NORVASC) 5 MG tablet, Take 1 tablet by mouth daily, Disp: 90 tablet, Rfl: 1    diclofenac (VOLTAREN) 50 MG EC tablet, Take 1 tablet by mouth in the morning and at bedtime, Disp: 60 tablet, Rfl: 1    LIDODERM 5 %, Place 1 patch onto the skin daily, Disp: 30 patch, Rfl: 1    ferrous sulfate (IRON 325) 325 (65 Fe) MG tablet, Take 1 tablet by mouth daily (with breakfast), Disp: 90 tablet, Rfl: 0    montelukast (SINGULAIR) 10 MG tablet, Take 1 tablet by mouth daily, Disp: 90 tablet, Rfl: 3    Multiple Vitamins-Calcium (ONE-A-DAY WITHIN PO), Take 1 tablet by mouth daily, Disp: , Rfl:     OMEGA-3 FATTY ACIDS-VITAMIN E PO, Take 1 capsule by mouth, Disp: , Rfl:     albuterol sulfate HFA (PROVENTIL;VENTOLIN;PROAIR) 108 (90 Base) MCG/ACT inhaler, Inhale 2 puffs into the lungs every 4 hours as needed, Disp: , Rfl:     omeprazole (PRILOSEC) 20 MG delayed release capsule, Take 1 capsule by mouth daily, Disp: , Rfl:       Allergies   Allergen Reactions    Pcn [Penicillins] Hives       Past Medical History:   Diagnosis Date    Eosinophilic esophagitis     started on flvent, dr. blair    Gastric ulcer 04/2017    dr blair, avoid nsaids    Gastritis 12/13/2017

## 2025-03-28 ENCOUNTER — TELEPHONE (OUTPATIENT)
Facility: CLINIC | Age: 70
End: 2025-03-28

## 2025-03-28 NOTE — TELEPHONE ENCOUNTER
This patient contacted office for the following prescriptions to be filled:    Medication requested : Amlodipine 5mg   PCP:  Dr. Rosa   Pharmacy or Print:  [facility]   Mail order or Local pharmacy [address]     Scheduled appointment if not seen by current providers in office:  Lov  02/06/2025, F/u 09/09/2025

## 2025-03-31 RX ORDER — AMLODIPINE BESYLATE 5 MG/1
5 TABLET ORAL DAILY
Qty: 90 TABLET | Refills: 1 | Status: SHIPPED | OUTPATIENT
Start: 2025-03-31

## 2025-03-31 NOTE — TELEPHONE ENCOUNTER
Pt is calling about her refill request on 3/28/2025 for RX amLODIPine (NORVASC) 5 MG tablet QTY 90

## 2025-04-01 ENCOUNTER — TELEPHONE (OUTPATIENT)
Facility: CLINIC | Age: 70
End: 2025-04-01

## 2025-04-02 NOTE — TELEPHONE ENCOUNTER
Patient identified with 2 identifiers (name and ).  Spoke with patient and she states that she heard on TV that anyone born before 1960 should get MMR booster.   Patient is inquiring on your thoughts?   Please advise

## 2025-04-03 ENCOUNTER — OFFICE VISIT (OUTPATIENT)
Age: 70
End: 2025-04-03

## 2025-04-03 VITALS — BODY MASS INDEX: 32.36 KG/M2 | WEIGHT: 182.6 LBS | HEIGHT: 63 IN

## 2025-04-03 DIAGNOSIS — M65.4 DE QUERVAIN'S TENOSYNOVITIS, RIGHT: Primary | ICD-10-CM

## 2025-04-03 RX ORDER — LIDOCAINE HYDROCHLORIDE 10 MG/ML
0.5 INJECTION, SOLUTION INFILTRATION; PERINEURAL ONCE
Status: COMPLETED | OUTPATIENT
Start: 2025-04-03 | End: 2025-04-03

## 2025-04-03 RX ADMIN — LIDOCAINE HYDROCHLORIDE 0.5 ML: 10 INJECTION, SOLUTION INFILTRATION; PERINEURAL at 08:35

## 2025-04-03 NOTE — PROGRESS NOTES
pain in the joint. Call or return to clinic prn if such symptoms occur or there is failure to improve as anticipated.     Note: This note was completed using voice recognition software.  Any typographical/name errors or mistakes are unintentional.

## 2025-04-07 ENCOUNTER — TELEPHONE (OUTPATIENT)
Age: 70
End: 2025-04-07

## 2025-04-07 DIAGNOSIS — M65.4 DE QUERVAIN'S TENOSYNOVITIS, RIGHT: Primary | ICD-10-CM

## 2025-04-07 NOTE — TELEPHONE ENCOUNTER
PT requested a new brace, PT expressed understanding that insurance may not cover it. PT is requesting a call back if able to put in an order. Pt can be reached at  # [phone]

## 2025-04-08 NOTE — TELEPHONE ENCOUNTER
Patient identified with 2 identifiers (name and ).     She is considered immune to MMR, since born before   No need to vaccine

## 2025-04-29 ENCOUNTER — CLINICAL DOCUMENTATION (OUTPATIENT)
Age: 70
End: 2025-04-29

## 2025-04-29 NOTE — PROGRESS NOTES
Patient arrived today 4/29/25 to obtain a right DeQ brace. Patient is aware that she may receive a bill for the brace given that it has not been a year since she received her last brace.

## 2025-07-14 ENCOUNTER — TRANSCRIBE ORDERS (OUTPATIENT)
Facility: HOSPITAL | Age: 70
End: 2025-07-14

## 2025-07-14 DIAGNOSIS — Z12.31 VISIT FOR SCREENING MAMMOGRAM: Primary | ICD-10-CM

## 2025-08-18 ENCOUNTER — HOSPITAL ENCOUNTER (OUTPATIENT)
Facility: HOSPITAL | Age: 70
Discharge: HOME | End: 2025-08-21
Attending: FAMILY MEDICINE
Payer: MEDICARE

## 2025-08-18 VITALS — HEIGHT: 63 IN | WEIGHT: 176 LBS | BODY MASS INDEX: 31.18 KG/M2

## 2025-08-18 DIAGNOSIS — Z12.31 VISIT FOR SCREENING MAMMOGRAM: ICD-10-CM

## 2025-08-18 PROCEDURE — 77063 BREAST TOMOSYNTHESIS BI: CPT

## 2025-08-25 ENCOUNTER — HOSPITAL ENCOUNTER (OUTPATIENT)
Age: 70
Discharge: HOME | End: 2025-08-25
Payer: MEDICARE

## 2025-08-25 DIAGNOSIS — R73.03 PREDIABETES: ICD-10-CM

## 2025-08-25 DIAGNOSIS — I10 ESSENTIAL HYPERTENSION: ICD-10-CM

## 2025-08-25 LAB
ALBUMIN SERPL-MCNC: 4 G/DL (ref 3.4–5)
ALBUMIN/GLOB SERPL: 1.3 (ref 0.8–1.7)
ALP SERPL-CCNC: 79 U/L (ref 45–117)
ALT SERPL-CCNC: 23 U/L (ref 10–35)
ANION GAP SERPL CALC-SCNC: 11 MMOL/L (ref 3–18)
AST SERPL-CCNC: 32 U/L (ref 10–38)
BILIRUB SERPL-MCNC: 1.1 MG/DL (ref 0.2–1)
BUN SERPL-MCNC: 15 MG/DL (ref 6–23)
BUN/CREAT SERPL: 22 (ref 12–20)
CALCIUM SERPL-MCNC: 10.2 MG/DL (ref 8.5–10.1)
CHLORIDE SERPL-SCNC: 106 MMOL/L (ref 98–107)
CO2 SERPL-SCNC: 26 MMOL/L (ref 21–32)
CREAT SERPL-MCNC: 0.72 MG/DL (ref 0.6–1.3)
EST. AVERAGE GLUCOSE BLD GHB EST-MCNC: 122 MG/DL
GLOBULIN SER CALC-MCNC: 3.2 G/DL (ref 2–4)
GLUCOSE SERPL-MCNC: 123 MG/DL (ref 74–108)
HBA1C MFR BLD: 5.9 % (ref 4.2–5.6)
POTASSIUM SERPL-SCNC: 4.1 MMOL/L (ref 3.5–5.5)
PROT SERPL-MCNC: 7.2 G/DL (ref 6.4–8.2)
SODIUM SERPL-SCNC: 142 MMOL/L (ref 136–145)

## 2025-08-25 PROCEDURE — 80053 COMPREHEN METABOLIC PANEL: CPT

## 2025-08-25 PROCEDURE — 36415 COLL VENOUS BLD VENIPUNCTURE: CPT

## 2025-08-25 PROCEDURE — 83036 HEMOGLOBIN GLYCOSYLATED A1C: CPT

## (undated) DEVICE — (D)LUB GEL SURG SURGLUB 2OZ -- DISC BY MFR USE ITEM 292507

## (undated) DEVICE — (D)PACK ICE DISP -- DISC BY MFR

## (undated) DEVICE — SUT ETHLN 3-0 18IN PS1 BLK --

## (undated) DEVICE — SUTURE MCRYL SZ 3-0 L27IN ABSRB UD L26MM SH 1/2 CIR Y416H

## (undated) DEVICE — NEEDLE SPNL 22GA L3.5IN BLK HUB S STL REG WALL FIT STYL W/

## (undated) DEVICE — TAPE CST LT 4INX4YD FBRGLS WHT --

## (undated) DEVICE — (D)PREP SKN CHLRAPRP APPL 26ML -- CONVERT TO ITEM 371833

## (undated) DEVICE — SUTURE VCRL SZ 2-0 L27IN ABSRB UD L26MM SH 1/2 CIR J417H

## (undated) DEVICE — LOOP VES MAXI RED

## (undated) DEVICE — PACK SURG BSHR TOT KNEE LF

## (undated) DEVICE — BANDAGE,GAUZE,BULKEE II,4.5"X4.1YD,STRL: Brand: MEDLINE

## (undated) DEVICE — ZIMMER® STERILE DISPOSABLE TOURNIQUET CUFF WITH PROTECTIVE SLEEVE AND PLC, DUAL PORT, SINGLE BLADDER, 34 IN. (86 CM)

## (undated) DEVICE — PAD,ABDOMINAL,8"X10",ST,LF: Brand: MEDLINE

## (undated) DEVICE — REM POLYHESIVE ADULT PATIENT RETURN ELECTRODE: Brand: VALLEYLAB

## (undated) DEVICE — SOLUTION IV 1000ML 0.9% SOD CHL

## (undated) DEVICE — SUT ETHLN 3-0 18IN PC5 BLK --

## (undated) DEVICE — Z CONVERTED USE 2274618 CRUTCH ALU ADLT 5.2IN-5.10IN PREM

## (undated) DEVICE — Z DISCONTINUED BY MEDLINE USE 2711682 TRAY SKIN PREP DRY W/ PREM GLV

## (undated) DEVICE — STERILE POLYISOPRENE POWDER-FREE SURGICAL GLOVES: Brand: PROTEXIS

## (undated) DEVICE — FLEX ADVANTAGE 3000CC: Brand: FLEX ADVANTAGE

## (undated) DEVICE — BANDAGE COMPR EXSANGUATION SGL LAYERED NO CLSR 9FT LEN 4IN W

## (undated) DEVICE — DRSG MEPILEX AG 4X4IN -- CONVERT TO ITEM 365990

## (undated) DEVICE — PADDING CAST SOF-ROL 4INX4YD -- NS

## (undated) DEVICE — INTENDED FOR TISSUE SEPARATION, AND OTHER PROCEDURES THAT REQUIRE A SHARP SURGICAL BLADE TO PUNCTURE OR CUT.: Brand: BARD-PARKER ® STAINLESS STEEL BLADES

## (undated) DEVICE — BLANKET WRM W29.9XL79.1IN UP BODY FORC AIR MISTRAL-AIR

## (undated) DEVICE — NEEDLE HYPO 18GA L1.5IN PNK S STL HUB POLYPR SHLD REG BVL

## (undated) DEVICE — GAUZE,SPONGE,4"X4",16PLY,STRL,LF,10/TRAY: Brand: MEDLINE

## (undated) DEVICE — SUTURE VCRL SZ 0 L36IN ABSRB UD L36MM CT-1 1/2 CIR J946H

## (undated) DEVICE — SUTURE VCRL SZ 3-0 L27IN ABSRB UD L26MM SH 1/2 CIR J416H

## (undated) DEVICE — BLADE SAW W9XL31MM THK038MM CUT THK043MM REPL SAG OSC THN

## (undated) DEVICE — GARMENT,MEDLINE,DVT,SEQUENTIAL,CALF,MD: Brand: MEDLINE

## (undated) DEVICE — DISPOSABLE TOURNIQUET CUFF SINGLE BLADDER, DUAL PORT AND QUICK CONNECT CONNECTOR: Brand: COLOR CUFF

## (undated) DEVICE — NEEDLE SUT SZ 2 MAYO 1/2 CIR TAPR PNT DISP

## (undated) DEVICE — BNDG ELAS HK LOOP 4X5YD NS -- MATRIX

## (undated) DEVICE — PADDING CAST W4INXL4YD ST COT COHESIVE HND TEARABLE SPEC

## (undated) DEVICE — BANDAGE COBAN 4 IN COMPR W4INXL5YD FOAM COHESIVE QUIK STK SELF ADH SFT

## (undated) DEVICE — DRESSING,GAUZE,XEROFORM,CURAD,1"X8",ST: Brand: CURAD

## (undated) DEVICE — SYR 10ML LUER LOK 1/5ML GRAD --

## (undated) DEVICE — Device: Brand: PILLOW, GENTLETOUCH, 7 INCH RT